# Patient Record
Sex: FEMALE | Race: WHITE | NOT HISPANIC OR LATINO | Employment: UNEMPLOYED | URBAN - METROPOLITAN AREA
[De-identification: names, ages, dates, MRNs, and addresses within clinical notes are randomized per-mention and may not be internally consistent; named-entity substitution may affect disease eponyms.]

---

## 2017-08-07 ENCOUNTER — OFFICE VISIT (OUTPATIENT)
Dept: RHEUMATOLOGY | Facility: CLINIC | Age: 42
End: 2017-08-07
Payer: COMMERCIAL

## 2017-08-07 VITALS
HEIGHT: 62 IN | DIASTOLIC BLOOD PRESSURE: 76 MMHG | SYSTOLIC BLOOD PRESSURE: 132 MMHG | BODY MASS INDEX: 36.62 KG/M2 | WEIGHT: 199 LBS

## 2017-08-07 DIAGNOSIS — M35.9 CONNECTIVE TISSUE DISEASE, UNDIFFERENTIATED: Primary | ICD-10-CM

## 2017-08-07 LAB
ALBUMIN SERPL-MCNC: 4.2 G/DL (ref 3.1–4.7)
ALP SERPL-CCNC: 81 IU/L (ref 40–104)
ALT (SGPT): 21 IU/L (ref 3–33)
AST SERPL-CCNC: 22 IU/L (ref 10–40)
BASOPHILS NFR BLD: 0 K/UL (ref 0–0.2)
BASOPHILS NFR BLD: 0.4 %
BILIRUB SERPL-MCNC: 0.5 MG/DL (ref 0.3–1)
BUN SERPL-MCNC: 15 MG/DL (ref 8–20)
CALCIUM SERPL-MCNC: 9.1 MG/DL (ref 7.7–10.4)
CHLORIDE: 103 MMOL/L (ref 98–110)
CO2 SERPL-SCNC: 26.6 MMOL/L (ref 22.8–31.6)
CREATININE: 0.52 MG/DL (ref 0.6–1.4)
EOSINOPHIL NFR BLD: 0.1 K/UL (ref 0–0.7)
EOSINOPHIL NFR BLD: 0.9 %
ERYTHROCYTE [DISTWIDTH] IN BLOOD BY AUTOMATED COUNT: 16.7 % (ref 11.7–14.9)
FOLATE SERPL-MCNC: 18 NG/ML (ref 2.2–11.2)
GLUCOSE: 109 MG/DL (ref 70–99)
GRAN #: 3.6 K/UL (ref 1.4–6.5)
GRAN%: 65 %
HCT VFR BLD AUTO: 39.7 % (ref 36–48)
HGB BLD-MCNC: 13 G/DL (ref 12–15)
IMMATURE GRANS (ABS): 0 K/UL (ref 0–1)
IMMATURE GRANULOCYTES: 0.4 %
LYMPH #: 1.5 K/UL (ref 1.2–3.4)
LYMPH%: 26.4 %
MCH RBC QN AUTO: 30 PG (ref 25–35)
MCHC RBC AUTO-ENTMCNC: 32.7 G/DL (ref 31–36)
MCV RBC AUTO: 91.7 FL (ref 79–98)
MONO #: 0.4 K/UL (ref 0.1–0.6)
MONO%: 6.9 %
NUCLEATED RBCS: 0 %
PLATELET # BLD AUTO: 195 K/UL (ref 140–440)
PMV BLD AUTO: 11.5 FL (ref 8.8–12.7)
POTASSIUM SERPL-SCNC: 4.4 MMOL/L (ref 3.5–5)
PROT SERPL-MCNC: 7.7 G/DL (ref 6–8.2)
RBC # BLD AUTO: 4.33 M/UL (ref 3.5–5.5)
SODIUM: 138 MMOL/L (ref 134–144)
URATE SERPL-MCNC: 2.7 MG/DL (ref 2.6–7.8)
VITAMIN B12: 341 PG/ML (ref 62–940)
WBC # BLD AUTO: 5.5 K/UL (ref 5–10)

## 2017-08-07 PROCEDURE — 3008F BODY MASS INDEX DOCD: CPT | Mod: ,,, | Performed by: INTERNAL MEDICINE

## 2017-08-07 PROCEDURE — 99204 OFFICE O/P NEW MOD 45 MIN: CPT | Mod: ,,, | Performed by: INTERNAL MEDICINE

## 2017-08-07 RX ORDER — TRIAMCINOLONE ACETONIDE 1 MG/G
CREAM TOPICAL
COMMUNITY
Start: 2017-07-01 | End: 2017-12-07 | Stop reason: SDUPTHER

## 2017-08-07 RX ORDER — DOXEPIN HYDROCHLORIDE 50 MG/1
CAPSULE ORAL
COMMUNITY
Start: 2017-07-01 | End: 2017-10-30 | Stop reason: SDUPTHER

## 2017-08-07 RX ORDER — PROMETHAZINE HYDROCHLORIDE 12.5 MG/1
12.5 TABLET ORAL
COMMUNITY
Start: 2017-05-31 | End: 2018-01-29 | Stop reason: SDUPTHER

## 2017-08-07 RX ORDER — GLY/DIMETH/PETROLAT,WHT/WATER
CREAM (GRAM) TOPICAL
COMMUNITY
Start: 2017-07-01 | End: 2019-04-29 | Stop reason: SDUPTHER

## 2017-08-07 RX ORDER — POLYETHYLENE GLYCOL 3350 17 G/17G
POWDER, FOR SOLUTION ORAL
COMMUNITY
Start: 2017-06-02 | End: 2017-10-30 | Stop reason: SDUPTHER

## 2017-08-07 RX ORDER — HYDROCORTISONE 1 %
CREAM (GRAM) TOPICAL
COMMUNITY
Start: 2017-07-01 | End: 2018-03-05

## 2017-08-07 RX ORDER — GABAPENTIN 400 MG/1
CAPSULE ORAL
COMMUNITY
Start: 2017-07-01 | End: 2017-10-30

## 2017-08-07 RX ORDER — KETOCONAZOLE 20 MG/G
CREAM TOPICAL
COMMUNITY
Start: 2017-07-01 | End: 2018-03-05

## 2017-08-07 RX ORDER — HYDROXYCHLOROQUINE SULFATE 200 MG/1
400 TABLET, FILM COATED ORAL DAILY
COMMUNITY
Start: 2017-07-01 | End: 2018-04-12 | Stop reason: SDUPTHER

## 2017-08-07 RX ORDER — ATORVASTATIN CALCIUM 40 MG/1
TABLET, FILM COATED ORAL
COMMUNITY
Start: 2017-07-01 | End: 2017-10-30 | Stop reason: SDUPTHER

## 2017-08-07 RX ORDER — MV-MN/IRON/FA/VIT K/K.GINSENG 18-400-25
1 TABLET ORAL DAILY
COMMUNITY
Start: 2017-06-05 | End: 2019-04-29

## 2017-08-07 RX ORDER — OMEPRAZOLE 40 MG/1
CAPSULE, DELAYED RELEASE ORAL
COMMUNITY
Start: 2017-06-22 | End: 2017-10-30 | Stop reason: SDUPTHER

## 2017-08-07 RX ORDER — CONJUGATED ESTROGENS 0.62 MG/G
CREAM VAGINAL
COMMUNITY
Start: 2017-07-01 | End: 2018-01-29

## 2017-08-07 RX ORDER — MUPIROCIN 20 MG/G
OINTMENT TOPICAL
COMMUNITY
Start: 2017-07-01 | End: 2018-03-05

## 2017-08-07 RX ORDER — DICLOFENAC SODIUM 75 MG/1
TABLET, DELAYED RELEASE ORAL
COMMUNITY
Start: 2017-07-01 | End: 2017-10-30 | Stop reason: SDUPTHER

## 2017-08-07 RX ORDER — GENTAMICIN SULFATE 1 MG/G
OINTMENT TOPICAL
COMMUNITY
Start: 2017-06-05 | End: 2017-12-01

## 2017-08-07 RX ORDER — ERGOCALCIFEROL 1.25 MG/1
CAPSULE ORAL
COMMUNITY
Start: 2017-05-26 | End: 2017-10-30 | Stop reason: SDUPTHER

## 2017-08-07 RX ORDER — DICYCLOMINE HYDROCHLORIDE 10 MG/1
10 CAPSULE ORAL 2 TIMES DAILY
COMMUNITY
Start: 2017-05-31 | End: 2018-04-12 | Stop reason: DRUGHIGH

## 2017-08-07 NOTE — PROGRESS NOTES
[facility] RHEUMATOLOGY        NEW PATIENT      Subjective:       Patient ID:   NAME: Promise Medrano : 1975     42 y.o. female    Referring Doc: No ref. provider found  Other Physicians:    Chief Complaint:  new patient (pt was diagnosed with Lupus by Dr. Chambers)      History of Present Illness:     New patient diagnosed by resident physician in [place] with Lupus.  Diagnosed based on positive PRASHANTH 1;320.Diagnosed based on arthralgias in PIPs,R 3 MCP and R trigger finger.  Fatigue for a few yrs.Interrupted sleep,loud snoring,tested yrs ago for sleep apnea but was neg.  No rashes,Photosensitive rash on chest and neck.  Dry mouth ,thinks from meds.No mucosal ulcerations.? Raynaud's        ROS:   GEN: no fevers,+ night sweats ,+ significant weight changes ( gained 20-30 pounds within last 6 months) +  fatigue  HEENT: no HA's, changes in vision , no mouth ulcers, no sicca symptoms, no scalp tenderness, jaw claudication  CV: no CP, SOB, PND, DE [place] ROSA or orthopnea,no palpitations,,Saw cardiolioist ,work up in progress for syncope  PULM:no SOB, cough, hemoptysis, sputum or pleuritic pain.Occ DE LA ROSA  GI: no abdominal pain, nausea, vomiting, constipation, diarrhea, melanotic stools, BRBPR, or hematemesis, no dysphagia,+Fatty liver  : no hematuria, dysuria,Nephrolithiasis.No hx renal dysfunction  NEURO: paresthesias in hands and feet on and off,No  headaches, visual disturbances, muscle weakness  SKIN:  no rashes , erythema, bruising, or swelling, +- Raynauds, +photosensitivity  MUSCULOSKELETAL:no joint swelling except as in HPI, + prolonged AM stiffness in hands lasting an hour no back pain   PSYCH:    Insomnia,  - depression, + anxiety  HEM; no hematologic disorders,thromboembolic episodes or miscarriages  Medications:    Current Outpatient Prescriptions:     atorvastatin (LIPITOR) 40 MG tablet, , Disp: , Rfl:     CENTRUM SPECIALIST ENERGY 18 mg iron-400 mcg-25 mcg-75mg Tab, , Disp: , Rfl:     CETAPHIL MOISTURIZING  "cream, , Disp: , Rfl:     COLLOIDAL OATMEAL (NATURAL OATMEAL BATH TREATMENT TOP), , Disp: , Rfl:     diclofenac (VOLTAREN) 75 MG EC tablet, , Disp: , Rfl:     dicyclomine (BENTYL) 10 MG capsule, , Disp: , Rfl:     doxepin (SINEQUAN) 50 MG capsule, , Disp: , Rfl:     ergocalciferol (ERGOCALCIFEROL) 50,000 unit Cap, , Disp: , Rfl:     gabapentin (NEURONTIN) 400 MG capsule, , Disp: , Rfl:     gentamicin (GARAMYCIN) 0.1 % ointment, , Disp: , Rfl:     hydrocortisone 1 % cream, , Disp: , Rfl:     hydroxychloroquine (PLAQUENIL) 200 mg tablet, 400 mg., Disp: , Rfl:     ibuprofen (ADVIL,MOTRIN) 600 MG tablet, Take 1 tablet (600 mg total) by mouth every 6 (six) hours as needed for Pain., Disp: 20 tablet, Rfl: 0    ketoconazole (NIZORAL) 2 % cream, , Disp: , Rfl:     mupirocin (BACTROBAN) 2 % ointment, , Disp: , Rfl:     omeprazole (PRILOSEC) 40 MG capsule, , Disp: , Rfl:     polyethylene glycol (GLYCOLAX) 17 gram/dose powder, , Disp: , Rfl:     PREMARIN vaginal cream, , Disp: , Rfl:     promethazine (PHENERGAN) 12.5 MG Tab, , Disp: , Rfl:     triamcinolone acetonide 0.1% (KENALOG) 0.1 % cream, , Disp: , Rfl: FAMILY HISTORY: negative for Connective Tissue Disease  FAMILY HX: sister with Lupus,mother with Raynauds    PAST MEDICAL HISTORY:Interstitial Lung Disease 7 yrs ago,lasted 1 1/2 yrs.Was treated with steroid  For one and a half year,DM age 13, low BP                                                Nephrolithiasis  PAST SURGICAL HISTORY:Hysterectomy,Tonsillectomy,C/S,,Breast Augmentation,reemoval fatty tumor in abdomen,    SOCIAL HISTORY:used to work as  for [email or web address] company.Not working outside home for 5 yrs.Lives with  and 2 kids,8,11    ALLERGIES:NKDA          Objective:     Vitals:  Blood pressure 132/76, height 5' 2" (1.575 m), weight 90.3 kg (199 lb).    Physical Examination:   GEN: no apparent distress, comfortable; AAOx3  SKIN: no rashes, no lesions, no sclerodactyly or " induration, no Raynaud's, no periungual erythema  HEAD: normal  EYES: no pallor, no icterus, PERRLA   ENT:  no thrush,no mucosal dryness or ulcerations  NECK: no masses, thyroid normal, trachea midline, no LAD/LN's, supple  CV:   S1 and S2 regular, no murmurs, gallop or rubs  CHEST: Normal respiratory effort;  normal breath sounds; no rubs, no wheezes, no crackles.   ABDOM: nontender and nondistended; soft; ; no rebound/guarding,no masses  MUSC/Skeletal: ROM normal; no crepitus; joints without synovitis, no deformities   EXTREM: no clubbing, cyanosis, edema, normal pulses.  NEURO: grossly intact; motorWNL; AAOx3; no tremors  PSYCH: normal mood, affect and behavior  LYMPH: normal cervical, supraclavicular            Labs:   @RESUFAST(WBC,HGB,HCT,MCV,PLT)  )@RESUFAST(NA,K,CL,CO2,GLU,BUN,Creatinine,Calcium,PROT,Albumin,Bilitot,Alkphos,AST,ALT,PRASHANTH,Sed Rate,CRP,RF,CCP)      Radiology/Diagnostic Studies:    I have reviewed all available labs and XRay reports    Assessment/Plan:   42 y.o. female with positive PRASHANTH,normal CBC,CMP,TSH from a few months ago  Reduce Plaquenil to 200 mg bid  PRASHANTH panel,antithyroid,antiphospholipids etc        PLAN:as above          Discussion:     I have explained all of the above in detail and the patient understands all of the current recommendation(s). I have answered all of their questions to the best of my ability and to their complete satisfaction.      I have reviewed the risks and benefits of the medication in detail with patient, who understands and wishes to proceed. Printed information regarding the disease and/or medication was also provided.        RTC 4 months      Electronically signed by Jonnathan James MD

## 2017-08-08 LAB
C3 SERPL-MCNC: 136 MG/DL (ref 82–167)
C4 NEF SERPL-MCNC: 17 MG/DL (ref 14–44)
HCV AB SERPL QL IA: <0.1 S/CO RATIO (ref 0–0.9)

## 2017-10-30 ENCOUNTER — OFFICE VISIT (OUTPATIENT)
Dept: FAMILY MEDICINE | Facility: CLINIC | Age: 42
End: 2017-10-30
Payer: MEDICAID

## 2017-10-30 VITALS
TEMPERATURE: 98 F | RESPIRATION RATE: 18 BRPM | OXYGEN SATURATION: 99 % | HEART RATE: 63 BPM | BODY MASS INDEX: 35.33 KG/M2 | WEIGHT: 192 LBS | DIASTOLIC BLOOD PRESSURE: 78 MMHG | HEIGHT: 62 IN | SYSTOLIC BLOOD PRESSURE: 125 MMHG

## 2017-10-30 DIAGNOSIS — K21.9 GERD WITHOUT ESOPHAGITIS: ICD-10-CM

## 2017-10-30 DIAGNOSIS — R10.11 RUQ PAIN: Primary | ICD-10-CM

## 2017-10-30 DIAGNOSIS — M32.8 OTHER FORMS OF SYSTEMIC LUPUS ERYTHEMATOSUS, UNSPECIFIED ORGAN INVOLVEMENT STATUS: ICD-10-CM

## 2017-10-30 DIAGNOSIS — E55.9 VITAMIN D DEFICIENCY: ICD-10-CM

## 2017-10-30 DIAGNOSIS — R55 VASOVAGAL SYNCOPE: ICD-10-CM

## 2017-10-30 DIAGNOSIS — Z98.84 HISTORY OF DIABETES MELLITUS RESOLVED FOLLOWING BARIATRIC SURGERY: ICD-10-CM

## 2017-10-30 DIAGNOSIS — E78.2 MIXED HYPERLIPIDEMIA: ICD-10-CM

## 2017-10-30 DIAGNOSIS — Z98.84 HISTORY OF BARIATRIC SURGERY: ICD-10-CM

## 2017-10-30 DIAGNOSIS — F51.04 CHRONIC INSOMNIA: ICD-10-CM

## 2017-10-30 DIAGNOSIS — Z86.39 HISTORY OF DIABETES MELLITUS RESOLVED FOLLOWING BARIATRIC SURGERY: ICD-10-CM

## 2017-10-30 DIAGNOSIS — Z72.0 TOBACCO ABUSE: ICD-10-CM

## 2017-10-30 DIAGNOSIS — K58.1 IRRITABLE BOWEL SYNDROME WITH CONSTIPATION: ICD-10-CM

## 2017-10-30 DIAGNOSIS — G62.9 PERIPHERAL POLYNEUROPATHY: ICD-10-CM

## 2017-10-30 PROCEDURE — 99204 OFFICE O/P NEW MOD 45 MIN: CPT | Mod: ,,, | Performed by: INTERNAL MEDICINE

## 2017-10-30 RX ORDER — VARENICLINE TARTRATE 1 MG/1
1 TABLET, FILM COATED ORAL 2 TIMES DAILY
Qty: 60 TABLET | Refills: 3 | Status: SHIPPED | OUTPATIENT
Start: 2017-10-30 | End: 2018-04-12

## 2017-10-30 RX ORDER — OMEPRAZOLE 40 MG/1
40 CAPSULE, DELAYED RELEASE ORAL EVERY MORNING
Qty: 30 CAPSULE | Refills: 5 | Status: SHIPPED | OUTPATIENT
Start: 2017-10-30 | End: 2019-03-21 | Stop reason: SDUPTHER

## 2017-10-30 RX ORDER — ERGOCALCIFEROL 1.25 MG/1
50000 CAPSULE ORAL
Qty: 4 CAPSULE | Refills: 5 | Status: SHIPPED | OUTPATIENT
Start: 2017-10-30 | End: 2018-06-20 | Stop reason: SDUPTHER

## 2017-10-30 RX ORDER — DULOXETIN HYDROCHLORIDE 30 MG/1
30 CAPSULE, DELAYED RELEASE ORAL NIGHTLY
Qty: 30 CAPSULE | Refills: 2 | Status: SHIPPED | OUTPATIENT
Start: 2017-10-30 | End: 2017-12-01 | Stop reason: SDUPTHER

## 2017-10-30 RX ORDER — POLYETHYLENE GLYCOL 3350 17 G/17G
POWDER, FOR SOLUTION ORAL
Qty: 510 G | Refills: 5 | Status: SHIPPED | OUTPATIENT
Start: 2017-10-30 | End: 2018-08-28

## 2017-10-30 RX ORDER — DICLOFENAC SODIUM 75 MG/1
75 TABLET, DELAYED RELEASE ORAL 2 TIMES DAILY PRN
Qty: 60 TABLET | Refills: 3 | Status: SHIPPED | OUTPATIENT
Start: 2017-10-30 | End: 2018-03-15 | Stop reason: SDUPTHER

## 2017-10-30 RX ORDER — DOXEPIN HYDROCHLORIDE 50 MG/1
50 CAPSULE ORAL NIGHTLY
Qty: 30 CAPSULE | Refills: 5 | Status: SHIPPED | OUTPATIENT
Start: 2017-10-30 | End: 2018-10-08 | Stop reason: SDUPTHER

## 2017-10-30 RX ORDER — VARENICLINE TARTRATE 0.5 (11)-1
KIT ORAL
COMMUNITY
Start: 2017-10-24 | End: 2017-10-30

## 2017-10-30 RX ORDER — ATORVASTATIN CALCIUM 40 MG/1
40 TABLET, FILM COATED ORAL DAILY
Qty: 30 TABLET | Refills: 5 | Status: SHIPPED | OUTPATIENT
Start: 2017-10-30 | End: 2018-04-12 | Stop reason: DRUGHIGH

## 2017-10-30 NOTE — ASSESSMENT & PLAN NOTE
Change from gabapentin to cymbalta. Will obtain routine gastric bypass labs, consider further w/u for neuropathy.

## 2017-10-30 NOTE — PROGRESS NOTES
"                                                NEW ADULT PATIENT NOTE    Subjective:     Chief Complaint:  Establish Care      History of Present Illness:   Promise Medrano is a 42 y.o. female here to establish care.   Lupus- Dx by resident at [place]. Now seeing Dr. James. Had labs done 8/2017.   RUQ pain- Pt c/o RUQ pain with bending over, squatting or pressing on belly x 3 years.  Not assoicated with eating.  Has been complaining of this for years, states she hwas told it was a hernia and had surgery but that didn't fix the pain. She was also told she has a fatty liver, not sure if that could be causing the pain.  +h/o IBS with constipation, doesn't take miralax regularly.   Neuropathic pain- Pt has tingling pain in B hands and feet, unclear if work up has been done to determine the etiology of this. She did have DM from age 13 until she had gastric bypass surgery.    Recurrent syncope- Pt recently saw Dr. Wilks and had + tilt table test in which her "heart stopped." Was told she has vasovagal syncope.      Past Medical History:   Diagnosis Date    Allergy     Arthritis     COPD (chronic obstructive pulmonary disease)     Diabetes mellitus     GERD (gastroesophageal reflux disease)     Neuromuscular disorder        Family History   Problem Relation Age of Onset    Early death Father     Heart disease Father     Stroke Father     Diabetes Paternal Grandmother        Social History     Social History    Marital status: Single     Spouse name: N/A    Number of children: N/A    Years of education: N/A     Occupational History    Not on file.     Social History Main Topics    Smoking status: Current Every Day Smoker     Packs/day: 1.00    Smokeless tobacco: Not on file    Alcohol use Yes    Drug use: No    Sexual activity: Yes     Partners: Male     Birth control/ protection: None     Other Topics Concern    Not on file     Social History Narrative    No narrative on file       ROS:  Review of " Systems   Constitutional: Negative for activity change, appetite change, fatigue and unexpected weight change.   HENT: Negative for congestion, ear pain, rhinorrhea, sinus pain, sinus pressure, sore throat and trouble swallowing.    Eyes: Negative for pain, redness and visual disturbance.   Respiratory: Negative for cough, chest tightness, shortness of breath and wheezing.    Cardiovascular: Negative for chest pain and palpitations.   Gastrointestinal: Positive for abdominal pain and constipation. Negative for diarrhea, nausea and vomiting.   Endocrine: Negative for cold intolerance, heat intolerance, polydipsia and polyuria.   Genitourinary: Negative for difficulty urinating, dysuria, flank pain, frequency, pelvic pain, vaginal bleeding and vaginal discharge.   Musculoskeletal: Positive for arthralgias and joint swelling.   Neurological: Positive for dizziness and syncope. Negative for seizures, weakness and headaches.   Hematological: Negative for adenopathy.   Psychiatric/Behavioral: Negative for confusion, dysphoric mood and suicidal ideas. The patient is not nervous/anxious.           Current Outpatient Prescriptions:     atorvastatin (LIPITOR) 40 MG tablet, Take 1 tablet (40 mg total) by mouth once daily., Disp: 30 tablet, Rfl: 5    CENTRUM SPECIALIST ENERGY 18 mg iron-400 mcg-25 mcg-75mg Tab, , Disp: , Rfl:     CETAPHIL MOISTURIZING cream, , Disp: , Rfl:     COLLOIDAL OATMEAL (NATURAL OATMEAL BATH TREATMENT TOP), , Disp: , Rfl:     diclofenac (VOLTAREN) 75 MG EC tablet, Take 1 tablet (75 mg total) by mouth 2 (two) times daily as needed (joint pain)., Disp: 60 tablet, Rfl: 3    dicyclomine (BENTYL) 10 MG capsule, , Disp: , Rfl:     doxepin (SINEQUAN) 50 MG capsule, Take 1 capsule (50 mg total) by mouth nightly., Disp: 30 capsule, Rfl: 5    DULoxetine (CYMBALTA) 30 MG capsule, Take 1 capsule (30 mg total) by mouth every evening., Disp: 30 capsule, Rfl: 2    ergocalciferol (ERGOCALCIFEROL) 50,000 unit  "Cap, Take 1 capsule (50,000 Units total) by mouth every 7 days., Disp: 4 capsule, Rfl: 5    hydrocortisone 1 % cream, , Disp: , Rfl:     hydroxychloroquine (PLAQUENIL) 200 mg tablet, 400 mg., Disp: , Rfl:     ketoconazole (NIZORAL) 2 % cream, , Disp: , Rfl:     mupirocin (BACTROBAN) 2 % ointment, , Disp: , Rfl:     omeprazole (PRILOSEC) 40 MG capsule, Take 1 capsule (40 mg total) by mouth every morning., Disp: 30 capsule, Rfl: 5    polyethylene glycol (GLYCOLAX) 17 gram/dose powder, Mix 17g (1 capful) with 6 oz liquid and take by mouth daily., Disp: 510 g, Rfl: 5    PREMARIN vaginal cream, , Disp: , Rfl:     promethazine (PHENERGAN) 12.5 MG Tab, , Disp: , Rfl:     triamcinolone acetonide 0.1% (KENALOG) 0.1 % cream, , Disp: , Rfl:     urea 40 % Lotn lotion, , Disp: , Rfl:     varenicline (CHANTIX) 1 mg Tab, Take 1 tablet (1 mg total) by mouth 2 (two) times daily., Disp: 60 tablet, Rfl: 3        Objective:       Physical Examination:     /78 (BP Location: Left arm, Patient Position: Sitting, BP Method: Large (Manual))   Pulse 63   Temp 97.8 °F (36.6 °C) (Oral)   Resp 18   Ht 5' 2" (1.575 m)   Wt 87.1 kg (192 lb)   SpO2 99%   BMI 35.12 kg/m²     Physical Exam   Constitutional: She is oriented to person, place, and time. She appears well-developed and well-nourished. No distress.   HENT:   Head: Normocephalic and atraumatic.   Nose: Nose normal.   Mouth/Throat: Oropharynx is clear and moist.   Eyes: Conjunctivae and EOM are normal. Pupils are equal, round, and reactive to light.   Neck: Normal range of motion. Neck supple. No thyromegaly present.   Cardiovascular: Normal rate, regular rhythm, normal heart sounds and intact distal pulses.    No murmur heard.  Pulmonary/Chest: Effort normal and breath sounds normal.   Abdominal: Soft. Bowel sounds are normal. She exhibits no distension and no mass. There is tenderness (diffuse). There is no guarding.   Musculoskeletal: She exhibits no edema. "   Lymphadenopathy:     She has no cervical adenopathy.   Neurological: She is alert and oriented to person, place, and time.   Skin: Skin is warm and dry.         Assessment/Plan:   Promise is a 42 y.o. female here to establish care.     Problem List Items Addressed This Visit        Neuro    Peripheral polyneuropathy    Current Assessment & Plan     Change from gabapentin to cymbalta. Will obtain routine gastric bypass labs, consider further w/u for neuropathy.          Relevant Medications    DULoxetine (CYMBALTA) 30 MG capsule       Cardiac/Vascular    Mixed hyperlipidemia    Current Assessment & Plan     Continue atorvastatin. Check lipids.          Relevant Medications    atorvastatin (LIPITOR) 40 MG tablet    Other Relevant Orders    Lipid panel (Completed)       Immunology/Multi System    Other forms of systemic lupus erythematosus    Current Assessment & Plan     F/u with Dr. James. ON plaquenil. Recent labs did not indicate active disease.          Relevant Medications    diclofenac (VOLTAREN) 75 MG EC tablet       Endocrine    Vitamin D deficiency    Current Assessment & Plan     Continue vitamin D supplementation. Recheck level.          Relevant Medications    ergocalciferol (ERGOCALCIFEROL) 50,000 unit Cap    Other Relevant Orders    Vitamin D (Completed)    History of bariatric surgery    Current Assessment & Plan     Check CBC, iron panel, zinc, copper, CMP, B12, folate.          Relevant Orders    Comprehensive metabolic panel (Completed)    CBC auto differential (Completed)    Copper, serum (Completed)    Folate (Completed)    Vitamin B12 (Completed)    Iron and TIBC (Completed)    Ferritin (Completed)    Zinc (Completed)    History of diabetes mellitus resolved following bariatric surgery    Current Assessment & Plan     Check HbA1c         Relevant Orders    Hemoglobin A1c (Completed)       GI    GERD without esophagitis    Current Assessment & Plan     Continue PPI.         RUQ pain - Primary     Current Assessment & Plan     Ddx includes fatty liver, constipation, IBS.  Recent colonoscopy WNL. Check RUQ u/s.          Relevant Orders    US Abdomen Limited (Completed)    Irritable bowel syndrome with constipation    Current Assessment & Plan     Advised daily miralax. May consider amitiza or linzess.          Relevant Medications    polyethylene glycol (GLYCOLAX) 17 gram/dose powder       Other    Chronic insomnia    Current Assessment & Plan     Continue doxepin.         Relevant Medications    doxepin (SINEQUAN) 50 MG capsule    Tobacco abuse    Current Assessment & Plan     Rx chantix.         Relevant Medications    varenicline (CHANTIX) 1 mg Tab    Vasovagal syncope    Current Assessment & Plan     Will obtain cardiology notes and testing done.                Health Maintenance   Topic Date Due    TETANUS VACCINE  07/12/1994    Pneumococcal PPSV23 (Medium Risk) (1) 07/12/1994    Mammogram  07/12/2016    Influenza Vaccine  Completed    Lipid Panel  Completed       Discussion:     Return in about 1 month (around 11/30/2017) for f/u lab results.    Goals     None          Electronically signed by Lexy Adamson

## 2017-11-20 LAB
% SATURATION: 10 %
ALBUMIN SERPL-MCNC: 4.1 G/DL (ref 3.1–4.7)
ALP SERPL-CCNC: 80 IU/L (ref 40–104)
ALT (SGPT): 13 IU/L (ref 3–33)
AST SERPL-CCNC: 17 IU/L (ref 10–40)
BASOPHILS NFR BLD: 0 K/UL (ref 0–0.2)
BASOPHILS NFR BLD: 0.7 %
BILIRUB SERPL-MCNC: 0.6 MG/DL (ref 0.3–1)
BUN SERPL-MCNC: 15 MG/DL (ref 8–20)
CALCIUM SERPL-MCNC: 8.9 MG/DL (ref 7.7–10.4)
CHLORIDE: 106 MMOL/L (ref 98–110)
CO2 SERPL-SCNC: 26.6 MMOL/L (ref 22.8–31.6)
CREATININE: 0.54 MG/DL (ref 0.6–1.4)
EOSINOPHIL NFR BLD: 0 K/UL (ref 0–0.7)
EOSINOPHIL NFR BLD: 0.5 %
ERYTHROCYTE [DISTWIDTH] IN BLOOD BY AUTOMATED COUNT: 15.8 % (ref 11.7–14.9)
FERRITIN SERPL-MCNC: 12 NG/ML (ref 24–162)
FOLATE SERPL-MCNC: 18.1 NG/ML (ref 2.2–11.2)
GLUCOSE: 87 MG/DL (ref 70–99)
GRAN #: 2.2 K/UL (ref 1.4–6.5)
GRAN%: 53.7 %
HBA1C MFR BLD: 6 % (ref 3.1–6.5)
HCT VFR BLD AUTO: 38.9 % (ref 36–48)
HGB BLD-MCNC: 12.8 G/DL (ref 12–15)
IMMATURE GRANS (ABS): 0 K/UL (ref 0–1)
IMMATURE GRANULOCYTES: 0.2 %
IRON: 44 MCG/DL (ref 24–162)
LYMPH #: 1.5 K/UL (ref 1.2–3.4)
LYMPH%: 37.5 %
MCH RBC QN AUTO: 29.8 PG (ref 25–35)
MCHC RBC AUTO-ENTMCNC: 32.9 G/DL (ref 31–36)
MCV RBC AUTO: 90.5 FL (ref 79–98)
MONO #: 0.3 K/UL (ref 0.1–0.6)
MONO%: 7.4 %
NUCLEATED RBCS: 0 %
PLATELET # BLD AUTO: 200 K/UL (ref 140–440)
PMV BLD AUTO: 11.2 FL (ref 8.8–12.7)
POTASSIUM SERPL-SCNC: 4 MMOL/L (ref 3.5–5)
PROT SERPL-MCNC: 7.5 G/DL (ref 6–8.2)
RBC # BLD AUTO: 4.3 M/UL (ref 3.5–5.5)
SODIUM: 140 MMOL/L (ref 134–144)
TOTAL IRON BINDING CAPACITY: 421 MCG/DL (ref 177–435)
VITAMIN B12: 230 PG/ML (ref 62–940)
VITAMIN D, 1,25 (OH)2: 67.5 NG/ML (ref 30–100)
WBC # BLD AUTO: 4 K/UL (ref 5–10)

## 2017-11-22 ENCOUNTER — PATIENT MESSAGE (OUTPATIENT)
Dept: FAMILY MEDICINE | Facility: CLINIC | Age: 42
End: 2017-11-22

## 2017-12-01 ENCOUNTER — OFFICE VISIT (OUTPATIENT)
Dept: FAMILY MEDICINE | Facility: CLINIC | Age: 42
End: 2017-12-01
Payer: MEDICAID

## 2017-12-01 VITALS
OXYGEN SATURATION: 98 % | WEIGHT: 194 LBS | SYSTOLIC BLOOD PRESSURE: 111 MMHG | DIASTOLIC BLOOD PRESSURE: 76 MMHG | BODY MASS INDEX: 35.48 KG/M2 | RESPIRATION RATE: 18 BRPM | HEART RATE: 72 BPM | TEMPERATURE: 98 F

## 2017-12-01 DIAGNOSIS — R09.82 POST-NASAL DRIP: ICD-10-CM

## 2017-12-01 DIAGNOSIS — M32.8 OTHER FORMS OF SYSTEMIC LUPUS ERYTHEMATOSUS, UNSPECIFIED ORGAN INVOLVEMENT STATUS: ICD-10-CM

## 2017-12-01 DIAGNOSIS — Z98.84 HISTORY OF BARIATRIC SURGERY: ICD-10-CM

## 2017-12-01 DIAGNOSIS — E61.1 IRON DEFICIENCY: ICD-10-CM

## 2017-12-01 DIAGNOSIS — G62.9 PERIPHERAL POLYNEUROPATHY: ICD-10-CM

## 2017-12-01 DIAGNOSIS — K58.1 IRRITABLE BOWEL SYNDROME WITH CONSTIPATION: ICD-10-CM

## 2017-12-01 DIAGNOSIS — E78.2 MIXED HYPERLIPIDEMIA: ICD-10-CM

## 2017-12-01 DIAGNOSIS — Z86.39 HISTORY OF DIABETES MELLITUS RESOLVED FOLLOWING BARIATRIC SURGERY: ICD-10-CM

## 2017-12-01 DIAGNOSIS — R10.11 RUQ PAIN: ICD-10-CM

## 2017-12-01 DIAGNOSIS — Z23 NEED FOR DIPHTHERIA-TETANUS-PERTUSSIS (TDAP) VACCINE: ICD-10-CM

## 2017-12-01 DIAGNOSIS — K64.9 HEMORRHOIDS, UNSPECIFIED HEMORRHOID TYPE: ICD-10-CM

## 2017-12-01 DIAGNOSIS — Z98.84 HISTORY OF DIABETES MELLITUS RESOLVED FOLLOWING BARIATRIC SURGERY: ICD-10-CM

## 2017-12-01 DIAGNOSIS — Z72.0 TOBACCO ABUSE: Primary | ICD-10-CM

## 2017-12-01 PROCEDURE — 90715 TDAP VACCINE 7 YRS/> IM: CPT | Mod: ,,, | Performed by: INTERNAL MEDICINE

## 2017-12-01 PROCEDURE — 99214 OFFICE O/P EST MOD 30 MIN: CPT | Mod: 25,,, | Performed by: INTERNAL MEDICINE

## 2017-12-01 PROCEDURE — 90471 IMMUNIZATION ADMIN: CPT | Mod: ,,, | Performed by: INTERNAL MEDICINE

## 2017-12-01 RX ORDER — UREA 40 G/100G
LOTION TOPICAL
COMMUNITY
Start: 2017-11-16 | End: 2021-01-06

## 2017-12-01 RX ORDER — FERROUS SULFATE 325(65) MG
325 TABLET ORAL DAILY
Qty: 30 TABLET | Refills: 5 | COMMUNITY
Start: 2017-12-01 | End: 2018-01-29 | Stop reason: SDUPTHER

## 2017-12-01 RX ORDER — FLUTICASONE PROPIONATE 50 MCG
1 SPRAY, SUSPENSION (ML) NASAL DAILY
Qty: 1 BOTTLE | Refills: 3 | Status: SHIPPED | OUTPATIENT
Start: 2017-12-01 | End: 2018-03-27 | Stop reason: SDUPTHER

## 2017-12-01 RX ORDER — HYDROCORTISONE ACETATE 25 MG/1
25 SUPPOSITORY RECTAL 2 TIMES DAILY
Qty: 20 SUPPOSITORY | Refills: 5 | Status: SHIPPED | OUTPATIENT
Start: 2017-12-01 | End: 2017-12-11

## 2017-12-01 RX ORDER — DULOXETIN HYDROCHLORIDE 60 MG/1
60 CAPSULE, DELAYED RELEASE ORAL NIGHTLY
Qty: 30 CAPSULE | Refills: 5 | Status: SHIPPED | OUTPATIENT
Start: 2017-12-01 | End: 2018-03-05

## 2017-12-01 NOTE — ASSESSMENT & PLAN NOTE
Increase cymbalta to 60mg. Check MMA since Vitamin B12 was borderline low.  Rest of labs were WNL.

## 2017-12-01 NOTE — ASSESSMENT & PLAN NOTE
F/u with Dr. James. Continue plaquenil. Recent labs did not indicate active disease.   Will d/w Dr. James starting pt on HRT in setting of SLE.

## 2017-12-01 NOTE — PROGRESS NOTES
ADULT FOLLOW-UP VISIT    Subjective:     Chief Complaint:  Follow-up      History of Present Illness:   Promise Medrano is a 42 y.o. female who presents for follow-up of medical issues.     Hemorrhoids- Pt c/o hemorrhoids recently since constipation worsened (though it has been better with daily miralax).  HRT- Pt reports that she had a full hysterectomy at age 33 and was on HRT with [facility] until last year when she was diagnosed with SLE and her PCP took her off.  She has been using premarin cream but would like to restart systemic therapy if possible.   Polyneuropathy- Labs showed mild iron deficiency and borderline low B12.  Pt reports that cymbalta is working for her neuropathy and she prefers it to the gabapentin.  She has been having some increased anxiety since stopping the gabapentin, though.  No side effects noted on cymbalta.      Past medical history, family history and social history are reviewed and there are no significant changes.     ROS:  Review of Systems   HENT: Positive for postnasal drip and rhinorrhea.    Eyes: Positive for visual disturbance (blurry vision off and on, pt has astigmatism).   Gastrointestinal: Positive for constipation and diarrhea. Negative for nausea and vomiting.   Neurological: Negative for tremors, syncope, light-headedness and headaches.   Psychiatric/Behavioral: Negative for confusion, dysphoric mood and sleep disturbance. The patient is nervous/anxious.           Current Outpatient Prescriptions:     atorvastatin (LIPITOR) 40 MG tablet, Take 1 tablet (40 mg total) by mouth once daily., Disp: 30 tablet, Rfl: 5    CENTRUM SPECIALIST ENERGY 18 mg iron-400 mcg-25 mcg-75mg Tab, , Disp: , Rfl:     CETAPHIL MOISTURIZING cream, , Disp: , Rfl:     COLLOIDAL OATMEAL (NATURAL OATMEAL BATH TREATMENT TOP), , Disp: , Rfl:     diclofenac (VOLTAREN) 75 MG EC tablet, Take 1 tablet (75 mg total) by mouth 2 (two) times daily as needed (joint pain).,  Disp: 60 tablet, Rfl: 3    dicyclomine (BENTYL) 10 MG capsule, , Disp: , Rfl:     doxepin (SINEQUAN) 50 MG capsule, Take 1 capsule (50 mg total) by mouth nightly., Disp: 30 capsule, Rfl: 5    DULoxetine (CYMBALTA) 60 MG capsule, Take 1 capsule (60 mg total) by mouth every evening., Disp: 30 capsule, Rfl: 5    ergocalciferol (ERGOCALCIFEROL) 50,000 unit Cap, Take 1 capsule (50,000 Units total) by mouth every 7 days., Disp: 4 capsule, Rfl: 5    hydrocortisone 1 % cream, , Disp: , Rfl:     hydroxychloroquine (PLAQUENIL) 200 mg tablet, 400 mg., Disp: , Rfl:     ketoconazole (NIZORAL) 2 % cream, , Disp: , Rfl:     mupirocin (BACTROBAN) 2 % ointment, , Disp: , Rfl:     omeprazole (PRILOSEC) 40 MG capsule, Take 1 capsule (40 mg total) by mouth every morning., Disp: 30 capsule, Rfl: 5    polyethylene glycol (GLYCOLAX) 17 gram/dose powder, Mix 17g (1 capful) with 6 oz liquid and take by mouth daily., Disp: 510 g, Rfl: 5    PREMARIN vaginal cream, , Disp: , Rfl:     promethazine (PHENERGAN) 12.5 MG Tab, , Disp: , Rfl:     triamcinolone acetonide 0.1% (KENALOG) 0.1 % cream, , Disp: , Rfl:     urea 40 % Lotn lotion, , Disp: , Rfl:     ferrous sulfate 325 mg (65 mg iron) Tab tablet, Take 1 tablet (325 mg total) by mouth once daily., Disp: 30 tablet, Rfl: 5    fluticasone (FLONASE) 50 mcg/actuation nasal spray, 1 spray by Each Nare route once daily., Disp: 1 Bottle, Rfl: 3    hydrocortisone (ANUSOL-HC) 25 mg suppository, Place 1 suppository (25 mg total) rectally 2 (two) times daily., Disp: 20 suppository, Rfl: 5    varenicline (CHANTIX) 1 mg Tab, Take 1 tablet (1 mg total) by mouth 2 (two) times daily., Disp: 60 tablet, Rfl: 3      Objective:     Physical Examination:     /76 (BP Location: Left arm, Patient Position: Sitting, BP Method: Large (Automatic))   Pulse 72   Temp 97.9 °F (36.6 °C) (Oral)   Resp 18   Wt 88 kg (194 lb)   SpO2 98%   BMI 35.48 kg/m²     Physical Exam   Constitutional: She  appears well-developed and well-nourished. No distress.   HENT:   Right Ear: Tympanic membrane and ear canal normal.   Left Ear: Tympanic membrane and ear canal normal.   Nose: Rhinorrhea present.   Mouth/Throat: Oropharynx is clear and moist. No oropharyngeal exudate or posterior oropharyngeal edema.   Eyes: Pupils are equal, round, and reactive to light.   Cardiovascular: Regular rhythm, normal heart sounds and intact distal pulses.    No murmur heard.  Pulmonary/Chest: Effort normal and breath sounds normal. She has no wheezes. She has no rales.   Musculoskeletal: She exhibits no edema.       Assessment/Plan:   Promise is a 42 y.o. female here for follow-up.    Problem List Items Addressed This Visit        Neuro    Peripheral polyneuropathy    Current Assessment & Plan     Increase cymbalta to 60mg. Check MMA since Vitamin B12 was borderline low.  Rest of labs were WNL.          Relevant Medications    DULoxetine (CYMBALTA) 60 MG capsule    Other Relevant Orders    Methylmalonic acid, serum       Cardiac/Vascular    Mixed hyperlipidemia    Current Assessment & Plan     Well controlled on atorvastatin. Lipids checked 10/2017.            Immunology/Multi System    Other forms of systemic lupus erythematosus    Current Assessment & Plan     F/u with Dr. James. Continue plaquenil. Recent labs did not indicate active disease.   Will d/w Dr. James starting pt on HRT in setting of SLE.             Endocrine    History of bariatric surgery    Current Assessment & Plan     Normal zinc, copper, CMP, folate. MMA ordered as above. Ferritin low, RDW elevated. Start daily Iron supplement.         History of diabetes mellitus resolved following bariatric surgery    Current Assessment & Plan     HbA1c 6.0% 10/2017.  Pt counseled to monitor sugar and carb intake. Check HbA1c Q6 months.            GI    RUQ pain    Current Assessment & Plan     Recent colonoscopy WNL, RUQ u/s WNL.  LFTs WNL.   Most likely related to  constipation/IBS.           Irritable bowel syndrome with constipation    Current Assessment & Plan     Continue daily miralax, if not helping can consider Amitiza or Linzess            Other    Tobacco abuse - Primary    Current Assessment & Plan     Pt has chantix but hasn't started it yet, quit date set for 1/1/18.               Other Visit Diagnoses     Iron deficiency        Relevant Medications    ferrous sulfate 325 mg (65 mg iron) Tab tablet    Hemorrhoids, unspecified hemorrhoid type        Relevant Medications    hydrocortisone (ANUSOL-HC) 25 mg suppository    Post-nasal drip        Relevant Medications    fluticasone (FLONASE) 50 mcg/actuation nasal spray    Need for diphtheria-tetanus-pertussis (Tdap) vaccine        Relevant Orders    Tdap Vaccine          Health Maintenance   Topic Date Due    TETANUS VACCINE  07/12/1994    Pneumococcal PPSV23 (Medium Risk) (1) 07/12/1994    Mammogram  07/12/2016    Influenza Vaccine  Completed    Lipid Panel  Completed     ?estrogen      Discussion:     Return in about 3 months (around 3/1/2018).    Goals     None          Electronically signed by Lexy Adamson

## 2017-12-01 NOTE — ASSESSMENT & PLAN NOTE
Normal zinc, copper, CMP, folate. MMA ordered as above. Ferritin low, RDW elevated. Start daily Iron supplement.

## 2017-12-07 ENCOUNTER — OFFICE VISIT (OUTPATIENT)
Dept: RHEUMATOLOGY | Facility: CLINIC | Age: 42
End: 2017-12-07
Payer: MEDICAID

## 2017-12-07 VITALS
WEIGHT: 197 LBS | DIASTOLIC BLOOD PRESSURE: 82 MMHG | BODY MASS INDEX: 36.25 KG/M2 | HEIGHT: 62 IN | SYSTOLIC BLOOD PRESSURE: 128 MMHG

## 2017-12-07 DIAGNOSIS — M15.9 OSTEOARTHRITIS, GENERALIZED: Primary | ICD-10-CM

## 2017-12-07 DIAGNOSIS — M35.9 CONNECTIVE TISSUE DISEASE, UNDIFFERENTIATED: ICD-10-CM

## 2017-12-07 PROCEDURE — 99213 OFFICE O/P EST LOW 20 MIN: CPT | Mod: ,,, | Performed by: INTERNAL MEDICINE

## 2017-12-07 NOTE — PROGRESS NOTES
[facility] RHEUMATOLOGY            PROGRESS NOTE      Subjective:       Patient ID:   NAME: Promise Medrano : 1975     42 y.o. female    Referring Doc: No ref. provider found  Other Physicians:    Chief Complaint:  No chief complaint on file.  Undifferentiated Connective Tissue Disease  History of Present Illness:     Patient returns today for a regularly scheduled follow-up visit for UCTD      The patient denies skin rashes,sicca sxs,mucosal ulcerations or Raynaud's  No cough or SOB,no CP  Continues with occ RUQ,had hall by Dr Adamson( apparently unrevealing)  No joint swelling.Occ arthralgias in finger joints.  Doing better overall on Cymbalta          ROS:   GEN:  No  fever, night sweats . weight is stable   + fatigue  SKIN: no rashes, no bruising, no ulcerations, no Raynaud's  HEENT: no HA's, No visual changes, no mucosal ulcers, no sicca symptoms,  CV:   no CP, SOB, PND, [place], no orthopnea, occ palpitations  PULM: normal with no SOB, cough, hemoptysis, sputum or pleuritic pain  GI:  no abdominal pain,+ occ  nausea,No vomiting,+ constipation(better ), diarrhea, melanotic stools, BRBPR, hematemesis, no dysphagia  :   no dysuria  NEURO: no paresthesias, headaches, visual disturbances, muscle weakness  MUSCULOSKELETAL:no joint swelling, prolonged AM stiffness, no back pain, +  muscle pain( neck pain)  Allergies:  Review of patient's allergies indicates:  No Known Allergies    Medications:    Current Outpatient Prescriptions:     atorvastatin (LIPITOR) 40 MG tablet, Take 1 tablet (40 mg total) by mouth once daily., Disp: 30 tablet, Rfl: 5    CENTRUM SPECIALIST ENERGY 18 mg iron-400 mcg-25 mcg-75mg Tab, , Disp: , Rfl:     CETAPHIL MOISTURIZING cream, , Disp: , Rfl:     diclofenac (VOLTAREN) 75 MG EC tablet, Take 1 tablet (75 mg total) by mouth 2 (two) times daily as needed (joint pain)., Disp: 60 tablet, Rfl: 3    dicyclomine (BENTYL) 10 MG capsule, , Disp: , Rfl:     doxepin (SINEQUAN) 50 MG capsule,  "Take 1 capsule (50 mg total) by mouth nightly., Disp: 30 capsule, Rfl: 5    DULoxetine (CYMBALTA) 60 MG capsule, Take 1 capsule (60 mg total) by mouth every evening., Disp: 30 capsule, Rfl: 5    ergocalciferol (ERGOCALCIFEROL) 50,000 unit Cap, Take 1 capsule (50,000 Units total) by mouth every 7 days., Disp: 4 capsule, Rfl: 5    ferrous sulfate 325 mg (65 mg iron) Tab tablet, Take 1 tablet (325 mg total) by mouth once daily., Disp: 30 tablet, Rfl: 5    fluticasone (FLONASE) 50 mcg/actuation nasal spray, 1 spray by Each Nare route once daily., Disp: 1 Bottle, Rfl: 3    hydrocortisone (ANUSOL-HC) 25 mg suppository, Place 1 suppository (25 mg total) rectally 2 (two) times daily., Disp: 20 suppository, Rfl: 5    hydrocortisone 1 % cream, , Disp: , Rfl:     hydroxychloroquine (PLAQUENIL) 200 mg tablet, 400 mg., Disp: , Rfl:     ketoconazole (NIZORAL) 2 % cream, , Disp: , Rfl:     mupirocin (BACTROBAN) 2 % ointment, , Disp: , Rfl:     omeprazole (PRILOSEC) 40 MG capsule, Take 1 capsule (40 mg total) by mouth every morning., Disp: 30 capsule, Rfl: 5    polyethylene glycol (GLYCOLAX) 17 gram/dose powder, Mix 17g (1 capful) with 6 oz liquid and take by mouth daily., Disp: 510 g, Rfl: 5    PREMARIN vaginal cream, , Disp: , Rfl:     promethazine (PHENERGAN) 12.5 MG Tab, , Disp: , Rfl:     urea 40 % Lotn lotion, , Disp: , Rfl:     varenicline (CHANTIX) 1 mg Tab, Take 1 tablet (1 mg total) by mouth 2 (two) times daily., Disp: 60 tablet, Rfl: 3    PMHx/PSHx Updates:        Last Eye Exam 3-4 months ago      Objective:     Vitals:  Blood pressure 128/82, height 5' 2" (1.575 m), weight 89.4 kg (197 lb).    Physical Examination:   GEN: no apparent distress, comfortable; AAOx3  SKIN: no rashes,no ulceration, no Raynaud's, no petechiae, no SQ nodules,  HEAD: normal  EYES: no pallor, no icterus, PERRLA  ENT:  ,no mucosal dryness or ulcerations  NECK: no masses, thyroid normal, trachea midline, no LAD/LN's, supple  CV: RRR " with no murmur; l S1 and S2 reg. ,no gallop no rubs,   CHEST: Normal respiratory effort; CTAB; normal breath sounds; no wheeze or crackles  ABDOM: nontender and nondistended; soft; no masses; no rebound/guarding  MUSC/Skeletal: ROM normal; no crepitus; joints without synovitis,  no deformities  No joint swelling or tenderness of PIP, MCP, wrist, elbow, shoulder, or knee joints  EXTREM: no clubbing, cyanosis, no edema,normal  pulses   NEURO: grossly intact; motor WNL; AAOx3;   PSYCH: normal mood, affect and behavior  LYMPH: normal cervical, supraclavicular          Labs:   Lab Results   Component Value Date    WBC 4.0 (L) 11/20/2017    HGB 12.8 11/20/2017    HCT 38.9 11/20/2017    MCV 90.5 11/20/2017     11/20/2017    CMP  @LASTLAB(NA,K,CL,CO2,GLU,BUN,Creatinine,Calcium,PROT,Albumin,Bilitot,Alkphos,AST,ALT,CRP,ESR,RF,CCP,PRASHANTH,SSA,CPK,uric acid) )@  I have reviewed all available lab results and radiology reports.    Radiology/Diagnostic Studies:        Assessment/Plan:   (1) 42 y.o. 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 I have answered all questions to the best of my ability and to their complete satisfaction.       The patient is to continue with the current management plan         RTC in   4 months      Electronically signed by Jonnathan James MD

## 2018-01-29 ENCOUNTER — OFFICE VISIT (OUTPATIENT)
Dept: FAMILY MEDICINE | Facility: CLINIC | Age: 43
End: 2018-01-29
Payer: MEDICAID

## 2018-01-29 VITALS
HEIGHT: 62 IN | WEIGHT: 200 LBS | BODY MASS INDEX: 36.8 KG/M2 | HEART RATE: 82 BPM | DIASTOLIC BLOOD PRESSURE: 72 MMHG | OXYGEN SATURATION: 98 % | SYSTOLIC BLOOD PRESSURE: 128 MMHG

## 2018-01-29 DIAGNOSIS — E61.1 IRON DEFICIENCY: ICD-10-CM

## 2018-01-29 DIAGNOSIS — H92.01 OTALGIA, RIGHT: Primary | ICD-10-CM

## 2018-01-29 DIAGNOSIS — H72.91 TYMPANIC MEMBRANE PERFORATION, RIGHT: ICD-10-CM

## 2018-01-29 DIAGNOSIS — H66.011 ACUTE SUPPURATIVE OTITIS MEDIA OF RIGHT EAR WITH SPONTANEOUS RUPTURE OF TYMPANIC MEMBRANE, RECURRENCE NOT SPECIFIED: ICD-10-CM

## 2018-01-29 DIAGNOSIS — H65.06 RECURRENT ACUTE SEROUS OTITIS MEDIA OF BOTH EARS: ICD-10-CM

## 2018-01-29 DIAGNOSIS — R11.0 NAUSEA: ICD-10-CM

## 2018-01-29 PROCEDURE — 99213 OFFICE O/P EST LOW 20 MIN: CPT | Mod: ,,, | Performed by: NURSE PRACTITIONER

## 2018-01-29 RX ORDER — AMOXICILLIN AND CLAVULANATE POTASSIUM 875; 125 MG/1; MG/1
1 TABLET, FILM COATED ORAL 2 TIMES DAILY
Qty: 20 TABLET | Refills: 0 | Status: SHIPPED | OUTPATIENT
Start: 2018-01-29 | End: 2018-03-05

## 2018-01-29 RX ORDER — OFLOXACIN 3 MG/ML
SOLUTION/ DROPS OPHTHALMIC
Qty: 10 ML | Refills: 1 | Status: SHIPPED | OUTPATIENT
Start: 2018-01-29 | End: 2018-03-05

## 2018-01-29 RX ORDER — FERROUS SULFATE 325(65) MG
325 TABLET ORAL DAILY
Qty: 30 TABLET | Refills: 5 | Status: SHIPPED | OUTPATIENT
Start: 2018-01-29 | End: 2019-01-23 | Stop reason: SDUPTHER

## 2018-01-29 RX ORDER — PROMETHAZINE HYDROCHLORIDE 25 MG/1
25 TABLET ORAL EVERY 4 HOURS PRN
Qty: 30 TABLET | Refills: 0 | Status: SHIPPED | OUTPATIENT
Start: 2018-01-29 | End: 2018-03-05

## 2018-01-29 RX ORDER — MIDODRINE HYDROCHLORIDE 2.5 MG/1
1 TABLET ORAL 2 TIMES DAILY
COMMUNITY
Start: 2018-01-23 | End: 2019-04-29 | Stop reason: SDUPTHER

## 2018-01-29 NOTE — PROGRESS NOTES
Subjective:       Patient ID: Promise Medrano is a 42 y.o. female.    Chief Complaint: Ear Injury (blew hole in right ear drum)    Otalgia    There is pain in the right ear. This is a new problem. The current episode started 1 to 4 weeks ago. The problem has been waxing and waning. There has been no fever. The pain is at a severity of 2/10. The pain is mild. Associated symptoms include headaches, rhinorrhea and vomiting. Pertinent negatives include no abdominal pain, coughing, diarrhea, ear discharge, hearing loss, neck pain, rash or sore throat. Treatments tried: peroxide in the ear. The treatment provided mild relief. There is no history of a chronic ear infection or a tympanostomy tube.     Review of Systems   Constitutional: Positive for fatigue. Negative for activity change, appetite change and fever.   HENT: Positive for ear pain, rhinorrhea, sinus pain and sinus pressure. Negative for congestion, ear discharge, facial swelling, hearing loss, sore throat, trouble swallowing and voice change.    Eyes: Negative for photophobia, pain, discharge, itching and visual disturbance.   Respiratory: Negative for cough, chest tightness and shortness of breath.    Cardiovascular: Negative for chest pain and palpitations.   Gastrointestinal: Positive for vomiting. Negative for abdominal pain, diarrhea and nausea.   Endocrine: Negative for cold intolerance and heat intolerance.   Genitourinary: Negative for difficulty urinating and dysuria.   Musculoskeletal: Negative for arthralgias, gait problem and neck pain.   Skin: Negative for rash.   Allergic/Immunologic: Negative for immunocompromised state.   Neurological: Positive for headaches. Negative for speech difficulty.   Psychiatric/Behavioral: Negative for confusion, self-injury and suicidal ideas.       Past Medical History:   Diagnosis Date    Allergy     Arthritis     COPD (chronic obstructive pulmonary disease)     Diabetes mellitus     GERD (gastroesophageal  reflux disease)     Neuromuscular disorder       Past Surgical History:   Procedure Laterality Date    breast augmentation      BREAST SURGERY       SECTION      COLON SURGERY      gastric sleeve      HYSTERECTOMY      TONSILLECTOMY         Family History   Problem Relation Age of Onset    Early death Father     Heart disease Father     Stroke Father     Diabetes Paternal Grandmother        Social History     Social History    Marital status: Single     Spouse name: N/A    Number of children: N/A    Years of education: N/A     Social History Main Topics    Smoking status: Current Every Day Smoker     Packs/day: 1.00    Smokeless tobacco: Never Used    Alcohol use Yes    Drug use: No    Sexual activity: Yes     Partners: Male     Birth control/ protection: None     Other Topics Concern    None     Social History Narrative    None       Current Outpatient Prescriptions   Medication Sig Dispense Refill    atorvastatin (LIPITOR) 40 MG tablet Take 1 tablet (40 mg total) by mouth once daily. 30 tablet 5    CENTRUM SPECIALIST ENERGY 18 mg iron-400 mcg-25 mcg-75mg Tab Take 1 tablet by mouth once daily.       CETAPHIL MOISTURIZING cream Apply topically as needed.       diclofenac (VOLTAREN) 75 MG EC tablet Take 1 tablet (75 mg total) by mouth 2 (two) times daily as needed (joint pain). 60 tablet 3    dicyclomine (BENTYL) 10 MG capsule Take 10 mg by mouth 2 (two) times daily.       doxepin (SINEQUAN) 50 MG capsule Take 1 capsule (50 mg total) by mouth nightly. 30 capsule 5    DULoxetine (CYMBALTA) 60 MG capsule Take 1 capsule (60 mg total) by mouth every evening. 30 capsule 5    ergocalciferol (ERGOCALCIFEROL) 50,000 unit Cap Take 1 capsule (50,000 Units total) by mouth every 7 days. 4 capsule 5    ferrous sulfate 325 mg (65 mg iron) Tab tablet Take 1 tablet (325 mg total) by mouth once daily. 30 tablet 5    fluticasone (FLONASE) 50 mcg/actuation nasal spray 1 spray by Each Nare route  "once daily. 1 Bottle 3    hydrocortisone 1 % cream Apply topically as needed.       hydroxychloroquine (PLAQUENIL) 200 mg tablet Take 400 mg by mouth once daily.       ketoconazole (NIZORAL) 2 % cream Apply topically as needed.       midodrine (PROAMATINE) 2.5 MG Tab Take 1 tablet by mouth 2 (two) times daily.      mupirocin (BACTROBAN) 2 % ointment Apply topically as needed.       omeprazole (PRILOSEC) 40 MG capsule Take 1 capsule (40 mg total) by mouth every morning. 30 capsule 5    polyethylene glycol (GLYCOLAX) 17 gram/dose powder Mix 17g (1 capful) with 6 oz liquid and take by mouth daily. 510 g 5    promethazine (PHENERGAN) 25 MG tablet Take 1 tablet (25 mg total) by mouth every 4 (four) hours as needed. 30 tablet 0    urea 40 % Lotn lotion Apply topically as needed.       amoxicillin-clavulanate 875-125mg (AUGMENTIN) 875-125 mg per tablet Take 1 tablet by mouth 2 (two) times daily. 20 tablet 0    ofloxacin (OCUFLOX) 0.3 % ophthalmic solution 10 drops in the right ear twice daily for 14 days 10 mL 1    varenicline (CHANTIX) 1 mg Tab Take 1 tablet (1 mg total) by mouth 2 (two) times daily. 60 tablet 3     No current facility-administered medications for this visit.        Review of patient's allergies indicates:  No Known Allergies  Objective:    HPI     Ear Injury    Additional comments: blew hole in right ear drum       Last edited by Alta Albert LPN on 1/29/2018 10:31 AM. (History)      Blood pressure 128/72, pulse 82, height 5' 2" (1.575 m), weight 90.7 kg (200 lb), SpO2 98 %. Body mass index is 36.58 kg/m².   Physical Exam   Constitutional: She is oriented to person, place, and time. She appears well-developed. She is cooperative. No distress.   HENT:   Head: Normocephalic and atraumatic.   Right Ear: No drainage. Tympanic membrane is injected and perforated (small perforation noted to right TM). A middle ear effusion is present.   Left Ear: No drainage. Tympanic membrane is bulging. A " middle ear effusion is present.   Ears:    Nose: Mucosal edema and rhinorrhea present. Right sinus exhibits maxillary sinus tenderness. Right sinus exhibits no frontal sinus tenderness. Left sinus exhibits maxillary sinus tenderness. Left sinus exhibits no frontal sinus tenderness.   Mouth/Throat: Uvula is midline and mucous membranes are normal. Oropharyngeal exudate (post nasal drainage noted) present. No posterior oropharyngeal erythema.   Eyes: Conjunctivae, EOM and lids are normal. Pupils are equal, round, and reactive to light. Lids are everted and swept, no foreign bodies found. Right pupil is round and reactive. Left pupil is round and reactive.   Neck: Trachea normal and normal range of motion. Neck supple.   Cardiovascular: Normal rate, regular rhythm, S1 normal, S2 normal, normal heart sounds and intact distal pulses.    No murmur heard.  Pulmonary/Chest: Effort normal and breath sounds normal. No respiratory distress. She has no wheezes.   Abdominal: Soft. There is no rigidity.   Musculoskeletal: Normal range of motion.   Lymphadenopathy:     She has no cervical adenopathy.     She has no axillary adenopathy.   Neurological: She is alert and oriented to person, place, and time.   Skin: Skin is warm and dry. Capillary refill takes less than 2 seconds.   Psychiatric: She has a normal mood and affect. Her behavior is normal. Judgment and thought content normal.   Nursing note and vitals reviewed.          Assessment:       1. Otalgia, right    2. Recurrent acute serous otitis media of both ears    3. Acute suppurative otitis media of right ear with spontaneous rupture of tympanic membrane, recurrence not specified    4. Tympanic membrane perforation, right    5. Iron deficiency    6. Nausea        Plan:       Promise was seen today for ear injury.    Diagnoses and all orders for this visit:    Otalgia, right    Recurrent acute serous otitis media of both ears    Acute suppurative otitis media of right ear  with spontaneous rupture of tympanic membrane, recurrence not specified  -     ofloxacin (OCUFLOX) 0.3 % ophthalmic solution; 10 drops in the right ear twice daily for 14 days  -     amoxicillin-clavulanate 875-125mg (AUGMENTIN) 875-125 mg per tablet; Take 1 tablet by mouth 2 (two) times daily.    Tympanic membrane perforation, right    Iron deficiency  -     ferrous sulfate 325 mg (65 mg iron) Tab tablet; Take 1 tablet (325 mg total) by mouth once daily.    Nausea  -     promethazine (PHENERGAN) 25 MG tablet; Take 1 tablet (25 mg total) by mouth every 4 (four) hours as needed.

## 2018-01-29 NOTE — PATIENT INSTRUCTIONS
When to Use Antibiotics   Antibiotics are medicines used to treat infections caused by bacteria. They dont work for illnesses caused by viruses or an allergic reaction. In fact, taking antibiotics for reasons other than a bacterial infection can cause problems. For example, you may have side effects from the medicine. And if you really need an antibiotic, it may not work well.                                                                                                                                              When antibiotics wont help  Your healthcare provider wont usually prescribe antibiotics for the following conditions. You can help by not asking for them if you have:   · A cold. This type of illness is caused by a virus. It can cause a runny nose, stuffed-up nose, sneezing, coughing, headache, mild body aches, and low fever. A cold gets better on its own in a few days to a week.  · The flu (influenza). This is a respiratory illness caused by a virus. The flu usually goes away on its own in a week or so. It can cause fever, body aches, sore throat, and fatigue.  · Bronchitis. This is an infection in the lungs most often caused by a virus. You may have coughing, phlegm, body aches, and a low fever. A common type of bronchitis is known as a chest cold (acute bronchitis). This often happens after you have a respiratory infection like a common cold. Bronchitis can take weeks to go away, but antibiotics usually dont help.  · Most sore throats. Sore throats are most often caused by viruses. Your throat may feel scratchy or achy, and it may hurt to swallow. You may also have a low fever and body aches. A sore throat usually gets better in a few days.  · Most ear infections. An ear infection may be caused by a virus or bacteria. It causes pain in the ear. Antibiotics usually dont help, and the infection goes away on its own.  · Most sinus infections (sinusitis). This kind of infection causes sinus pain and  swelling, and a runny nose. In most cases, sinusitis goes away on its own, and antibiotics dont make recovery quicker.  · Allergic rhinitis. This is a set of symptoms caused by an allergic reaction. You may have sneezing, a runny nose, itchy or watery eyes, or a sore throat. Allergies are not treated with antibiotics.  · Low fever. A mild fever thats less than 100.4°F (38°C) most likely doesnt need treatment with antibiotics.   When antibiotics can help   Antibiotics can be used to treat:                                                     · Strep throat. This is a throat infectioncaused by a certain type of bacteria. Symptoms of strep throat include a sore throat, white patches on the tonsils, red spots on the roof of the mouth, fever, body aches, and nausea and vomiting.  · Urinary tract infection (UTI). This is a bacterial infection of the bladder and the tube that takes urine out of the body. It can cause burning pain and urine thats cloudy or tinted with blood. UTIs are very common. Antibiotics usually help treat these infections.  · Some ear infections. In some cases, a healthcare provider may prescribe antibiotics for an ear infection. You may need a test to show whats causing the ear infection.  · Some sinus infections. In some cases, yourhealthcare provider may give you antibiotics. He or she may first need to make sure your symptoms arent caused by a virus, fungus, allergies, or air pollutants such as smoke.   Your doctor may also recommend antibiotics if you have a condition that can affect your immune system, such as diabetes or cancer.   Self-care at home   If your infection cant be treated with antibiotics, you can take other steps to feel better. Try the remedies below. In general:   · Rest and sleep as much as needed.  · Drink water and other clear fluids.  · Dont smoke, and avoid smoke from other people.  · Use over-the-counter medicine such as acetaminophen to ease pain or fever, as  directed by your healthcare provider.   To treat sinus pain or nasal congestion:   · Put a warm, moist washcloth on your face where you feel sinus pain or pressure.  · Use a nasal spray with medicine or saline, as directed by your healthcare provider.  · Breathe in steam from a hot shower.  · Use a humidifier or cool mist vaporizer.   To quiet a cough:   · Use a humidifier or cool mist vaporizer.  · Breathe in steam from a hot shower.  · Use cough lozenges.   To sooth a sore throat:   · Suck on ice chips, popsicles, or lozenges.  · Use a sore throat spray.  · Use a humidifier or cool mist vaporizer.  · Gargle with saltwater.  · Drink warm liquids.   To ease ear pain:   · Hold a warm, moist washcloth on the ear for 10 minutes at a time.  Date Last Reviewed: 9/1/2016  © 8557-1250 [employer]. [address] [place] [address]. All rights reserved. This information is not intended as a substitute for professional medical care. Always follow your healthcare professional's instructions.        Eardrum Rupture (Perforation)    Your eardrum is a thin membrane between your outer and middle ear. Sound waves entering your ear cause the membrane to vibrate. This helps you hear. An injury or infection can cause your eardrum to tear (rupture). This creates a hole (perforation) that may affect your hearing.  Causes of eardrum perforation  Causes of a ruptured eardrum include:  · Pressure from an ear infection  · Putting an object such as a cotton swab or pencil into the ear  · A very loud noise such as a gunshot close to the ear  · Rapid changes in air pressure. These can happen during scuba diving or traveling at high altitudes.  · A slap or blow to the ear  When to go to the emergency room (ER)  Seek medical care right away if you:  · Have severe pain, bleeding, or ringing in your ear.  · Lose your hearing suddenly.  · Become very dizzy for no reason.  · Have an object lodged in your ear.  A ruptured  eardrum from an ear infection usually isn't an emergency. In fact, the rupture often relieves pressure and pain. It usually heals within hours or days. But you should have the ear looked at by a healthcare provider within 24 hours.  What to expect in the ER  Your ear will be examined. Treatment will depend on how severe the damage is. Small holes often heal on their own. A small patch may be placed over a minor eardrum tear. Large tears may need to be repaired during an operation. If you are very dizzy or have severe hearing loss, you are likely to stay in the hospital for treatment for one or more days.  Don't clean inside the ear canal with cotton swabs or any other object.   Date Last Reviewed: 10/1/2016  © 5569-6009 [employer]. [address] [place] [address], [place] [address]. All rights reserved. This information is not intended as a substitute for professional medical care. Always follow your healthcare professional's instructions.

## 2018-03-05 ENCOUNTER — OFFICE VISIT (OUTPATIENT)
Dept: FAMILY MEDICINE | Facility: CLINIC | Age: 43
End: 2018-03-05
Payer: MEDICAID

## 2018-03-05 VITALS
BODY MASS INDEX: 36.28 KG/M2 | WEIGHT: 197.13 LBS | HEIGHT: 62 IN | OXYGEN SATURATION: 97 % | DIASTOLIC BLOOD PRESSURE: 82 MMHG | HEART RATE: 88 BPM | SYSTOLIC BLOOD PRESSURE: 122 MMHG | RESPIRATION RATE: 18 BRPM

## 2018-03-05 DIAGNOSIS — Z72.0 TOBACCO ABUSE: Primary | ICD-10-CM

## 2018-03-05 DIAGNOSIS — Z98.84 HISTORY OF DIABETES MELLITUS RESOLVED FOLLOWING BARIATRIC SURGERY: ICD-10-CM

## 2018-03-05 DIAGNOSIS — G62.9 PERIPHERAL POLYNEUROPATHY: ICD-10-CM

## 2018-03-05 DIAGNOSIS — Z86.39 HISTORY OF DIABETES MELLITUS RESOLVED FOLLOWING BARIATRIC SURGERY: ICD-10-CM

## 2018-03-05 DIAGNOSIS — J30.89 CHRONIC NONSEASONAL ALLERGIC RHINITIS DUE TO POLLEN: ICD-10-CM

## 2018-03-05 DIAGNOSIS — K58.1 IRRITABLE BOWEL SYNDROME WITH CONSTIPATION: ICD-10-CM

## 2018-03-05 DIAGNOSIS — Z23 NEED FOR PROPHYLACTIC VACCINATION AGAINST STREPTOCOCCUS PNEUMONIAE (PNEUMOCOCCUS): ICD-10-CM

## 2018-03-05 DIAGNOSIS — Z12.31 ENCOUNTER FOR SCREENING MAMMOGRAM FOR BREAST CANCER: ICD-10-CM

## 2018-03-05 DIAGNOSIS — M32.8 OTHER FORMS OF SYSTEMIC LUPUS ERYTHEMATOSUS, UNSPECIFIED ORGAN INVOLVEMENT STATUS: ICD-10-CM

## 2018-03-05 PROCEDURE — 90732 PPSV23 VACC 2 YRS+ SUBQ/IM: CPT | Mod: ,,, | Performed by: INTERNAL MEDICINE

## 2018-03-05 PROCEDURE — 90471 IMMUNIZATION ADMIN: CPT | Mod: ,,, | Performed by: INTERNAL MEDICINE

## 2018-03-05 PROCEDURE — 99213 OFFICE O/P EST LOW 20 MIN: CPT | Mod: 25,,, | Performed by: INTERNAL MEDICINE

## 2018-03-05 RX ORDER — CETIRIZINE HYDROCHLORIDE 10 MG/1
10 TABLET ORAL DAILY
Qty: 30 TABLET | Refills: 3 | Status: SHIPPED | OUTPATIENT
Start: 2018-03-05 | End: 2018-07-09 | Stop reason: SDUPTHER

## 2018-03-05 RX ORDER — GABAPENTIN 400 MG/1
400 CAPSULE ORAL 2 TIMES DAILY
Qty: 60 CAPSULE | Refills: 11 | Status: SHIPPED | OUTPATIENT
Start: 2018-03-05 | End: 2018-04-12 | Stop reason: SDUPTHER

## 2018-03-05 NOTE — ASSESSMENT & PLAN NOTE
F/u with Dr. James. Continue plaquenil. Recent labs did not indicate active disease.  Pt's new complaints today seem c/w autoimmune disease. She has upcoming appt with Dr. James and I advised her to discuss these symptoms at that time.

## 2018-03-05 NOTE — PROGRESS NOTES
"                                    ADULT FOLLOW-UP VISIT    Subjective:     Chief Complaint:  Follow-up; Medication Problem (does not like cymbalta); and Allergies      History of Present Illness:   Promise Medrano is a 42 y.o. female who presents for follow-up of medical issues.   At last visit she stated that cymbalta was working well for her neuropathic pain and we increased it to 60mg.  She says that since that time she has been having a  "flare" of her lupus sxs, complaining of widespread pain, joint swelling, color changes in her feet (?Raynaud's), and low grade fevers ( F). She started back on gabapentin which seems to help with her symptoms.  She did not f/u with me or Dr. James about these sxs.  She also did not start chantix or quit smoking because she has been suffering with the above sxs.       Past medical history, family history and social history are reviewed and there are no significant changes.     ROS:  Review of Systems   HENT: Positive for ear pain, postnasal drip, rhinorrhea and sneezing. Negative for ear discharge, sinus pain and sinus pressure.    Eyes: Positive for itching. Negative for discharge.   Respiratory: Positive for cough. Negative for shortness of breath and wheezing.    Cardiovascular: Negative for chest pain and palpitations.   Gastrointestinal: Positive for abdominal pain and constipation. Negative for diarrhea, nausea and vomiting.   Musculoskeletal: Positive for arthralgias, joint swelling, myalgias and neck pain.   Neurological: Negative for syncope, weakness, numbness and headaches.          Current Outpatient Prescriptions:     atorvastatin (LIPITOR) 40 MG tablet, Take 1 tablet (40 mg total) by mouth once daily., Disp: 30 tablet, Rfl: 5    CENTRUM SPECIALIST ENERGY 18 mg iron-400 mcg-25 mcg-75mg Tab, Take 1 tablet by mouth once daily. , Disp: , Rfl:     CETAPHIL MOISTURIZING cream, Apply topically as needed. , Disp: , Rfl:     diclofenac (VOLTAREN) 75 MG EC " "tablet, Take 1 tablet (75 mg total) by mouth 2 (two) times daily as needed (joint pain)., Disp: 60 tablet, Rfl: 3    dicyclomine (BENTYL) 10 MG capsule, Take 10 mg by mouth 2 (two) times daily. , Disp: , Rfl:     doxepin (SINEQUAN) 50 MG capsule, Take 1 capsule (50 mg total) by mouth nightly., Disp: 30 capsule, Rfl: 5    ergocalciferol (ERGOCALCIFEROL) 50,000 unit Cap, Take 1 capsule (50,000 Units total) by mouth every 7 days., Disp: 4 capsule, Rfl: 5    ferrous sulfate 325 mg (65 mg iron) Tab tablet, Take 1 tablet (325 mg total) by mouth once daily., Disp: 30 tablet, Rfl: 5    fluticasone (FLONASE) 50 mcg/actuation nasal spray, 1 spray by Each Nare route once daily., Disp: 1 Bottle, Rfl: 3    hydroxychloroquine (PLAQUENIL) 200 mg tablet, Take 400 mg by mouth once daily. , Disp: , Rfl:     midodrine (PROAMATINE) 2.5 MG Tab, Take 1 tablet by mouth 2 (two) times daily., Disp: , Rfl:     omeprazole (PRILOSEC) 40 MG capsule, Take 1 capsule (40 mg total) by mouth every morning., Disp: 30 capsule, Rfl: 5    polyethylene glycol (GLYCOLAX) 17 gram/dose powder, Mix 17g (1 capful) with 6 oz liquid and take by mouth daily., Disp: 510 g, Rfl: 5    urea 40 % Lotn lotion, Apply topically as needed. , Disp: , Rfl:     cetirizine (ZYRTEC) 10 MG tablet, Take 1 tablet (10 mg total) by mouth once daily., Disp: 30 tablet, Rfl: 3    gabapentin (NEURONTIN) 400 MG capsule, Take 1 capsule (400 mg total) by mouth 2 (two) times daily., Disp: 60 capsule, Rfl: 11    varenicline (CHANTIX) 1 mg Tab, Take 1 tablet (1 mg total) by mouth 2 (two) times daily., Disp: 60 tablet, Rfl: 3      Objective:     Physical Examination:     /82   Pulse 88   Resp 18   Ht 5' 2" (1.575 m)   Wt 89.4 kg (197 lb 1.6 oz)   SpO2 97%   BMI 36.05 kg/m²     Physical Exam   Constitutional: She appears well-developed and well-nourished. No distress.   HENT:   Right Ear: Tympanic membrane normal.   Left Ear: Tympanic membrane normal.   Nose: Mucosal " edema present.   Mouth/Throat: Oropharynx is clear and moist and mucous membranes are normal.   Eyes: Pupils are equal, round, and reactive to light.   Cardiovascular: Regular rhythm, normal heart sounds and intact distal pulses.    No murmur heard.  Pulmonary/Chest: Effort normal and breath sounds normal. She has no wheezes. She has no rales.   Abdominal: Soft. Bowel sounds are normal. She exhibits no distension. There is no tenderness.   Musculoskeletal: She exhibits no edema.       Assessment/Plan:   Promise is a 42 y.o. female here for follow-up.    Problem List Items Addressed This Visit        Neuro    Peripheral polyneuropathy    Current Assessment & Plan     Lab w/u was all WNL.  Unclear if pt's widespread aches and pains are all related to lupus/MCTD.  Gave pt instructions in switching back to gabapentin, weaning off cymbalta.          Relevant Medications    gabapentin (NEURONTIN) 400 MG capsule       Immunology/Multi System    Other forms of systemic lupus erythematosus    Current Assessment & Plan     F/u with Dr. James. Continue plaquenil. Recent labs did not indicate active disease.  Pt's new complaints today seem c/w autoimmune disease. She has upcoming appt with Dr. James and I advised her to discuss these symptoms at that time.             Endocrine    History of diabetes mellitus resolved following bariatric surgery    Current Assessment & Plan     HbA1c 6.0% 10/2017.  Pt counseled to monitor sugar and carb intake. Recheck HbA1c.         Relevant Orders    Hemoglobin A1c       GI    Irritable bowel syndrome with constipation    Current Assessment & Plan     Continue daily miralax.  Pt referred to GI for follow-up. Had last colonoscopy 1-2 years ago at [place].          Relevant Orders    Ambulatory referral to Gastroenterology       Other    Tobacco abuse - Primary    Current Assessment & Plan     Pt has chantix but hasn't started it yet.               Other Visit Diagnoses     Need for  prophylactic vaccination against Streptococcus pneumoniae (pneumococcus)        Relevant Orders    (In Office Administered) Pneumococcal Polysaccharide Vaccine (23 Valent) (SQ/IM) (Completed)    Encounter for screening mammogram for breast cancer        Relevant Orders    Mammo Digital Screening Bilat without CA    Chronic nonseasonal allergic rhinitis due to pollen        Relevant Medications    cetirizine (ZYRTEC) 10 MG tablet          Health Maintenance   Topic Date Due    Pneumococcal PPSV23 (Medium Risk) (1) 07/12/1994    Mammogram  03/28/2019    TETANUS VACCINE  12/01/2027    Influenza Vaccine  Completed    Lipid Panel  Completed           Discussion:     Follow-up in about 6 months (around 9/5/2018).    Goals     None          Electronically signed by Lexy Adamson

## 2018-03-05 NOTE — ASSESSMENT & PLAN NOTE
Lab w/u was all WNL.  Unclear if pt's widespread aches and pains are all related to lupus/MCTD.  Gave pt instructions in switching back to gabapentin, weaning off cymbalta.

## 2018-03-05 NOTE — ASSESSMENT & PLAN NOTE
Continue daily miralax.  Pt referred to GI for follow-up. Had last colonoscopy 1-2 years ago at [facility].

## 2018-03-15 DIAGNOSIS — M32.8 OTHER FORMS OF SYSTEMIC LUPUS ERYTHEMATOSUS, UNSPECIFIED ORGAN INVOLVEMENT STATUS: ICD-10-CM

## 2018-03-15 RX ORDER — DICLOFENAC SODIUM 75 MG/1
TABLET, DELAYED RELEASE ORAL
Qty: 60 TABLET | Refills: 3 | Status: SHIPPED | OUTPATIENT
Start: 2018-03-15 | End: 2018-08-19 | Stop reason: SDUPTHER

## 2018-03-27 DIAGNOSIS — G62.9 PERIPHERAL POLYNEUROPATHY: ICD-10-CM

## 2018-03-27 DIAGNOSIS — R09.82 POST-NASAL DRIP: ICD-10-CM

## 2018-03-27 RX ORDER — FLUTICASONE PROPIONATE 50 MCG
SPRAY, SUSPENSION (ML) NASAL
Qty: 9.9 G | Refills: 5 | Status: SHIPPED | OUTPATIENT
Start: 2018-03-27 | End: 2018-10-08 | Stop reason: SDUPTHER

## 2018-03-27 RX ORDER — DULOXETIN HYDROCHLORIDE 30 MG/1
CAPSULE, DELAYED RELEASE ORAL
Qty: 30 CAPSULE | Refills: 0 | OUTPATIENT
Start: 2018-03-27

## 2018-03-29 LAB — HBA1C MFR BLD: 5.9 % (ref 3.1–6.5)

## 2018-04-12 ENCOUNTER — OFFICE VISIT (OUTPATIENT)
Dept: RHEUMATOLOGY | Facility: CLINIC | Age: 43
End: 2018-04-12
Payer: MEDICAID

## 2018-04-12 VITALS — SYSTOLIC BLOOD PRESSURE: 148 MMHG | DIASTOLIC BLOOD PRESSURE: 92 MMHG | WEIGHT: 192.5 LBS | BODY MASS INDEX: 35.21 KG/M2

## 2018-04-12 DIAGNOSIS — G62.9 PERIPHERAL POLYNEUROPATHY: ICD-10-CM

## 2018-04-12 DIAGNOSIS — M15.9 OSTEOARTHRITIS, GENERALIZED: ICD-10-CM

## 2018-04-12 DIAGNOSIS — M79.7 FIBROMYALGIA: ICD-10-CM

## 2018-04-12 DIAGNOSIS — M35.9 CONNECTIVE TISSUE DISEASE, UNDIFFERENTIATED: Primary | ICD-10-CM

## 2018-04-12 LAB
ALBUMIN SERPL-MCNC: 4.3 G/DL (ref 3.1–4.7)
ALP SERPL-CCNC: 66 IU/L (ref 40–104)
ALT (SGPT): 18 IU/L (ref 3–33)
AST SERPL-CCNC: 22 IU/L (ref 10–40)
BASOPHILS NFR BLD: 0 K/UL (ref 0–0.2)
BASOPHILS NFR BLD: 0.3 %
BILIRUB SERPL-MCNC: 0.3 MG/DL (ref 0.3–1)
BUN SERPL-MCNC: 15 MG/DL (ref 8–20)
CALCIUM SERPL-MCNC: 9.5 MG/DL (ref 7.7–10.4)
CHLORIDE: 100 MMOL/L (ref 98–110)
CO2 SERPL-SCNC: 27.3 MMOL/L (ref 22.8–31.6)
CREATININE: 0.55 MG/DL (ref 0.6–1.4)
EOSINOPHIL NFR BLD: 0.1 K/UL (ref 0–0.7)
EOSINOPHIL NFR BLD: 1 %
ERYTHROCYTE [DISTWIDTH] IN BLOOD BY AUTOMATED COUNT: 15.9 % (ref 11.7–14.9)
GLUCOSE: 94 MG/DL (ref 70–99)
GRAN #: 3.6 K/UL (ref 1.4–6.5)
GRAN%: 61 %
HCT VFR BLD AUTO: 41.3 % (ref 36–48)
HGB BLD-MCNC: 13.7 G/DL (ref 12–15)
IMMATURE GRANS (ABS): 0 K/UL (ref 0–1)
IMMATURE GRANULOCYTES: 0.3 %
LYMPH #: 1.8 K/UL (ref 1.2–3.4)
LYMPH%: 30.2 %
MCH RBC QN AUTO: 30.6 PG (ref 25–35)
MCHC RBC AUTO-ENTMCNC: 33.2 G/DL (ref 31–36)
MCV RBC AUTO: 92.4 FL (ref 79–98)
MONO #: 0.4 K/UL (ref 0.1–0.6)
MONO%: 7.2 %
NUCLEATED RBCS: 0 %
PLATELET # BLD AUTO: 223 K/UL (ref 140–440)
PMV BLD AUTO: 10.6 FL (ref 8.8–12.7)
POTASSIUM SERPL-SCNC: 4.2 MMOL/L (ref 3.5–5)
PROT SERPL-MCNC: 7.7 G/DL (ref 6–8.2)
RBC # BLD AUTO: 4.47 M/UL (ref 3.5–5.5)
SODIUM: 139 MMOL/L (ref 134–144)
WBC # BLD AUTO: 6 K/UL (ref 5–10)

## 2018-04-12 PROCEDURE — 99213 OFFICE O/P EST LOW 20 MIN: CPT | Mod: ,,, | Performed by: INTERNAL MEDICINE

## 2018-04-12 RX ORDER — HYDROXYCHLOROQUINE SULFATE 200 MG/1
400 TABLET, FILM COATED ORAL DAILY
Qty: 60 TABLET | Refills: 3 | Status: SHIPPED | OUTPATIENT
Start: 2018-04-12 | End: 2018-08-28 | Stop reason: SDUPTHER

## 2018-04-12 RX ORDER — KETOCONAZOLE 20 MG/G
CREAM TOPICAL
COMMUNITY
Start: 2018-04-08 | End: 2021-01-06

## 2018-04-12 RX ORDER — IBUPROFEN 600 MG/1
600 TABLET ORAL 2 TIMES DAILY PRN
COMMUNITY
Start: 2018-03-27 | End: 2021-01-06 | Stop reason: SDUPTHER

## 2018-04-12 RX ORDER — GABAPENTIN 100 MG/1
CAPSULE ORAL
Qty: 300 CAPSULE | Refills: 3 | Status: SHIPPED | OUTPATIENT
Start: 2018-04-12 | End: 2019-01-16 | Stop reason: SDUPTHER

## 2018-04-12 RX ORDER — ATORVASTATIN CALCIUM 80 MG/1
TABLET, FILM COATED ORAL
COMMUNITY
Start: 2018-03-27 | End: 2019-04-29

## 2018-04-12 RX ORDER — HYDROCORTISONE 1 %
CREAM (GRAM) TOPICAL
COMMUNITY
Start: 2017-05-31 | End: 2021-01-06

## 2018-04-12 RX ORDER — DICYCLOMINE HYDROCHLORIDE 20 MG/1
TABLET ORAL
Refills: 2 | COMMUNITY
Start: 2018-02-27 | End: 2019-05-02 | Stop reason: SDUPTHER

## 2018-04-12 RX ORDER — MUPIROCIN 20 MG/G
OINTMENT TOPICAL
COMMUNITY
Start: 2018-04-08 | End: 2021-01-06

## 2018-04-24 RX ORDER — GABAPENTIN 400 MG/1
400 CAPSULE ORAL 3 TIMES DAILY
Qty: 90 CAPSULE | Refills: 11 | Status: SHIPPED | OUTPATIENT
Start: 2018-04-24 | End: 2018-06-01 | Stop reason: SDUPTHER

## 2018-05-31 ENCOUNTER — OFFICE VISIT (OUTPATIENT)
Dept: RHEUMATOLOGY | Facility: CLINIC | Age: 43
End: 2018-05-31
Payer: MEDICAID

## 2018-05-31 VITALS
WEIGHT: 193.38 LBS | BODY MASS INDEX: 35.37 KG/M2 | SYSTOLIC BLOOD PRESSURE: 126 MMHG | DIASTOLIC BLOOD PRESSURE: 72 MMHG

## 2018-05-31 DIAGNOSIS — M79.7 FIBROMYALGIA: ICD-10-CM

## 2018-05-31 DIAGNOSIS — M35.9 CONNECTIVE TISSUE DISEASE, UNDIFFERENTIATED: Primary | ICD-10-CM

## 2018-05-31 DIAGNOSIS — M15.9 OSTEOARTHRITIS, GENERALIZED: ICD-10-CM

## 2018-05-31 LAB — TSH SERPL DL<=0.005 MIU/L-ACNC: 1.75 ULU/ML (ref 0.3–5.6)

## 2018-05-31 PROCEDURE — 99213 OFFICE O/P EST LOW 20 MIN: CPT | Mod: ,,, | Performed by: INTERNAL MEDICINE

## 2018-05-31 RX ORDER — POLYETHYLENE GLYCOL 400 AND PROPYLENE GLYCOL 4; 3 MG/ML; MG/ML
SOLUTION/ DROPS OPHTHALMIC
COMMUNITY
Start: 2018-05-24 | End: 2018-08-28

## 2018-05-31 RX ORDER — TRIAMCINOLONE ACETONIDE 1 MG/G
CREAM TOPICAL
Refills: 3 | COMMUNITY
Start: 2018-05-20 | End: 2021-01-06

## 2018-05-31 NOTE — PROGRESS NOTES
[facility] RHEUMATOLOGY            PROGRESS NOTE      Subjective:       Patient ID:   NAME: Promise Medrano : 1975     42 y.o. female    Referring Doc: No ref. provider found  Other Physicians:    Chief Complaint:  Connective tissue disease, undifferentiated (Needs gabapentin three times a day sent in)      History of Present Illness:     Patient returns today for a regularly scheduled follow-up visit for  Undifferentiated connective tissue disease and fibromyalgia     The patient  Continues with a diffuse musculoskeletal pains and fatigue. She still smokes. No chest pains call or shortness of breath except when she has a bout of bronchitis. No mucosal ulcerations.          ROS:   GEN:  No  fever, night sweats . weight is stable   + fatigue  SKIN: no rashes, no bruising, no ulcerations, no Raynaud's  HEENT: no HA's, No visual changes, no mucosal ulcers, no sicca symptoms,  CV:   no CP, SOB, PND, DE LA ROSA, no orthopnea, no palpitations  PULM: normal with no SOB, cough, hemoptysis, sputum or pleuritic pain  GI:  no abdominal pain, nausea, vomiting, constipation, diarrhea, melanotic stools, BRBPR, hematemesis, no dysphagia  :   no dysuria  NEURO: no paresthesias, headaches, visual disturbances, muscle weakness  MUSCULOSKELETAL:no joint swelling, prolonged AM stiffness, no back pain, + muscle pain  Allergies:  Review of patient's allergies indicates:  No Known Allergies    Medications:    Current Outpatient Prescriptions:     atorvastatin (LIPITOR) 80 MG tablet, , Disp: , Rfl:     CENTRUM SPECIALIST ENERGY 18 mg iron-400 mcg-25 mcg-75mg Tab, Take 1 tablet by mouth once daily. , Disp: , Rfl:     CETAPHIL MOISTURIZING cream, Apply topically as needed. , Disp: , Rfl:     cetirizine (ZYRTEC) 10 MG tablet, Take 1 tablet (10 mg total) by mouth once daily., Disp: 30 tablet, Rfl: 3    diclofenac (VOLTAREN) 75 MG EC tablet, TAKE 1 TABLET(75 MG) BY MOUTH TWICE DAILY AS NEEDED FOR JOINT PAIN, Disp: 60 tablet, Rfl: 3     dicyclomine (BENTYL) 20 mg tablet, TK 1 T PO BID PRN. CONTINUE CURRENT DOSAGE, Disp: , Rfl: 2    doxepin (SINEQUAN) 50 MG capsule, Take 1 capsule (50 mg total) by mouth nightly., Disp: 30 capsule, Rfl: 5    ergocalciferol (ERGOCALCIFEROL) 50,000 unit Cap, Take 1 capsule (50,000 Units total) by mouth every 7 days., Disp: 4 capsule, Rfl: 5    ferrous sulfate 325 mg (65 mg iron) Tab tablet, Take 1 tablet (325 mg total) by mouth once daily., Disp: 30 tablet, Rfl: 5    fluticasone (FLONASE) 50 mcg/actuation nasal spray, SHAKE LIQUID AND USE 1 SPRAY IN EACH NOSTRIL EVERY DAY, Disp: 9.9 g, Rfl: 5    gabapentin (NEURONTIN) 100 MG capsule, 1-2 po tid( add to the 400 mg tablet), Disp: 300 capsule, Rfl: 3    gabapentin (NEURONTIN) 400 MG capsule, Take 1 capsule (400 mg total) by mouth 3 (three) times daily., Disp: 90 capsule, Rfl: 11    hydrocortisone 1 % cream, Apply to affected area 2 times daily., Disp: , Rfl:     hydroxychloroquine (PLAQUENIL) 200 mg tablet, Take 2 tablets (400 mg total) by mouth once daily., Disp: 60 tablet, Rfl: 3    ibuprofen (ADVIL,MOTRIN) 600 MG tablet, , Disp: , Rfl:     ketoconazole (NIZORAL) 2 % cream, , Disp: , Rfl:     midodrine (PROAMATINE) 2.5 MG Tab, Take 1 tablet by mouth 2 (two) times daily., Disp: , Rfl:     mupirocin (BACTROBAN) 2 % ointment, , Disp: , Rfl:     omeprazole (PRILOSEC) 40 MG capsule, Take 1 capsule (40 mg total) by mouth every morning., Disp: 30 capsule, Rfl: 5    polyethylene glycol (GLYCOLAX) 17 gram/dose powder, Mix 17g (1 capful) with 6 oz liquid and take by mouth daily., Disp: 510 g, Rfl: 5    SYSTANE ULTRA 0.4-0.3 % Drop, , Disp: , Rfl:     triamcinolone acetonide 0.1% (KENALOG) 0.1 % cream, MELY SPARINGLY TO ECZEMA BID, Disp: , Rfl: 3    urea 40 % Lotn lotion, Apply topically as needed. , Disp: , Rfl:     PMHx/PSHx Updates:        Last Eye Exam recently      Objective:     Vitals:  Blood pressure 126/72, weight 87.7 kg (193 lb 6.4 oz).    Physical  Examination:   GEN: no apparent distress, comfortable; AAOx3  SKIN: no rashes,no ulceration, no Raynaud's, no petechiae, no SQ nodules,  HEAD: normal  EYES: no pallor, no icterus, PERRLA  ENT:  ,+mucosal dryness No ulcerations  NECK: no masses, thyroid normal, trachea midline, no LAD/LN's, supple  CV: RRR with no murmur; l S1 and S2 reg. ,no gallop no rubs,   CHEST: Normal respiratory effort; CTAB; normal breath sounds; no wheeze or crackles  ABDOM: nontender and nondistended; soft; no masses; no rebound/guarding  MUSC/Skeletal: ROM normal; no crepitus; joints without synovitis,  no deformities  No joint swelling or tenderness of PIP, MCP, wrist, elbow, shoulder, or knee joints. Widespread trigger points  EXTREM: no clubbing, cyanosis, no edema,normal  pulses   NEURO: grossly intact; motor WNL; AAOx3; ,  PSYCH: normal mood, affect and behavior  LYMPH: normal cervical, supraclavicular          Labs:   Lab Results   Component Value Date    WBC 6.0 04/12/2018    HGB 13.7 04/12/2018    HCT 41.3 04/12/2018    MCV 92.4 04/12/2018     04/12/2018    CMP  @LASTLAB(NA,K,CL,CO2,GLU,BUN,Creatinine,Calcium,PROT,Albumin,Bilitot,Alkphos,AST,ALT,CRP,ESR,RF,CCP,PRASHANTH,SSA,CPK,uric acid) )@  I have reviewed all available lab results and radiology reports.    Radiology/Diagnostic Studies:        Assessment/Plan:   (1) 42 y.o. female with diagnosis of Undifferentiated connective tissue disease.( Positive PRASHANTH ,sicca symptoms and Raynaud's)  2) fibromyalgia. She can continue with gabapentin 3 times a day( she takes 400-600 mg 3 times a day)      TSH, thyroid antibodies  Antiphospholipids    Discussion:     I have explained all of the above in detail and the patient understands all of the current recommendation(s). I have answered all questions to the best of my ability and to their complete satisfaction.       The patient is to continue with the current management plan         RTC in   3-4 months      Electronically signed by  Jonnathan James MD

## 2018-06-01 RX ORDER — GABAPENTIN 400 MG/1
400 CAPSULE ORAL 3 TIMES DAILY
Qty: 90 CAPSULE | Refills: 11 | Status: SHIPPED | OUTPATIENT
Start: 2018-06-01 | End: 2019-07-19 | Stop reason: SDUPTHER

## 2018-06-04 DIAGNOSIS — M35.9 CONNECTIVE TISSUE DISEASE: ICD-10-CM

## 2018-06-04 DIAGNOSIS — M35.9 CONNECTIVE TISSUE DISEASE, UNDIFFERENTIATED: Primary | ICD-10-CM

## 2018-06-07 LAB
CARDIOLIPIN IGA SER IA-ACNC: <9 APL U/ML (ref 0–11)
CARDIOLIPIN IGG SER IA-ACNC: <9 GPL U/ML (ref 0–14)
CARDIOLIPIN IGM SER IA-ACNC: <9 MPL U/ML (ref 0–12)

## 2018-06-08 LAB
DRVVT CONFIRM: 34.9 SEC (ref 0–47)
LUPUS ANTICOAGULANT INTERPRETATION: NORMAL
PTT-LA MIX: 34 SEC (ref 0–51.9)

## 2018-06-09 LAB
B2 GLYCOPROTEIN I IGA: <9 GPI IGA UNITS (ref 0–25)
B2 GLYCOPROTEIN I IGG: <9 GPI IGG UNITS (ref 0–20)
B2 GLYCOPROTEIN I IGM: <9 GPI IGM UNITS (ref 0–32)

## 2018-06-20 DIAGNOSIS — E55.9 VITAMIN D DEFICIENCY: ICD-10-CM

## 2018-06-20 RX ORDER — ERGOCALCIFEROL 1.25 MG/1
CAPSULE ORAL
Qty: 4 CAPSULE | Refills: 3 | Status: SHIPPED | OUTPATIENT
Start: 2018-06-20 | End: 2018-10-17 | Stop reason: SDUPTHER

## 2018-07-09 DIAGNOSIS — J30.89 CHRONIC NONSEASONAL ALLERGIC RHINITIS DUE TO POLLEN: ICD-10-CM

## 2018-07-09 RX ORDER — CETIRIZINE HYDROCHLORIDE 10 MG/1
10 TABLET ORAL DAILY
Qty: 30 TABLET | Refills: 3 | Status: SHIPPED | OUTPATIENT
Start: 2018-07-09 | End: 2019-01-23

## 2018-08-19 DIAGNOSIS — M32.8 OTHER FORMS OF SYSTEMIC LUPUS ERYTHEMATOSUS, UNSPECIFIED ORGAN INVOLVEMENT STATUS: ICD-10-CM

## 2018-08-20 RX ORDER — DICLOFENAC SODIUM 75 MG/1
TABLET, DELAYED RELEASE ORAL
Qty: 60 TABLET | Refills: 0 | Status: SHIPPED | OUTPATIENT
Start: 2018-08-20 | End: 2018-08-28

## 2018-08-28 ENCOUNTER — OFFICE VISIT (OUTPATIENT)
Dept: RHEUMATOLOGY | Facility: CLINIC | Age: 43
End: 2018-08-28
Payer: MEDICAID

## 2018-08-28 VITALS
BODY MASS INDEX: 35.74 KG/M2 | DIASTOLIC BLOOD PRESSURE: 88 MMHG | SYSTOLIC BLOOD PRESSURE: 128 MMHG | WEIGHT: 195.38 LBS

## 2018-08-28 DIAGNOSIS — M35.9 CONNECTIVE TISSUE DISEASE, UNDIFFERENTIATED: Primary | ICD-10-CM

## 2018-08-28 DIAGNOSIS — R05.3 COUGH, PERSISTENT: ICD-10-CM

## 2018-08-28 LAB
ALBUMIN SERPL-MCNC: 4.6 G/DL (ref 3.1–4.7)
ALP SERPL-CCNC: 76 IU/L (ref 40–104)
ALT (SGPT): 17 IU/L (ref 3–33)
AST SERPL-CCNC: 21 IU/L (ref 10–40)
BASOPHILS NFR BLD: 0 K/UL (ref 0–0.2)
BASOPHILS NFR BLD: 0.3 %
BILIRUB SERPL-MCNC: 0.8 MG/DL (ref 0.3–1)
BUN SERPL-MCNC: 19 MG/DL (ref 8–20)
CALCIUM SERPL-MCNC: 9.2 MG/DL (ref 7.7–10.4)
CHLORIDE: 102 MMOL/L (ref 98–110)
CO2 SERPL-SCNC: 28.3 MMOL/L (ref 22.8–31.6)
CREATININE: 0.65 MG/DL (ref 0.6–1.4)
EOSINOPHIL NFR BLD: 0 K/UL (ref 0–0.7)
EOSINOPHIL NFR BLD: 0.4 %
ERYTHROCYTE [DISTWIDTH] IN BLOOD BY AUTOMATED COUNT: 14.7 % (ref 11.7–14.9)
GLUCOSE: 88 MG/DL (ref 70–99)
GRAN #: 4.6 K/UL (ref 1.4–6.5)
GRAN%: 66.4 %
HCT VFR BLD AUTO: 42.1 % (ref 36–48)
HGB BLD-MCNC: 14.4 G/DL (ref 12–15)
IMMATURE GRANS (ABS): 0 K/UL (ref 0–1)
IMMATURE GRANULOCYTES: 0.1 %
LYMPH #: 1.9 K/UL (ref 1.2–3.4)
LYMPH%: 27.4 %
MCH RBC QN AUTO: 32.2 PG (ref 25–35)
MCHC RBC AUTO-ENTMCNC: 34.2 G/DL (ref 31–36)
MCV RBC AUTO: 94.2 FL (ref 79–98)
MONO #: 0.4 K/UL (ref 0.1–0.6)
MONO%: 5.4 %
NUCLEATED RBCS: 0 %
PLATELET # BLD AUTO: 195 K/UL (ref 140–440)
PMV BLD AUTO: 11.2 FL (ref 8.8–12.7)
POTASSIUM SERPL-SCNC: 4.2 MMOL/L (ref 3.5–5)
PROT SERPL-MCNC: 8 G/DL (ref 6–8.2)
RBC # BLD AUTO: 4.47 M/UL (ref 3.5–5.5)
SODIUM: 139 MMOL/L (ref 134–144)
WBC # BLD AUTO: 7 K/UL (ref 5–10)

## 2018-08-28 PROCEDURE — 99213 OFFICE O/P EST LOW 20 MIN: CPT | Mod: ,,, | Performed by: INTERNAL MEDICINE

## 2018-08-28 RX ORDER — HYDROXYCHLOROQUINE SULFATE 200 MG/1
400 TABLET, FILM COATED ORAL DAILY
Qty: 60 TABLET | Refills: 3 | Status: SHIPPED | OUTPATIENT
Start: 2018-08-28 | End: 2019-01-23

## 2018-08-28 RX ORDER — LIFITEGRAST 50 MG/ML
SOLUTION/ DROPS OPHTHALMIC
COMMUNITY
Start: 2018-08-21 | End: 2021-01-06

## 2018-08-28 RX ORDER — DICLOFENAC SODIUM 10 MG/G
2 GEL TOPICAL 4 TIMES DAILY
Qty: 1 TUBE | Refills: 2 | Status: SHIPPED | OUTPATIENT
Start: 2018-08-28 | End: 2019-01-16 | Stop reason: SDUPTHER

## 2018-08-28 NOTE — PROGRESS NOTES
[facility] RHEUMATOLOGY            PROGRESS NOTE      Subjective:       Patient ID:   NAME: Promise Medrano : 1975     43 y.o. female    Referring Doc: No ref. provider found  Other Physicians:    Chief Complaint:  Connective tissue disease, undifferentiated      History of Present Illness:     Patient returns today for a regularly scheduled follow-up visit for  UCTD and Fibromyalgia.  Had head injury last wk,was seen in ER yesterday.CT Scan of head ok,as per pt.Will see neurologist,referred by ER     The patient still smokes 1 PPD,CNS with chronic cough and occasional shortness of breath. No chest pains. Continues with fatigue. No joint swelling. Diffuse musculoskeletal pain.            ROS:   GEN:  No  fever, night sweats . weight is stable   + fatigue  SKIN: no rashes, no bruising, no ulcerations, no Raynaud's  HEENT: no HA's, No visual changes, no mucosal ulcers, no sicca symptoms,  CV:   no CP, SOB, PND, DE LA ROSA, no orthopnea, no palpitations  PULM: normal with no SOB, + non prod cough,  No hemoptysis, sputum or pleuritic pain  GI:  no abdominal pain, nausea, vomiting, constipation, diarrhea, melanotic stools, BRBPR, hematemesis, no dysphagia  :   no dysuria  NEURO: no paresthesias, headaches, visual disturbances, muscle weakness  MUSCULOSKELETAL:no joint swelling, prolonged AM stiffness, no back pain, + muscle pain  Allergies:  Review of patient's allergies indicates:  No Known Allergies    Medications:    Current Outpatient Medications:     atorvastatin (LIPITOR) 80 MG tablet, , Disp: , Rfl:     CENTRUM SPECIALIST ENERGY 18 mg iron-400 mcg-25 mcg-75mg Tab, Take 1 tablet by mouth once daily. , Disp: , Rfl:     CETAPHIL MOISTURIZING cream, Apply topically as needed. , Disp: , Rfl:     cetirizine (ZYRTEC) 10 MG tablet, Take 1 tablet (10 mg total) by mouth once daily., Disp: 30 tablet, Rfl: 3    diclofenac (VOLTAREN) 75 MG EC tablet, TAKE 1 TABLET(75 MG) BY MOUTH TWICE DAILY AS NEEDED FOR JOINT PAIN,  Disp: 60 tablet, Rfl: 0    dicyclomine (BENTYL) 20 mg tablet, TK 1 T PO BID PRN. CONTINUE CURRENT DOSAGE, Disp: , Rfl: 2    doxepin (SINEQUAN) 50 MG capsule, Take 1 capsule (50 mg total) by mouth nightly., Disp: 30 capsule, Rfl: 5    ergocalciferol (ERGOCALCIFEROL) 50,000 unit Cap, TAKE 1 CAPSULE BY MOUTH EVERY 7 DAYS, Disp: 4 capsule, Rfl: 3    ferrous sulfate 325 mg (65 mg iron) Tab tablet, Take 1 tablet (325 mg total) by mouth once daily., Disp: 30 tablet, Rfl: 5    fluticasone (FLONASE) 50 mcg/actuation nasal spray, SHAKE LIQUID AND USE 1 SPRAY IN EACH NOSTRIL EVERY DAY, Disp: 9.9 g, Rfl: 5    gabapentin (NEURONTIN) 100 MG capsule, 1-2 po tid( add to the 400 mg tablet), Disp: 300 capsule, Rfl: 3    gabapentin (NEURONTIN) 400 MG capsule, Take 1 capsule (400 mg total) by mouth 3 (three) times daily., Disp: 90 capsule, Rfl: 11    hydrocortisone 1 % cream, Apply to affected area 2 times daily., Disp: , Rfl:     hydroxychloroquine (PLAQUENIL) 200 mg tablet, Take 2 tablets (400 mg total) by mouth once daily., Disp: 60 tablet, Rfl: 3    ibuprofen (ADVIL,MOTRIN) 600 MG tablet, , Disp: , Rfl:     ketoconazole (NIZORAL) 2 % cream, , Disp: , Rfl:     midodrine (PROAMATINE) 2.5 MG Tab, Take 1 tablet by mouth 2 (two) times daily., Disp: , Rfl:     mupirocin (BACTROBAN) 2 % ointment, , Disp: , Rfl:     omeprazole (PRILOSEC) 40 MG capsule, Take 1 capsule (40 mg total) by mouth every morning., Disp: 30 capsule, Rfl: 5    triamcinolone acetonide 0.1% (KENALOG) 0.1 % cream, MELY SPARINGLY TO ECZEMA BID, Disp: , Rfl: 3    urea 40 % Lotn lotion, Apply topically as needed. , Disp: , Rfl:     XIIDRA 5 % Dpet, , Disp: , Rfl:     PMHx/PSHx Updates:          Objective:     Vitals:  Blood pressure 128/88, weight 88.6 kg (195 lb 6.4 oz).    Physical Examination:   GEN: no apparent distress, comfortable; AAOx3  SKIN: no rashes,no ulceration, no Raynaud's, no petechiae, no SQ nodules,  HEAD: normal  EYES: no pallor, no  icterus, PERRLA  ENT:  ,no mucosal dryness or ulcerations  NECK: no masses, thyroid normal, trachea midline, no LAD/LN's, supple  CV: RRR with no murmur; l S1 and S2 reg. ,no gallop no rubs,   CHEST: Normal respiratory effort; CTAB; normal breath sounds; no wheeze or crackles  MUSC/Skeletal: ROM normal; no crepitus; joints without synovitis,  no deformities  No joint swelling or tenderness of PIP, MCP, wrist, elbow, shoulder, or knee jointsW.idespread trigger points  EXTREM: no clubbing, cyanosis, no edema,normal  pulses   NEURO: grossly intact; motor WNL; AAOx3; ,   PSYCH: normal mood, affect and behavior  LYMPH: normal cervical, supraclavicular          Labs:   Lab Results   Component Value Date    WBC 6.0 04/12/2018    HGB 13.7 04/12/2018    HCT 41.3 04/12/2018    MCV 92.4 04/12/2018     04/12/2018    CMP  @LASTLAB(NA,K,CL,CO2,GLU,BUN,Creatinine,Calcium,PROT,Albumin,Bilitot,Alkphos,AST,ALT,CRP,ESR,RF,CCP,PRASHANTH,SSA,CPK,uric acid) )@  I have reviewed all available lab results and radiology reports.    Radiology/Diagnostic Studies:    antiphospholipids were negative    Assessment/Plan:   (1) 43 y.o. female with diagnosis of fibromyalgia and undifferentiated connective tissue disease.  Both stable      CBC CMP urinalysis  Can use Voltaren gel as needed      Discussion:     I have explained all of the above in detail and the patient understands all of the current recommendation(s). I have answered all questions to the best of my ability and to their complete satisfaction.       The patient is to continue with the current management plan         RTC in 4 months or before if needed        Electronically signed by Jonnathan James MD

## 2018-08-31 DIAGNOSIS — R05.3 CHRONIC COUGH: Primary | ICD-10-CM

## 2018-09-04 ENCOUNTER — TELEPHONE (OUTPATIENT)
Dept: FAMILY MEDICINE | Facility: CLINIC | Age: 43
End: 2018-09-04

## 2018-09-05 ENCOUNTER — TELEPHONE (OUTPATIENT)
Dept: FAMILY MEDICINE | Facility: CLINIC | Age: 43
End: 2018-09-05

## 2018-09-05 ENCOUNTER — TELEPHONE (OUTPATIENT)
Dept: RHEUMATOLOGY | Facility: CLINIC | Age: 43
End: 2018-09-05

## 2018-09-05 NOTE — TELEPHONE ENCOUNTER
----- Message from Hien Lewis sent at 9/5/2018  5:30 PM CDT -----  Contact: PT Portal Request  Message from [facility], System Message sent at 9/5/2018  5:17 PM CDT -----    Appointment Request From: Promise Medrano    With Provider: Dr. Mine Palmer    Preferred Date Range: 9/6/2018 - 11/6/2018    Preferred Times: Any time    Reason for visit: Primary care establishment    Comments:  I would like to see Dr. Palmer. She was my Primary care previously. I believe she has a better understanding of my illnesses.

## 2018-09-05 NOTE — TELEPHONE ENCOUNTER
Patient notified of CT chest results. Instructed to see pulmonologist if still coughing.  Patient referred toDr. Garcia

## 2018-09-20 ENCOUNTER — OFFICE VISIT (OUTPATIENT)
Dept: FAMILY MEDICINE | Facility: CLINIC | Age: 43
End: 2018-09-20
Payer: MEDICAID

## 2018-09-20 ENCOUNTER — TELEPHONE (OUTPATIENT)
Dept: FAMILY MEDICINE | Facility: CLINIC | Age: 43
End: 2018-09-20

## 2018-09-20 VITALS
BODY MASS INDEX: 35.33 KG/M2 | DIASTOLIC BLOOD PRESSURE: 74 MMHG | WEIGHT: 192 LBS | OXYGEN SATURATION: 96 % | HEIGHT: 62 IN | HEART RATE: 78 BPM | TEMPERATURE: 99 F | RESPIRATION RATE: 14 BRPM | SYSTOLIC BLOOD PRESSURE: 112 MMHG

## 2018-09-20 DIAGNOSIS — R73.01 IFG (IMPAIRED FASTING GLUCOSE): ICD-10-CM

## 2018-09-20 DIAGNOSIS — Z12.39 BREAST CANCER SCREENING: ICD-10-CM

## 2018-09-20 DIAGNOSIS — Z72.0 TOBACCO ABUSE: ICD-10-CM

## 2018-09-20 DIAGNOSIS — R10.11 RUQ PAIN: ICD-10-CM

## 2018-09-20 DIAGNOSIS — Z23 FLU VACCINE NEED: ICD-10-CM

## 2018-09-20 DIAGNOSIS — F51.04 CHRONIC INSOMNIA: ICD-10-CM

## 2018-09-20 DIAGNOSIS — Z82.0 FAMILY HISTORY OF MIGRAINE HEADACHES IN MOTHER: ICD-10-CM

## 2018-09-20 DIAGNOSIS — J43.1 PANLOBULAR EMPHYSEMA: ICD-10-CM

## 2018-09-20 DIAGNOSIS — R55 VASOVAGAL SYNCOPE: ICD-10-CM

## 2018-09-20 DIAGNOSIS — E78.2 MIXED HYPERLIPIDEMIA: ICD-10-CM

## 2018-09-20 DIAGNOSIS — F41.9 CHRONIC ANXIETY: Primary | ICD-10-CM

## 2018-09-20 DIAGNOSIS — K21.9 GERD WITHOUT ESOPHAGITIS: ICD-10-CM

## 2018-09-20 PROCEDURE — 90471 IMMUNIZATION ADMIN: CPT | Mod: ,,, | Performed by: INTERNAL MEDICINE

## 2018-09-20 PROCEDURE — 90686 IIV4 VACC NO PRSV 0.5 ML IM: CPT | Mod: ,,, | Performed by: INTERNAL MEDICINE

## 2018-09-20 PROCEDURE — 99214 OFFICE O/P EST MOD 30 MIN: CPT | Mod: 25,,, | Performed by: INTERNAL MEDICINE

## 2018-09-20 RX ORDER — ESCITALOPRAM OXALATE 10 MG/1
10 TABLET ORAL DAILY
Qty: 30 TABLET | Refills: 5 | Status: SHIPPED | OUTPATIENT
Start: 2018-09-20 | End: 2018-10-18 | Stop reason: SDUPTHER

## 2018-09-20 RX ORDER — METFORMIN HYDROCHLORIDE 500 MG/1
500 TABLET ORAL 2 TIMES DAILY WITH MEALS
Qty: 60 TABLET | Refills: 5 | Status: SHIPPED | OUTPATIENT
Start: 2018-09-20 | End: 2019-03-20 | Stop reason: SDUPTHER

## 2018-09-20 RX ORDER — FLUTICASONE FUROATE AND VILANTEROL 100; 25 UG/1; UG/1
1 POWDER RESPIRATORY (INHALATION) DAILY
Qty: 1 EACH | Refills: 2 | Status: SHIPPED | OUTPATIENT
Start: 2018-09-20 | End: 2018-10-18

## 2018-09-20 NOTE — PATIENT INSTRUCTIONS
The patient is asked to make an attempt to improve diet and exercise patterns to aid in medical management of this problem.  Diet: Diabetes  Food is an important tool that you can use to control diabetes and stay healthy. Eating well-balanced meals in the correct amounts will help you control your blood glucose levels and prevent low blood sugar reactions. It will also help you reduce the health risks of diabetes. There is no one specific diet that is right for everyone with diabetes. But there are general guidelines to follow. A registered dietitian (URIEL) will create a tailored diet approach thats just right for you. He or she will also help you plan healthy meals and snacks. If you have any questions, call your dietitian for advice.     Guidelines for success  Talk with your healthcare provider before starting a diabetes diet or weight loss program. If you haven't talked with a dietitian yet, ask your provider for a referral. The following guidelines can help you succeed:  · Select foods from the 6 food groups below. Your dietitian will help you find food choices within each group. He or she will also show you serving sizes and how many servings you can have at each meal.  ¨ Grains, beans, and starchy vegetables  ¨ Vegetables  ¨ Fruit  ¨ Milk or yogurt  ¨ Meat, poultry, fish, or tofu  ¨ Healthy fats  · Check your blood sugar levels as directed by your provider. Take any medicine as prescribed by your provider.  · Learn to read food labels and pick the right portion sizes.  · Eat only the amount of food in your meal plan. Eat about the same amount of food at regular times each day. Dont skip meals. Eat meals 4 to 5 hours apart, with snacks in between.  · Limit alcohol. It raises blood sugar levels. Drink water or calorie-free diet drinks that use safe sweeteners.  · Eat less fat to help lower your risk of heart disease. Use nonfat or low-fat dairy products and lean meats. Avoid fried foods. Use cooking oils that  are unsaturated, such as olive, canola, or peanut oil.  · Talk with your dietitian about safe sugar substitutes.  · Avoid added salt. It can contribute to high blood pressure, which can cause heart disease. People with diabetes already have a risk of high blood pressure and heart disease.  · Stay at a healthy weight. If you need to lose weight, cut down on your portion sizes. But dont skip meals. Exercise is an important part of any weight management program. Talk with your provider about an exercise program thats right for you.  · For more information about the best diet plan for you, talk with a registered dietitian (RD). To find an RD in your area, contact:  ¨ Academy of Nutrition and Dietetics www.eatright.org  ¨ The American Diabetes Association 765-129-6648 www.diabetes.org  Date Last Reviewed: 8/1/2016  © 4099-3290 [employer]. [address] [place] [address], [place] [address]. All rights reserved. This information is not intended as a substitute for professional medical care. Always follow your healthcare professional's instructions.

## 2018-09-20 NOTE — TELEPHONE ENCOUNTER
The following medication needs a prior authorization:     Medication Name: BREO    Dosage: 100/25 MCG/DOSE    Frequency: Daily    Directions for use:  Take one puff inhalation daily    Diagnosis: j43.1    Is the request for a reauthorization? no    Is the patient currently stable on therapy? Yes samples    Please list all therapeutic alternatives previously used with start/end dates and outcome:    proventil failed  ADVAIR FAILED

## 2018-09-20 NOTE — PROGRESS NOTES
SUBJECTIVE:    Patient ID: Promise Medrano is a 43 y.o. female.    Chief Complaint: Establish Care and Lupus    HPI     Pt here to establish-Lupus diagnosed after Lucy-had two children after-Had interstitial lung disease 2009 (sx) never diagnosed but sx after hysterectomy-had gastric sleeve about 2010 and subsequently lost over 60 pounds and slowly gained it back-she had steroid induced Diabetes-since gastric sleeve she has had no problems with the lungs-she has been wheezing times two months and has been told she has allergies-is to see Pulmonary-saw cardiology who did hi own PFS and she was told she needed pulmonologist-he referred her to ENT for vertigo -she has seen cardiology for hyperlipidemia and has no heart disease of an ischemic nature she knows about-on tilt test when given beta blocker she fainted -is vasovagal she has been told is due to Lupus-she is on midodrine and bentyl for the vasovagal bradycardia-    History of type 2 diabetes but it was associated with PCO'S-responded to spironolactone and H blockers and chromium-has periods of low blood sugar AC 6.2    Needs to stop smoking but anxiety keeps her from doing so-has tried expo which had helped in the past    Tobacco Use/Cessation:  I assessed Promise Medrano and discussed smoking cessation with her for 3-10 minutes. She would like to stop and has chant ix      Social History     Tobacco Use   Smoking Status Current Every Day Smoker    Packs/day: 1.00    Types: Cigarettes   Smokeless Tobacco Never Used     Office Visit on 08/28/2018   Component Date Value Ref Range Status    Glucose 08/28/2018 88  70 - 99 mg/dL Final    BUN, Bld 08/28/2018 19  8 - 20 mg/dL Final    Creatinine 08/28/2018 0.65  0.60 - 1.40 mg/dL Final    Calcium 08/28/2018 9.2  7.7 - 10.4 mg/dL Final    Sodium 08/28/2018 139  134 - 144 mmol/L Final    Potassium 08/28/2018 4.2  3.5 - 5.0 mmol/L Final    Chloride 08/28/2018 102  98 - 110 mmol/L Final    CO2 08/28/2018  28.3  22.8 - 31.6 mmol/L Final    Albumin 08/28/2018 4.6  3.1 - 4.7 g/dL Final    Total Bilirubin 08/28/2018 0.8  0.3 - 1.0 mg/dL Final    Alkaline Phosphatase 08/28/2018 76  40 - 104 IU/L Final    Total Protein 08/28/2018 8.0  6.0 - 8.2 g/dL Final    ALT (SGPT) 08/28/2018 17  3 - 33 IU/L Final    AST 08/28/2018 21  10 - 40 IU/L Final    WBC 08/28/2018 7.0  5.0 - 10.0 K/uL Final    RBC 08/28/2018 4.47  3.50 - 5.50 M/uL Final    Hemoglobin 08/28/2018 14.4  12.0 - 15.0 g/dL Final    Hematocrit 08/28/2018 42.1  36.0 - 48.0 % Final    MCV 08/28/2018 94.2  79.0 - 98.0 fL Final    MCH 08/28/2018 32.2  25.0 - 35.0 pg Final    MCHC 08/28/2018 34.2  31.0 - 36.0 g/dL Final    RDW 08/28/2018 14.7  11.7 - 14.9 % Final    Platelets 08/28/2018 195  140 - 440 K/uL Final    MPV 08/28/2018 11.2  8.8 - 12.7 fL Final    Gran% 08/28/2018 66.4  % Final    Lymph% 08/28/2018 27.4  % Final    Mono% 08/28/2018 5.4  % Final    Eosinophil% 08/28/2018 0.4  % Final    Basophil% 08/28/2018 0.3  % Final    Gran # (ANC) 08/28/2018 4.6  1.4 - 6.5 K/uL Final    Lymph # 08/28/2018 1.9  1.2 - 3.4 K/uL Final    Mono # 08/28/2018 0.4  0.1 - 0.6 K/uL Final    Eos # 08/28/2018 0.0  0.0 - 0.7 K/uL Final    Baso # 08/28/2018 0.0  0.0 - 0.2 K/uL Final    Immature Grans (Abs) 08/28/2018 0.0  0.0 - 1.0 K/uL Final    Immature Granulocytes 08/28/2018 0.1  % Final    nRBC% 08/28/2018 0  % Final   Orders Only on 06/05/2018   Component Date Value Ref Range Status    Anticardiolipin IgG 06/05/2018 <9  0 - 14 GPL U/mL Final    Anticardiolipin IgM 06/05/2018 <9  0 - 12 MPL U/mL Final    Anticardiolipin IgA 06/05/2018 <9  0 - 11 APL U/mL Final    PTT-LA Mix 06/05/2018 34.0  0.0 - 51.9 sec Final    dRVVT Confirm 06/05/2018 34.9  0.0 - 47.0 sec Final    Lupus Anticoagulant Interpretation 06/05/2018 Comment:   Final   Orders Only on 06/04/2018   Component Date Value Ref Range Status    B2 Glycoprotein I IgG 06/05/2018 <9  0 - 20 GPI IgG  units Final    B2 Glycoprotein I IgM 06/05/2018 <9  0 - 32 GPI IgM units Final    B2 Glycoprotein I IgA 06/05/2018 <9  0 - 25 GPI IgA units Final   Office Visit on 05/31/2018   Component Date Value Ref Range Status    TSH 05/31/2018 1.75  0.30 - 5.60 ulU/ml Final   Office Visit on 04/12/2018   Component Date Value Ref Range Status    WBC 04/12/2018 6.0  5.0 - 10.0 K/uL Final    RBC 04/12/2018 4.47  3.50 - 5.50 M/uL Final    Hemoglobin 04/12/2018 13.7  12.0 - 15.0 g/dL Final    Hematocrit 04/12/2018 41.3  36.0 - 48.0 % Final    MCV 04/12/2018 92.4  79.0 - 98.0 fL Final    MCH 04/12/2018 30.6  25.0 - 35.0 pg Final    MCHC 04/12/2018 33.2  31.0 - 36.0 g/dL Final    RDW 04/12/2018 15.9* 11.7 - 14.9 % Final    Platelets 04/12/2018 223  140 - 440 K/uL Final    MPV 04/12/2018 10.6  8.8 - 12.7 fL Final    Gran% 04/12/2018 61.0  % Final    Lymph% 04/12/2018 30.2  % Final    Mono% 04/12/2018 7.2  % Final    Eosinophil% 04/12/2018 1.0  % Final    Basophil% 04/12/2018 0.3  % Final    Gran # (ANC) 04/12/2018 3.6  1.4 - 6.5 K/uL Final    Lymph # 04/12/2018 1.8  1.2 - 3.4 K/uL Final    Mono # 04/12/2018 0.4  0.1 - 0.6 K/uL Final    Eos # 04/12/2018 0.1  0.0 - 0.7 K/uL Final    Baso # 04/12/2018 0.0  0.0 - 0.2 K/uL Final    Immature Grans (Abs) 04/12/2018 0.0  0.0 - 1.0 K/uL Final    Immature Granulocytes 04/12/2018 0.3  % Final    nRBC% 04/12/2018 0  % Final    Glucose 04/12/2018 94  70 - 99 mg/dL Final    BUN, Bld 04/12/2018 15  8 - 20 mg/dL Final    Creatinine 04/12/2018 0.55* 0.60 - 1.40 mg/dL Final    Calcium 04/12/2018 9.5  7.7 - 10.4 mg/dL Final    Sodium 04/12/2018 139  134 - 144 mmol/L Final    Potassium 04/12/2018 4.2  3.5 - 5.0 mmol/L Final    Chloride 04/12/2018 100  98 - 110 mmol/L Final    CO2 04/12/2018 27.3  22.8 - 31.6 mmol/L Final    Albumin 04/12/2018 4.3  3.1 - 4.7 g/dL Final    Total Bilirubin 04/12/2018 0.3  0.3 - 1.0 mg/dL Final    Alkaline Phosphatase 04/12/2018 66  40 -  "104 IU/L Final    Total Protein 2018 7.7  6.0 - 8.2 g/dL Final    ALT (SGPT) 2018 18  3 - 33 IU/L Final    AST 2018 22  10 - 40 IU/L Final       Past Medical History:   Diagnosis Date    Allergy     Arthritis     COPD (chronic obstructive pulmonary disease)     Diabetes mellitus     GERD (gastroesophageal reflux disease)     Neuromuscular disorder      Social History     Socioeconomic History    Marital status: Single     Spouse name: Not on file    Number of children: Not on file    Years of education: Not on file    Highest education level: Not on file   Social Needs    Financial resource strain: Not on file    Food insecurity - worry: Not on file    Food insecurity - inability: Not on file    Transportation needs - medical: Not on file    Transportation needs - non-medical: Not on file   Occupational History    Not on file   Tobacco Use    Smoking status: Current Every Day Smoker     Packs/day: 1.00     Types: Cigarettes    Smokeless tobacco: Never Used   Substance and Sexual Activity    Alcohol use: Yes    Drug use: No    Sexual activity: Yes     Partners: Male     Birth control/protection: None   Other Topics Concern    Not on file   Social History Narrative    Not on file     Past Surgical History:   Procedure Laterality Date    breast augmentation      BREAST SURGERY       SECTION      COLON SURGERY      gastric sleeve      HYSTERECTOMY      TONSILLECTOMY       Family History   Problem Relation Age of Onset    Early death Father     Heart disease Father     Stroke Father     Diabetes Paternal Grandmother      Vitals:    18 1008   BP: 112/74   Pulse: 78   Resp: 14   Temp: 98.9 °F (37.2 °C)   SpO2: 96%   Weight: 87.1 kg (192 lb)   Height: 5' 2" (1.575 m)       Review of Systems   Constitutional: Positive for fever (in sun ( Lupus)).   HENT: Positive for ear pain (itch and crackle) and rhinorrhea (for now-usually nose is dry-singlulair did not " help-xyzal has helped). Negative for sore throat.         Dry ears-pruritic   Respiratory: Positive for shortness of breath and wheezing.    Cardiovascular: Positive for leg swelling. Negative for chest pain.   Gastrointestinal: Positive for abdominal pain (RUQ lateral pain-no def dx-she suspects GB-only US has been done). Negative for vomiting.        GERD without nexium   Musculoskeletal: Positive for neck pain.        Neck pain after injury in her garage for which she went to ER after ammo can fell on the head-mild concussion-headaches since-more when she moves around-burning in top of head-pain posterior head -using ibuprofen which helps   Skin: Negative for rash.        Reynauds   Neurological: Positive for tremors (with anxiety). Headaches: see MS.        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           Objective:      Physical Exam   Constitutional: She is oriented to person, place, and time. She appears well-developed and well-nourished. She is cooperative. No distress.   BMI   HENT:   Head: Normocephalic and atraumatic.   Right Ear: Tympanic membrane normal.   Left Ear: Tympanic membrane normal.   Mouth/Throat: Uvula is midline and mucous membranes are normal.   Eyes: Conjunctivae and EOM are normal. Right pupil is round and reactive. Left pupil is round and reactive.   Neck: Trachea normal and normal range of motion. Neck supple. No JVD present. No thyromegaly present.   Cardiovascular: Normal rate, regular rhythm, normal heart sounds and intact distal pulses.   Pulmonary/Chest: Effort normal. No tachypnea. No respiratory distress.   Inspiratory rhonchi and wheezes bilaterally ant anteroposteriorally   Abdominal: Soft. Bowel sounds are normal. There is no tenderness.   Musculoskeletal: Normal range of motion.   Lymphadenopathy:     She has no cervical adenopathy.   Neurological: She is alert and oriented to person, place, and  time. She has normal strength.   Skin: Skin is warm and dry. No rash noted.   Psychiatric: She has a normal mood and affect. Her speech is normal.   Nursing note and vitals reviewed.          Assessment:       1. Chronic anxiety    2. BMI 35.0-35.9,adult    3. Family history of migraine headaches in mother    4. Vasovagal syncope    5. Tobacco abuse    6. Chronic insomnia    7. GERD without esophagitis    8. Panlobular emphysema    9. Breast cancer screening    10. Mixed hyperlipidemia    11. Flu vaccine need    12. IGF (impaired fasting glucose)    13. RUQ pain         Plan:           Chronic anxiety        -     Start Expo 10 mg daily no 30 with 2 refills and ev al in one month for 20 mg dose     BMI 35.0-35.9,adult        -     Diet recks    Family history of migraine headaches in mother    Vasovagal syncope        -     Per cardiology    Tobacco abuse        -     Encourage her use of the chant ix she has    Chronic insomnia        -     Melatonin trial if sx continue after expo    GERD without esophagitis        -     Continue her usual routine for prn use (OTC)    Panlobular emphysema  -     es citalopram oxalate (LEXER) 10 MG tablet; Take 1 tablet (10 mg total) by mouth once daily.  Dispense: 30 tablet; Refill: 5    Breast cancer screening  -     Mammo Digital Screening Bi lat with To mos; Future; Expected date: 09/20/2018    Mixed hyperlipidemia  -     Lipid panel; Future; Expected date: 09/20/2018    Flu vaccine need  -     Influenza - Quadrivalent (3 years & older) (PF)    IGF (impaired fasting glucose)  -     Hemoglobin AC; Future; Expected date: 09/20/2018  -     metformin (GLUCOPHAGE) 500 MG tablet; Take 1 tablet (500 mg total) by mouth 2 (two) times daily with meals.  Dispense: 60 tablet; Refill: 5    RUQ pain  -     NM Hepatobiliary Imaging with GB (HI DA); Future; Expected date: 09/26/2018    Other orders  -     fluticasone-cilantro (BR EO PHOEBE PTA) 100-25 mcg/dose Diskus inhaler; Inhale 1 puff into  the lungs once daily. Controller  Dispense: 1 each; Refill: 2

## 2018-10-01 ENCOUNTER — TELEPHONE (OUTPATIENT)
Dept: FAMILY MEDICINE | Facility: CLINIC | Age: 43
End: 2018-10-01

## 2018-10-08 DIAGNOSIS — R09.82 POST-NASAL DRIP: ICD-10-CM

## 2018-10-08 DIAGNOSIS — F51.04 CHRONIC INSOMNIA: ICD-10-CM

## 2018-10-08 RX ORDER — FLUTICASONE PROPIONATE 50 MCG
SPRAY, SUSPENSION (ML) NASAL
Qty: 9.9 ML | Refills: 5 | Status: SHIPPED | OUTPATIENT
Start: 2018-10-08 | End: 2019-05-21 | Stop reason: SDUPTHER

## 2018-10-08 RX ORDER — DOXEPIN HYDROCHLORIDE 50 MG/1
CAPSULE ORAL
Qty: 30 CAPSULE | Refills: 5 | Status: SHIPPED | OUTPATIENT
Start: 2018-10-08 | End: 2019-05-22

## 2018-10-17 DIAGNOSIS — E55.9 VITAMIN D DEFICIENCY: ICD-10-CM

## 2018-10-17 RX ORDER — ERGOCALCIFEROL 1.25 MG/1
CAPSULE ORAL
Qty: 4 CAPSULE | Refills: 5 | Status: SHIPPED | OUTPATIENT
Start: 2018-10-17 | End: 2019-05-21 | Stop reason: SDUPTHER

## 2018-10-18 ENCOUNTER — OFFICE VISIT (OUTPATIENT)
Dept: FAMILY MEDICINE | Facility: CLINIC | Age: 43
End: 2018-10-18
Payer: MEDICAID

## 2018-10-18 VITALS
HEART RATE: 70 BPM | DIASTOLIC BLOOD PRESSURE: 72 MMHG | BODY MASS INDEX: 35.85 KG/M2 | TEMPERATURE: 99 F | HEIGHT: 62 IN | WEIGHT: 194.81 LBS | RESPIRATION RATE: 14 BRPM | OXYGEN SATURATION: 99 % | SYSTOLIC BLOOD PRESSURE: 128 MMHG

## 2018-10-18 DIAGNOSIS — E78.2 MIXED HYPERLIPIDEMIA: ICD-10-CM

## 2018-10-18 DIAGNOSIS — J43.1 PANLOBULAR EMPHYSEMA: ICD-10-CM

## 2018-10-18 DIAGNOSIS — F41.9 CHRONIC ANXIETY: Primary | ICD-10-CM

## 2018-10-18 DIAGNOSIS — R11.0 NAUSEA: ICD-10-CM

## 2018-10-18 PROBLEM — J44.9 COPD (CHRONIC OBSTRUCTIVE PULMONARY DISEASE): Status: ACTIVE | Noted: 2018-10-18

## 2018-10-18 PROCEDURE — 99214 OFFICE O/P EST MOD 30 MIN: CPT | Mod: ,,, | Performed by: NURSE PRACTITIONER

## 2018-10-18 RX ORDER — ONDANSETRON 4 MG/1
4 TABLET, ORALLY DISINTEGRATING ORAL EVERY 8 HOURS PRN
Qty: 30 TABLET | Refills: 1 | Status: SHIPPED | OUTPATIENT
Start: 2018-10-18 | End: 2019-01-23 | Stop reason: SDUPTHER

## 2018-10-18 RX ORDER — ONDANSETRON 4 MG/1
TABLET, ORALLY DISINTEGRATING ORAL
Refills: 0 | COMMUNITY
Start: 2018-08-28 | End: 2018-10-18 | Stop reason: SDUPTHER

## 2018-10-18 RX ORDER — ESCITALOPRAM OXALATE 20 MG/1
20 TABLET ORAL DAILY
Qty: 90 TABLET | Refills: 1 | Status: SHIPPED | OUTPATIENT
Start: 2018-10-18 | End: 2019-03-18 | Stop reason: SDUPTHER

## 2018-10-18 NOTE — PATIENT INSTRUCTIONS
Treatment for COPD    Your healthcare provider will prescribe the best treatments for your COPD.  Treatment  Recommendations include the following:  · Medicines. Some medicines help relieve symptoms when you have them. Others are taken daily to control inflammation in the lungs. Always take your medicines as prescribed. Learn the names of your medicines, as well as how and when to use them.  · Oxygen therapy. Oxygen may be prescribed if tests show that your blood contains too little oxygen.  · Smoking. If you smoke, quit. Smoking is the main cause of COPD. Quitting will help you be able to better manage your COPD. Ask your healthcare provider about ways to help you quit smoking.  · Avoiding infections. Infections, like a cold or the flu, can cause your symptoms to worsen. Try to stay away from people who are sick. Wash your hands often. And, ask your healthcare provider about vaccines for the flu and pneumonia.  Coping with shortness of breath  Coping tips include the following:  · Exercise. Try to be as active as possible. This will improve energy levels and strengthen your muscles, so you can do more.  · Breathing techniques. Ask your healthcare provider or nurse to show you how to do pursed-lip breathing.  · Balance rest and activity. Each day, try to balance rest periods with activity. For example, you might start the day with getting dressed and eating breakfast, then relax and read the paper. After that, take a brief walk. And then sit with your feet up for a while.  · Pulmonary rehabilitation. Ask your provider, or call your local hospital to find out about pulmonary rehab programs. The programs help with managing your disease, breathing techniques, exercise, support and counseling.  · Healthy eating. Eating a healthy, balanced diet and making an effort to maintain your ideal weight are important to staying as healthy as possible. Make sure you have a lot of fruit and vegetables every day, as well as  "balanced portions of whole grains, lean meats and fish, and low-fat dairy products.  Date Last Reviewed: 5/1/2016  © 9357-0130 [employer]. [address] [place] [address], [place] [address]. All rights reserved. This information is not intended as a substitute for professional medical care. Always follow your healthcare professional's instructions.        Facts About Dietary Fat     Olive oil is a good source of unsaturated fat.     Eating less saturated and trans fat is one of the best things you can do for your heart. Start by finding out which fats are better to use. Then always try to use as little "bad" fat as you can.  Why eat less fat?  · Cutting down on the fat you eat can lower your blood cholesterol levels. This may help prevent clogged arteries from buildup of plaque.  · A low-fat diet can help you lose excess weight. Doing so can lower your blood pressure and reduce your chances of getting diabetes.  · A low-fat diet reduces your risk for stroke and for some cancers.  Unsaturated fat is most healthy  · When you must add fat, use unsaturated fat.  · Unsaturated fats come from plants. They include olive, canola, peanut, corn, avocado, safflower, and sunflower oils.  · Liquid (squeezable) margarine is also mostly unsaturated fat.  · In moderate amounts, unsaturated fat can even be good for your heart.  Saturated fat is less healthy  · Avoid eating saturated fat because it raises your blood cholesterol levels.  · Most saturated fat comes from animals. Foods such as butter, lard, cheese, cream, whole milk, and fatty cuts of meat are high in saturated fat.  · Some oils, such as palm and coconut oils, are also saturated fats.  Trans fat is least healthy  · Also avoid trans fat whenever possible. Even if it's not listed on the food label, look for it in the ingredients in the form of hydrogenated or partially hydrogenated oils.  · This is found in snack foods, shortening, french fries, and stick " margarines.  Add flavor without fat  · Sprinkle herbs on fish, chicken, and meat, and in soups.  · Try herbs, lemon juice, or flavored vinegar on vegetables.  · Add chopped onions, garlic, and peppers to flavor beans and rice.   Date Last Reviewed: 5/11/2015  © 7460-2683 [employer]. [address] [place] [address], [place] [address]. All rights reserved. This information is not intended as a substitute for professional medical care. Always follow your healthcare professional's instructions.

## 2018-10-23 PROBLEM — F41.9 CHRONIC ANXIETY: Status: ACTIVE | Noted: 2018-10-23

## 2018-10-23 NOTE — PROGRESS NOTES
SUBJECTIVE:      Patient ID: Promise Medrano is a 43 y.o. female.    Chief Complaint: Follow-up (4 week f/u for lexapro)    FU for lexapro eval - states she's noticed some improvement in anxiety/mood, would like to increase dose    States insurance doesn't cover Breo, has to have tried & failed other options, brought formulary list today, open to trying Stiolto      Anxiety   Presents for follow-up visit. Symptoms include depressed mood, muscle tension, nervous/anxious behavior and shortness of breath. Patient reports no chest pain, compulsions, confusion, decreased concentration, dizziness, dry mouth, excessive worry, feeling of choking, hyperventilation, impotence, insomnia, irritability, malaise, nausea, obsessions, palpitations, panic, restlessness or suicidal ideas. Symptoms occur constantly. The severity of symptoms is mild. The quality of sleep is good.     Compliance with medications is %.   Breathing Problem   She complains of difficulty breathing, shortness of breath and wheezing. There is no chest tightness, cough, frequent throat clearing, hemoptysis, hoarse voice or sputum production. This is a chronic problem. The current episode started more than 1 year ago. The problem occurs daily. The problem has been unchanged. Associated symptoms include dyspnea on exertion and heartburn. Pertinent negatives include no appetite change, chest pain, ear congestion, ear pain, fever, headaches, malaise/fatigue, myalgias, nasal congestion, orthopnea, PND, postnasal drip, rhinorrhea, sneezing, sore throat, sweats, trouble swallowing or weight loss. Her symptoms are aggravated by strenuous activity and change in weather. Her symptoms are alleviated by steroid inhaler. She reports moderate improvement on treatment. Risk factors for lung disease include smoking/tobacco exposure. Her past medical history is significant for COPD and emphysema.       Past Surgical History:   Procedure Laterality Date    breast  augmentation  2004    BREAST SURGERY  2004     SECTION  ,    gastric sleeve  2010    HYSTERECTOMY  2010    TONSILLECTOMY  1996     Family History   Problem Relation Age of Onset    Early death Father     Heart disease Father     Stroke Father     Kidney disease Father     Diabetes Paternal Grandmother     Cancer Paternal Grandmother     Cancer Mother     Raynaud syndrome Mother     Raynaud syndrome Sister     Polycystic ovary syndrome Daughter     Diabetes Maternal Grandmother     Heart disease Maternal Grandmother     Kidney disease Maternal Grandmother     Heart disease Maternal Grandfather     Cancer Paternal Grandfather     No Known Problems Daughter       Social History     Socioeconomic History    Marital status: Single     Spouse name: None    Number of children: None    Years of education: None    Highest education level: None   Social Needs    Financial resource strain: None    Food insecurity - worry: None    Food insecurity - inability: None    Transportation needs - medical: None    Transportation needs - non-medical: None   Occupational History    None   Tobacco Use    Smoking status: Current Every Day Smoker     Packs/day: 1.00     Types: Cigarettes    Smokeless tobacco: Never Used   Substance and Sexual Activity    Alcohol use: Yes    Drug use: No    Sexual activity: Yes     Partners: Male     Birth control/protection: None   Other Topics Concern    None   Social History Narrative    None     Current Outpatient Medications   Medication Sig Dispense Refill    atorvastatin (LIPITOR) 80 MG tablet       [employer] 18 mg iron-400 mcg-25 mcg-75mg Tab Take 1 tablet by mouth once daily.       CETAPHIL MOISTURIZING cream Apply topically as needed.       cetirizine (ZYRTEC) 10 MG tablet Take 1 tablet (10 mg total) by mouth once daily. 30 tablet 3    diclofenac sodium (VOLTAREN) 1 % Gel Apply 2 g topically 4 (four) times daily. for 10 days  1 Tube 2    dicyclomine (BENTYL) 20 mg tablet TK 1 T PO BID PRN. CONTINUE CURRENT DOSAGE  2    doxepin (SINEQUAN) 50 MG capsule TAKE 1 CAPSULE(50 MG) BY MOUTH EVERY NIGHT 30 capsule 5    ergocalciferol (ERGOCALCIFEROL) 50,000 unit Cap TAKE 1 CAPSULE BY MOUTH EVERY 7 DAYS 4 capsule 5    escitalopram oxalate (LEXAPRO) 20 MG tablet Take 1 tablet (20 mg total) by mouth once daily. 90 tablet 1    ferrous sulfate 325 mg (65 mg iron) Tab tablet Take 1 tablet (325 mg total) by mouth once daily. 30 tablet 5    fluticasone (FLONASE) 50 mcg/actuation nasal spray SHAKE LIQUID AND USE 1 SPRAY IN EACH NOSTRIL EVERY DAY 9.9 mL 5    gabapentin (NEURONTIN) 100 MG capsule 1-2 po tid( add to the 400 mg tablet) 300 capsule 3    gabapentin (NEURONTIN) 400 MG capsule Take 1 capsule (400 mg total) by mouth 3 (three) times daily. 90 capsule 11    hydrocortisone 1 % cream Apply to affected area 2 times daily.      hydroxychloroquine (PLAQUENIL) 200 mg tablet Take 2 tablets (400 mg total) by mouth once daily. 60 tablet 3    ibuprofen (ADVIL,MOTRIN) 600 MG tablet       ketoconazole (NIZORAL) 2 % cream       metFORMIN (GLUCOPHAGE) 500 MG tablet Take 1 tablet (500 mg total) by mouth 2 (two) times daily with meals. 60 tablet 5    midodrine (PROAMATINE) 2.5 MG Tab Take 1 tablet by mouth 2 (two) times daily.      mupirocin (BACTROBAN) 2 % ointment       omeprazole (PRILOSEC) 40 MG capsule Take 1 capsule (40 mg total) by mouth every morning. 30 capsule 5    ondansetron (ZOFRAN-ODT) 4 MG TbDL Take 1 tablet (4 mg total) by mouth every 8 (eight) hours as needed. 30 tablet 1    triamcinolone acetonide 0.1% (KENALOG) 0.1 % cream MELY SPARINGLY TO ECZEMA BID  3    urea 40 % Lotn lotion Apply topically as needed.       XIIDRA 5 % Dpet       tiotropium-olodaterol (STIOLTO RESPIMAT) 2.5-2.5 mcg/actuation Mist Inhale 2 puffs into the lungs once daily. Controller 4 g 2    tiotropium-olodaterol (STIOLTO RESPIMAT) 2.5-2.5 mcg/actuation Mist  Inhale 2 puffs into the lungs once daily. Controller 8 g 0     No current facility-administered medications for this visit.      Review of patient's allergies indicates:  No Known Allergies   Past Medical History:   Diagnosis Date    Allergy     Arthritis     COPD (chronic obstructive pulmonary disease)     Diabetes mellitus     GERD (gastroesophageal reflux disease)     Neuromuscular disorder      Past Surgical History:   Procedure Laterality Date    breast augmentation  2004    BREAST SURGERY  2004     SECTION  ,2009    gastric sleeve  2010    HYSTERECTOMY      TONSILLECTOMY  1996       Review of Systems   Constitutional: Negative for activity change, appetite change, fatigue, fever, irritability, malaise/fatigue, unexpected weight change and weight loss.   HENT: Negative for congestion, ear pain, hearing loss, hoarse voice, postnasal drip, rhinorrhea, sinus pressure, sinus pain, sneezing, sore throat and trouble swallowing.    Eyes: Negative for photophobia and pain.   Respiratory: Positive for shortness of breath and wheezing. Negative for cough, hemoptysis, sputum production and chest tightness.    Cardiovascular: Positive for dyspnea on exertion. Negative for chest pain, palpitations, leg swelling and PND.   Gastrointestinal: Positive for heartburn. Negative for abdominal distention, abdominal pain, blood in stool, constipation, diarrhea, nausea and vomiting.   Endocrine: Negative for cold intolerance, heat intolerance, polydipsia and polyuria.   Genitourinary: Negative for difficulty urinating, dysuria, flank pain, frequency, hematuria, impotence, pelvic pain and urgency.   Musculoskeletal: Negative for arthralgias, back pain, joint swelling, myalgias and neck pain.   Skin: Negative for pallor.   Allergic/Immunologic: Negative for environmental allergies and food allergies.   Neurological: Negative for dizziness, weakness, light-headedness, numbness and headaches.   Hematological:  "Does not bruise/bleed easily.   Psychiatric/Behavioral: Negative for agitation, confusion, decreased concentration, sleep disturbance and suicidal ideas. The patient is nervous/anxious. The patient does not have insomnia.       OBJECTIVE:      Vitals:    10/18/18 0835   BP: 128/72   Pulse: 70   Resp: 14   Temp: 98.9 °F (37.2 °C)   TempSrc: Oral   SpO2: 99%   Weight: 88.4 kg (194 lb 12.8 oz)   Height: 5' 2" (1.575 m)     Physical Exam   Constitutional: She is oriented to person, place, and time. Vital signs are normal. She appears well-developed and well-nourished. No distress.   obese   HENT:   Head: Normocephalic and atraumatic.   Right Ear: Hearing normal.   Left Ear: Hearing normal.   Nose: Nose normal. No rhinorrhea.   Mouth/Throat: Mucous membranes are normal.   Eyes: Conjunctivae and lids are normal. Pupils are equal, round, and reactive to light. Right eye exhibits no discharge. Left eye exhibits no discharge. Right conjunctiva is not injected. Left conjunctiva is not injected. Right pupil is round and reactive. Left pupil is round and reactive. Pupils are equal.   Neck: Trachea normal and normal range of motion. Neck supple. No JVD present. No tracheal deviation present. No thyromegaly present.   Cardiovascular: Normal rate, regular rhythm, normal heart sounds and intact distal pulses. Exam reveals no gallop and no friction rub.   No murmur heard.  Pulses:       Radial pulses are 2+ on the right side, and 2+ on the left side.   Pulmonary/Chest: Effort normal. No stridor. No respiratory distress. She has decreased breath sounds in the right lower field and the left lower field. She has no wheezes. She has no rhonchi. She has no rales.   Abdominal: Soft. Bowel sounds are normal. She exhibits no distension. There is no tenderness. There is no rigidity and no guarding.   Musculoskeletal: Normal range of motion. She exhibits no edema.   Lymphadenopathy:     She has no cervical adenopathy.   Neurological: She is " alert and oriented to person, place, and time. She has normal strength. She displays no atrophy. She displays a negative Romberg sign. Coordination and gait normal.   Skin: Skin is warm and dry. Capillary refill takes less than 2 seconds. No lesion and no rash noted. No cyanosis. No pallor.   Psychiatric: She has a normal mood and affect. Her speech is normal and behavior is normal. Judgment and thought content normal. Cognition and memory are normal. She is attentive.   Nursing note and vitals reviewed.     Assessment:       1. Chronic anxiety    2. Panlobular emphysema    3. Mixed hyperlipidemia    4. Nausea        Plan:       Chronic anxiety  -     escitalopram oxalate (LEXAPRO) 20 MG tablet; Take 1 tablet (20 mg total) by mouth once daily.  Dispense: 90 tablet; Refill: 1    Panlobular emphysema  -     tiotropium-olodaterol (STIOLTO RESPIMAT) 2.5-2.5 mcg/actuation Mist; Inhale 2 puffs into the lungs once daily. Controller  Dispense: 4 g; Refill: 2  -     tiotropium-olodaterol (STIOLTO RESPIMAT) 2.5-2.5 mcg/actuation Mist; Inhale 2 puffs into the lungs once daily. Controller  Dispense: 8 g; Refill: 0    Mixed hyperlipidemia   -stable on med, reviewed 9/27 lab     Nausea  -     ondansetron (ZOFRAN-ODT) 4 MG TbDL; Take 1 tablet (4 mg total) by mouth every 8 (eight) hours as needed.  Dispense: 30 tablet; Refill: 1      Follow-up in about 3 months (around 1/18/2019) for breathing recheck.      10/23/2018 AURORA Owens, FNP-C

## 2018-10-26 DIAGNOSIS — R10.11 RUQ PAIN: Primary | ICD-10-CM

## 2019-01-16 DIAGNOSIS — R11.0 NAUSEA: ICD-10-CM

## 2019-01-16 DIAGNOSIS — G62.9 PERIPHERAL POLYNEUROPATHY: ICD-10-CM

## 2019-01-16 RX ORDER — DICLOFENAC SODIUM 10 MG/G
GEL TOPICAL
Qty: 100 G | Refills: 0 | Status: SHIPPED | OUTPATIENT
Start: 2019-01-16 | End: 2019-03-20 | Stop reason: SDUPTHER

## 2019-01-16 RX ORDER — GABAPENTIN 100 MG/1
CAPSULE ORAL
Qty: 300 CAPSULE | Refills: 0 | Status: SHIPPED | OUTPATIENT
Start: 2019-01-16 | End: 2019-03-20 | Stop reason: SDUPTHER

## 2019-01-17 RX ORDER — ONDANSETRON 4 MG/1
TABLET, ORALLY DISINTEGRATING ORAL
Qty: 30 TABLET | Refills: 0 | OUTPATIENT
Start: 2019-01-17

## 2019-01-23 ENCOUNTER — OFFICE VISIT (OUTPATIENT)
Dept: FAMILY MEDICINE | Facility: CLINIC | Age: 44
End: 2019-01-23
Payer: MEDICAID

## 2019-01-23 VITALS
SYSTOLIC BLOOD PRESSURE: 106 MMHG | TEMPERATURE: 99 F | OXYGEN SATURATION: 98 % | BODY MASS INDEX: 33.13 KG/M2 | HEIGHT: 62 IN | DIASTOLIC BLOOD PRESSURE: 74 MMHG | HEART RATE: 78 BPM | WEIGHT: 180 LBS | RESPIRATION RATE: 14 BRPM

## 2019-01-23 DIAGNOSIS — K64.1 GRADE II HEMORRHOIDS: ICD-10-CM

## 2019-01-23 DIAGNOSIS — J40 BRONCHITIS: ICD-10-CM

## 2019-01-23 DIAGNOSIS — E11.40 CONTROLLED TYPE 2 DIABETES WITH NEUROPATHY: Primary | ICD-10-CM

## 2019-01-23 DIAGNOSIS — E61.1 IRON DEFICIENCY: ICD-10-CM

## 2019-01-23 DIAGNOSIS — R11.0 NAUSEA: ICD-10-CM

## 2019-01-23 DIAGNOSIS — M32.13 OTHER SYSTEMIC LUPUS ERYTHEMATOSUS WITH LUNG INVOLVEMENT: ICD-10-CM

## 2019-01-23 DIAGNOSIS — L84 CALLUS OF FOOT: ICD-10-CM

## 2019-01-23 DIAGNOSIS — M35.00 SJOGREN'S SYNDROME WITHOUT EXTRAGLANDULAR INVOLVEMENT: ICD-10-CM

## 2019-01-23 LAB
CREATININE RANDOM URINE: 42 MG/DL
HBA1C MFR BLD: 5.4 % (ref 3.1–6.5)
MICROALBUM.,U,RANDOM: <2 MCG/ML (ref 0–19.9)
MICROALBUMIN/CREATININE RATIO: NORMAL (ref 0–30)

## 2019-01-23 PROCEDURE — 99214 PR OFFICE/OUTPT VISIT, EST, LEVL IV, 30-39 MIN: ICD-10-PCS | Mod: ,,, | Performed by: INTERNAL MEDICINE

## 2019-01-23 PROCEDURE — 99214 OFFICE O/P EST MOD 30 MIN: CPT | Mod: ,,, | Performed by: INTERNAL MEDICINE

## 2019-01-23 RX ORDER — ONDANSETRON 4 MG/1
4 TABLET, ORALLY DISINTEGRATING ORAL EVERY 8 HOURS PRN
Qty: 30 TABLET | Refills: 1 | Status: SHIPPED | OUTPATIENT
Start: 2019-01-23 | End: 2019-03-18 | Stop reason: SDUPTHER

## 2019-01-23 RX ORDER — LEVOCETIRIZINE DIHYDROCHLORIDE 5 MG/1
5 TABLET, FILM COATED ORAL NIGHTLY
Qty: 90 TABLET | Refills: 1 | Status: SHIPPED | OUTPATIENT
Start: 2019-01-23 | End: 2019-10-16 | Stop reason: SDUPTHER

## 2019-01-23 RX ORDER — LANCETS
1 EACH MISCELLANEOUS DAILY PRN
Qty: 100 EACH | Refills: 0 | Status: SHIPPED | OUTPATIENT
Start: 2019-01-23 | End: 2019-07-16 | Stop reason: SDUPTHER

## 2019-01-23 RX ORDER — LEVOCETIRIZINE DIHYDROCHLORIDE 5 MG/1
5 TABLET, FILM COATED ORAL NIGHTLY
COMMUNITY
End: 2019-01-23 | Stop reason: SDUPTHER

## 2019-01-23 RX ORDER — LEVOCETIRIZINE DIHYDROCHLORIDE 5 MG/1
5 TABLET, FILM COATED ORAL NIGHTLY
Qty: 90 TABLET | Refills: 1 | Status: SHIPPED | OUTPATIENT
Start: 2019-01-23 | End: 2019-02-26 | Stop reason: SDUPTHER

## 2019-01-23 RX ORDER — DOXYCYCLINE HYCLATE 100 MG
100 TABLET ORAL 2 TIMES DAILY
Qty: 20 TABLET | Refills: 0 | Status: SHIPPED | OUTPATIENT
Start: 2019-01-23 | End: 2019-02-26 | Stop reason: ALTCHOICE

## 2019-01-23 RX ORDER — FERROUS SULFATE 325(65) MG
325 TABLET ORAL DAILY
Qty: 90 TABLET | Refills: 1 | Status: SHIPPED | OUTPATIENT
Start: 2019-01-23 | End: 2019-08-18 | Stop reason: SDUPTHER

## 2019-01-23 NOTE — PROGRESS NOTES
SUBJECTIVE:    Patient ID: Promise Medrano is a 43 y.o. female.    Chief Complaint: Anxiety; Abdominal Pain; and Follow-up    HPI     Patient comes in for follow-up of below    HTN-after some trauma recently she went to ER and had HTN related to pain after fall .-BP has been good at home.    Chronic anxiety-been ok    Vasovagal syncope-no faints but low BP and low sugar at times and she treats herself with position change and something to eat    Diabetes-does not check-she does have neuropathy    Chronic insomnia-good    GERD without esophagitis-ok with rx    Panlobular emphysema--in August she had a CT of the chest because of ongoing cough and a mild was at attenuation pattern involving the lungs was seen with scattered thin-walled lucencies in both lungs predominantly in the upper lobes and in the subpleural distribution there were a few tiny centrilobular nodular opacities in both upper lobes with fluid airspace opacity or consolidation and no interlobular septal thickening. There were no pleural effusions. No bronchial wall thickening or bronchiectasis was seen. This was felt to possibly represent smoking-related interstitial lung changes with air trapping and small airways disease. She also was noted to have stable prominent mediastinal lymph nodes and prominent enlarged bilateral axillary lymph nodes    Right upper quadrant pain-we had attempted US but insurance did not approve-still hasn't had study-the pain got bad over the Holidays-she had some temporary left upper quadrant pain-Endoscopy done and showed only yeast     Laboratory studies included a glucose of 88 BUN of 19, creatinine of 0.65. Electrolytes were normal. Liver function studies were normal. CBC was normal with hemoglobin of 14.4 and hematocrit of 42.1. Platelets were normal. White blood cell count was normal.    Tobacco Use/Cessation:  I assessed Promise Medrano and discussed smoking cessation with her for 3-10 minutes. She is smoking less  -decreased to 1/2 PPD-offered smoking cessation therapy-she has chantix at home  Social History     Tobacco Use   Smoking Status Current Every Day Smoker    Packs/day: 1.00    Types: Cigarettes   Smokeless Tobacco Never Used       Orders Only on 01/23/2019   Component Date Value Ref Range Status    Hemoglobin A1C 01/23/2019 5.4  3.1 - 6.5 % Final   Office Visit on 01/23/2019   Component Date Value Ref Range Status    Creatinine, Random Ur 01/23/2019 42.00  mg/dl Preliminary   Office Visit on 08/28/2018   Component Date Value Ref Range Status    Glucose 08/28/2018 88  70 - 99 mg/dL Final    BUN, Bld 08/28/2018 19  8 - 20 mg/dL Final    Creatinine 08/28/2018 0.65  0.60 - 1.40 mg/dL Final    Calcium 08/28/2018 9.2  7.7 - 10.4 mg/dL Final    Sodium 08/28/2018 139  134 - 144 mmol/L Final    Potassium 08/28/2018 4.2  3.5 - 5.0 mmol/L Final    Chloride 08/28/2018 102  98 - 110 mmol/L Final    CO2 08/28/2018 28.3  22.8 - 31.6 mmol/L Final    Albumin 08/28/2018 4.6  3.1 - 4.7 g/dL Final    Total Bilirubin 08/28/2018 0.8  0.3 - 1.0 mg/dL Final    Alkaline Phosphatase 08/28/2018 76  40 - 104 IU/L Final    Total Protein 08/28/2018 8.0  6.0 - 8.2 g/dL Final    ALT (SGPT) 08/28/2018 17  3 - 33 IU/L Final    AST 08/28/2018 21  10 - 40 IU/L Final    WBC 08/28/2018 7.0  5.0 - 10.0 K/uL Final    RBC 08/28/2018 4.47  3.50 - 5.50 M/uL Final    Hemoglobin 08/28/2018 14.4  12.0 - 15.0 g/dL Final    Hematocrit 08/28/2018 42.1  36.0 - 48.0 % Final    MCV 08/28/2018 94.2  79.0 - 98.0 fL Final    MCH 08/28/2018 32.2  25.0 - 35.0 pg Final    MCHC 08/28/2018 34.2  31.0 - 36.0 g/dL Final    RDW 08/28/2018 14.7  11.7 - 14.9 % Final    Platelets 08/28/2018 195  140 - 440 K/uL Final    MPV 08/28/2018 11.2  8.8 - 12.7 fL Final    Gran% 08/28/2018 66.4  % Final    Lymph% 08/28/2018 27.4  % Final    Mono% 08/28/2018 5.4  % Final    Eosinophil% 08/28/2018 0.4  % Final    Basophil% 08/28/2018 0.3  % Final    Gran #  (ANC) 2018 4.6  1.4 - 6.5 K/uL Final    Lymph # 2018 1.9  1.2 - 3.4 K/uL Final    Mono # 2018 0.4  0.1 - 0.6 K/uL Final    Eos # 2018 0.0  0.0 - 0.7 K/uL Final    Baso # 2018 0.0  0.0 - 0.2 K/uL Final    Immature Grans (Abs) 2018 0.0  0.0 - 1.0 K/uL Final    Immature Granulocytes 2018 0.1  % Final    nRBC% 2018 0  % Final       Past Medical History:   Diagnosis Date    Allergy     Arthritis     COPD (chronic obstructive pulmonary disease)     Diabetes mellitus     GERD (gastroesophageal reflux disease)     Grade II hemorrhoids 2019    Neuromuscular disorder     Sjogren's syndrome 2019     Social History     Socioeconomic History    Marital status: Single     Spouse name: Not on file    Number of children: Not on file    Years of education: Not on file    Highest education level: Not on file   Social Needs    Financial resource strain: Not on file    Food insecurity - worry: Not on file    Food insecurity - inability: Not on file    Transportation needs - medical: Not on file    Transportation needs - non-medical: Not on file   Occupational History    Not on file   Tobacco Use    Smoking status: Current Every Day Smoker     Packs/day: 1.00     Types: Cigarettes    Smokeless tobacco: Never Used   Substance and Sexual Activity    Alcohol use: Yes    Drug use: No    Sexual activity: Yes     Partners: Male     Birth control/protection: None   Other Topics Concern    Not on file   Social History Narrative    Not on file     Past Surgical History:   Procedure Laterality Date    breast augmentation  2004    BREAST SURGERY  2004     SECTION  ,2009    gastric sleeve  2010    HYSTERECTOMY  2010    TONSILLECTOMY       Family History   Problem Relation Age of Onset    Early death Father     Heart disease Father     Stroke Father     Kidney disease Father     Diabetes Paternal Grandmother     Cancer Paternal  Grandmother     Cancer Mother     Raynaud syndrome Mother     Raynaud syndrome Sister     Polycystic ovary syndrome Daughter     Diabetes Maternal Grandmother     Heart disease Maternal Grandmother     Kidney disease Maternal Grandmother     Heart disease Maternal Grandfather     Cancer Paternal Grandfather     No Known Problems Daughter        Review of patient's allergies indicates:  No Known Allergies  Current Outpatient Medications   Medication Sig Dispense Refill    atorvastatin (LIPITOR) 80 MG tablet       [employer] 18 mg iron-400 mcg-25 mcg-75mg Tab Take 1 tablet by mouth once daily.       CETAPHIL MOISTURIZING cream Apply topically as needed.       diclofenac sodium (VOLTAREN) 1 % Gel APPLY 2 GRAMS EXTERNALLY TO THE AFFECTED AREA FOUR TIMES DAILY FOR 10 DAYS 100 g 0    dicyclomine (BENTYL) 20 mg tablet TK 1 T PO BID PRN. CONTINUE CURRENT DOSAGE  2    doxepin (SINEQUAN) 50 MG capsule TAKE 1 CAPSULE(50 MG) BY MOUTH EVERY NIGHT 30 capsule 5    ergocalciferol (ERGOCALCIFEROL) 50,000 unit Cap TAKE 1 CAPSULE BY MOUTH EVERY 7 DAYS 4 capsule 5    escitalopram oxalate (LEXAPRO) 20 MG tablet Take 1 tablet (20 mg total) by mouth once daily. 90 tablet 1    ferrous sulfate (FEOSOL) 325 mg (65 mg iron) Tab tablet Take 1 tablet (325 mg total) by mouth once daily. 90 tablet 1    fluticasone (FLONASE) 50 mcg/actuation nasal spray SHAKE LIQUID AND USE 1 SPRAY IN EACH NOSTRIL EVERY DAY 9.9 mL 5    gabapentin (NEURONTIN) 100 MG capsule TAKE 1 TO 2 CAPSULES BY MOUTH THREE TIMES DAILY(TAKE WITH 400 MG DOSE) 300 capsule 0    gabapentin (NEURONTIN) 400 MG capsule Take 1 capsule (400 mg total) by mouth 3 (three) times daily. 90 capsule 11    hydrocortisone 1 % cream Apply to affected area 2 times daily.      ibuprofen (ADVIL,MOTRIN) 600 MG tablet       ketoconazole (NIZORAL) 2 % cream       levocetirizine (XYZAL) 5 MG tablet Take 1 tablet (5 mg total) by mouth every evening. 90 tablet 1     metFORMIN (GLUCOPHAGE) 500 MG tablet Take 1 tablet (500 mg total) by mouth 2 (two) times daily with meals. 60 tablet 5    midodrine (PROAMATINE) 2.5 MG Tab Take 1 tablet by mouth 2 (two) times daily.      mupirocin (BACTROBAN) 2 % ointment       omeprazole (PRILOSEC) 40 MG capsule Take 1 capsule (40 mg total) by mouth every morning. 30 capsule 5    ondansetron (ZOFRAN-ODT) 4 MG TbDL Take 1 tablet (4 mg total) by mouth every 8 (eight) hours as needed. 30 tablet 1    tiotropium-olodaterol (STIOLTO RESPIMAT) 2.5-2.5 mcg/actuation Mist Inhale 2 puffs into the lungs once daily. Controller 4 g 2    triamcinolone acetonide 0.1% (KENALOG) 0.1 % cream MELY SPARINGLY TO ECZEMA BID  3    urea 40 % Lotn lotion Apply topically as needed.       XIIDRA 5 % Dpet       blood sugar diagnostic Strp 1 each by Misc.(Non-Drug; Combo Route) route daily as needed. 100 each 0    blood-glucose meter, drum-type (ACCU-CHEK COMPACT PLUS CARE) Kit 1 Device by Misc.(Non-Drug; Combo Route) route daily as needed. 1 kit 0    doxycycline (VIBRA-TABS) 100 MG tablet Take 1 tablet (100 mg total) by mouth 2 (two) times daily. 20 tablet 0    lancets (ACCU-CHEK SOFTCLIX LANCETS) Misc 1 Device by Misc.(Non-Drug; Combo Route) route daily as needed. 100 each 0    levocetirizine (XYZAL) 5 MG tablet Take 1 tablet (5 mg total) by mouth every evening. 90 tablet 1     No current facility-administered medications for this visit.        Review of Systems   Constitutional: Negative for appetite change, chills, diaphoresis, fatigue, fever and unexpected weight change.   HENT: Negative for congestion, ear pain, hearing loss, nosebleeds, postnasal drip, sinus pressure (sinus congestion), sinus pain, sneezing, sore throat, tinnitus, trouble swallowing and voice change.         Sjogrens   Eyes: Negative for photophobia, pain, itching and visual disturbance.   Respiratory: Negative for apnea, cough (recent), chest tightness, shortness of breath, wheezing and  "stridor.    Cardiovascular: Negative for chest pain, palpitations and leg swelling.   Gastrointestinal: Negative for abdominal distention, abdominal pain, blood in stool, constipation, diarrhea, nausea and vomiting.        Abd pain and swelling with plaquenil-also cause hemorroids   Endocrine: Negative for cold intolerance, heat intolerance, polydipsia and polyuria.   Genitourinary: Negative for difficulty urinating, dyspareunia, dysuria, flank pain, frequency, hematuria, menstrual problem, pelvic pain, urgency, vaginal discharge and vaginal pain.        Slight leakage-cant hold urine   Musculoskeletal: Negative for arthralgias, back pain, joint swelling, myalgias, neck pain and neck stiffness.        Right third finger gets Stuck down" and she has to open it up-no strength in hands-hx Lupus-she was on plaquenil which made her sick so she weaned herself off     Skin: Negative for pallor.        Calluses feet   Allergic/Immunologic: Negative for environmental allergies and food allergies.   Neurological: Negative for dizziness (at times with low BP), tremors, speech difficulty, weakness, light-headedness (with drops in BP) and numbness (hands and feet).   Hematological: Does not bruise/bleed easily.   Psychiatric/Behavioral: Negative for agitation, confusion, decreased concentration, sleep disturbance and suicidal ideas. The patient is not nervous/anxious.         HPI          Objective:      Vitals:    01/23/19 0857   BP: 106/74   Pulse: 78   Resp: 14   Temp: 99.1 °F (37.3 °C)   SpO2: 98%   Weight: 81.6 kg (180 lb)   Height: 5' 2" (1.575 m)       Physical Exam   Constitutional: She is oriented to person, place, and time. She appears well-developed and well-nourished. She is cooperative. No distress.   Increased BMI   HENT:   Head: Normocephalic and atraumatic.   Right Ear: Tympanic membrane normal.   Left Ear: Tympanic membrane normal.   Mouth/Throat: Uvula is midline, oropharynx is clear and moist and mucous " membranes are normal.   Mouth dry-brown coating tongue-pharynx clear   Eyes: Conjunctivae and EOM are normal. Right pupil is round and reactive. Left pupil is round and reactive.   Neck: Trachea normal and normal range of motion. Neck supple. No JVD present. No thyromegaly present.   Cardiovascular: Normal rate, regular rhythm, normal heart sounds and intact distal pulses.   Pulmonary/Chest: Effort normal. No tachypnea. No respiratory distress.   Bilateral rhonchi almost totally clear with cough   Abdominal: Soft. Bowel sounds are normal. There is no tenderness.   Today no tenderness in the RUQ   Musculoskeletal: Normal range of motion.   Trigger finger right third finger   Lymphadenopathy:     She has no cervical adenopathy.   Neurological: She is alert and oriented to person, place, and time. She has normal strength.   Skin: Skin is warm and dry. No rash noted.   Calluses left medial great toe and medial and lateral right foot-callus like formation metatarsal ridge plantar surface of left third metatarsal head   Psychiatric: She has a normal mood and affect. Her speech is normal.   Nursing note and vitals reviewed.      Protective Sensation (w/ 10 gram monofilament):  Right: Intact  Left: Intact    Visual Inspection:  Callus -  Bilateral    Pedal Pulses:   Right: Present  Left: Present    Posterior tibialis:   Right:Present  Left: Present      Assessment:       1. BMI 32.0-32.9,adult    2. Nausea    3. Iron deficiency    4. Controlled type 2 diabetes with neuropathy    5. Callus of foot    6. Bronchitis    7. Other systemic lupus erythematosus with lung involvement    8. Sjogren's syndrome without extraglandular involvement    9. Grade II hemorrhoids         Plan:       BMI 32.0-32.9,adult    Nausea  -     ondansetron (ZOFRAN-ODT) 4 MG TbDL; Take 1 tablet (4 mg total) by mouth every 8 (eight) hours as needed.  Dispense: 30 tablet; Refill: 1    Iron deficiency  -     ferrous sulfate (FEOSOL) 325 mg (65 mg iron) Tab  tablet; Take 1 tablet (325 mg total) by mouth once daily.  Dispense: 90 tablet; Refill: 1    Controlled type 2 diabetes with neuropathy  -     Hemoglobin A1C; Future; Expected date: 01/23/2019  -     Microalbumin/creatinine urine ratio  -     lancets (ACCU-CHEK SOFTCLIX LANCETS) Misc; 1 Device by Misc.(Non-Drug; Combo Route) route daily as needed.  Dispense: 100 each; Refill: 0  -     blood sugar diagnostic Strp; 1 each by Misc.(Non-Drug; Combo Route) route daily as needed.  Dispense: 100 each; Refill: 0  -     blood-glucose meter, drum-type (ACCU-CHEK COMPACT PLUS CARE) Kit; 1 Device by Misc.(Non-Drug; Combo Route) route daily as needed.  Dispense: 1 kit; Refill: 0    Callus of foot  -     Ambulatory referral to Podiatry    Bronchitis  -     doxycycline (VIBRA-TABS) 100 MG tablet; Take 1 tablet (100 mg total) by mouth 2 (two) times daily.  Dispense: 20 tablet; Refill: 0  -     levocetirizine (XYZAL) 5 MG tablet; Take 1 tablet (5 mg total) by mouth every evening.  Dispense: 90 tablet; Refill: 1    Other systemic lupus erythematosus with lung involvement        -     Per Rheumatology    Sjogren's syndrome without extraglandular involvement        -     Per Rheumatology-    Grade II hemorrhoids        --    OTC hydrocortisone supps    Other orders  -     levocetirizine (XYZAL) 5 MG tablet; Take 1 tablet (5 mg total) by mouth every evening.  Dispense: 90 tablet; Refill: 1      Follow-up in about 3 months (around 4/23/2019).        1/23/2019 Mine Palmer M.D.

## 2019-01-24 ENCOUNTER — TELEPHONE (OUTPATIENT)
Dept: FAMILY MEDICINE | Facility: CLINIC | Age: 44
End: 2019-01-24

## 2019-01-24 NOTE — TELEPHONE ENCOUNTER
----- Message from Mine Palmer MD sent at 1/23/2019 10:49 PM CST -----  Test results are normal-cont DM w no protein in rine

## 2019-01-28 ENCOUNTER — TELEPHONE (OUTPATIENT)
Dept: FAMILY MEDICINE | Facility: CLINIC | Age: 44
End: 2019-01-28

## 2019-01-29 ENCOUNTER — TELEPHONE (OUTPATIENT)
Dept: FAMILY MEDICINE | Facility: CLINIC | Age: 44
End: 2019-01-29

## 2019-01-31 ENCOUNTER — PATIENT MESSAGE (OUTPATIENT)
Dept: FAMILY MEDICINE | Facility: CLINIC | Age: 44
End: 2019-01-31

## 2019-01-31 DIAGNOSIS — Z86.39 HISTORY OF DIABETES MELLITUS RESOLVED FOLLOWING BARIATRIC SURGERY: Primary | ICD-10-CM

## 2019-01-31 DIAGNOSIS — Z98.84 HISTORY OF DIABETES MELLITUS RESOLVED FOLLOWING BARIATRIC SURGERY: Primary | ICD-10-CM

## 2019-01-31 RX ORDER — INSULIN PUMP SYRINGE, 3 ML
EACH MISCELLANEOUS
Qty: 1 EACH | Refills: 0 | Status: SHIPPED | OUTPATIENT
Start: 2019-01-31 | End: 2019-07-16 | Stop reason: SDUPTHER

## 2019-02-07 ENCOUNTER — OFFICE VISIT (OUTPATIENT)
Dept: PODIATRY | Facility: CLINIC | Age: 44
End: 2019-02-07
Payer: MEDICAID

## 2019-02-07 VITALS
SYSTOLIC BLOOD PRESSURE: 112 MMHG | HEART RATE: 71 BPM | DIASTOLIC BLOOD PRESSURE: 79 MMHG | BODY MASS INDEX: 32.74 KG/M2 | WEIGHT: 179 LBS

## 2019-02-07 DIAGNOSIS — L85.1 ACQUIRED KERATOSIS (KERATODERMA) PALMARIS ET PLANTARIS: ICD-10-CM

## 2019-02-07 DIAGNOSIS — S90.852A FOREIGN BODY IN LEFT FOOT, INITIAL ENCOUNTER: Primary | ICD-10-CM

## 2019-02-07 DIAGNOSIS — Q66.70 PES CAVUS: ICD-10-CM

## 2019-02-07 PROCEDURE — 99203 PR OFFICE/OUTPT VISIT, NEW, LEVL III, 30-44 MIN: ICD-10-PCS | Mod: 25,,, | Performed by: PODIATRIST

## 2019-02-07 PROCEDURE — 73630 X-RAY EXAM OF FOOT: CPT | Mod: RT,,, | Performed by: PODIATRIST

## 2019-02-07 PROCEDURE — 73630 PR  X-RAY FOOT 3+ VW: ICD-10-PCS | Mod: LT,,, | Performed by: PODIATRIST

## 2019-02-07 PROCEDURE — 99203 OFFICE O/P NEW LOW 30 MIN: CPT | Mod: 25,,, | Performed by: PODIATRIST

## 2019-02-07 RX ORDER — DOXYCYCLINE 100 MG/1
CAPSULE ORAL
Refills: 0 | COMMUNITY
Start: 2019-01-23 | End: 2019-02-26 | Stop reason: ALTCHOICE

## 2019-02-07 NOTE — PROGRESS NOTES
[address] [facility] [address]  [place] [address]  Phone: [phone]   [phone]    Patient's PCP:Mine Palmer MD  Referring Provider: No ref. provider found    Subjective:      Chief Complaint:: Callouses (bilateral) and Foot Pain    HPI  Promise Medrano is a 43 y.o. female who presents with a complaint of bilateral callouses lasting for 3 years.  Additional complaint of pt walking on the outside of her feet, pt not sure if has to do with the callouses. Onset of the symptoms was 3 years ago pt went to [place] and wore thin shoes and stepped on something so pt thinks stepped on a barnacle or something in the ocean, ever since stepped on a barnacle pt has been getting these callouses.  Pt was able to dig some of the barnacle out, but thinks something is still in foot.  Current symptoms include numbness, tingling.  Aggravating factors are prolonged walking, certain shoes. Symptoms have gotten worse. Treatment to date have included pumice stones, electric koffi/shaver, cuticle emerson, lotion.  Patients rates pain 7/10 on pain scale.    Systemic Doctor: Mine Palmer   Date Last Seen: 19  Blood Sugar: pt usually checks when feeling really bad which brings blood sugar really low, around 55  Hemoglobin A1c: 5.4    Vitals:    19 0954   BP: 112/79   Pulse: 71     Shoe Size: 7.5 / 8    Past Surgical History:   Procedure Laterality Date    breast augmentation  2004    BREAST SURGERY  2004     SECTION  ,    gastric sleeve  2010    HYSTERECTOMY      TONSILLECTOMY       Past Medical History:   Diagnosis Date    Allergy     Arthritis     COPD (chronic obstructive pulmonary disease)     Diabetes mellitus     GERD (gastroesophageal reflux disease)     Grade II hemorrhoids 2019    Lupus     Neuromuscular disorder     Sjogren's syndrome 2019     Family History   Problem Relation Age of Onset    Early death Father     Heart disease Father     Stroke Father      Kidney disease Father     Diabetes Paternal Grandmother     Cancer Paternal Grandmother     Cancer Mother     Raynaud syndrome Mother     Raynaud syndrome Sister     Polycystic ovary syndrome Daughter     Diabetes Maternal Grandmother     Heart disease Maternal Grandmother     Kidney disease Maternal Grandmother     Heart disease Maternal Grandfather     Cancer Paternal Grandfather     No Known Problems Daughter         Social History:   Marital Status: Single  Alcohol History:  reports that she drinks alcohol.  Tobacco History:  reports that she has been smoking cigarettes.  She has been smoking about 1.00 pack per day. she has never used smokeless tobacco.  Drug History:  reports that she does not use drugs.    Review of patient's allergies indicates:  No Known Allergies    Current Outpatient Medications   Medication Sig Dispense Refill    atorvastatin (LIPITOR) 80 MG tablet       blood sugar diagnostic Strp 1 each by Misc.(Non-Drug; Combo Route) route daily as needed. 100 each 0    blood-glucose meter kit Use as instructed 1 each 0    blood-glucose meter, drum-type (ACCU-CHEK COMPACT PLUS CARE) Kit 1 Device by Misc.(Non-Drug; Combo Route) route daily as needed. 1 kit 0    CENTRUM SPECIALIST ENERGY 18 mg iron-400 mcg-25 mcg-75mg Tab Take 1 tablet by mouth once daily.       CETAPHIL MOISTURIZING cream Apply topically as needed.       diclofenac sodium (VOLTAREN) 1 % Gel APPLY 2 GRAMS EXTERNALLY TO THE AFFECTED AREA FOUR TIMES DAILY FOR 10 DAYS 100 g 0    dicyclomine (BENTYL) 20 mg tablet TK 1 T PO BID PRN. CONTINUE CURRENT DOSAGE  2    doxepin (SINEQUAN) 50 MG capsule TAKE 1 CAPSULE(50 MG) BY MOUTH EVERY NIGHT 30 capsule 5    doxycycline (MONODOX) 100 MG capsule   0    doxycycline (VIBRA-TABS) 100 MG tablet Take 1 tablet (100 mg total) by mouth 2 (two) times daily. 20 tablet 0    ergocalciferol (ERGOCALCIFEROL) 50,000 unit Cap TAKE 1 CAPSULE BY MOUTH EVERY 7 DAYS 4 capsule 5     escitalopram oxalate (LEXAPRO) 20 MG tablet Take 1 tablet (20 mg total) by mouth once daily. 90 tablet 1    ferrous sulfate (FEOSOL) 325 mg (65 mg iron) Tab tablet Take 1 tablet (325 mg total) by mouth once daily. 90 tablet 1    fluticasone (FLONASE) 50 mcg/actuation nasal spray SHAKE LIQUID AND USE 1 SPRAY IN EACH NOSTRIL EVERY DAY 9.9 mL 5    gabapentin (NEURONTIN) 100 MG capsule TAKE 1 TO 2 CAPSULES BY MOUTH THREE TIMES DAILY(TAKE WITH 400 MG DOSE) 300 capsule 0    gabapentin (NEURONTIN) 400 MG capsule Take 1 capsule (400 mg total) by mouth 3 (three) times daily. 90 capsule 11    hydrocortisone 1 % cream Apply to affected area 2 times daily.      ibuprofen (ADVIL,MOTRIN) 600 MG tablet       ketoconazole (NIZORAL) 2 % cream       lancets (ACCU-CHEK SOFTCLIX LANCETS) Misc 1 Device by Misc.(Non-Drug; Combo Route) route daily as needed. 100 each 0    levocetirizine (XYZAL) 5 MG tablet Take 1 tablet (5 mg total) by mouth every evening. 90 tablet 1    levocetirizine (XYZAL) 5 MG tablet Take 1 tablet (5 mg total) by mouth every evening. 90 tablet 1    metFORMIN (GLUCOPHAGE) 500 MG tablet Take 1 tablet (500 mg total) by mouth 2 (two) times daily with meals. 60 tablet 5    midodrine (PROAMATINE) 2.5 MG Tab Take 1 tablet by mouth 2 (two) times daily.      mupirocin (BACTROBAN) 2 % ointment       omeprazole (PRILOSEC) 40 MG capsule Take 1 capsule (40 mg total) by mouth every morning. 30 capsule 5    ondansetron (ZOFRAN-ODT) 4 MG TbDL Take 1 tablet (4 mg total) by mouth every 8 (eight) hours as needed. 30 tablet 1    tiotropium-olodaterol (STIOLTO RESPIMAT) 2.5-2.5 mcg/actuation Mist Inhale 2 puffs into the lungs once daily. Controller 4 g 2    triamcinolone acetonide 0.1% (KENALOG) 0.1 % cream MELY SPARINGLY TO ECZEMA BID  3    urea 40 % Lotn lotion Apply topically as needed.       XIIDRA 5 % Dpet        No current facility-administered medications for this visit.        Review of Systems   Constitutional:  Positive for chills, fatigue and fever. Negative for unexpected weight change.   HENT: Negative for hearing loss and trouble swallowing.    Eyes: Negative for photophobia and visual disturbance.   Respiratory: Negative for cough, shortness of breath and wheezing.    Cardiovascular: Negative for chest pain, palpitations and leg swelling.   Gastrointestinal: Negative for abdominal pain and nausea.   Genitourinary: Negative for dysuria and frequency.   Musculoskeletal: Positive for arthralgias and joint swelling. Negative for back pain.   Skin: Negative for rash.   Neurological: Negative for tremors, seizures, weakness, numbness and headaches.   Hematological: Does not bruise/bleed easily.         Objective:        Physical Exam:   Foot Exam    General  General Appearance: appears stated age and healthy   Orientation: alert and oriented to person, place, and time   Affect: appropriate   Gait: antalgic       Right Foot/Ankle     Inspection and Palpation  Ecchymosis: none  Tenderness: great toe metatarsophalangeal joint and lesser metatarsophalangeal joints (Plantar surface no ulcerations or signs of infection plantar fifth MPJ no ulcers or signs of infection)  Swelling: none   Arch: pes cavus (Plantarflexed first metatarsal limits semiflat flexible)  Hammertoes: absent  Claw Toes: absent  Hallux valgus: no  Hallux limitus: no  Skin Exam: skin intact;     Neurovascular  Dorsalis pedis: 2+  Posterior tibial: 2+  Saphenous nerve sensation: normal  Tibial nerve sensation: normal  Superficial peroneal nerve sensation: normal  Deep peroneal nerve sensation: normal  Sural nerve sensation: normal    Muscle Strength  Ankle dorsiflexion: 5  Ankle plantar flexion: 5  Ankle inversion: 5  Ankle eversion: 5  Great toe extension: 5  Great toe flexion: 5    Range of Motion    Normal right ankle ROM    Comments  Keratosis plantar first MPJ right foot fifth MPJ right foot medial first toe right foot no bleeding no signs of infection no  ulcerations. Pes cavus foot was plantarflexed first metatarsal.    Left Foot/Ankle      Inspection and Palpation  Ecchymosis: none  Tenderness: lesser metatarsophalangeal joints (Plantar second MPJ no ulcerations as infection)  Swelling: none   Arch: pes cavus (Flexible plantarflexed first metatarsal)  Hammertoes: absent  Claw toes: absent  Hallux valgus: no  Hallux limitus: no  Skin Exam: skin intact;     Neurovascular  Dorsalis pedis: 2+  Posterior tibial: 2+  Saphenous nerve sensation: normal  Tibial nerve sensation: normal  Superficial peroneal nerve sensation: normal  Deep peroneal nerve sensation: normal  Sural nerve sensation: normal    Muscle Strength  Ankle dorsiflexion: 5  Ankle plantar flexion: 5  Ankle inversion: 5  Ankle eversion: 5  Great toe extension: 5  Great toe flexion: 5    Range of Motion    Normal left ankle ROM    Comments  Nucleated keratotic lesion plantar second MPJ with severe pain to touch and compression. No ulceration no bleeding. Keratosis Lanter fifth MPJ. Pes cavus foot structure.    Physical Exam   Cardiovascular:   Pulses:       Dorsalis pedis pulses are 2+ on the right side, and 2+ on the left side.        Posterior tibial pulses are 2+ on the right side, and 2+ on the left side.       Imaging:   X-Ray Foot Complete Bilateral  AP, lateral, lateral oblique weightbearing bilateral feet:  Pes cavus foot structure plantarflexed first metatarsal right foot. No fractures, no bone tumors, no soft tissue masses.    Left foot no fractures, no bone tumors, no soft tissue masses. No foreign body seen. Pes cavus foot structure.           Assessment:       1. Foreign body in left foot, initial encounter    2. Acquired keratosis (keratoderma) palmaris et plantaris    3. Pes cavus      Plan:   Foreign body in left foot, initial encounter  -     X-Ray Foot Complete Bilateral    Acquired keratosis (keratoderma) palmaris et plantaris    Pes cavus  -     X-Ray Foot Complete Bilateral    Instructions  After Cantharone Acid Treatment    1. Keep dry for 3 days  2. If a blister forms, pop it  3. After 3rd day, begin wart stick twice daily for two week  4. Follow-up appointment 2 weeks      Wart Treatment: Twice Daily    1. Bathe area at night  2. File with pumice stone or nail file  3. Apply wart stick to affected area  4. Cover with a bad aid    Follow-up in about 2 weeks (around 2/21/2019).    Procedures - None    Counseling:     I provided patient education verbally regarding:   Patient diagnosis, treatment options, as well as alternatives, risks, and benefits.     This note was created using Dragon voice recognition software that occasionally misinterpreted phrases or words.             [unfilled]

## 2019-02-26 ENCOUNTER — OFFICE VISIT (OUTPATIENT)
Dept: FAMILY MEDICINE | Facility: CLINIC | Age: 44
End: 2019-02-26
Payer: MEDICAID

## 2019-02-26 ENCOUNTER — PATIENT MESSAGE (OUTPATIENT)
Dept: FAMILY MEDICINE | Facility: CLINIC | Age: 44
End: 2019-02-26

## 2019-02-26 VITALS
TEMPERATURE: 100 F | DIASTOLIC BLOOD PRESSURE: 88 MMHG | HEIGHT: 62 IN | OXYGEN SATURATION: 95 % | HEART RATE: 84 BPM | BODY MASS INDEX: 32.97 KG/M2 | SYSTOLIC BLOOD PRESSURE: 124 MMHG | RESPIRATION RATE: 14 BRPM | WEIGHT: 179.19 LBS

## 2019-02-26 DIAGNOSIS — J40 BRONCHITIS: ICD-10-CM

## 2019-02-26 DIAGNOSIS — J06.9 URI WITH COUGH AND CONGESTION: Primary | ICD-10-CM

## 2019-02-26 DIAGNOSIS — J01.40 ACUTE NON-RECURRENT PANSINUSITIS: ICD-10-CM

## 2019-02-26 DIAGNOSIS — J06.9 URI WITH COUGH AND CONGESTION: ICD-10-CM

## 2019-02-26 PROCEDURE — 99213 OFFICE O/P EST LOW 20 MIN: CPT | Mod: 25,,, | Performed by: NURSE PRACTITIONER

## 2019-02-26 PROCEDURE — 99213 PR OFFICE/OUTPT VISIT, EST, LEVL III, 20-29 MIN: ICD-10-PCS | Mod: 25,,, | Performed by: NURSE PRACTITIONER

## 2019-02-26 PROCEDURE — 94640 PR INHAL RX, AIRWAY OBST/DX SPUTUM INDUCT: ICD-10-PCS | Mod: ,,, | Performed by: NURSE PRACTITIONER

## 2019-02-26 PROCEDURE — 94640 AIRWAY INHALATION TREATMENT: CPT | Mod: ,,, | Performed by: NURSE PRACTITIONER

## 2019-02-26 RX ORDER — PROMETHAZINE HYDROCHLORIDE AND DEXTROMETHORPHAN HYDROBROMIDE 6.25; 15 MG/5ML; MG/5ML
5 SYRUP ORAL EVERY 6 HOURS PRN
Qty: 118 ML | Refills: 0 | Status: SHIPPED | OUTPATIENT
Start: 2019-02-26 | End: 2019-02-26 | Stop reason: ALTCHOICE

## 2019-02-26 RX ORDER — DOXYCYCLINE HYCLATE 100 MG
100 TABLET ORAL 2 TIMES DAILY
Qty: 20 TABLET | Refills: 0 | Status: SHIPPED | OUTPATIENT
Start: 2019-02-26 | End: 2019-02-27

## 2019-02-26 RX ORDER — PROMETHAZINE HYDROCHLORIDE AND CODEINE PHOSPHATE 6.25; 1 MG/5ML; MG/5ML
5 SOLUTION ORAL EVERY 4 HOURS PRN
Qty: 118 ML | Refills: 0 | Status: SHIPPED | OUTPATIENT
Start: 2019-02-26 | End: 2019-02-27 | Stop reason: SDUPTHER

## 2019-02-26 RX ORDER — ALBUTEROL SULFATE 90 UG/1
2 AEROSOL, METERED RESPIRATORY (INHALATION) EVERY 6 HOURS PRN
Qty: 18 G | Refills: 0 | Status: SHIPPED | OUTPATIENT
Start: 2019-02-26 | End: 2019-04-08 | Stop reason: SDUPTHER

## 2019-02-26 RX ORDER — IPRATROPIUM BROMIDE AND ALBUTEROL SULFATE 2.5; .5 MG/3ML; MG/3ML
3 SOLUTION RESPIRATORY (INHALATION) EVERY 6 HOURS PRN
Qty: 1 BOX | Refills: 2 | Status: CANCELLED | OUTPATIENT
Start: 2019-02-26

## 2019-02-26 RX ORDER — IPRATROPIUM BROMIDE AND ALBUTEROL SULFATE 2.5; .5 MG/3ML; MG/3ML
3 SOLUTION RESPIRATORY (INHALATION)
Status: COMPLETED | OUTPATIENT
Start: 2019-02-26 | End: 2019-02-26

## 2019-02-26 RX ORDER — IPRATROPIUM BROMIDE AND ALBUTEROL SULFATE 2.5; .5 MG/3ML; MG/3ML
3 SOLUTION RESPIRATORY (INHALATION) EVERY 6 HOURS PRN
Qty: 1 BOX | Refills: 2 | Status: SHIPPED | OUTPATIENT
Start: 2019-02-26 | End: 2019-02-27 | Stop reason: SDUPTHER

## 2019-02-26 RX ORDER — PROMETHAZINE HYDROCHLORIDE AND CODEINE PHOSPHATE 6.25; 1 MG/5ML; MG/5ML
5 SOLUTION ORAL EVERY 4 HOURS PRN
Qty: 118 ML | Refills: 0 | Status: CANCELLED | OUTPATIENT
Start: 2019-02-26 | End: 2019-03-08

## 2019-02-26 RX ADMIN — IPRATROPIUM BROMIDE AND ALBUTEROL SULFATE 3 ML: 2.5; .5 SOLUTION RESPIRATORY (INHALATION) at 09:02

## 2019-02-26 NOTE — PROGRESS NOTES
SUBJECTIVE:      Patient ID: Promise Medrano is a 43 y.o. female.    Chief Complaint: No chief complaint on file.    URI    This is a new problem. The current episode started in the past 7 days. The problem has been gradually improving. The maximum temperature recorded prior to her arrival was 102 - 102.9 F. The fever has been present for 1 to 2 days. Associated symptoms include congestion, coughing, ear pain, headaches, nausea, rhinorrhea, a sore throat, swollen glands, vomiting and wheezing. Pertinent negatives include no abdominal pain, chest pain, diarrhea, dysuria, neck pain, sinus pain or sneezing. She has tried NSAIDs for the symptoms. The treatment provided mild relief.       Past Surgical History:   Procedure Laterality Date    breast augmentation      BREAST SURGERY       SECTION  ,2009    gastric sleeve  2010    HYSTERECTOMY  2010    TONSILLECTOMY       Family History   Problem Relation Age of Onset    Early death Father     Heart disease Father     Stroke Father     Kidney disease Father     Diabetes Paternal Grandmother     Cancer Paternal Grandmother     Cancer Mother     Raynaud syndrome Mother     Raynaud syndrome Sister     Polycystic ovary syndrome Daughter     Diabetes Maternal Grandmother     Heart disease Maternal Grandmother     Kidney disease Maternal Grandmother     Heart disease Maternal Grandfather     Cancer Paternal Grandfather     No Known Problems Daughter       Social History     Socioeconomic History    Marital status: Single     Spouse name: None    Number of children: None    Years of education: None    Highest education level: None   Social Needs    Financial resource strain: None    Food insecurity - worry: None    Food insecurity - inability: None    Transportation needs - medical: None    Transportation needs - non-medical: None   Occupational History    None   Tobacco Use    Smoking status: Current Every Day Smoker      Packs/day: 1.00     Types: Cigarettes    Smokeless tobacco: Never Used   Substance and Sexual Activity    Alcohol use: Yes    Drug use: No    Sexual activity: Yes     Partners: Male     Birth control/protection: None   Other Topics Concern    None   Social History Narrative    None     Current Outpatient Medications   Medication Sig Dispense Refill    atorvastatin (LIPITOR) 80 MG tablet       blood sugar diagnostic Strp 1 each by Misc.(Non-Drug; Combo Route) route daily as needed. 100 each 0    blood-glucose meter kit Use as instructed 1 each 0    blood-glucose meter, drum-type (ACCU-CHEK COMPACT PLUS CARE) Kit 1 Device by Misc.(Non-Drug; Combo Route) route daily as needed. 1 kit 0    CENTRUM SPECIALIST ENERGY 18 mg iron-400 mcg-25 mcg-75mg Tab Take 1 tablet by mouth once daily.       CETAPHIL MOISTURIZING cream Apply topically as needed.       diclofenac sodium (VOLTAREN) 1 % Gel APPLY 2 GRAMS EXTERNALLY TO THE AFFECTED AREA FOUR TIMES DAILY FOR 10 DAYS 100 g 0    dicyclomine (BENTYL) 20 mg tablet TK 1 T PO BID PRN. CONTINUE CURRENT DOSAGE  2    doxepin (SINEQUAN) 50 MG capsule TAKE 1 CAPSULE(50 MG) BY MOUTH EVERY NIGHT 30 capsule 5    ergocalciferol (ERGOCALCIFEROL) 50,000 unit Cap TAKE 1 CAPSULE BY MOUTH EVERY 7 DAYS 4 capsule 5    escitalopram oxalate (LEXAPRO) 20 MG tablet Take 1 tablet (20 mg total) by mouth once daily. 90 tablet 1    ferrous sulfate (FEOSOL) 325 mg (65 mg iron) Tab tablet Take 1 tablet (325 mg total) by mouth once daily. 90 tablet 1    fluticasone (FLONASE) 50 mcg/actuation nasal spray SHAKE LIQUID AND USE 1 SPRAY IN EACH NOSTRIL EVERY DAY 9.9 mL 5    gabapentin (NEURONTIN) 100 MG capsule TAKE 1 TO 2 CAPSULES BY MOUTH THREE TIMES DAILY(TAKE WITH 400 MG DOSE) 300 capsule 0    gabapentin (NEURONTIN) 400 MG capsule Take 1 capsule (400 mg total) by mouth 3 (three) times daily. 90 capsule 11    hydrocortisone 1 % cream Apply to affected area 2 times daily.      ibuprofen  (ADVIL,MOTRIN) 600 MG tablet       ketoconazole (NIZORAL) 2 % cream       lancets (ACCU-CHEK SOFTCLIX LANCETS) Misc 1 Device by Misc.(Non-Drug; Combo Route) route daily as needed. 100 each 0    levocetirizine (XYZAL) 5 MG tablet Take 1 tablet (5 mg total) by mouth every evening. 90 tablet 1    metFORMIN (GLUCOPHAGE) 500 MG tablet Take 1 tablet (500 mg total) by mouth 2 (two) times daily with meals. 60 tablet 5    midodrine (PROAMATINE) 2.5 MG Tab Take 1 tablet by mouth 2 (two) times daily.      mupirocin (BACTROBAN) 2 % ointment       omeprazole (PRILOSEC) 40 MG capsule Take 1 capsule (40 mg total) by mouth every morning. 30 capsule 5    ondansetron (ZOFRAN-ODT) 4 MG TbDL Take 1 tablet (4 mg total) by mouth every 8 (eight) hours as needed. 30 tablet 1    tiotropium-olodaterol (STIOLTO RESPIMAT) 2.5-2.5 mcg/actuation Mist Inhale 2 puffs into the lungs once daily. Controller 4 g 2    triamcinolone acetonide 0.1% (KENALOG) 0.1 % cream MELY SPARINGLY TO ECZEMA BID  3    urea 40 % Lotn lotion Apply topically as needed.       XIIDRA 5 % Dpet       albuterol (VENTOLIN HFA) 90 mcg/actuation inhaler Inhale 2 puffs into the lungs every 6 (six) hours as needed for Wheezing. Rescue 18 g 0    albuterol-ipratropium (DUO-NEB) 2.5 mg-0.5 mg/3 mL nebulizer solution Take 3 mLs by nebulization every 6 (six) hours as needed for Wheezing or Shortness of Breath. Rescue - not to be used with inhaler 1 Box 2    doxycycline (MONODOX) 100 MG capsule Take 1 capsule (100 mg total) by mouth 2 (two) times daily. for 20 doses 20 capsule 0    promethazine-codeine 6.25-10 mg/5 ml (PHENERGAN WITH CODEINE) 6.25-10 mg/5 mL syrup Take 5 mLs by mouth every 4 (four) hours as needed for Cough. 118 mL 0     No current facility-administered medications for this visit.      Review of patient's allergies indicates:  No Known Allergies   Past Medical History:   Diagnosis Date    Allergy     Arthritis     COPD (chronic obstructive pulmonary  disease)     Diabetes mellitus     GERD (gastroesophageal reflux disease)     Grade II hemorrhoids 2019    Lupus     Neuromuscular disorder     Sjogren's syndrome 2019     Past Surgical History:   Procedure Laterality Date    breast augmentation  2004    BREAST SURGERY  2004     SECTION  2006,2009    gastric sleeve  2010    HYSTERECTOMY      TONSILLECTOMY         Review of Systems   Constitutional: Negative for activity change, appetite change, fatigue and unexpected weight change.   HENT: Positive for congestion, ear pain, postnasal drip, rhinorrhea and sore throat. Negative for hearing loss, sinus pressure, sinus pain and sneezing.    Eyes: Negative for photophobia and pain.   Respiratory: Positive for cough, shortness of breath and wheezing. Negative for chest tightness.    Cardiovascular: Negative for chest pain, palpitations and leg swelling.   Gastrointestinal: Positive for nausea and vomiting. Negative for abdominal distention, abdominal pain, blood in stool, constipation and diarrhea.   Endocrine: Negative for cold intolerance, heat intolerance, polydipsia and polyuria.   Genitourinary: Negative for difficulty urinating, dysuria, flank pain, frequency, hematuria, pelvic pain and urgency.   Musculoskeletal: Negative for arthralgias, back pain, joint swelling, myalgias and neck pain.   Skin: Negative for pallor.   Allergic/Immunologic: Negative for environmental allergies and food allergies.   Neurological: Positive for headaches. Negative for dizziness, weakness, light-headedness and numbness.   Hematological: Does not bruise/bleed easily.   Psychiatric/Behavioral: Negative for agitation, confusion, decreased concentration and sleep disturbance. The patient is nervous/anxious.       OBJECTIVE:      Vitals:    19 0848 19 0901   BP: 124/88    Pulse: 85 84   Resp: 14    Temp: 99.7 °F (37.6 °C)    SpO2: (!) 91% 95%   Weight: 81.3 kg (179 lb 3.2 oz)    Height: 5'  "2" (1.575 m)      Physical Exam   Constitutional: She is oriented to person, place, and time. She appears well-developed and well-nourished. She has a sickly appearance. No distress.   Obese, low grade temp   HENT:   Head: Normocephalic and atraumatic.   Right Ear: Hearing normal.   Left Ear: Hearing normal.   Nose: No rhinorrhea. Right sinus exhibits maxillary sinus tenderness and frontal sinus tenderness. Left sinus exhibits maxillary sinus tenderness and frontal sinus tenderness.   Mouth/Throat: Uvula is midline and mucous membranes are normal. Posterior oropharyngeal erythema present.   Eyes: Conjunctivae and lids are normal. Pupils are equal, round, and reactive to light. Right eye exhibits no discharge. Left eye exhibits no discharge. Right conjunctiva is not injected. Left conjunctiva is not injected. Right pupil is round and reactive. Left pupil is round and reactive. Pupils are equal.   Neck: Trachea normal and normal range of motion. Neck supple. No JVD present. No tracheal deviation present. No thyromegaly present.   Cardiovascular: Normal rate, regular rhythm, normal heart sounds and intact distal pulses. Exam reveals no gallop and no friction rub.   No murmur heard.  Pulses:       Radial pulses are 2+ on the right side, and 2+ on the left side.   Pulmonary/Chest: Effort normal. No stridor. No respiratory distress. She has no decreased breath sounds. She has wheezes in the right lower field and the left lower field. She has rhonchi in the right upper field, the right lower field, the left upper field and the left lower field. She has no rales.   S/p duo-neb treatment, rhonchi & wheezing significantly improved   Abdominal: Soft. Bowel sounds are normal. She exhibits no distension. There is no tenderness. There is no rigidity and no guarding.   Musculoskeletal: Normal range of motion. She exhibits no edema.   Lymphadenopathy:        Head (right side): Submandibular adenopathy present.        Head (left " side): Submandibular adenopathy present.     She has no cervical adenopathy.   Neurological: She is alert and oriented to person, place, and time. She has normal strength. She displays no atrophy. She displays a negative Romberg sign. Coordination and gait normal.   Skin: Skin is warm and dry. Capillary refill takes less than 2 seconds. No lesion and no rash noted. No cyanosis. No pallor.   Psychiatric: She has a normal mood and affect. Her speech is normal and behavior is normal. Judgment and thought content normal. Cognition and memory are normal. She is attentive.   Nursing note and vitals reviewed.     Assessment:       1. URI with cough and congestion    2. Acute non-recurrent pansinusitis    3. Bronchitis        Plan:       URI with cough and congestion  -     albuterol-ipratropium 2.5 mg-0.5 mg/3 mL nebulizer solution 3 mL  -     albuterol (VENTOLIN HFA) 90 mcg/actuation inhaler; Inhale 2 puffs into the lungs every 6 (six) hours as needed for Wheezing. Rescue  Dispense: 18 g; Refill: 0  -     promethazine-codeine 6.25-10 mg/5 ml (PHENERGAN WITH CODEINE) 6.25-10 mg/5 mL syrup; Take 5 mLs by mouth every 4 (four) hours as needed for Cough.  Dispense: 118 mL; Refill: 0    Acute non-recurrent pansinusitis  -     doxycycline (Monodox) 100 MG tablet; Take 1 tablet (100 mg total) by mouth 2 (two) times daily.  Dispense: 20 tablet; Refill: 0    Bronchitis  -     NEBULIZER KIT (SUPPLIES) FOR HOME USE  -     NEBULIZER FOR HOME USE  -     albuterol-ipratropium (DUO-NEB) 2.5 mg-0.5 mg/3 mL nebulizer solution; Take 3 mLs by nebulization every 6 (six) hours as needed for Wheezing or Shortness of Breath. Rescue - not to be used with inhaler  Dispense: 1 Box; Refill: 2        Follow-up if symptoms worsen or fail to improve.      3/2/2019 AURORA Owens, FNP-C

## 2019-02-26 NOTE — PATIENT INSTRUCTIONS

## 2019-02-27 ENCOUNTER — PATIENT MESSAGE (OUTPATIENT)
Dept: FAMILY MEDICINE | Facility: CLINIC | Age: 44
End: 2019-02-27

## 2019-02-27 DIAGNOSIS — J01.40 ACUTE NON-RECURRENT PANSINUSITIS: Primary | ICD-10-CM

## 2019-02-27 RX ORDER — PROMETHAZINE HYDROCHLORIDE AND CODEINE PHOSPHATE 6.25; 1 MG/5ML; MG/5ML
5 SOLUTION ORAL EVERY 4 HOURS PRN
Qty: 118 ML | Refills: 0 | Status: SHIPPED | OUTPATIENT
Start: 2019-02-27 | End: 2019-03-09

## 2019-02-27 RX ORDER — IPRATROPIUM BROMIDE AND ALBUTEROL SULFATE 2.5; .5 MG/3ML; MG/3ML
3 SOLUTION RESPIRATORY (INHALATION) EVERY 6 HOURS PRN
Qty: 1 BOX | Refills: 2 | Status: SHIPPED | OUTPATIENT
Start: 2019-02-27 | End: 2019-07-19 | Stop reason: SDUPTHER

## 2019-02-27 RX ORDER — DOXYCYCLINE 100 MG/1
100 CAPSULE ORAL 2 TIMES DAILY
Qty: 20 CAPSULE | Refills: 0 | Status: SHIPPED | OUTPATIENT
Start: 2019-02-27 | End: 2019-03-09

## 2019-03-08 ENCOUNTER — TELEPHONE (OUTPATIENT)
Dept: FAMILY MEDICINE | Facility: CLINIC | Age: 44
End: 2019-03-08

## 2019-03-08 NOTE — TELEPHONE ENCOUNTER
Pt pharmacy called to clarify Doxy Prior Auth Request. Was left on hold for 15 min twice and hung up on when the first call went through.

## 2019-03-14 ENCOUNTER — OFFICE VISIT (OUTPATIENT)
Dept: FAMILY MEDICINE | Facility: CLINIC | Age: 44
End: 2019-03-14
Payer: MEDICAID

## 2019-03-14 VITALS
BODY MASS INDEX: 31.93 KG/M2 | OXYGEN SATURATION: 95 % | WEIGHT: 173.5 LBS | TEMPERATURE: 100 F | HEIGHT: 62 IN | DIASTOLIC BLOOD PRESSURE: 76 MMHG | SYSTOLIC BLOOD PRESSURE: 134 MMHG | HEART RATE: 81 BPM | RESPIRATION RATE: 14 BRPM

## 2019-03-14 DIAGNOSIS — J06.9 URI WITH COUGH AND CONGESTION: ICD-10-CM

## 2019-03-14 DIAGNOSIS — M79.605 LEFT LEG PAIN: Primary | ICD-10-CM

## 2019-03-14 PROCEDURE — 99214 PR OFFICE/OUTPT VISIT, EST, LEVL IV, 30-39 MIN: ICD-10-PCS | Mod: ,,, | Performed by: NURSE PRACTITIONER

## 2019-03-14 PROCEDURE — 99214 OFFICE O/P EST MOD 30 MIN: CPT | Mod: ,,, | Performed by: NURSE PRACTITIONER

## 2019-03-14 NOTE — PROGRESS NOTES
SUBJECTIVE:      Patient ID: Promise Medrano is a 43 y.o. female.    Chief Complaint: No chief complaint on file.    Leg Pain    The incident occurred more than 1 week ago. There was no injury mechanism. Pain location: L calf. The quality of the pain is described as aching and cramping. The pain is at a severity of 7/10. The pain has been constant since onset. Associated symptoms include numbness and tingling. She reports no foreign bodies present. The symptoms are aggravated by movement and weight bearing. She has tried rest for the symptoms. The treatment provided no relief.   URI    This is a new problem. The current episode started 1 to 4 weeks ago. The problem has been gradually improving. Maximum temperature: low grade. The fever has been present for 3 to 4 days. Associated symptoms include congestion, coughing, headaches, nausea, a plugged ear sensation, rhinorrhea and a sore throat. Pertinent negatives include no abdominal pain, chest pain, diarrhea, dysuria, ear pain, neck pain, sinus pain, sneezing, vomiting or wheezing. She has tried inhaler use, sleep and decongestant (nebs, promethazine-codeine) for the symptoms. The treatment provided moderate relief.       Past Surgical History:   Procedure Laterality Date    breast augmentation  2004    BREAST SURGERY  2004     SECTION  ,2009    gastric sleeve  2010    HYSTERECTOMY      TONSILLECTOMY       Family History   Problem Relation Age of Onset    Early death Father     Heart disease Father     Stroke Father     Kidney disease Father     Diabetes Paternal Grandmother     Cancer Paternal Grandmother     Cancer Mother     Raynaud syndrome Mother     Raynaud syndrome Sister     Polycystic ovary syndrome Daughter     Diabetes Maternal Grandmother     Heart disease Maternal Grandmother     Kidney disease Maternal Grandmother     Heart disease Maternal Grandfather     Cancer Paternal Grandfather     No Known Problems  Daughter       Social History     Socioeconomic History    Marital status: Single     Spouse name: None    Number of children: None    Years of education: None    Highest education level: None   Social Needs    Financial resource strain: None    Food insecurity - worry: None    Food insecurity - inability: None    Transportation needs - medical: None    Transportation needs - non-medical: None   Occupational History    None   Tobacco Use    Smoking status: Current Every Day Smoker     Packs/day: 1.00     Types: Cigarettes    Smokeless tobacco: Never Used   Substance and Sexual Activity    Alcohol use: Yes    Drug use: No    Sexual activity: Yes     Partners: Male     Birth control/protection: None   Other Topics Concern    None   Social History Narrative    None     Current Outpatient Medications   Medication Sig Dispense Refill    albuterol (VENTOLIN HFA) 90 mcg/actuation inhaler Inhale 2 puffs into the lungs every 6 (six) hours as needed for Wheezing. Rescue 18 g 0    albuterol-ipratropium (DUO-NEB) 2.5 mg-0.5 mg/3 mL nebulizer solution Take 3 mLs by nebulization every 6 (six) hours as needed for Wheezing or Shortness of Breath. Rescue - not to be used with inhaler 1 Box 2    atorvastatin (LIPITOR) 80 MG tablet       blood sugar diagnostic Strp 1 each by Misc.(Non-Drug; Combo Route) route daily as needed. 100 each 0    blood-glucose meter kit Use as instructed 1 each 0    blood-glucose meter, drum-type (ACCU-CHEK COMPACT PLUS CARE) Kit 1 Device by Misc.(Non-Drug; Combo Route) route daily as needed. 1 kit 0    CENTRUM SPECIALIST ENERGY 18 mg iron-400 mcg-25 mcg-75mg Tab Take 1 tablet by mouth once daily.       CETAPHIL MOISTURIZING cream Apply topically as needed.       diclofenac sodium (VOLTAREN) 1 % Gel APPLY 2 GRAMS EXTERNALLY TO THE AFFECTED AREA FOUR TIMES DAILY FOR 10 DAYS 100 g 0    dicyclomine (BENTYL) 20 mg tablet TK 1 T PO BID PRN. CONTINUE CURRENT DOSAGE  2    doxepin  (SINEQUAN) 50 MG capsule TAKE 1 CAPSULE(50 MG) BY MOUTH EVERY NIGHT 30 capsule 5    ergocalciferol (ERGOCALCIFEROL) 50,000 unit Cap TAKE 1 CAPSULE BY MOUTH EVERY 7 DAYS 4 capsule 5    escitalopram oxalate (LEXAPRO) 20 MG tablet Take 1 tablet (20 mg total) by mouth once daily. 90 tablet 1    ferrous sulfate (FEOSOL) 325 mg (65 mg iron) Tab tablet Take 1 tablet (325 mg total) by mouth once daily. 90 tablet 1    fluticasone (FLONASE) 50 mcg/actuation nasal spray SHAKE LIQUID AND USE 1 SPRAY IN EACH NOSTRIL EVERY DAY 9.9 mL 5    gabapentin (NEURONTIN) 100 MG capsule TAKE 1 TO 2 CAPSULES BY MOUTH THREE TIMES DAILY(TAKE WITH 400 MG DOSE) 300 capsule 0    gabapentin (NEURONTIN) 400 MG capsule Take 1 capsule (400 mg total) by mouth 3 (three) times daily. 90 capsule 11    hydrocortisone 1 % cream Apply to affected area 2 times daily.      ibuprofen (ADVIL,MOTRIN) 600 MG tablet       ketoconazole (NIZORAL) 2 % cream       lancets (ACCU-CHEK SOFTCLIX LANCETS) Misc 1 Device by Misc.(Non-Drug; Combo Route) route daily as needed. 100 each 0    levocetirizine (XYZAL) 5 MG tablet Take 1 tablet (5 mg total) by mouth every evening. 90 tablet 1    metFORMIN (GLUCOPHAGE) 500 MG tablet Take 1 tablet (500 mg total) by mouth 2 (two) times daily with meals. 60 tablet 5    midodrine (PROAMATINE) 2.5 MG Tab Take 1 tablet by mouth 2 (two) times daily.      mupirocin (BACTROBAN) 2 % ointment       omeprazole (PRILOSEC) 40 MG capsule Take 1 capsule (40 mg total) by mouth every morning. 30 capsule 5    ondansetron (ZOFRAN-ODT) 4 MG TbDL Take 1 tablet (4 mg total) by mouth every 8 (eight) hours as needed. 30 tablet 1    tiotropium-olodaterol (STIOLTO RESPIMAT) 2.5-2.5 mcg/actuation Mist Inhale 2 puffs into the lungs once daily. Controller 4 g 2    triamcinolone acetonide 0.1% (KENALOG) 0.1 % cream MELY SPARINGLY TO ECZEMA BID  3    urea 40 % Lotn lotion Apply topically as needed.       XIIDRA 5 % Dpet        No current  facility-administered medications for this visit.      Review of patient's allergies indicates:  No Known Allergies   Past Medical History:   Diagnosis Date    Allergy     Arthritis     COPD (chronic obstructive pulmonary disease)     Diabetes mellitus     GERD (gastroesophageal reflux disease)     Grade II hemorrhoids 2019    Lupus     Neuromuscular disorder     Sjogren's syndrome 2019     Past Surgical History:   Procedure Laterality Date    breast augmentation  2004    BREAST SURGERY  2004     SECTION  2006,2009    gastric sleeve  2010    HYSTERECTOMY  2010    TONSILLECTOMY         Review of Systems   Constitutional: Negative for activity change, appetite change, fatigue and unexpected weight change.   HENT: Positive for congestion, rhinorrhea and sore throat. Negative for ear pain, hearing loss, postnasal drip, sinus pressure, sinus pain and sneezing.    Eyes: Negative for photophobia and pain.   Respiratory: Positive for cough. Negative for chest tightness, shortness of breath and wheezing.    Cardiovascular: Negative for chest pain, palpitations and leg swelling.   Gastrointestinal: Positive for nausea. Negative for abdominal distention, abdominal pain, blood in stool, constipation, diarrhea and vomiting.   Endocrine: Negative for cold intolerance, heat intolerance, polydipsia and polyuria.   Genitourinary: Negative for difficulty urinating, dysuria, flank pain, frequency, hematuria, pelvic pain and urgency.   Musculoskeletal: Positive for myalgias (L calf up to glute). Negative for arthralgias, back pain, joint swelling and neck pain.   Skin: Negative for pallor.   Allergic/Immunologic: Negative for environmental allergies and food allergies.   Neurological: Positive for tingling, numbness and headaches. Negative for dizziness, weakness and light-headedness.   Hematological: Does not bruise/bleed easily.   Psychiatric/Behavioral: Negative for agitation, confusion,  "decreased concentration and sleep disturbance. The patient is nervous/anxious.       OBJECTIVE:      Vitals:    03/14/19 0810   BP: 134/76   Pulse: 81   Resp: 14   Temp: 99.6 °F (37.6 °C)   SpO2: 95%   Weight: 78.7 kg (173 lb 8 oz)   Height: 5' 2" (1.575 m)     Physical Exam   Constitutional: She is oriented to person, place, and time. Vital signs are normal. She appears well-developed and well-nourished. No distress.   obese   HENT:   Head: Normocephalic and atraumatic.   Right Ear: Hearing normal. A middle ear effusion (serous) is present.   Left Ear: Hearing normal. A middle ear effusion (serous) is present.   Nose: Mucosal edema present. No rhinorrhea.   Mouth/Throat: Uvula is midline and mucous membranes are normal. Posterior oropharyngeal erythema present.   Eyes: Conjunctivae and lids are normal. Pupils are equal, round, and reactive to light. Right eye exhibits no discharge. Left eye exhibits no discharge. Right conjunctiva is not injected. Left conjunctiva is not injected. Right pupil is round and reactive. Left pupil is round and reactive. Pupils are equal.   Neck: Trachea normal and normal range of motion. Neck supple. No JVD present. No tracheal deviation present. No thyromegaly present.   Cardiovascular: Normal rate, regular rhythm, normal heart sounds and intact distal pulses. Exam reveals no gallop and no friction rub.   No murmur heard.  Pulses:       Radial pulses are 2+ on the right side, and 2+ on the left side.   Pulmonary/Chest: Effort normal and breath sounds normal. No stridor. No respiratory distress. She has no decreased breath sounds. She has no wheezes. She has no rhonchi. She has no rales.   Abdominal: Soft. Bowel sounds are normal. She exhibits no distension. There is no tenderness. There is no rigidity and no guarding.   Musculoskeletal: Normal range of motion. She exhibits no edema.        Left lower leg: She exhibits tenderness.   Lymphadenopathy:     She has no cervical adenopathy. "   Neurological: She is alert and oriented to person, place, and time. She has normal strength. She displays no atrophy. She displays a negative Romberg sign. Coordination and gait normal.   Skin: Skin is warm and dry. Capillary refill takes less than 2 seconds. No lesion and no rash noted. No cyanosis. No pallor.   Psychiatric: She has a normal mood and affect. Her speech is normal and behavior is normal. Judgment and thought content normal. Cognition and memory are normal. She is attentive.   Nursing note and vitals reviewed.     Assessment:       1. Left leg pain    2. URI with cough and congestion        Plan:       Left leg pain  -     US Lower Extremity Veins Left; Future; Expected date: 03/14/2019    URI with cough and congestion   - improving with alternating neb treatments & inhaler   - has had difficulty getting ABX approved through insurance, so she has just been using prescription cough medicine with OTC mucinex   - continue to watch caffeine intake, rest, adequate hydration, salt water gargles for throat discomfort        Follow-up if symptoms worsen or fail to improve.      3/14/2019 AURORA Owens, FNP-C

## 2019-03-14 NOTE — PATIENT INSTRUCTIONS
Leg Cramps  A muscle cramp or spasm is a strong contraction of the muscle fibers. It is also called a charley horse. This may occur in the foot, calf, or thigh at night when the legs are elevated. If the spasm is prolonged, it can become very painful.  This may be caused by sleeping in an uncomfortable position, muscle fatigue, poor muscle tone from lack of exercise and stretching, dehydration, electrolyte imbalance, diabetes, alcohol use, and certain medicine.  Home care  · Drink plenty of fluids during the day to prevent dehydration.  · Stretch your legs before bedtime.  · Eat a diet high in potassium. These foods include fresh fruit, such as bananas, oranges, cantaloupe, and honeydew melon. It also includes apple, prune, orange, grape and pineapple juices. Other foods high in potassium are white, red, and balderas beans, baked potatoes, raw spinach, cod, flounder, halibut, salmon, and scallops.  · Talk with your healthcare provider about taking mineral and vitamin supplements that contain magnesium and vitamin B-12 if you are not already taking these. Other prescription medicines may also be used.  · Avoid stimulants such as caffeine, nicotine, decongestants.  How to relieve an acute leg cramp  · For mild pain, getting out of the bed and walking may help. Some people find relief with heat and massage. You can apply heat with a warm shower, bath, or compress. Some people feel better with a cold packs. You can make an ice pack by filling a plastic bag that seals at the top with ice cubes and then wrapping it with a thin towel. Try both and use the method that feels best for 15 to 20 minutes at a time.  · For severe pain, stretching the muscle that is in spasm may quickly relieve the pain.  · When the spasm is in your foot, your toes may curl up or down. To stretch the muscle in spasm, bend your toes in the opposite direction. If the spasm pulls your toes up, bend them down. If the spasm pulls them down, bend them  up.  · When the spasm is in your calf, bend the ankle so the foot points upward toward your knee.  · When the spasm is in your thigh, bend or straighten the knee and hip until you feel relief.  Follow-up care  Follow up with your healthcare provider, or as advised.  When to seek medical advice  Call your healthcare provider right away if any of these occur:  · Walking makes your pain worse and rest makes it better  · You develop weakness in the affected leg  · Pain or frequency of spasms increases and is not controlled by the above measures  Date Last Reviewed: 11/23/2015  © 7619-1875 [employer]. [address] [place] [address], [place] [address]. All rights reserved. This information is not intended as a substitute for professional medical care. Always follow your healthcare professional's instructions.

## 2019-03-18 DIAGNOSIS — E78.2 MIXED HYPERLIPIDEMIA: ICD-10-CM

## 2019-03-18 DIAGNOSIS — R11.0 NAUSEA: ICD-10-CM

## 2019-03-18 DIAGNOSIS — G62.9 PERIPHERAL POLYNEUROPATHY: ICD-10-CM

## 2019-03-18 DIAGNOSIS — F41.9 CHRONIC ANXIETY: ICD-10-CM

## 2019-03-18 RX ORDER — DICLOFENAC SODIUM 10 MG/G
GEL TOPICAL
Qty: 100 G | Refills: 0 | OUTPATIENT
Start: 2019-03-18

## 2019-03-18 RX ORDER — ONDANSETRON 4 MG/1
TABLET, ORALLY DISINTEGRATING ORAL
Qty: 30 TABLET | Refills: 0 | Status: SHIPPED | OUTPATIENT
Start: 2019-03-18 | End: 2019-03-20 | Stop reason: SDUPTHER

## 2019-03-18 RX ORDER — ATORVASTATIN CALCIUM 40 MG/1
TABLET, FILM COATED ORAL
Qty: 30 TABLET | Refills: 5 | Status: SHIPPED | OUTPATIENT
Start: 2019-03-18 | End: 2019-07-16

## 2019-03-18 RX ORDER — GABAPENTIN 100 MG/1
CAPSULE ORAL
Qty: 300 CAPSULE | Refills: 0 | OUTPATIENT
Start: 2019-03-18

## 2019-03-19 RX ORDER — ESCITALOPRAM OXALATE 20 MG/1
TABLET ORAL
Qty: 90 TABLET | Refills: 0 | Status: SHIPPED | OUTPATIENT
Start: 2019-03-19 | End: 2019-03-20 | Stop reason: SDUPTHER

## 2019-03-20 DIAGNOSIS — G62.9 PERIPHERAL POLYNEUROPATHY: ICD-10-CM

## 2019-03-20 DIAGNOSIS — R73.01 IFG (IMPAIRED FASTING GLUCOSE): ICD-10-CM

## 2019-03-20 DIAGNOSIS — F41.9 CHRONIC ANXIETY: ICD-10-CM

## 2019-03-20 DIAGNOSIS — R11.0 NAUSEA: ICD-10-CM

## 2019-03-20 RX ORDER — ONDANSETRON 4 MG/1
8 TABLET, ORALLY DISINTEGRATING ORAL EVERY 8 HOURS PRN
Qty: 30 TABLET | Refills: 0 | Status: SHIPPED | OUTPATIENT
Start: 2019-03-20 | End: 2019-03-21 | Stop reason: SDUPTHER

## 2019-03-20 RX ORDER — METFORMIN HYDROCHLORIDE 500 MG/1
500 TABLET ORAL 2 TIMES DAILY WITH MEALS
Qty: 60 TABLET | Refills: 5 | Status: SHIPPED | OUTPATIENT
Start: 2019-03-20 | End: 2019-07-16

## 2019-03-20 RX ORDER — DICLOFENAC SODIUM 10 MG/G
GEL TOPICAL
Qty: 100 G | Refills: 0 | Status: SHIPPED | OUTPATIENT
Start: 2019-03-20 | End: 2021-03-16

## 2019-03-20 RX ORDER — ESCITALOPRAM OXALATE 20 MG/1
20 TABLET ORAL DAILY
Qty: 90 TABLET | Refills: 0 | Status: SHIPPED | OUTPATIENT
Start: 2019-03-20 | End: 2019-03-21 | Stop reason: SDUPTHER

## 2019-03-20 RX ORDER — GABAPENTIN 100 MG/1
CAPSULE ORAL
Qty: 180 CAPSULE | Refills: 0 | Status: SHIPPED | OUTPATIENT
Start: 2019-03-20 | End: 2019-06-06 | Stop reason: SDUPTHER

## 2019-03-21 DIAGNOSIS — R11.0 NAUSEA: ICD-10-CM

## 2019-03-21 DIAGNOSIS — F41.9 CHRONIC ANXIETY: ICD-10-CM

## 2019-03-21 RX ORDER — OMEPRAZOLE 40 MG/1
40 CAPSULE, DELAYED RELEASE ORAL EVERY MORNING
Qty: 30 CAPSULE | Refills: 5 | Status: SHIPPED | OUTPATIENT
Start: 2019-03-21 | End: 2019-09-19 | Stop reason: SDUPTHER

## 2019-03-21 RX ORDER — ESCITALOPRAM OXALATE 20 MG/1
20 TABLET ORAL DAILY
Qty: 90 TABLET | Refills: 0 | Status: SHIPPED | OUTPATIENT
Start: 2019-03-21 | End: 2019-04-21 | Stop reason: SDUPTHER

## 2019-03-21 RX ORDER — ONDANSETRON 4 MG/1
8 TABLET, ORALLY DISINTEGRATING ORAL EVERY 8 HOURS PRN
Qty: 30 TABLET | Refills: 0 | Status: SHIPPED | OUTPATIENT
Start: 2019-03-21 | End: 2019-04-29

## 2019-03-25 ENCOUNTER — TELEPHONE (OUTPATIENT)
Dept: FAMILY MEDICINE | Facility: CLINIC | Age: 44
End: 2019-03-25

## 2019-03-27 ENCOUNTER — PATIENT MESSAGE (OUTPATIENT)
Dept: FAMILY MEDICINE | Facility: CLINIC | Age: 44
End: 2019-03-27

## 2019-04-08 DIAGNOSIS — J06.9 URI WITH COUGH AND CONGESTION: ICD-10-CM

## 2019-04-08 RX ORDER — ALBUTEROL SULFATE 90 UG/1
AEROSOL, METERED RESPIRATORY (INHALATION)
Qty: 18 G | Refills: 0 | Status: SHIPPED | OUTPATIENT
Start: 2019-04-08 | End: 2019-04-29

## 2019-04-21 DIAGNOSIS — R11.0 NAUSEA: ICD-10-CM

## 2019-04-21 DIAGNOSIS — J43.1 PANLOBULAR EMPHYSEMA: ICD-10-CM

## 2019-04-21 DIAGNOSIS — F41.9 CHRONIC ANXIETY: ICD-10-CM

## 2019-04-22 ENCOUNTER — OFFICE VISIT (OUTPATIENT)
Dept: RHEUMATOLOGY | Facility: CLINIC | Age: 44
End: 2019-04-22
Payer: MEDICAID

## 2019-04-22 VITALS
SYSTOLIC BLOOD PRESSURE: 106 MMHG | DIASTOLIC BLOOD PRESSURE: 67 MMHG | WEIGHT: 177.31 LBS | BODY MASS INDEX: 32.43 KG/M2

## 2019-04-22 DIAGNOSIS — M35.9 CONNECTIVE TISSUE DISEASE: Primary | ICD-10-CM

## 2019-04-22 PROCEDURE — 99213 PR OFFICE/OUTPT VISIT, EST, LEVL III, 20-29 MIN: ICD-10-PCS | Mod: ,,, | Performed by: INTERNAL MEDICINE

## 2019-04-22 PROCEDURE — 99213 OFFICE O/P EST LOW 20 MIN: CPT | Mod: ,,, | Performed by: INTERNAL MEDICINE

## 2019-04-22 RX ORDER — ESCITALOPRAM OXALATE 20 MG/1
TABLET ORAL
Qty: 90 TABLET | Refills: 0 | Status: SHIPPED | OUTPATIENT
Start: 2019-04-22 | End: 2019-04-29

## 2019-04-22 RX ORDER — TIOTROPIUM BROMIDE AND OLODATEROL 3.124; 2.736 UG/1; UG/1
SPRAY, METERED RESPIRATORY (INHALATION)
Qty: 4 G | Refills: 5 | Status: SHIPPED | OUTPATIENT
Start: 2019-04-22 | End: 2021-04-14

## 2019-04-22 RX ORDER — ONDANSETRON 4 MG/1
TABLET, ORALLY DISINTEGRATING ORAL
Qty: 30 TABLET | Refills: 0 | Status: SHIPPED | OUTPATIENT
Start: 2019-04-22 | End: 2019-10-16 | Stop reason: SDUPTHER

## 2019-04-22 NOTE — PROGRESS NOTES
[facility] RHEUMATOLOGY            PROGRESS NOTE      Subjective:       Patient ID:   NAME: Promise Medrano : 1975     43 y.o. female    Referring Doc: No ref. provider found  Other Physicians:    Chief Complaint:  connective tissue disease,undifferentiated (fighting pneuonia off/on since December)      History of Present Illness:     Patient returns today for a regularly scheduled follow-up visit for    Undifferentiated connective tissue disease   The patient was recently hospitalized for one day for exacerbation of COPD. Patient is a smoker.No chest pains no rashes no mucosal ulcerations. No sicca symptoms  Pt  stopped Plaquenil  due to GI discomfort.no Raynaud's          ROS:   GEN:  No  fever, night sweats . weight is stable   + fatigue  SKIN: no rashes, no bruising, no ulcerations, no Raynaud's  HEENT: no HA's, No visual changes, no mucosal ulcers, no sicca symptoms,  CV:   no CP, SOB, PND, [place], no orthopnea, no palpitations  PULM: normal with no SOB, NP cough,No hemoptysis, sputum or pleuritic pain  GI:  no abdominal pain, nausea, vomiting, constipation, diarrhea, melanotic stools, BRBPR, hematemesis, no dysphagia  :   no dysuria  NEURO: no paresthesias, headaches, visual disturbances, muscle weakness  MUSCULOSKELETAL:no joint swelling, prolonged AM stiffness, no back pain, + muscle pain  Allergies:  Review of patient's allergies indicates:   Allergen Reactions    Plaquenil [hydroxychloroquine]      GI issue. Could not tolerate       Medications:    Current Outpatient Medications:     albuterol (PROVENTIL/VENTOLIN HFA) 90 mcg/actuation inhaler, INHALE 2 PUFFS INTO THE LUNGS EVERY 6 HOURS AS NEEDED FOR WHEEZING, Disp: 18 g, Rfl: 0    albuterol-ipratropium (DUO-NEB) 2.5 mg-0.5 mg/3 mL nebulizer solution, Take 3 mLs by nebulization every 6 (six) hours as needed for Wheezing or Shortness of Breath. Rescue - not to be used with inhaler, Disp: 1 Box, Rfl: 2    atorvastatin (LIPITOR) 40 MG tablet, TAKE  1 TABLET(40 MG) BY MOUTH EVERY DAY, Disp: 30 tablet, Rfl: 5    atorvastatin (LIPITOR) 80 MG tablet, , Disp: , Rfl:     blood sugar diagnostic Strp, 1 each by Misc.(Non-Drug; Combo Route) route daily as needed., Disp: 100 each, Rfl: 0    blood-glucose meter kit, Use as instructed, Disp: 1 each, Rfl: 0    blood-glucose meter, drum-type (ACCU-CHEK COMPACT PLUS CARE) Kit, 1 Device by Misc.(Non-Drug; Combo Route) route daily as needed., Disp: 1 kit, Rfl: 0    CENTRUM SPECIALIST ENERGY 18 mg iron-400 mcg-25 mcg-75mg Tab, Take 1 tablet by mouth once daily. , Disp: , Rfl:     CETAPHIL MOISTURIZING cream, Apply topically as needed. , Disp: , Rfl:     diclofenac sodium (VOLTAREN) 1 % Gel, 2-4 gm bid-tid prn, Disp: 100 g, Rfl: 0    dicyclomine (BENTYL) 20 mg tablet, TK 1 T PO BID PRN. CONTINUE CURRENT DOSAGE, Disp: , Rfl: 2    doxepin (SINEQUAN) 50 MG capsule, TAKE 1 CAPSULE(50 MG) BY MOUTH EVERY NIGHT, Disp: 30 capsule, Rfl: 5    ergocalciferol (ERGOCALCIFEROL) 50,000 unit Cap, TAKE 1 CAPSULE BY MOUTH EVERY 7 DAYS, Disp: 4 capsule, Rfl: 5    escitalopram oxalate (LEXAPRO) 20 MG tablet, Take 1 tablet (20 mg total) by mouth once daily., Disp: 90 tablet, Rfl: 0    ferrous sulfate (FEOSOL) 325 mg (65 mg iron) Tab tablet, Take 1 tablet (325 mg total) by mouth once daily., Disp: 90 tablet, Rfl: 1    fluticasone (FLONASE) 50 mcg/actuation nasal spray, SHAKE LIQUID AND USE 1 SPRAY IN EACH NOSTRIL EVERY DAY, Disp: 9.9 mL, Rfl: 5    gabapentin (NEURONTIN) 100 MG capsule, TAKE 1 TO 2 CAPSULES BY MOUTH THREE TIMES DAILY(TAKE WITH 400 MG DOSE), Disp: 180 capsule, Rfl: 0    gabapentin (NEURONTIN) 400 MG capsule, Take 1 capsule (400 mg total) by mouth 3 (three) times daily., Disp: 90 capsule, Rfl: 11    hydrocortisone 1 % cream, Apply to affected area 2 times daily., Disp: , Rfl:     ibuprofen (ADVIL,MOTRIN) 600 MG tablet, , Disp: , Rfl:     ketoconazole (NIZORAL) 2 % cream, , Disp: , Rfl:     lancets (ACCU-CHEK SOFTCLIX  LANCETS) Misc, 1 Device by Misc.(Non-Drug; Combo Route) route daily as needed., Disp: 100 each, Rfl: 0    levocetirizine (XYZAL) 5 MG tablet, Take 1 tablet (5 mg total) by mouth every evening., Disp: 90 tablet, Rfl: 1    metFORMIN (GLUCOPHAGE) 500 MG tablet, Take 1 tablet (500 mg total) by mouth 2 (two) times daily with meals., Disp: 60 tablet, Rfl: 5    midodrine (PROAMATINE) 2.5 MG Tab, Take 1 tablet by mouth 2 (two) times daily., Disp: , Rfl:     mupirocin (BACTROBAN) 2 % ointment, , Disp: , Rfl:     omeprazole (PRILOSEC) 40 MG capsule, Take 1 capsule (40 mg total) by mouth every morning., Disp: 30 capsule, Rfl: 5    ondansetron (ZOFRAN-ODT) 4 MG TbDL, Take 2 tablets (8 mg total) by mouth every 8 (eight) hours as needed., Disp: 30 tablet, Rfl: 0    STIOLTO RESPIMAT 2.5-2.5 mcg/actuation Mist, INHALE 1 PUFF INTO THE LUNGS ONCE DAILY, Disp: 4 g, Rfl: 5    triamcinolone acetonide 0.1% (KENALOG) 0.1 % cream, MELY SPARINGLY TO ECZEMA BID, Disp: , Rfl: 3    urea 40 % Lotn lotion, Apply topically as needed. , Disp: , Rfl:     XIIDRA 5 % Dpet, , Disp: , Rfl:     PMHx/PSHx Updates:      Objective:     Vitals:  Blood pressure 106/67, weight 80.4 kg (177 lb 4.8 oz).    Physical Examination:   GEN: no apparent distress, comfortable; AAOx3  SKIN: no rashes,no ulceration, no Raynaud's, no petechiae, no SQ nodules,  HEAD: normal  EYES: no pallor, no icterus,  NECK: no masses, thyroid normal, trachea midline, no LAD/LN's, supple  CV: RRR with no murmur; l S1 and S2 reg. ,no gallop no rubs,   CHEST: Normal respiratory effort; CTAB; normal breath sounds; no wheeze or crackles  MUSC/Skeletal: ROM normal; no crepitus; joints without synovitis,  no deformities  No joint swelling or tenderness of PIP, MCP, wrist, elbow, shoulder, or knee joints  EXTREM: no clubbing, cyanosis, no edema,normal  pulses   NEURO: grossly intact; motor WNL; AAOx3; ,  PSYCH: normal mood, affect and behavior  LYMPH: normal cervical,  supraclavicular          Labs:   Lab Results   Component Value Date    WBC 7.0 08/28/2018    HGB 14.4 08/28/2018    HCT 42.1 08/28/2018    MCV 94.2 08/28/2018     08/28/2018    CMP  @[facility](NA,K,CL,CO2,GLU,BUN,Creatinine,Calcium,PROT,Albumin,Bilitot,Alkphos,AST,ALT,CRP,ESR,RF,CCP,PRASHANTH,SSA,CPK,uric acid) )@  I have reviewed all available lab results and radiology reports.    Radiology/Diagnostic Studies:        Assessment/Plan:   (1) 43 y.o. female with diagnosis of UCTD and Fibromyalgia..stable.reviewed recent CBC CMP and urinalysis all okay. She can try Plaquenil one  in the evening ;stop if any side effects  2) COPD..will see Dr Lamar ( pul ,has seen him in the past)  3)Smoker  Referred to [facility] smoking cessation clinics            Discussion:     I have explained all of the above in detail and the patient understands all of the current recommendation(s). I have answered all questions to the best of my ability and to their complete satisfaction.       The patient is to continue with the current management plan         RTC in   3 months      Electronically signed by Jonnathan James MD

## 2019-04-29 ENCOUNTER — OFFICE VISIT (OUTPATIENT)
Dept: FAMILY MEDICINE | Facility: CLINIC | Age: 44
End: 2019-04-29
Payer: MEDICAID

## 2019-04-29 VITALS
SYSTOLIC BLOOD PRESSURE: 110 MMHG | DIASTOLIC BLOOD PRESSURE: 72 MMHG | HEART RATE: 74 BPM | BODY MASS INDEX: 32.2 KG/M2 | OXYGEN SATURATION: 96 % | WEIGHT: 175 LBS | HEIGHT: 62 IN | RESPIRATION RATE: 14 BRPM | TEMPERATURE: 99 F

## 2019-04-29 DIAGNOSIS — F51.04 CHRONIC INSOMNIA: ICD-10-CM

## 2019-04-29 DIAGNOSIS — J43.1 PANLOBULAR EMPHYSEMA: Primary | ICD-10-CM

## 2019-04-29 DIAGNOSIS — E78.2 MIXED HYPERLIPIDEMIA: ICD-10-CM

## 2019-04-29 DIAGNOSIS — Z12.39 SCREENING BREAST EXAMINATION: ICD-10-CM

## 2019-04-29 DIAGNOSIS — Z98.84 HISTORY OF DIABETES MELLITUS RESOLVED FOLLOWING BARIATRIC SURGERY: ICD-10-CM

## 2019-04-29 DIAGNOSIS — M54.2 NECK PAIN, CHRONIC: ICD-10-CM

## 2019-04-29 DIAGNOSIS — Z91.09 ENVIRONMENTAL ALLERGIES: ICD-10-CM

## 2019-04-29 DIAGNOSIS — F41.9 CHRONIC ANXIETY: ICD-10-CM

## 2019-04-29 DIAGNOSIS — R79.0 LOW MAGNESIUM LEVEL: ICD-10-CM

## 2019-04-29 DIAGNOSIS — R20.0 NUMBNESS IN BOTH HANDS: ICD-10-CM

## 2019-04-29 DIAGNOSIS — G47.30 SLEEP APNEA, UNSPECIFIED TYPE: ICD-10-CM

## 2019-04-29 DIAGNOSIS — Z86.39 HISTORY OF DIABETES MELLITUS RESOLVED FOLLOWING BARIATRIC SURGERY: ICD-10-CM

## 2019-04-29 DIAGNOSIS — G89.29 NECK PAIN, CHRONIC: ICD-10-CM

## 2019-04-29 DIAGNOSIS — M32.19 OTHER SYSTEMIC LUPUS ERYTHEMATOSUS WITH OTHER ORGAN INVOLVEMENT: ICD-10-CM

## 2019-04-29 DIAGNOSIS — J32.4 CHRONIC PANSINUSITIS: ICD-10-CM

## 2019-04-29 DIAGNOSIS — R55 VASOVAGAL SYNCOPE: ICD-10-CM

## 2019-04-29 PROCEDURE — 99406 PR TOBACCO USE CESSATION INTERMEDIATE 3-10 MINUTES: ICD-10-PCS | Mod: ,,, | Performed by: INTERNAL MEDICINE

## 2019-04-29 PROCEDURE — 99214 PR OFFICE/OUTPT VISIT, EST, LEVL IV, 30-39 MIN: ICD-10-PCS | Mod: ,,, | Performed by: INTERNAL MEDICINE

## 2019-04-29 PROCEDURE — 99214 OFFICE O/P EST MOD 30 MIN: CPT | Mod: ,,, | Performed by: INTERNAL MEDICINE

## 2019-04-29 PROCEDURE — 99406 BEHAV CHNG SMOKING 3-10 MIN: CPT | Mod: ,,, | Performed by: INTERNAL MEDICINE

## 2019-04-29 RX ORDER — MIDODRINE HYDROCHLORIDE 2.5 MG/1
2.5 TABLET ORAL 2 TIMES DAILY
Qty: 180 TABLET | Refills: 1 | Status: SHIPPED | OUTPATIENT
Start: 2019-04-29 | End: 2019-07-16 | Stop reason: SDUPTHER

## 2019-04-29 RX ORDER — DIAZEPAM 5 MG/1
TABLET ORAL
Qty: 60 TABLET | Refills: 0 | Status: SHIPPED | OUTPATIENT
Start: 2019-04-29 | End: 2019-06-06 | Stop reason: SDUPTHER

## 2019-04-29 RX ORDER — GLY/DIMETH/PETROLAT,WHT/WATER
1 CREAM (GRAM) TOPICAL
Qty: 90 G | Refills: 1 | COMMUNITY
Start: 2019-04-29 | End: 2020-04-02 | Stop reason: SDUPTHER

## 2019-04-29 NOTE — PROGRESS NOTES
"  SUBJECTIVE:    Patient ID: Promise Medrano is a 43 y.o. female.    Chief Complaint: Anxiety; Hyperlipidemia; and Follow-up    HPI     Patient comes in for recheck with underlying history of    Chronic anxiety"through the roof" after lung flare-ups -always happens post pulmonary flare-ups-needs something-took an old xanax 1mg which was 8 years old, to sleep-but it makes her sad  Vasovagal syncope-from lupus-on midodrine  Diabetes-not following  Chronic insomnia-see above  GERD-ok with rx  Panlobular emphysema-see below  RUQ pain--still has same-hx colitis-going to GI for evaluation    On 4/14 she was admitted to the hospital with acute hypoxemic respiratory failure requiring supplemental oxygen, severe recurrent bronchitis, hypomagnesemia, acute sinusitis, tachycardia, history of lupus, depression, obesity, and tobacco dependence. She was discharged with oral steroids and short course of antibiotics at that time her hemoglobin was 12.7 with hematocrit of 38.3 glucose was 261 potassium was 4.3 glucose came down to 103 magnesium was 1.4 on admission.Pt is now able to breathe but is smoking again-she was told she did not have a COPD flare-she remained overnite in the ER    New issue- neck hurting ? From coughing-neck pain hurts all the time-has hand numbness very often-    Tobacco Use/Cessation:  I assessed Promise Medrano and discussed smoking cessation with her for 3-10 minutes. She is still smoking a PPD and knows she needs to stop-I offered smoke cessation classes-she already has chantix-lexapro did not work.      Social History     Tobacco Use   Smoking Status Current Every Day Smoker    Packs/day: 1.00    Types: Cigarettes   Smokeless Tobacco Never Used       Orders Only on 01/23/2019   Component Date Value Ref Range Status    Hemoglobin A1C 01/23/2019 5.4  3.1 - 6.5 % Final   Office Visit on 01/23/2019   Component Date Value Ref Range Status    Microalbum.,U,Random 01/23/2019 <2.0  0.0 - 19.9 mcg/ml Final "    Creatinine, Random Ur 2019 42.00  mg/dl Final    Microalb Creat Ratio 2019 see below  0 - 30 Final       Past Medical History:   Diagnosis Date    Allergy     Arthritis     COPD (chronic obstructive pulmonary disease)     Diabetes mellitus     GERD (gastroesophageal reflux disease)     Grade II hemorrhoids 2019    Lupus     Neuromuscular disorder     Sjogren's syndrome 2019     Past Surgical History:   Procedure Laterality Date    breast augmentation      BREAST SURGERY       SECTION  ,    gastric sleeve  2010    HYSTERECTOMY      TONSILLECTOMY       Family History   Problem Relation Age of Onset    Early death Father     Heart disease Father     Stroke Father     Kidney disease Father     Diabetes Paternal Grandmother     Cancer Paternal Grandmother     Cancer Mother     Raynaud syndrome Mother     Raynaud syndrome Sister     Polycystic ovary syndrome Daughter     Diabetes Maternal Grandmother     Heart disease Maternal Grandmother     Kidney disease Maternal Grandmother     Heart disease Maternal Grandfather     Cancer Paternal Grandfather     No Known Problems Daughter        Marital Status: Single  Alcohol History:  reports that she drinks alcohol.  Tobacco History:  reports that she has been smoking cigarettes.  She has been smoking about 1.00 pack per day. She has never used smokeless tobacco.  Drug History:  reports that she does not use drugs.    Review of patient's allergies indicates:   Allergen Reactions    Plaquenil [hydroxychloroquine]      GI issue. Could not tolerate       Current Outpatient Medications:     albuterol-ipratropium (DUO-NEB) 2.5 mg-0.5 mg/3 mL nebulizer solution, Take 3 mLs by nebulization every 6 (six) hours as needed for Wheezing or Shortness of Breath. Rescue - not to be used with inhaler, Disp: 1 Box, Rfl: 2    atorvastatin (LIPITOR) 40 MG tablet, TAKE 1 TABLET(40 MG) BY MOUTH EVERY DAY,  Disp: 30 tablet, Rfl: 5    blood sugar diagnostic Strp, 1 each by Misc.(Non-Drug; Combo Route) route daily as needed., Disp: 100 each, Rfl: 0    blood-glucose meter kit, Use as instructed, Disp: 1 each, Rfl: 0    blood-glucose meter, drum-type (ACCU-CHEK COMPACT PLUS CARE) Kit, 1 Device by Misc.(Non-Drug; Combo Route) route daily as needed., Disp: 1 kit, Rfl: 0    CETAPHIL MOISTURIZING cream, Apply 1 application topically as needed., Disp: 90 g, Rfl: 1    diclofenac sodium (VOLTAREN) 1 % Gel, 2-4 gm bid-tid prn, Disp: 100 g, Rfl: 0    dicyclomine (BENTYL) 20 mg tablet, TK 1 T PO BID PRN. CONTINUE CURRENT DOSAGE, Disp: , Rfl: 2    doxepin (SINEQUAN) 50 MG capsule, TAKE 1 CAPSULE(50 MG) BY MOUTH EVERY NIGHT, Disp: 30 capsule, Rfl: 5    ergocalciferol (ERGOCALCIFEROL) 50,000 unit Cap, TAKE 1 CAPSULE BY MOUTH EVERY 7 DAYS, Disp: 4 capsule, Rfl: 5    ferrous sulfate (FEOSOL) 325 mg (65 mg iron) Tab tablet, Take 1 tablet (325 mg total) by mouth once daily., Disp: 90 tablet, Rfl: 1    fluticasone (FLONASE) 50 mcg/actuation nasal spray, SHAKE LIQUID AND USE 1 SPRAY IN EACH NOSTRIL EVERY DAY, Disp: 9.9 mL, Rfl: 5    gabapentin (NEURONTIN) 100 MG capsule, TAKE 1 TO 2 CAPSULES BY MOUTH THREE TIMES DAILY(TAKE WITH 400 MG DOSE), Disp: 180 capsule, Rfl: 0    gabapentin (NEURONTIN) 400 MG capsule, Take 1 capsule (400 mg total) by mouth 3 (three) times daily., Disp: 90 capsule, Rfl: 11    hydrocortisone 1 % cream, Apply to affected area 2 times daily., Disp: , Rfl:     ibuprofen (ADVIL,MOTRIN) 600 MG tablet, , Disp: , Rfl:     ketoconazole (NIZORAL) 2 % cream, , Disp: , Rfl:     lancets (ACCU-CHEK SOFTCLIX LANCETS) Misc, 1 Device by Misc.(Non-Drug; Combo Route) route daily as needed., Disp: 100 each, Rfl: 0    levocetirizine (XYZAL) 5 MG tablet, Take 1 tablet (5 mg total) by mouth every evening., Disp: 90 tablet, Rfl: 1    metFORMIN (GLUCOPHAGE) 500 MG tablet, Take 1 tablet (500 mg total) by mouth 2 (two) times  daily with meals., Disp: 60 tablet, Rfl: 5    midodrine (PROAMATINE) 2.5 MG Tab, Take 1 tablet (2.5 mg total) by mouth 2 (two) times daily., Disp: 180 tablet, Rfl: 1    mupirocin (BACTROBAN) 2 % ointment, , Disp: , Rfl:     omeprazole (PRILOSEC) 40 MG capsule, Take 1 capsule (40 mg total) by mouth every morning., Disp: 30 capsule, Rfl: 5    ondansetron (ZOFRAN-ODT) 4 MG TbDL, DISSOLVE 2 TABLETS(8 MG) ON THE TONGUE EVERY 8 HOURS AS NEEDED, Disp: 30 tablet, Rfl: 0    STIOLTO RESPIMAT 2.5-2.5 mcg/actuation Mist, INHALE 1 PUFF INTO THE LUNGS ONCE DAILY, Disp: 4 g, Rfl: 5    triamcinolone acetonide 0.1% (KENALOG) 0.1 % cream, MELY SPARINGLY TO ECZEMA BID, Disp: , Rfl: 3    urea 40 % Lotn lotion, Apply topically as needed. , Disp: , Rfl:     XIIDRA 5 % Dpet, , Disp: , Rfl:     diazePAM (VALIUM) 5 MG tablet, 1-2 hs as needed for sleeplessness/anxiety, Disp: 60 tablet, Rfl: 0    Review of Systems   Constitutional: Positive for fever (constant-due to lupu99...). Negative for appetite change, chills, diaphoresis, fatigue and unexpected weight change.   HENT: Positive for ear pain and rhinorrhea. Negative for congestion (chronic nasal and sinus mucous-green mucous), hearing loss, nosebleeds, postnasal drip, sinus pressure, sinus pain, sneezing, sore throat, tinnitus, trouble swallowing and voice change.    Eyes: Negative for photophobia, pain, itching and visual disturbance.   Respiratory: Positive for cough (chronic cough) and shortness of breath (walking across parking lot this am). Negative for apnea, chest tightness, wheezing and stridor.    Cardiovascular: Positive for chest pain (with shortness of breath and heart racing-sees cardiology) and leg swelling (minimal). Negative for palpitations.   Gastrointestinal: Positive for abdominal pain (HPI) and vomiting. Negative for abdominal distention, blood in stool, constipation, diarrhea and nausea.   Endocrine: Negative for cold intolerance, heat intolerance,  "polydipsia and polyuria.   Genitourinary: Negative for difficulty urinating, dyspareunia, dysuria, flank pain, frequency, hematuria, menstrual problem, pelvic pain, urgency, vaginal discharge and vaginal pain.        Stress bladder incontinence   Musculoskeletal: Positive for neck pain. Negative for arthralgias, back pain, joint swelling, myalgias and neck stiffness.   Skin: Negative for pallor and rash.   Allergic/Immunologic: Negative for environmental allergies (many-hx of allergy shots as child) and food allergies.   Neurological: Positive for headaches (sinus headaches). Negative for dizziness, tremors, speech difficulty, weakness, light-headedness and numbness.   Hematological: Does not bruise/bleed easily.   Psychiatric/Behavioral: Negative for agitation, confusion, decreased concentration, sleep disturbance and suicidal ideas. The patient is not nervous/anxious.           Objective:      Vitals:    04/29/19 1029   BP: 110/72   Pulse: 74   Resp: 14   Temp: 99 °F (37.2 °C)   SpO2: 96%   Weight: 79.4 kg (175 lb)   Height: 5' 2" (1.575 m)     Physical Exam   Constitutional: She is oriented to person, place, and time. She appears well-developed and well-nourished. She is cooperative. No distress.   Increased BMI   HENT:   Head: Normocephalic and atraumatic.   Right Ear: Tympanic membrane normal.   Left Ear: Tympanic membrane normal.   Mouth/Throat: Uvula is midline and mucous membranes are normal.   Eyes: Pupils are equal, round, and reactive to light. Conjunctivae and EOM are normal. Right eye exhibits no discharge. Left eye exhibits no discharge. No scleral icterus. Right pupil is round and reactive. Left pupil is round and reactive.   Neck: Trachea normal and normal range of motion. Neck supple. No JVD present. Carotid bruit is not present. No thyromegaly present.   Cardiovascular: Normal rate, regular rhythm and intact distal pulses. Exam reveals no gallop and no friction rub.   No murmur heard.  Pulses:      "  Dorsalis pedis pulses are 3+ on the right side, and 1+ on the left side.        Posterior tibial pulses are 3+ on the right side, and 1+ on the left side.   Pulmonary/Chest: Effort normal. No respiratory distress. She has no wheezes. She has no rales.   Rhonchi bilaterally and some distant inspiratory wheezes   Abdominal: Soft. Bowel sounds are normal. She exhibits no distension and no mass. There is no tenderness. There is no guarding. No hernia.   Musculoskeletal: Normal range of motion. She exhibits no edema.   Neurological: She is alert and oriented to person, place, and time. She has normal strength.   Skin: Skin is warm and dry. Capillary refill takes less than 2 seconds. No lesion and no rash noted. No cyanosis. Nails show no clubbing.   Psychiatric: She has a normal mood and affect. Her speech is normal and behavior is normal. Judgment and thought content normal.   Nursing note and vitals reviewed.        Assessment:       1. Panlobular emphysema    2. Environmental allergies    3. Chronic pansinusitis    4. BMI 32.0-32.9,adult    5. Chronic anxiety    6. Vasovagal syncope    7. Mixed hyperlipidemia    8. Other systemic lupus erythematosus with other organ involvement    9. History of diabetes mellitus resolved following bariatric surgery    10. Neck pain, chronic    11. Numbness in both hands    12. Chronic insomnia    13. Sleep apnea, unspecified type    14. Low magnesium level    15. Screening breast examination         Plan:       Panlobular emphysema  -     CBC auto differential; Future; Expected date: 09/29/2019  -     Comprehensive metabolic panel; Future; Expected date: 09/29/2019    Environmental allergies  -     Ambulatory referral to Immunology    Chronic pansinusitis  -     CBC auto differential; Future; Expected date: 09/29/2019  -     Comprehensive metabolic panel; Future; Expected date: 09/29/2019  -     Ambulatory referral to Immunology    BMI 32.0-32.9,adult    Chronic anxiety  -      Comprehensive metabolic panel; Future; Expected date: 09/29/2019    Vasovagal syncope  -     CBC auto differential; Future; Expected date: 09/29/2019  -     midodrine (PROAMATINE) 2.5 MG Tab; Take 1 tablet (2.5 mg total) by mouth 2 (two) times daily.  Dispense: 180 tablet; Refill: 1    Mixed hyperlipidemia  -     Lipid panel; Future; Expected date: 09/29/2019  -     Comprehensive metabolic panel; Future; Expected date: 09/29/2019    Other systemic lupus erythematosus with other organ involvement  -     Comprehensive metabolic panel; Future; Expected date: 09/29/2019    History of diabetes mellitus resolved following bariatric surgery  -     Hemoglobin A1c; Future; Expected date: 07/29/2019  -     Microalbumin/creatinine urine ratio; Future; Expected date: 07/29/2019  -     Lipid panel; Future; Expected date: 09/29/2019  -     Comprehensive metabolic panel; Future; Expected date: 09/29/2019  -     Hemoglobin A1C; Future; Expected date: 04/29/2019  -     Cancel: Microalbumin/creatinine urine ratio    Neck pain, chronic  -     X-Ray Cervical Spine AP Lat with Flex Ex; Future; Expected date: 04/29/2019    Numbness in both hands  -     Comprehensive metabolic panel; Future; Expected date: 09/29/2019  -     X-Ray Cervical Spine AP Lat with Flex Ex; Future; Expected date: 04/29/2019    Chronic insomnia  -     diazePAM (VALIUM) 5 MG tablet; 1-2 hs as needed for sleeplessness/anxiety  Dispense: 60 tablet; Refill: 0    Sleep apnea, unspecified type  -     Polysomnogram (CPAP will be added if patient meets diagnostic criteria.); Future    Low magnesium level  -     Magnesium; Future; Expected date: 04/29/2019    Screening breast examination  -     Mammo Digital Screening Bilat with Rico; Future; Expected date: 04/29/2019    Other orders  -     CETAPHIL MOISTURIZING cream; Apply 1 application topically as needed.  Dispense: 90 g; Refill: 1      Follow up in about 3 months (around 7/29/2019) for FOLLOW UP LABS-COPD.         4/29/2019 Mine Palmer M.D.

## 2019-05-02 DIAGNOSIS — K58.1 IRRITABLE BOWEL SYNDROME WITH CONSTIPATION: Primary | ICD-10-CM

## 2019-05-02 LAB — HBA1C MFR BLD: 5.5 % (ref 3.1–6.5)

## 2019-05-02 NOTE — TELEPHONE ENCOUNTER
Patient states that she is waiting on a refill  for Bentyl 20 mg, BID.    Pt notified and verbalized understanding.    ----- Message from Mine Palmer MD sent at 5/2/2019  3:53 PM CDT -----  Test results are normal

## 2019-05-06 RX ORDER — DICYCLOMINE HYDROCHLORIDE 20 MG/1
TABLET ORAL
Qty: 60 TABLET | Refills: 2 | Status: SHIPPED | OUTPATIENT
Start: 2019-05-06 | End: 2019-08-18 | Stop reason: SDUPTHER

## 2019-05-08 ENCOUNTER — TELEPHONE (OUTPATIENT)
Dept: FAMILY MEDICINE | Facility: CLINIC | Age: 44
End: 2019-05-08

## 2019-05-08 NOTE — TELEPHONE ENCOUNTER
----- Message from Mine Palmer MD sent at 5/2/2019  3:55 PM CDT -----  Please call the patient regarding her abnormal result.mild arthritic change lower C spine but screening xrays dont tell much but its where we have to start when we evaluate any neck sx

## 2019-05-21 DIAGNOSIS — R09.82 POST-NASAL DRIP: ICD-10-CM

## 2019-05-21 DIAGNOSIS — E55.9 VITAMIN D DEFICIENCY: ICD-10-CM

## 2019-05-21 RX ORDER — FLUTICASONE PROPIONATE 50 MCG
SPRAY, SUSPENSION (ML) NASAL
Qty: 9.9 ML | Refills: 0 | Status: SHIPPED | OUTPATIENT
Start: 2019-05-21 | End: 2019-06-19 | Stop reason: SDUPTHER

## 2019-05-21 RX ORDER — ERGOCALCIFEROL 1.25 MG/1
CAPSULE ORAL
Qty: 4 CAPSULE | Refills: 0 | Status: SHIPPED | OUTPATIENT
Start: 2019-05-21 | End: 2019-06-19 | Stop reason: SDUPTHER

## 2019-05-22 ENCOUNTER — PATIENT MESSAGE (OUTPATIENT)
Dept: FAMILY MEDICINE | Facility: CLINIC | Age: 44
End: 2019-05-22

## 2019-05-22 ENCOUNTER — OFFICE VISIT (OUTPATIENT)
Dept: FAMILY MEDICINE | Facility: CLINIC | Age: 44
End: 2019-05-22
Payer: MEDICAID

## 2019-05-22 VITALS
DIASTOLIC BLOOD PRESSURE: 78 MMHG | OXYGEN SATURATION: 96 % | SYSTOLIC BLOOD PRESSURE: 114 MMHG | HEIGHT: 62 IN | WEIGHT: 167 LBS | HEART RATE: 82 BPM | BODY MASS INDEX: 30.73 KG/M2 | RESPIRATION RATE: 12 BRPM | TEMPERATURE: 99 F

## 2019-05-22 DIAGNOSIS — E78.2 MIXED HYPERLIPIDEMIA: ICD-10-CM

## 2019-05-22 DIAGNOSIS — M94.0 ACUTE COSTOCHONDRITIS: Primary | ICD-10-CM

## 2019-05-22 DIAGNOSIS — Z86.39 HISTORY OF TYPE 2 DIABETES MELLITUS: ICD-10-CM

## 2019-05-22 PROBLEM — Z98.84 HISTORY OF DIABETES MELLITUS RESOLVED FOLLOWING BARIATRIC SURGERY: Status: RESOLVED | Noted: 2017-10-30 | Resolved: 2019-05-22

## 2019-05-22 PROCEDURE — 99213 OFFICE O/P EST LOW 20 MIN: CPT | Mod: ,,, | Performed by: INTERNAL MEDICINE

## 2019-05-22 PROCEDURE — 99213 PR OFFICE/OUTPT VISIT, EST, LEVL III, 20-29 MIN: ICD-10-PCS | Mod: ,,, | Performed by: INTERNAL MEDICINE

## 2019-05-22 RX ORDER — NABUMETONE 750 MG/1
750 TABLET, FILM COATED ORAL 2 TIMES DAILY
Qty: 20 TABLET | Refills: 0 | Status: SHIPPED | OUTPATIENT
Start: 2019-05-22 | End: 2019-06-17

## 2019-05-22 NOTE — PROGRESS NOTES
SUBJECTIVE:    Patient ID: Promise Medrano is a 43 y.o. female.    Chief Complaint: Pain (pt was skating and ran into friend on left side. )    HPI     Patient comes in with chief complaint of possible rib fracture. She was at the skating rink and decided to skate-she tripped over the carpet and her ribs hit a man-her chest hurts and she hurts under the ribs posteriorally and hurts with coughing-has underlying COPD-says they both heard a number of pops -says her right side hurt less than it has in a number of years.( also neck)    Patient is was a diabetic before gastric bypass surgery, who is no longer following her Accu-Cheks regularly. Her A1C, however,was 5.5, and her urine was negative for protein.    Office Visit on 2019   Component Date Value Ref Range Status    Hemoglobin A1C 2019 5.5  3.1 - 6.5 % Final   Orders Only on 2019   Component Date Value Ref Range Status    Hemoglobin A1C 2019 5.4  3.1 - 6.5 % Final   Office Visit on 2019   Component Date Value Ref Range Status    Microalbum.,U,Random 2019 <2.0  0.0 - 19.9 mcg/ml Final    Creatinine, Random Ur 2019 42.00  mg/dl Final    Microalb Creat Ratio 2019 see below  0 - 30 Final       Past Medical History:   Diagnosis Date    Allergy     Arthritis     COPD (chronic obstructive pulmonary disease)     Diabetes mellitus     GERD (gastroesophageal reflux disease)     Grade II hemorrhoids 2019    History of diabetes mellitus resolved following bariatric surgery 10/30/2017    Lupus     Neuromuscular disorder     Sjogren's syndrome 2019     Past Surgical History:   Procedure Laterality Date    breast augmentation  2004    BREAST SURGERY  2004     SECTION  2006,2009    gastric sleeve  2010    HYSTERECTOMY  2010    TONSILLECTOMY       Family History   Problem Relation Age of Onset    Early death Father     Heart disease Father     Stroke Father     Kidney disease Father      Diabetes Paternal Grandmother     Cancer Paternal Grandmother     Cancer Mother     Raynaud syndrome Mother     Raynaud syndrome Sister     Polycystic ovary syndrome Daughter     Diabetes Maternal Grandmother     Heart disease Maternal Grandmother     Kidney disease Maternal Grandmother     Heart disease Maternal Grandfather     Cancer Paternal Grandfather     No Known Problems Daughter        Marital Status: Single  Alcohol History:  reports that she drinks alcohol.  Tobacco History:  reports that she has been smoking cigarettes.  She has been smoking about 1.00 pack per day. She has never used smokeless tobacco.  Drug History:  reports that she does not use drugs.    Review of patient's allergies indicates:   Allergen Reactions    Plaquenil [hydroxychloroquine]      GI issue. Could not tolerate       Current Outpatient Medications:     albuterol-ipratropium (DUO-NEB) 2.5 mg-0.5 mg/3 mL nebulizer solution, Take 3 mLs by nebulization every 6 (six) hours as needed for Wheezing or Shortness of Breath. Rescue - not to be used with inhaler, Disp: 1 Box, Rfl: 2    atorvastatin (LIPITOR) 40 MG tablet, TAKE 1 TABLET(40 MG) BY MOUTH EVERY DAY, Disp: 30 tablet, Rfl: 5    blood sugar diagnostic Strp, 1 each by Misc.(Non-Drug; Combo Route) route daily as needed., Disp: 100 each, Rfl: 0    blood-glucose meter kit, Use as instructed, Disp: 1 each, Rfl: 0    blood-glucose meter, drum-type (ACCU-CHEK COMPACT PLUS CARE) Kit, 1 Device by Misc.(Non-Drug; Combo Route) route daily as needed., Disp: 1 kit, Rfl: 0    CETAPHIL MOISTURIZING cream, Apply 1 application topically as needed., Disp: 90 g, Rfl: 1    diazePAM (VALIUM) 5 MG tablet, 1-2 hs as needed for sleeplessness/anxiety, Disp: 60 tablet, Rfl: 0    diclofenac sodium (VOLTAREN) 1 % Gel, 2-4 gm bid-tid prn, Disp: 100 g, Rfl: 0    dicyclomine (BENTYL) 20 mg tablet, TK 1 T PO BID PRN. CONTINUE CURRENT DOSAGE, Disp: 60 tablet, Rfl: 2    ergocalciferol  (ERGOCALCIFEROL) 50,000 unit Cap, TAKE 1 CAPSULE BY MOUTH EVERY 7 DAYS, Disp: 4 capsule, Rfl: 0    ferrous sulfate (FEOSOL) 325 mg (65 mg iron) Tab tablet, Take 1 tablet (325 mg total) by mouth once daily., Disp: 90 tablet, Rfl: 1    fluticasone propionate (FLONASE) 50 mcg/actuation nasal spray, SHAKE LIQUID AND USE 1 SPRAY IN EACH NOSTRIL EVERY DAY, Disp: 9.9 mL, Rfl: 0    gabapentin (NEURONTIN) 100 MG capsule, TAKE 1 TO 2 CAPSULES BY MOUTH THREE TIMES DAILY(TAKE WITH 400 MG DOSE), Disp: 180 capsule, Rfl: 0    gabapentin (NEURONTIN) 400 MG capsule, Take 1 capsule (400 mg total) by mouth 3 (three) times daily., Disp: 90 capsule, Rfl: 11    hydrocortisone 1 % cream, Apply to affected area 2 times daily., Disp: , Rfl:     ibuprofen (ADVIL,MOTRIN) 600 MG tablet, , Disp: , Rfl:     ketoconazole (NIZORAL) 2 % cream, , Disp: , Rfl:     lancets (ACCU-CHEK SOFTCLIX LANCETS) Misc, 1 Device by Misc.(Non-Drug; Combo Route) route daily as needed., Disp: 100 each, Rfl: 0    levocetirizine (XYZAL) 5 MG tablet, Take 1 tablet (5 mg total) by mouth every evening., Disp: 90 tablet, Rfl: 1    metFORMIN (GLUCOPHAGE) 500 MG tablet, Take 1 tablet (500 mg total) by mouth 2 (two) times daily with meals., Disp: 60 tablet, Rfl: 5    midodrine (PROAMATINE) 2.5 MG Tab, Take 1 tablet (2.5 mg total) by mouth 2 (two) times daily., Disp: 180 tablet, Rfl: 1    mupirocin (BACTROBAN) 2 % ointment, , Disp: , Rfl:     omeprazole (PRILOSEC) 40 MG capsule, Take 1 capsule (40 mg total) by mouth every morning., Disp: 30 capsule, Rfl: 5    ondansetron (ZOFRAN-ODT) 4 MG TbDL, DISSOLVE 2 TABLETS(8 MG) ON THE TONGUE EVERY 8 HOURS AS NEEDED, Disp: 30 tablet, Rfl: 0    STIOLTO RESPIMAT 2.5-2.5 mcg/actuation Mist, INHALE 1 PUFF INTO THE LUNGS ONCE DAILY, Disp: 4 g, Rfl: 5    triamcinolone acetonide 0.1% (KENALOG) 0.1 % cream, MELY SPARINGLY TO ECZEMA BID, Disp: , Rfl: 3    urea 40 % Lotn lotion, Apply topically as needed. , Disp: , Rfl:      "XIIDRA 5 % Dpet, , Disp: , Rfl:     nabumetone (RELAFEN) 750 MG tablet, Take 1 tablet (750 mg total) by mouth 2 (two) times daily., Disp: 20 tablet, Rfl: 0    Review of Systems   All other systems reviewed and are negative.         Objective:      Vitals:    05/22/19 0920   BP: 114/78   Pulse: 82   Resp: 12   Temp: 99 °F (37.2 °C)   SpO2: 96%   Weight: 75.8 kg (167 lb)   Height: 5' 2" (1.575 m)     Physical Exam   Constitutional: She is oriented to person, place, and time. She appears well-developed and well-nourished. She is cooperative.   HENT:   Right Ear: Tympanic membrane normal.   Left Ear: Tympanic membrane normal.   Eyes: Conjunctivae, EOM and lids are normal. Right pupil is round and reactive. Left pupil is round and reactive.   Neck: Trachea normal and normal range of motion. Neck supple. No JVD present. Carotid bruit is not present. No thyromegaly present.   Cardiovascular: Normal rate, regular rhythm, S1 normal, S2 normal, normal heart sounds and intact distal pulses. Exam reveals no gallop and no friction rub.   No murmur heard.  Pulmonary/Chest: Breath sounds normal. No respiratory distress. She has no wheezes. She has no rales. She exhibits tenderness (some tenderness under the left scapula).   Abdominal: Soft. Bowel sounds are normal. She exhibits no mass. There is no tenderness. There is no rigidity and no guarding.   Musculoskeletal: She exhibits no edema.   Neurological: She is alert and oriented to person, place, and time.   Skin: Skin is warm and dry. Capillary refill takes less than 2 seconds. No lesion and no rash noted. Nails show no clubbing.   Psychiatric: She has a normal mood and affect. Her behavior is normal. Judgment and thought content normal.   Nursing note and vitals reviewed.      Protective Sensation (w/ 10 gram monofilament):  Right: Intact  Left: Intact    Visual Inspection:  Normal -  Bilateral    Pedal Pulses:   Right: Present  Left: Present    Posterior tibialis: "   Right:Present  Left: Present    Assessment:       1. Acute costochondritis    2. Mixed hyperlipidemia    3. History of type 2 diabetes mellitus         Plan:       Acute costochondritis  -     X-Ray Ribs 2 View Left; Future; Expected date: 05/22/2019  -     nabumetone (RELAFEN) 750 MG tablet; Take 1 tablet (750 mg total) by mouth 2 (two) times daily.  Dispense: 20 tablet; Refill: 0    Mixed hyperlipidemia  -     Comprehensive metabolic panel; Future; Expected date: 08/22/2019  -     Lipid panel; Future; Expected date: 08/22/2019    History of type 2 diabetes mellitus  -     Hemoglobin A1C; Future; Expected date: 05/22/2019      No follow-ups on file.        5/22/2019 Mine Palmer M.D.

## 2019-06-06 DIAGNOSIS — R11.0 NAUSEA: ICD-10-CM

## 2019-06-06 DIAGNOSIS — G62.9 PERIPHERAL POLYNEUROPATHY: ICD-10-CM

## 2019-06-06 DIAGNOSIS — F51.04 CHRONIC INSOMNIA: ICD-10-CM

## 2019-06-06 RX ORDER — DIAZEPAM 5 MG/1
TABLET ORAL
Qty: 60 TABLET | Refills: 0 | Status: SHIPPED | OUTPATIENT
Start: 2019-06-06 | End: 2019-07-16 | Stop reason: SDUPTHER

## 2019-06-06 RX ORDER — GABAPENTIN 100 MG/1
CAPSULE ORAL
Qty: 180 CAPSULE | Refills: 0 | Status: SHIPPED | OUTPATIENT
Start: 2019-06-06 | End: 2019-08-19 | Stop reason: SDUPTHER

## 2019-06-06 RX ORDER — ONDANSETRON 4 MG/1
TABLET, ORALLY DISINTEGRATING ORAL
Qty: 30 TABLET | Refills: 0 | Status: SHIPPED | OUTPATIENT
Start: 2019-06-06 | End: 2019-07-16 | Stop reason: SDUPTHER

## 2019-06-10 DIAGNOSIS — F51.04 CHRONIC INSOMNIA: ICD-10-CM

## 2019-06-10 RX ORDER — DIAZEPAM 5 MG/1
TABLET ORAL
Qty: 60 TABLET | Refills: 0 | OUTPATIENT
Start: 2019-06-10

## 2019-06-10 NOTE — TELEPHONE ENCOUNTER
----- Message from Alta Izquierdo LPN sent at 6/10/2019  9:48 AM CDT -----  DIAZEPAM NO LONGER ON PREFERRED MEDIATION LIST FOR [facility]. PT WILL NEED ALTERNATE OR CAN PAY OUT - OF - POCKET

## 2019-06-10 NOTE — TELEPHONE ENCOUNTER
Any issues like this calls for the patient to give us a list of the agents the insurance company allows in order that a delay in recommendations be prevented

## 2019-06-13 ENCOUNTER — TELEPHONE (OUTPATIENT)
Dept: FAMILY MEDICINE | Facility: CLINIC | Age: 44
End: 2019-06-13

## 2019-06-13 DIAGNOSIS — F51.04 CHRONIC INSOMNIA: ICD-10-CM

## 2019-06-13 RX ORDER — DIAZEPAM 5 MG/1
TABLET ORAL
Qty: 60 TABLET | Refills: 0 | Status: CANCELLED | OUTPATIENT
Start: 2019-06-13

## 2019-06-13 NOTE — TELEPHONE ENCOUNTER
----- Message from Zenobia Cuellar sent at 6/11/2019  9:32 AM CDT -----  PRIOR AUTHORIZATION, 6/10/19

## 2019-06-17 ENCOUNTER — OFFICE VISIT (OUTPATIENT)
Dept: ALLERGY | Facility: CLINIC | Age: 44
End: 2019-06-17
Payer: MEDICAID

## 2019-06-17 VITALS
WEIGHT: 162 LBS | HEIGHT: 62 IN | BODY MASS INDEX: 29.81 KG/M2 | SYSTOLIC BLOOD PRESSURE: 106 MMHG | DIASTOLIC BLOOD PRESSURE: 60 MMHG

## 2019-06-17 DIAGNOSIS — J40 CHRONIC SINUSITIS WITH RECURRENT BRONCHITIS: Primary | ICD-10-CM

## 2019-06-17 DIAGNOSIS — Z98.84 HISTORY OF BARIATRIC SURGERY: ICD-10-CM

## 2019-06-17 DIAGNOSIS — K21.9 LARYNGOPHARYNGEAL REFLUX (LPR): ICD-10-CM

## 2019-06-17 DIAGNOSIS — Z72.0 TOBACCO ABUSE: ICD-10-CM

## 2019-06-17 DIAGNOSIS — A49.9 RECURRENT BACTERIAL INFECTION: ICD-10-CM

## 2019-06-17 DIAGNOSIS — J32.9 CHRONIC SINUSITIS WITH RECURRENT BRONCHITIS: Primary | ICD-10-CM

## 2019-06-17 DIAGNOSIS — M35.00 SJOGREN'S SYNDROME WITHOUT EXTRAGLANDULAR INVOLVEMENT: ICD-10-CM

## 2019-06-17 PROCEDURE — 99205 PR OFFICE/OUTPT VISIT, NEW, LEVL V, 60-74 MIN: ICD-10-PCS | Mod: ,,, | Performed by: ALLERGY & IMMUNOLOGY

## 2019-06-17 PROCEDURE — 99205 OFFICE O/P NEW HI 60 MIN: CPT | Mod: ,,, | Performed by: ALLERGY & IMMUNOLOGY

## 2019-06-17 RX ORDER — AZELASTINE 1 MG/ML
1 SPRAY, METERED NASAL 2 TIMES DAILY
Qty: 30 ML | Refills: 3 | Status: SHIPPED | OUTPATIENT
Start: 2019-06-17 | End: 2019-10-24 | Stop reason: SDUPTHER

## 2019-06-17 RX ORDER — MONTELUKAST SODIUM 10 MG/1
10 TABLET ORAL DAILY
Qty: 90 TABLET | Refills: 0 | Status: SHIPPED | OUTPATIENT
Start: 2019-06-17 | End: 2019-07-18 | Stop reason: SDUPTHER

## 2019-06-17 NOTE — LETTER
June 17, 2019      Mine Palmer MD  [address]  [place] [address]           [facility] - Allergy  [address]  [address]  [place] [address]  Phone: [phone]  Fax: [phone]          Patient: Promise Medrano   MR Number: [id number]   YOB: 1975   Date of Visit: 6/17/2019       Dear Dr. Mine Palmer:    Thank you for referring Promise Medrano to me for evaluation. Attached you will find relevant portions of my assessment and plan of care.    If you have questions, please do not hesitate to call me. I look forward to following Promise Medrano along with you.    Sincerely,    Katiana Cline MD    Enclosure  CC:  No Recipients    If you would like to receive this communication electronically, please contact [email or web address] or [phone] to request more information on [facility] Link access.    For providers and/or their staff who would like to refer a patient to [facility], please contact us through our one-stop-shop provider referral line, [facility] , at [phone].    If you feel you have received this communication in error or would no longer like to receive these types of communications, please e-mail [email or web address]          psoriatic plaques (baseline)

## 2019-06-17 NOTE — PATIENT INSTRUCTIONS
Nose:  Saline first 1-2 times per day- arm and hammer simply saline mist is the easiest.   Next azelastine 1 spray per nare twice per day - if symptoms worsen, may use up to 4 times per day   Then fluticasone 1 spray per nare twice per day, increased symptoms, 2 sprays per nare twice per day.     Technique:  Head down  Aim up and out   Spray don't sniff    Start montelukast 1 pill once per day     Sinus ct to look at the inside of your sinuses     Lung:  Referred to Dr. Garcia for pulmonary care   Spirometry test     Immune: blood work at [facility] imaging, don't need to fast, you can eat with labs.     F/u 6 weeks sooner if needed.

## 2019-06-17 NOTE — PROGRESS NOTES
"Subjective:       Patient ID: Promise Medrano is a 43 y.o. female.    Chief Complaint: Allergies (Referral from Dr. Mine Palmer - "eval of allergies".  Was allergy tested as a child and had significant allergies to grass, dust, animals, etc.  Did get allergy shots weekly as a child.  reports "I almost drown in my own mucous" - has lupus) and Recurrent Pneumonia (reports having 3-4 months of bronichitis this past winter. )    HPI     Pt presents as a consult from Dr. Mine Palmer for an allergy/immunology eval    Rhinitis  Onset: childhood  Sx: pnd, rhinorrhea, congestion, ears crackle.   Season: perennial   Trigger: uncertain   Tx: flonase- off past few days  xyzal, prior montelukast-    ait in the past: yes  Recent testing:no   No ct sinus.     Recurrent bronchitis  Onset: 2018  3-4 episodes last winter  Has a pmh of lupus autoimmunity  April 2019 was dc from [facility] for pna and or bronchitis  She has a history of current smoker   Has nebulizer for albuterol   Chest ct 8/2018 shows bronchioloitis and mediastinal lymph nodes and axillary lymph nodes presumed reactive.     Lpr:   "horrible acid reflux"  "gut issues" not diagnosed.   "why I got off plaquenil" "tore up my gut."     Atopic Hx    Rhinitis see above   Oral allergy: denies  Food allergy: none    Asthma see above for bronchitis   Latex tolerates   Eczema: denies, but may have a psoriasis.    Urticaria denies chronic, has doxepin qhs     Infection History    Pneumonia # in the past 12 months: bronchitis as above   Sinus infection # in the past 12 months: reports feels like sinus infections.   Otitis infection # in the past 12 months:  Denies chronic infection, but notes chronic fluid in the ears.       Review of Systems      General: neg unexpected weight changes, fevers, chills, night sweats, malaise  HEENT: see hpi, Neg eye pain, vision changes, ear drainage, nose bleeds, throat tightness, sores in the mouth  CV: Neg chest pain, palpitations, " "swelling  Resp: see hpi, neg shortness of breath, hemoptysis  GI: see hpi, neg dysphagia, night abdominal pain, reflux, chronic diarrhea, chronic constipation  Derm: See Hpi, neg new rash, neg flushing  Mu/sk: Neg joint pain, joint swelling   Psych: Neg anxiety  neuro: neg chronic headaches, muscle weakness  Endo: neg heat/cold intolerance, chronic fatigue    Objective:     Vitals:    06/17/19 1042   BP: 106/60   Weight: 73.5 kg (162 lb)   Height: 5' 2" (1.575 m)   PF: 230 L/min        Physical Exam      General: no acute distress, well developed well nourished   HEENT:   Head:normocephalic atraumatic  Eyes: BENITEZ, EOMI, Neg injection, scleral icterus, or conjunctival papillary hypertrophy.  Ears: tm clear bilaterally, normal canal  Nose:2-3+ inferior turbinates pink, neg nasal polyps            Mucosa: mild dryness             Septal irritation: none   OP: mucus membranes moist, - cobblestoning, - PND, neg erythema or lesions, + tongue discoloration likely from tobacco, rust color/yellow.   Neck: supple, Full range of motion, neg lymphadenopathy  Chest: full respiratory excursion no abnormal chest abnormality  Resp: clear to ascultation bilaterally  CV: RRR, neg MRG, brisk capillary refill  Abdomen: BS+, non tender, non distended.   Ext:  Neg clubbing, cyanosis, pitting edema  Skin: Neg rashes or lesions  Lymph: neg supraclavicular, axillary     Assessment:       1. Chronic sinusitis with recurrent bronchitis    2. Recurrent bacterial infection    3. Laryngopharyngeal reflux (LPR)    4. Tobacco abuse    5. History of bariatric surgery    6. Sjogren's syndrome without extraglandular involvement    7. History of lupus        Plan:       Chronic sinusitis with recurrent bronchitis  -     Complete PFT with bronchodilator; Future  -     Ambulatory referral to Pulmonology  -     azelastine (ASTELIN) 137 mcg (0.1 %) nasal spray; 1 spray (137 mcg total) by Nasal route 2 (two) times daily.  Dispense: 30 mL; Refill: 3  -     " montelukast (SINGULAIR) 10 mg tablet; Take 1 tablet (10 mg total) by mouth once daily.  Dispense: 90 tablet; Refill: 0  -     CT Sinuses without Contrast  -     IgG 1, 2, 3, and 4; Future; Expected date: 06/17/2019  -     IgG; Future; Expected date: 06/17/2019  -     T- and B-Lymphocyte and Natural Killer Cell Profile; Future; Expected date: 06/17/2019  -     IgM; Future; Expected date: 06/17/2019  -     IgA; Future; Expected date: 06/17/2019  -     Pneumococcal Im (14 Serotypes); Future; Expected date: 06/17/2019  -     Inhalant Panel; Future; Expected date: 06/17/2019  -     C4+C2; Future; Expected date: 06/17/2019  -     Complement, total; Future; Expected date: 06/17/2019    Recurrent bacterial infection  -     IgG 1, 2, 3, and 4; Future; Expected date: 06/17/2019  -     IgG; Future; Expected date: 06/17/2019  -     T- and B-Lymphocyte and Natural Killer Cell Profile; Future; Expected date: 06/17/2019  -     IgM; Future; Expected date: 06/17/2019  -     IgA; Future; Expected date: 06/17/2019  -     Pneumococcal Im (14 Serotypes); Future; Expected date: 06/17/2019  -     C4+C2; Future; Expected date: 06/17/2019  -     Complement, total; Future; Expected date: 06/17/2019    Laryngopharyngeal reflux (LPR)  -     ranitidine (ZANTAC) 150 MG tablet; Take 2 tablets (300 mg total) by mouth nightly.  Dispense: 60 tablet; Refill: 1    Tobacco abuse  -     Ambulatory referral to Pulmonology    History of bariatric surgery    Sjogren's syndrome without extraglandular involvement    History of lupus  -     C4+C2; Future; Expected date: 06/17/2019  -     Complement, total; Future; Expected date: 06/17/2019      Chronic rhinitis:  Serum IgE- not stable enough for skin prick testing   Start saline and azelastine   Continue fluticasone   Start montelukast 1 pill po qday     Recurrent bronchitis and sinusitis  Start immune eval.   [facility] labs - imaging center   Spirometry - complete PFT  Referral to pulm for smoker with recurrent  bronchitis.   Doesn't feel she needs rescue inhaler but has sx > 2 days per week.    LPR:  Start zantac 300 mg qhs.     F/u 6 weeks, sooner if needed.            Katiana Cline M.D.  Allergy/Immunology  [facility]   [phone] phone  [phone] fax

## 2019-06-18 ENCOUNTER — TELEPHONE (OUTPATIENT)
Dept: FAMILY MEDICINE | Facility: CLINIC | Age: 44
End: 2019-06-18

## 2019-06-19 DIAGNOSIS — E55.9 VITAMIN D DEFICIENCY: ICD-10-CM

## 2019-06-19 DIAGNOSIS — R09.82 POST-NASAL DRIP: ICD-10-CM

## 2019-06-20 RX ORDER — FLUTICASONE PROPIONATE 50 MCG
SPRAY, SUSPENSION (ML) NASAL
Qty: 9.9 ML | Refills: 0 | Status: SHIPPED | OUTPATIENT
Start: 2019-06-20 | End: 2019-07-18 | Stop reason: SDUPTHER

## 2019-06-20 RX ORDER — ERGOCALCIFEROL 1.25 MG/1
CAPSULE ORAL
Qty: 4 CAPSULE | Refills: 0 | Status: SHIPPED | OUTPATIENT
Start: 2019-06-20 | End: 2019-07-18 | Stop reason: SDUPTHER

## 2019-06-21 LAB
C4 NEF SERPL-MCNC: 16 MG/DL (ref 14–44)
IGM: 52 MG/DL (ref 26–217)

## 2019-06-27 ENCOUNTER — PATIENT MESSAGE (OUTPATIENT)
Dept: ALLERGY | Facility: CLINIC | Age: 44
End: 2019-06-27

## 2019-07-16 ENCOUNTER — OFFICE VISIT (OUTPATIENT)
Dept: FAMILY MEDICINE | Facility: CLINIC | Age: 44
End: 2019-07-16
Payer: MEDICAID

## 2019-07-16 VITALS
BODY MASS INDEX: 28.52 KG/M2 | DIASTOLIC BLOOD PRESSURE: 70 MMHG | SYSTOLIC BLOOD PRESSURE: 122 MMHG | OXYGEN SATURATION: 96 % | TEMPERATURE: 99 F | HEIGHT: 62 IN | WEIGHT: 155 LBS | HEART RATE: 82 BPM | RESPIRATION RATE: 12 BRPM

## 2019-07-16 DIAGNOSIS — F51.04 CHRONIC INSOMNIA: ICD-10-CM

## 2019-07-16 DIAGNOSIS — R55 VASOVAGAL SYNCOPE: ICD-10-CM

## 2019-07-16 DIAGNOSIS — J43.1 PANLOBULAR EMPHYSEMA: ICD-10-CM

## 2019-07-16 DIAGNOSIS — F41.9 CHRONIC ANXIETY: Primary | ICD-10-CM

## 2019-07-16 DIAGNOSIS — E78.2 MIXED HYPERLIPIDEMIA: ICD-10-CM

## 2019-07-16 DIAGNOSIS — E11.40 CONTROLLED TYPE 2 DIABETES WITH NEUROPATHY: ICD-10-CM

## 2019-07-16 DIAGNOSIS — E78.00 PURE HYPERCHOLESTEROLEMIA: ICD-10-CM

## 2019-07-16 DIAGNOSIS — K21.9 GERD WITHOUT ESOPHAGITIS: ICD-10-CM

## 2019-07-16 PROCEDURE — 99214 OFFICE O/P EST MOD 30 MIN: CPT | Mod: ,,, | Performed by: INTERNAL MEDICINE

## 2019-07-16 PROCEDURE — 99214 PR OFFICE/OUTPT VISIT, EST, LEVL IV, 30-39 MIN: ICD-10-PCS | Mod: ,,, | Performed by: INTERNAL MEDICINE

## 2019-07-16 RX ORDER — DIAZEPAM 5 MG/1
TABLET ORAL
Qty: 60 TABLET | Refills: 0 | Status: SHIPPED | OUTPATIENT
Start: 2019-07-16 | End: 2019-09-24 | Stop reason: SDUPTHER

## 2019-07-16 RX ORDER — EZETIMIBE 10 MG/1
10 TABLET ORAL DAILY
COMMUNITY
Start: 2019-07-15 | End: 2021-04-14 | Stop reason: SDUPTHER

## 2019-07-16 RX ORDER — MIDODRINE HYDROCHLORIDE 5 MG/1
5 TABLET ORAL 2 TIMES DAILY
Qty: 180 TABLET | Refills: 0 | Status: SHIPPED | OUTPATIENT
Start: 2019-07-16 | End: 2019-10-16 | Stop reason: SDUPTHER

## 2019-07-16 RX ORDER — ATORVASTATIN CALCIUM 40 MG/1
80 TABLET, FILM COATED ORAL DAILY
Qty: 30 TABLET | Refills: 5 | Status: SHIPPED | OUTPATIENT
Start: 2019-07-16 | End: 2019-07-29

## 2019-07-16 RX ORDER — GABAPENTIN 400 MG/1
400 CAPSULE ORAL 2 TIMES DAILY
Refills: 11 | COMMUNITY
Start: 2019-05-21 | End: 2019-10-16 | Stop reason: SDUPTHER

## 2019-07-16 NOTE — PROGRESS NOTES
SUBJECTIVE:    Patient ID: Promise Medrano is a 44 y.o. female.    Chief Complaint: COPD; Allergies; and Follow-up    HPI     Patient comes in for follow-up of anxiety. Additional history includes    Mixed hyperlipidemia-ACTUALLY TRIGS DOWN NOW    History of type 2 diabetes mellitus (from age 13) before her gastric bypass. Her last A1C was 5.5-She has not been taking the metformin.    She has lupus-had neuropathy first ascribed to Diabetes-she also had significant arthritis sx-she actually told the MD to check him for lupus-she does not get butterfly rash but puffiness-    Total  Chol 191  HDL 47  TRIG 81-H/H 13.5/41.9  GLUCOSE 84  BUN15 CR .44 GFR 125CC--TSH 1.5-SHE IS ATORVASTATIN 80 MG     COPD-PFS THIS YEAR-SHE HAS BEEN RECOMMENDED TO SEE THE PULMONOLOGIST    SMOKING A PPD STILL    Orders Only on 06/19/2019   Component Date Value Ref Range Status    C4 Complement 06/19/2019 16  14 - 44 mg/dL Final   Office Visit on 06/17/2019   Component Date Value Ref Range Status    IgM 06/19/2019 52  26 - 217 mg/dL Final   Office Visit on 04/29/2019   Component Date Value Ref Range Status    Hemoglobin A1C 05/02/2019 5.5  3.1 - 6.5 % Final   Orders Only on 01/23/2019   Component Date Value Ref Range Status    Hemoglobin A1C 01/23/2019 5.4  3.1 - 6.5 % Final   Office Visit on 01/23/2019   Component Date Value Ref Range Status    Microalbum.,U,Random 01/23/2019 <2.0  0.0 - 19.9 mcg/ml Final    Creatinine, Random Ur 01/23/2019 42.00  mg/dl Final    Microalb Creat Ratio 01/23/2019 see below  0 - 30 Final       Past Medical History:   Diagnosis Date    Allergy     Arthritis     COPD (chronic obstructive pulmonary disease)     Diabetes mellitus     GERD (gastroesophageal reflux disease)     Grade II hemorrhoids 1/23/2019    History of diabetes mellitus resolved following bariatric surgery 10/30/2017    Lupus     Neuromuscular disorder     Sjogren's syndrome 1/23/2019     Past Surgical History:   Procedure  Laterality Date    breast augmentation      BREAST SURGERY       SECTION  ,    gastric sleeve  2010    HYSTERECTOMY      TONSILLECTOMY       Family History   Problem Relation Age of Onset    Early death Father     Heart disease Father     Stroke Father     Kidney disease Father     Diabetes Paternal Grandmother     Cancer Paternal Grandmother     Cancer Mother     Raynaud syndrome Mother     Raynaud syndrome Sister     Polycystic ovary syndrome Daughter     Diabetes Maternal Grandmother     Heart disease Maternal Grandmother     Kidney disease Maternal Grandmother     Heart disease Maternal Grandfather     Cancer Paternal Grandfather     No Known Problems Daughter        Marital Status: Single  Alcohol History:  reports that she drinks alcohol.  Tobacco History:  reports that she has been smoking cigarettes.  She has been smoking about 1.00 pack per day. She has never used smokeless tobacco.  Drug History:  reports that she does not use drugs.    Review of patient's allergies indicates:  No Known Allergies    Current Outpatient Medications:     albuterol-ipratropium (DUO-NEB) 2.5 mg-0.5 mg/3 mL nebulizer solution, Take 3 mLs by nebulization every 6 (six) hours as needed for Wheezing or Shortness of Breath. Rescue - not to be used with inhaler, Disp: 1 Box, Rfl: 2    atorvastatin (LIPITOR) 40 MG tablet, TAKE 1 TABLET(40 MG) BY MOUTH EVERY DAY, Disp: 30 tablet, Rfl: 5    azelastine (ASTELIN) 137 mcg (0.1 %) nasal spray, 1 spray (137 mcg total) by Nasal route 2 (two) times daily., Disp: 30 mL, Rfl: 3    blood sugar diagnostic Strp, 1 each by Misc.(Non-Drug; Combo Route) route daily as needed., Disp: 100 each, Rfl: 0    blood-glucose meter, drum-type (ACCU-CHEK COMPACT PLUS CARE) Kit, 1 Device by Misc.(Non-Drug; Combo Route) route daily as needed., Disp: 1 kit, Rfl: 0    CETAPHIL MOISTURIZING cream, Apply 1 application topically as needed., Disp: 90 g, Rfl:  1    diazePAM (VALIUM) 5 MG tablet, TAKE 1 TO 2 TABLETS BY MOUTH EVERY NIGHT AT BEDTIME AS NEEDED FOR ANXIETY OR SLEEPLESSNESS, Disp: 60 tablet, Rfl: 0    diclofenac sodium (VOLTAREN) 1 % Gel, 2-4 gm bid-tid prn, Disp: 100 g, Rfl: 0    dicyclomine (BENTYL) 20 mg tablet, TK 1 T PO BID PRN. CONTINUE CURRENT DOSAGE, Disp: 60 tablet, Rfl: 2    ergocalciferol (ERGOCALCIFEROL) 50,000 unit Cap, TAKE 1 CAPSULE BY MOUTH EVERY 7 DAYS, Disp: 4 capsule, Rfl: 0    ezetimibe (ZETIA) 10 mg tablet, Take 10 mg by mouth once daily. , Disp: , Rfl:     ferrous sulfate (FEOSOL) 325 mg (65 mg iron) Tab tablet, Take 1 tablet (325 mg total) by mouth once daily., Disp: 90 tablet, Rfl: 1    fluticasone propionate (FLONASE) 50 mcg/actuation nasal spray, SHAKE LIQUID AND USE 1 SPRAY IN EACH NOSTRIL EVERY DAY, Disp: 9.9 mL, Rfl: 0    gabapentin (NEURONTIN) 100 MG capsule, TAKE 1 TO 2 CAPSULES BY MOUTH THREE TIMES DAILY. TAKE WITH 400MG DOSE, Disp: 180 capsule, Rfl: 0    gabapentin (NEURONTIN) 400 MG capsule, , Disp: , Rfl: 11    hydrocortisone 1 % cream, Apply to affected area 2 times daily., Disp: , Rfl:     ibuprofen (ADVIL,MOTRIN) 600 MG tablet, , Disp: , Rfl:     ketoconazole (NIZORAL) 2 % cream, , Disp: , Rfl:     lancets (ACCU-CHEK SOFTCLIX LANCETS) Misc, 1 Device by Misc.(Non-Drug; Combo Route) route daily as needed., Disp: 100 each, Rfl: 0    levocetirizine (XYZAL) 5 MG tablet, Take 1 tablet (5 mg total) by mouth every evening., Disp: 90 tablet, Rfl: 1    midodrine (PROAMATINE) 2.5 MG Tab, Take 1 tablet (2.5 mg total) by mouth 2 (two) times daily., Disp: 180 tablet, Rfl: 1    montelukast (SINGULAIR) 10 mg tablet, Take 1 tablet (10 mg total) by mouth once daily., Disp: 90 tablet, Rfl: 0    mupirocin (BACTROBAN) 2 % ointment, , Disp: , Rfl:     omeprazole (PRILOSEC) 40 MG capsule, Take 1 capsule (40 mg total) by mouth every morning., Disp: 30 capsule, Rfl: 5    ondansetron (ZOFRAN-ODT) 4 MG TbDL, DISSOLVE 2 TABLETS(8  "MG) ON THE TONGUE EVERY 8 HOURS AS NEEDED, Disp: 30 tablet, Rfl: 0    ranitidine (ZANTAC) 150 MG tablet, Take 2 tablets (300 mg total) by mouth nightly., Disp: 60 tablet, Rfl: 1    STIOLTO RESPIMAT 2.5-2.5 mcg/actuation Mist, INHALE 1 PUFF INTO THE LUNGS ONCE DAILY, Disp: 4 g, Rfl: 5    triamcinolone acetonide 0.1% (KENALOG) 0.1 % cream, MELY SPARINGLY TO ECZEMA BID, Disp: , Rfl: 3    urea 40 % Lotn lotion, Apply topically as needed. , Disp: , Rfl:     XIIDRA 5 % Dpet, , Disp: , Rfl:     Review of Systems   Constitutional: Negative for activity change and unexpected weight change.   HENT: Negative for hearing loss, rhinorrhea and trouble swallowing.    Eyes: Positive for visual disturbance. Negative for discharge.   Respiratory: Positive for shortness of breath (intermittent) and wheezing. Negative for chest tightness.    Cardiovascular: Positive for chest pain and palpitations (intermittent, associated with dizziness).   Gastrointestinal: Positive for constipation. Negative for blood in stool, diarrhea and vomiting.   Endocrine: Negative for polydipsia and polyuria.   Genitourinary: Positive for difficulty urinating (cant hold urine). Negative for dysuria, hematuria and menstrual problem.   Musculoskeletal: Positive for arthralgias (generalized), joint swelling (fingers-trigger finger right third ) and neck pain.   Neurological: Positive for tremors (awakens with tremors at times) and headaches. Negative for weakness.   Psychiatric/Behavioral: Positive for sleep disturbance (sleep paralysis). Negative for confusion and dysphoric mood.          Objective:      Vitals:    07/16/19 1645 07/16/19 1649   BP:  122/70   Pulse:  82   Resp: 12 12   Temp:  98.9 °F (37.2 °C)   SpO2:  96%   Weight: 70.3 kg (155 lb) 70.3 kg (155 lb)   Height: 5' 2" (1.575 m) 5' 2" (1.575 m)     Physical Exam   Constitutional: She is oriented to person, place, and time. She appears well-developed and well-nourished. She is cooperative. No " distress.   HENT:   Head: Normocephalic and atraumatic.   Right Ear: Tympanic membrane normal.   Left Ear: Tympanic membrane normal.   Nose: Nose normal.   Mouth/Throat: Uvula is midline and mucous membranes are normal.   Eyes: Pupils are equal, round, and reactive to light. Conjunctivae and EOM are normal. Right eye exhibits no discharge. Left eye exhibits no discharge. No scleral icterus. Right pupil is round and reactive. Left pupil is round and reactive.   Neck: Trachea normal and normal range of motion. Neck supple. No JVD present. Carotid bruit is not present. No thyromegaly present.   Cardiovascular: Normal rate, regular rhythm and intact distal pulses. Exam reveals no gallop and no friction rub.   No murmur heard.  Pulmonary/Chest: Effort normal. No respiratory distress. She has no wheezes. She has no rales.   Rhonchi bilaterally anterior and posterior   Abdominal: Soft. Bowel sounds are normal. She exhibits no distension and no mass. There is no tenderness. There is no guarding. No hernia.   Musculoskeletal: Normal range of motion. She exhibits no edema.   Neurological: She is alert and oriented to person, place, and time. She has normal strength.   Skin: Skin is warm and dry. No lesion and no rash noted. No cyanosis. Nails show no clubbing.   Psychiatric: She has a normal mood and affect. Her speech is normal and behavior is normal. Judgment and thought content normal.   Nursing note and vitals reviewed.        Assessment:       1. Pure hypercholesterolemia    2. Panlobular emphysema    3. GERD without esophagitis    4. Chronic insomnia    5. Chronic anxiety         Plan:       Pure hypercholesterolemia  -     Lipid panel; Future; Expected date: 01/16/2020  -     Hepatic function panel; Future; Expected date: 01/16/2020    Panlobular emphysema    GERD without esophagitis    Chronic insomnia    Chronic anxiety      No follow-ups on file.        7/16/2019 Mine Palmer M.D.

## 2019-07-18 DIAGNOSIS — R09.82 POST-NASAL DRIP: ICD-10-CM

## 2019-07-18 DIAGNOSIS — J40 CHRONIC SINUSITIS WITH RECURRENT BRONCHITIS: ICD-10-CM

## 2019-07-18 DIAGNOSIS — E55.9 VITAMIN D DEFICIENCY: ICD-10-CM

## 2019-07-18 DIAGNOSIS — J32.9 CHRONIC SINUSITIS WITH RECURRENT BRONCHITIS: ICD-10-CM

## 2019-07-18 DIAGNOSIS — R11.0 NAUSEA: ICD-10-CM

## 2019-07-18 RX ORDER — ONDANSETRON 4 MG/1
TABLET, ORALLY DISINTEGRATING ORAL
Qty: 30 TABLET | Refills: 0 | Status: SHIPPED | OUTPATIENT
Start: 2019-07-18 | End: 2019-08-18 | Stop reason: SDUPTHER

## 2019-07-18 RX ORDER — LANCETS
1 EACH MISCELLANEOUS DAILY PRN
Qty: 100 EACH | Refills: 0 | Status: SHIPPED | OUTPATIENT
Start: 2019-07-18 | End: 2020-04-20

## 2019-07-18 RX ORDER — MONTELUKAST SODIUM 10 MG/1
TABLET ORAL
Qty: 90 TABLET | Refills: 0 | Status: SHIPPED | OUTPATIENT
Start: 2019-07-18 | End: 2019-11-24 | Stop reason: SDUPTHER

## 2019-07-18 RX ORDER — FLUTICASONE PROPIONATE 50 MCG
SPRAY, SUSPENSION (ML) NASAL
Qty: 16 ML | Refills: 0 | Status: SHIPPED | OUTPATIENT
Start: 2019-07-18 | End: 2019-08-18 | Stop reason: SDUPTHER

## 2019-07-18 RX ORDER — ERGOCALCIFEROL 1.25 MG/1
CAPSULE ORAL
Qty: 4 CAPSULE | Refills: 0 | Status: SHIPPED | OUTPATIENT
Start: 2019-07-18 | End: 2019-08-18 | Stop reason: SDUPTHER

## 2019-07-19 ENCOUNTER — PATIENT MESSAGE (OUTPATIENT)
Dept: FAMILY MEDICINE | Facility: CLINIC | Age: 44
End: 2019-07-19

## 2019-07-19 DIAGNOSIS — J40 BRONCHITIS: ICD-10-CM

## 2019-07-19 RX ORDER — GABAPENTIN 400 MG/1
CAPSULE ORAL
Qty: 90 CAPSULE | Refills: 0 | Status: SHIPPED | OUTPATIENT
Start: 2019-07-19 | End: 2019-08-19 | Stop reason: SDUPTHER

## 2019-07-22 DIAGNOSIS — E78.00 PURE HYPERCHOLESTEROLEMIA: Primary | ICD-10-CM

## 2019-07-22 RX ORDER — ATORVASTATIN CALCIUM 80 MG/1
80 TABLET, FILM COATED ORAL DAILY
Qty: 90 TABLET | Refills: 3 | Status: SHIPPED | OUTPATIENT
Start: 2019-07-22 | End: 2020-08-12

## 2019-07-22 RX ORDER — IPRATROPIUM BROMIDE AND ALBUTEROL SULFATE 2.5; .5 MG/3ML; MG/3ML
SOLUTION RESPIRATORY (INHALATION)
Qty: 180 ML | Refills: 0 | Status: SHIPPED | OUTPATIENT
Start: 2019-07-22 | End: 2019-10-16 | Stop reason: SDUPTHER

## 2019-07-22 NOTE — TELEPHONE ENCOUNTER
INS WILL NOT COVER ATORVASTATIN 40 x 2 TABLES DAILY, ONLY COVERS 1 TAB DAILY. RE-PENDED 80MG BELOW, PLEASE SIGN & RESUBMIT, THX

## 2019-07-29 ENCOUNTER — OFFICE VISIT (OUTPATIENT)
Dept: ALLERGY | Facility: CLINIC | Age: 44
End: 2019-07-29
Payer: MEDICAID

## 2019-07-29 VITALS
WEIGHT: 155 LBS | SYSTOLIC BLOOD PRESSURE: 138 MMHG | DIASTOLIC BLOOD PRESSURE: 80 MMHG | HEIGHT: 62 IN | BODY MASS INDEX: 28.52 KG/M2

## 2019-07-29 DIAGNOSIS — Z72.0 TOBACCO ABUSE: ICD-10-CM

## 2019-07-29 DIAGNOSIS — J98.4 SMALL AIRWAYS DISEASE: Primary | ICD-10-CM

## 2019-07-29 DIAGNOSIS — K21.9 LARYNGOPHARYNGEAL REFLUX (LPR): ICD-10-CM

## 2019-07-29 DIAGNOSIS — J32.9 RECURRENT SINUSITIS: ICD-10-CM

## 2019-07-29 PROCEDURE — 99214 PR OFFICE/OUTPT VISIT, EST, LEVL IV, 30-39 MIN: ICD-10-PCS | Mod: S$GLB,,, | Performed by: ALLERGY & IMMUNOLOGY

## 2019-07-29 PROCEDURE — 3023F SPIROM DOC REV: CPT | Mod: S$GLB,,, | Performed by: ALLERGY & IMMUNOLOGY

## 2019-07-29 PROCEDURE — 99214 OFFICE O/P EST MOD 30 MIN: CPT | Mod: S$GLB,,, | Performed by: ALLERGY & IMMUNOLOGY

## 2019-07-29 PROCEDURE — 3023F PR SPIROMETRY RESULTS DOCUMENTED AND REVIEWED: ICD-10-PCS | Mod: S$GLB,,, | Performed by: ALLERGY & IMMUNOLOGY

## 2019-07-29 RX ORDER — BUDESONIDE AND FORMOTEROL FUMARATE DIHYDRATE 160; 4.5 UG/1; UG/1
2 AEROSOL RESPIRATORY (INHALATION) EVERY 12 HOURS
Qty: 1 INHALER | Refills: 3 | Status: SHIPPED | OUTPATIENT
Start: 2019-07-29 | End: 2020-07-28

## 2019-07-29 NOTE — PROGRESS NOTES
Subjective:       Patient ID: Promise Medrano is a 44 y.o. female.    Chief Complaint: Immune Eval (Review labs, doing better, but still has a lot of sinus drainage)    HPI     Pt presents allergy/immunology eval    Allergic Rhinitis- dust mite only   Condition: improved   Onset: childhood  Sx: pnd, rhinorrhea, congestion, ears crackle.   Season: perennial   Trigger: uncertain   Tx: saline, azelastine- 1 sen qday or more prn, fluticasone, montelukast    ait in the past: yes  Recent testing: serum IgE dust mite only.   ct sinus: 6/2019  Essentially clear paranasal sinuses without significant mucoperiosteal sinus  disease.    Recurrent bronchitis  Condition: improved   Onset: 2018  3-4 episodes last winter  Has a pmh of lupus autoimmunity, complement levels normal.   April 2019 was dc from [facility] for pna and or bronchitis  She has a history of current smoker   Has nebulizer for albuterol  Not required nebulizer.   Sx: cough   Sx frequ: > 2 days per week    Chest ct 8/2018 shows bronchioloitis and mediastinal lymph nodes and axillary lymph nodes presumed reactive.   Pft: large airway no obstruction and partial response to bronchodilator, small airway reversed by 31%.     Immune evaluation:   Complement, Total (CH50) 56  >41 U/mL  Complement C2                 3.3  C4 Complement 14 - 44 mg/dL 16      IgA, Serum                    281                         IgM 26 - 217 mg/dL 52      IgG, Total Serum             1259                     700-1600  mg/dL       IgG Subclass 1                710                      248-810  mg/dL       IgG Subclass 2                189                      130-555  mg/dL       IgG Subclass 3                 70                         mg/dL       IgG Subclass 4                 61                         2-96  mg/dL                                           Strep Pneumoniae Type 1  0.5 L >1.3 ug/mL  Strep Pneumoniae Type 3 0.6 L >1.3 ug/mL  Strep Pneumoniae Type 4 0.1 L >1.3 ug/mL  Strep  Pneumoniae Type 8 1.8  >1.3 ug/mL  Strep Pneumoniae Type 9 0.5 L >1.3 ug/mL  Strep Pneumoniae Type 12 <0.1 L >1.3 ug/mL  Strep Pneumoniae Type 14 1.2 L >1.3 ug/mL  Strep Pneumoniae Type 19 0.7 L >1.3 ug/mL  Strep Pneumoniae Type 23 0.3 L >1.3 ug/mL  Strep Pneumoniae Type 26 4.3  >1.3 ug/mL  Strep Pneumoniae Type 51 0.3 L >1.3 ug/mL  Strep Pneumoniae Type 56 0.5 L >1.3 ug/mL  Strep Pneumoniae Type 57 1.5  >1.3 ug/mL  Strep Pneumoniae Type 68 <0.1 L >1.3 ug/mL    3/14 = 21%    Hematocrit                   46.0                    34.0-46.6  %           WBC                           5.7                     3.4-10.8  x10E3/uL    RBC                          4.83                    3.77-5.28  x10E6/uL    Hemoglobin                   14.7                    11.1-15.9  g/dL        Basos                           0                   Not Estab.  %           Neurtrophils                   60                   Not Estab.  %           Neurtrophils (Absolute) 3.4  1.4-7.0 x10E3/uL  Lymphs (Absolute)             1.9                      0.7-3.1  x10E3/uL    MCV                            95                        79-97  fL          MCHC                         32.0                    31.5-35.7  g/dL        MCH                          30.4                    26.6-33.0  pg          Lymphs                         34                   Not Estab.  %           Immature Granulocytes            0                   Not Estab.  %           Eos                             0                   Not Estab.  %           Monocytes                       6                   Not Estab.  %           Platelets                     185                      150-450  x10E3/uL    Eos (Absolute)                0.0                      0.0-0.4  x10E3/uL    BASO (Absolute)               0.0                      0.0-0.2  x10E3/uL    Monocytes (Absolute)          0.4                      0.1-0.9  x10E3/uL    % CD19+ Lymphs                9.3                      "3.3-25.4  %           Abs. CD19+ Lymphs             177                         /uL         Absolute CD4 Wilbur           716                     359-1519  /uL         Absolute CD3                 1634                     622-2402  /uL         % CD 4 Pos. Lymph            37.7                    30.8-58.5  %           CD4/CD8 Ratio                0.78 L                  0.92-3.72              % CD3 Pos. Lymph             86.0                    57.5-86.2  %           Abs. CD8 Suppressor           923 H                    109-897  /uL         % CD8 Pos. Lymph             48.6 H                  12.0-35.5  %           RDW                          14.5                    12.3-15.4  %           % NK (CD56/16)                4.1                     1.4-19.4  %           Ab NK (CD56/16)                78       Lpr:   Ranitidine rx at last visit.   Condition: stable , not better.   She didn't start it.   "horrible acid reflux"  "gut issues" not diagnosed.   "why I got off plaquenil" "tore up my gut."   On prilosec daily.     Atopic Hx    Rhinitis see above   Oral allergy: denies  Food allergy: none    Asthma see above for bronchitis   Latex tolerates   Eczema: denies, but may have a psoriasis.    Urticaria denies chronic, has doxepin qhs     Infection History    Pneumonia # in the past 12 months: bronchitis as above   Sinus infection # in the past 12 months: reports feels like sinus infections.   Otitis infection # in the past 12 months:  Denies chronic infection, but notes chronic fluid in the ears.     Dust mite only    IgE Cladosporium herbarum <0.10  Class 0 kU/L  IgE Dog Dander              <0.10                      Class 0  kU/L        IgE Cat Epithelium          <0.10                      Class 0  kU/L        IgE Ragweed, Short          <0.10                      Class 0  kU/L        IgE D. pteronyssinus         0.13 AB                 Class 0/I  kU/L        IgE A. fumigatus            <0.10                   "    Class 0  kU/L        IgE Alteraria alternata <0.10  Class 0 kU/L  IgE Elm, American           <0.10                      Class 0  kU/L        IgE Bermuda Grass           <0.10                      Class 0  kU/L        IgE Trenton, White              <0.10                      Class 0  kU/L        IgE Pigweed, Common         <0.10                      Class 0  kU/L        IgE Cockroach, English        <0.10                      Class 0  kU/L        IgE Thistle Russian         <0.10                      Class 0  kU/L        IgE D. farinae              <0.10                      Class 0  kU/L        IgE Cockroach American <0.10  Class 0 kU/L   This test was developed and its performance      characteristics determined by [facility].  It      has not been cleared or approved by the U.S.      Food and Drug Administration.      The FDA has determined that such clearance      or approval is not necessary. This test is      used for clinical purposes.  It should not      be regarded as investigational or for                             research.                                           IgE Maple/Box Elder         <0.10                      Class 0  kU/L        IgE Penicillium chrysogen <0.10  Class 0 kU/L  IgE Englewood              <0.10                      Class 0  kU/L        IgE Gabe Grass           <0.10                      Class 0  kU/L        IgE Arik Grass           <0.10                      Class 0  kU/L        IgE Bahia Grass             <0.10                      Class 0  kU/L        IgE Sheep Estelle           <0.10                      Class 0  kU/L        IgE Plantain, English        <0.10                      Class 0  kU/L        IgE Mugwort                 <0.10                      Class 0  kU/L        IgE Pecan Cannon           <0.10                      Class 0  kU/L        IgE Candida albicans        <0.10                      Class 0  kU/L        IgE Setomelanomma rostrat <0.10  Class 0 kU/L  IgE  White Myrtlewood          <0.10                      Class 0  kU/L        IgE Epicoccum purpur        <0.10                      Class 0  kU/L        IgE Fusarium proliferatum <0.10  Class 0 kU/L  IgE Trichophyton rubrum <0.10  Class 0 kU/L  IgE Rough Marshelder        <0.10                      Class 0  kU/L        IgE Mouse Urine             <0.10                      Class 0  kU/L        IgE Silver Birch            <0.10                      Class 0  kU/L        IgE Maple Whiting/[place] <0.10  Class 0 kU/L  IgE Bipolaris               <0.10                      Class 0  kU/L         This test was developed and its performance      characteristics determined by [facility].  It      has not been cleared or approved by the U.[place].      Food and Drug Administration.      The FDA has determined that such clearance      or approval is not necessary. This test is      used for clinical purposes.  It should not      be regarded as investigational or for                             research.                                           IgE Nishant, White              <0.10                      Class 0  kU/L        IgE Privet, Common          <0.10                      Class 0  kU/L        IgE Ragweed, Giant          <0.10                      Class 0  kU/L        IgE Nettle                  <0.10                      Class 0  kU/L        IgE Cocklebur               <0.10                      Class 0  kU/L        IgE Dockweed, Yellow        <0.10                      Class 0  kU/L         This test was developed and its performance      characteristics determined by [facility].  It      has not been cleared or approved by the U.S.      Food and Drug Administration.      The FDA has determined that such clearance      or approval is not necessary. This test is      used for clinical purposes.  It should not      be regarded as investigational or for                             research.                                           IgE Hackberry                <0.10                      Class 0  kU/L         This test was developed and its performance      characteristics determined by [facility].  It      has not been cleared or approved by the U.S.      Food and Drug Administration.      The FDA has determined that such clearance      or approval is not necessary. This test is      used for clinical purposes.  It should not      be regarded as investigational or for                             research.                                           IgE Sweet Gum               <0.10                      Class 0  kU/L         This test was developed and its performance      characteristics determined by [facility].  It      has not been cleared or approved by the U.S.      Food and Drug Administration.      The FDA has determined that such clearance      or approval is not necessary. This test is      used for clinical purposes.  It should not      be regarded as investigational or for                             research.                                           IgE Wormwood                <0.10                      Class 0  kU/L        IgE Fennel, Dog             <0.10                      Class 0  kU/L         This test was developed and its performance      characteristics determined by [facility].  It      has not been cleared or approved by the U.[place].      Food and Drug Administration.      The FDA has determined that such clearance      or approval is not necessary. This test is      used for clinical purposes.  It should not      be regarded as investigational or for                             research.                                                                                                                        Performed at: , [facility] [address], [id number]      Jc Mckeon MD, Phone:  [phone]     IgE Mouse Epithelium        <0.10                      Class 0  kU/L        IgE Ingleside Bald            <0.10      "      Review of Systems      General: neg unexpected weight changes, fevers, chills, night sweats, malaise  HEENT: see hpi, Neg eye pain, vision changes, ear drainage, nose bleeds, throat tightness, sores in the mouth  CV: Neg chest pain, palpitations, swelling  Resp: see hpi, neg shortness of breath, hemoptysis  GI: see hpi, neg dysphagia, night abdominal pain, reflux, chronic diarrhea, chronic constipation  Derm: See Hpi, neg new rash, neg flushing  Mu/sk: Neg joint pain, joint swelling   Psych: Neg anxiety  neuro: neg chronic headaches, muscle weakness  Endo: neg heat/cold intolerance, chronic fatigue    Objective:     Vitals:    07/29/19 1306   BP: 138/80   Weight: 70.3 kg (155 lb)   Height: 5' 2" (1.575 m)   PF: 320 L/min        Physical Exam      General: no acute distress, well developed well nourished   HEENT:   Head:normocephalic atraumatic  Eyes: BENITEZ, EOMI, Neg injection, scleral icterus, or conjunctival papillary hypertrophy.  Ears: tm clear bilaterally, normal canal  Nose:2-3+ inferior turbinates pink, neg nasal polyps            Mucosa: mild dryness             Septal irritation: none   OP: mucus membranes moist, - cobblestoning, - PND, neg erythema or lesions, + tongue discoloration likely from tobacco, rust color/yellow.   Neck: supple, Full range of motion, neg lymphadenopathy  Chest: full respiratory excursion no abnormal chest abnormality  Resp: clear to ascultation bilaterally  CV: RRR, neg MRG, brisk capillary refill  Abdomen: BS+, non tender, non distended.   Ext:  Neg clubbing, cyanosis, pitting edema  Skin: Neg rashes or lesions  Lymph: neg supraclavicular, axillary     Assessment:       1. Small airways disease    2. Recurrent sinusitis    3. Tobacco abuse    4. Laryngopharyngeal reflux (LPR)        Plan:       Small airways disease  -     budesonide-formoterol 160-4.5 mcg (SYMBICORT) 160-4.5 mcg/actuation HFAA; Inhale 2 puffs into the lungs every 12 (twelve) hours. Controller  Dispense: 1 " Inhaler; Refill: 3  -     inhalat.spacing dev,med. mask (AEROCHAMBER PLUS Z STAT MD LOPEZ) Spcr; 1 Device by Misc.(Non-Drug; Combo Route) route 2 (two) times daily.  Dispense: 1 each; Refill: 3    Recurrent sinusitis    Tobacco abuse    Laryngopharyngeal reflux (LPR)      Chronic rhinitis: dust mite only   Serum IgE- not stable enough for skin prick testing. Dust mite only.   saline and azelastine    Continue fluticasone   montelukast 1 pill po qday     Recurrent bronchitis and sinusitis  Immune eval: personally reviewed   Low strep pneumo titers  Recommend ppv 23 then repeat titers in 4 weeks.   [facility] labs - imaging center   Spirometry - complete PFT  -personally reviewed   Small airway reversibility by 31%   Referral to pulm for smoker with recurrent bronchitis sent.   Doesn't feel she needs rescue inhaler but has sx > 2 days per week.  Start controller inhaler. symbicort 160 2 puffs bid.   Continue stialto.     LPR:  Restart zantac 300 mg qhs, prn.      F/u 8 weeks, sooner if needed.            Katiana Cline M.D.  Allergy/Immunology  [facility]   [phone] phone  [phone] fax

## 2019-07-29 NOTE — PATIENT INSTRUCTIONS
Immune: pneumovax -23 and then repeat titers 4 weeks later.     Nose:  Continue saline and two nasal sprays     Continue montelukast 10 mg 1 pill once per day     Lung:  symbicort 160 2 puffs twice per day     Use ventolin 2-4 puffs as needed for cough, wheeze, shortness of breath.     Make appt with pulmonology.     Stomach:  Ranitidine 300 mg (2 pills) at night if needed.

## 2019-07-30 ENCOUNTER — OFFICE VISIT (OUTPATIENT)
Dept: RHEUMATOLOGY | Facility: CLINIC | Age: 44
End: 2019-07-30
Payer: MEDICAID

## 2019-07-30 VITALS — SYSTOLIC BLOOD PRESSURE: 113 MMHG | BODY MASS INDEX: 28.5 KG/M2 | DIASTOLIC BLOOD PRESSURE: 71 MMHG | WEIGHT: 155.81 LBS

## 2019-07-30 DIAGNOSIS — M35.9 CONNECTIVE TISSUE DISEASE, UNDIFFERENTIATED: Primary | ICD-10-CM

## 2019-07-30 PROCEDURE — 99213 OFFICE O/P EST LOW 20 MIN: CPT | Mod: S$GLB,,, | Performed by: INTERNAL MEDICINE

## 2019-07-30 PROCEDURE — 99213 PR OFFICE/OUTPT VISIT, EST, LEVL III, 20-29 MIN: ICD-10-PCS | Mod: S$GLB,,, | Performed by: INTERNAL MEDICINE

## 2019-07-30 NOTE — PROGRESS NOTES
[facility] RHEUMATOLOGY            PROGRESS NOTE      Subjective:       Patient ID:   NAME: Promise Medrano : 1975     44 y.o. female    Referring Doc: No ref. provider found  Other Physicians:    Chief Complaint:  Connective tissue disease, undifferentiated      History of Present Illness:     Patient returns today for a regularly scheduled follow-up visit for UCTD       The patient still smokes.  Has lost #40 since Ivana,smaller portions.  No rashes.  Occ pruritus.No sicca.No mucosal ulcerations.  Will see pulmonologist        ROS:   GEN:  No  fever, night sweats . weight is stable   + fatigue  SKIN: no rashes, no bruising, no ulcerations, no Raynaud's  HEENT: no HA's, No visual changes, no mucosal ulcers, no sicca symptoms,  CV:   no CP, SOB, PND, [place], no orthopnea, no palpitations  PULM: normal with + occ SOB,+ cough,No  hemoptysis, sputum or pleuritic pain  GI:  no abdominal pain, nausea, vomiting, constipation, diarrhea, melanotic stools, BRBPR, hematemesis, no dysphagia  :   no dysuria  NEURO: no paresthesias, headaches, visual disturbances, muscle weakness  MUSCULOSKELETAL:no joint swelling, prolonged AM stiffness, no back pain, no muscle pain  Allergies:  Review of patient's allergies indicates:  No Known Allergies    Medications:    Current Outpatient Medications:     albuterol-ipratropium (DUO-NEB) 2.5 mg-0.5 mg/3 mL nebulizer solution, USE 1 VIAL IN NEBULIZER EVERY 6 HOURS AS NEEDED FOR WHEEZING OR SHORTNESS OF BREATH, RESUCE, DO NOT USE WITH INHALER, Disp: 180 mL, Rfl: 0    atorvastatin (LIPITOR) 80 MG tablet, Take 1 tablet (80 mg total) by mouth once daily., Disp: 90 tablet, Rfl: 3    azelastine (ASTELIN) 137 mcg (0.1 %) nasal spray, 1 spray (137 mcg total) by Nasal route 2 (two) times daily., Disp: 30 mL, Rfl: 3    blood sugar diagnostic Strp, 1 each by Misc.(Non-Drug; Combo Route) route daily as needed. TRUE METRIX BS TEST STRIPS.Z86.39, Disp: 100 each, Rfl: 3    blood-glucose  meter, drum-type (ACCU-CHEK COMPACT PLUS CARE) Kit, 1 Device by Misc.(Non-Drug; Combo Route) route daily as needed., Disp: 1 kit, Rfl: 0    budesonide-formoterol 160-4.5 mcg (SYMBICORT) 160-4.5 mcg/actuation HFAA, Inhale 2 puffs into the lungs every 12 (twelve) hours. Controller, Disp: 1 Inhaler, Rfl: 3    CETAPHIL MOISTURIZING cream, Apply 1 application topically as needed., Disp: 90 g, Rfl: 1    diazePAM (VALIUM) 5 MG tablet, TAKE 1 TO 2 TABLETS BY MOUTH EVERY NIGHT AT BEDTIME AS NEEDED FOR ANXIETY OR SLEEPLESSNESS, Disp: 60 tablet, Rfl: 0    diclofenac sodium (VOLTAREN) 1 % Gel, 2-4 gm bid-tid prn, Disp: 100 g, Rfl: 0    dicyclomine (BENTYL) 20 mg tablet, TK 1 T PO BID PRN. CONTINUE CURRENT DOSAGE, Disp: 60 tablet, Rfl: 2    ergocalciferol (ERGOCALCIFEROL) 50,000 unit Cap, TAKE 1 CAPSULE BY MOUTH EVERY 7 DAYS, Disp: 4 capsule, Rfl: 0    ezetimibe (ZETIA) 10 mg tablet, Take 10 mg by mouth once daily. , Disp: , Rfl:     ferrous sulfate (FEOSOL) 325 mg (65 mg iron) Tab tablet, Take 1 tablet (325 mg total) by mouth once daily., Disp: 90 tablet, Rfl: 1    fluticasone propionate (FLONASE) 50 mcg/actuation nasal spray, SHAKE LIQUID AND USE 1 SPRAY IN EACH NOSTRIL EVERY DAY, Disp: 16 mL, Rfl: 0    gabapentin (NEURONTIN) 100 MG capsule, TAKE 1 TO 2 CAPSULES BY MOUTH THREE TIMES DAILY. TAKE WITH 400MG DOSE, Disp: 180 capsule, Rfl: 0    gabapentin (NEURONTIN) 400 MG capsule, , Disp: , Rfl: 11    gabapentin (NEURONTIN) 400 MG capsule, TAKE 1 CAPSULE(400 MG) BY MOUTH THREE TIMES DAILY, Disp: 90 capsule, Rfl: 0    hydrocortisone 1 % cream, Apply to affected area 2 times daily., Disp: , Rfl:     ibuprofen (ADVIL,MOTRIN) 600 MG tablet, , Disp: , Rfl:     inhalat.spacing dev,med. mask (AEROCHAMBER PLUS Z STAT MD MSK) Spcr, 1 Device by Misc.(Non-Drug; Combo Route) route 2 (two) times daily., Disp: 1 each, Rfl: 3    ketoconazole (NIZORAL) 2 % cream, , Disp: , Rfl:     lancets (ACCU-CHEK SOFTCLIX LANCETS) Misc, 1  Device by Misc.(Non-Drug; Combo Route) route daily as needed. TRUE METRIX OR GENERIC COVERED BY INS.Z86.39, Disp: 100 each, Rfl: 0    levocetirizine (XYZAL) 5 MG tablet, Take 1 tablet (5 mg total) by mouth every evening., Disp: 90 tablet, Rfl: 1    midodrine (PROAMATINE) 5 MG Tab, Take 1 tablet (5 mg total) by mouth 2 (two) times daily., Disp: 180 tablet, Rfl: 0    montelukast (SINGULAIR) 10 mg tablet, TAKE 1 TABLET(10 MG) BY MOUTH EVERY DAY, Disp: 90 tablet, Rfl: 0    mupirocin (BACTROBAN) 2 % ointment, , Disp: , Rfl:     omeprazole (PRILOSEC) 40 MG capsule, Take 1 capsule (40 mg total) by mouth every morning., Disp: 30 capsule, Rfl: 5    ondansetron (ZOFRAN-ODT) 4 MG TbDL, DISSOLVE 2 TABLETS(8 MG) ON THE TONGUE EVERY 8 HOURS AS NEEDED, Disp: 30 tablet, Rfl: 0    ondansetron (ZOFRAN-ODT) 4 MG TbDL, DISSOLVE 2 TABLETS(8 MG) ON THE TONGUE EVERY 8 HOURS AS NEEDED, Disp: 30 tablet, Rfl: 0    ranitidine (ZANTAC) 150 MG tablet, Take 2 tablets (300 mg total) by mouth nightly., Disp: 60 tablet, Rfl: 1    STIOLTO RESPIMAT 2.5-2.5 mcg/actuation Mist, INHALE 1 PUFF INTO THE LUNGS ONCE DAILY, Disp: 4 g, Rfl: 5    triamcinolone acetonide 0.1% (KENALOG) 0.1 % cream, MELY SPARINGLY TO ECZEMA BID, Disp: , Rfl: 3    urea 40 % Lotn lotion, Apply topically as needed. , Disp: , Rfl:     XIIDRA 5 % Dpet, , Disp: , Rfl:     PMHx/PSHx Updates:        Objective:     Vitals:  Blood pressure 113/71, weight 70.7 kg (155 lb 12.8 oz).    Physical Examination:   GEN: no apparent distress, comfortable; AAOx3  SKIN: no rashes,no ulceration, no Raynaud's, no petechiae, no SQ nodules,  HEAD: normal  EYES: no pallor, no icterus,  NECK: no masses, thyroid normal, trachea midline, no LAD/LN's, supple  CV: RRR with no murmur; l S1 and S2 reg. ,no gallop no rubs,   CHEST: Normal respiratory effort; CTAB; normal breath sounds; no wheeze or crackles  MUSC/Skeletal: ROM normal; no crepitus; joints without synovitis,  no deformities  No joint  swelling or tenderness of PIP, MCP, wrist, elbow, shoulder, or knee joints.Widespread trigger points  EXTREM: no clubbing, cyanosis, no edema,normal  pulses   NEURO: grossly intact; motor WNL; AAOx3;  PSYCH: normal mood, affect and behavior  LYMPH: normal cervical, supraclavicular          Labs:   Lab Results   Component Value Date    WBC 7.0 08/28/2018    HGB 14.4 08/28/2018    HCT 42.1 08/28/2018    MCV 94.2 08/28/2018     08/28/2018    CMP  @[facility](NA,K,CL,CO2,GLU,BUN,Creatinine,Calcium,PROT,Albumin,Bilitot,Alkphos,AST,ALT,CRP,ESR,RF,CCP,PRASHANTH,SSA,CPK,uric acid) )@  I have reviewed all available lab results and radiology reports.    Radiology/Diagnostic Studies:    CBC,CMP from this month normal    Assessment/Plan:   (1) 44 y.o. female with diagnosis of UCTD.( low titer PRASHANTH but + anti Corbin and + RNP)Did not restart Plaquenil.Stable  PLAN: UA  2)Smoker.Did not take Chantix  3) Fibromyalgia  Will try to quit soon              Discussion:     I have explained all of the above in detail and the patient understands all of the current recommendation(s). I have answered all questions to the best of my ability and to their complete satisfaction.       The patient is to continue with the current management plan         RTC in   4 months      Electronically signed by Jonnathan James MD

## 2019-08-18 DIAGNOSIS — E61.1 IRON DEFICIENCY: ICD-10-CM

## 2019-08-18 DIAGNOSIS — K21.9 LARYNGOPHARYNGEAL REFLUX (LPR): ICD-10-CM

## 2019-08-18 DIAGNOSIS — E55.9 VITAMIN D DEFICIENCY: ICD-10-CM

## 2019-08-18 DIAGNOSIS — G62.9 PERIPHERAL POLYNEUROPATHY: ICD-10-CM

## 2019-08-18 DIAGNOSIS — K58.1 IRRITABLE BOWEL SYNDROME WITH CONSTIPATION: ICD-10-CM

## 2019-08-18 DIAGNOSIS — R11.0 NAUSEA: ICD-10-CM

## 2019-08-18 DIAGNOSIS — R09.82 POST-NASAL DRIP: ICD-10-CM

## 2019-08-18 RX ORDER — ERGOCALCIFEROL 1.25 MG/1
CAPSULE ORAL
Qty: 4 CAPSULE | Refills: 0 | Status: SHIPPED | OUTPATIENT
Start: 2019-08-18 | End: 2019-10-16 | Stop reason: SDUPTHER

## 2019-08-18 RX ORDER — ONDANSETRON 4 MG/1
TABLET, ORALLY DISINTEGRATING ORAL
Qty: 30 TABLET | Refills: 0 | Status: SHIPPED | OUTPATIENT
Start: 2019-08-18 | End: 2019-09-19 | Stop reason: SDUPTHER

## 2019-08-18 RX ORDER — FLUTICASONE PROPIONATE 50 MCG
SPRAY, SUSPENSION (ML) NASAL
Qty: 16 ML | Refills: 0 | Status: SHIPPED | OUTPATIENT
Start: 2019-08-18 | End: 2019-09-19 | Stop reason: SDUPTHER

## 2019-08-18 RX ORDER — DICYCLOMINE HYDROCHLORIDE 20 MG/1
TABLET ORAL
Qty: 60 TABLET | Refills: 0 | Status: SHIPPED | OUTPATIENT
Start: 2019-08-18 | End: 2019-09-19 | Stop reason: SDUPTHER

## 2019-08-18 RX ORDER — FERROUS SULFATE 325(65) MG
TABLET ORAL
Qty: 90 TABLET | Refills: 0 | Status: SHIPPED | OUTPATIENT
Start: 2019-08-18 | End: 2019-11-24 | Stop reason: SDUPTHER

## 2019-08-19 RX ORDER — GABAPENTIN 100 MG/1
CAPSULE ORAL
Qty: 180 CAPSULE | Refills: 0 | Status: SHIPPED | OUTPATIENT
Start: 2019-08-19 | End: 2019-09-19 | Stop reason: SDUPTHER

## 2019-08-19 RX ORDER — GABAPENTIN 400 MG/1
CAPSULE ORAL
Qty: 90 CAPSULE | Refills: 0 | Status: SHIPPED | OUTPATIENT
Start: 2019-08-19 | End: 2019-09-19 | Stop reason: SDUPTHER

## 2019-09-19 DIAGNOSIS — R09.82 POST-NASAL DRIP: ICD-10-CM

## 2019-09-19 DIAGNOSIS — K21.9 LARYNGOPHARYNGEAL REFLUX (LPR): ICD-10-CM

## 2019-09-19 DIAGNOSIS — G62.9 PERIPHERAL POLYNEUROPATHY: ICD-10-CM

## 2019-09-19 DIAGNOSIS — R11.0 NAUSEA: ICD-10-CM

## 2019-09-19 DIAGNOSIS — E55.9 VITAMIN D DEFICIENCY: ICD-10-CM

## 2019-09-19 DIAGNOSIS — K58.1 IRRITABLE BOWEL SYNDROME WITH CONSTIPATION: ICD-10-CM

## 2019-09-19 RX ORDER — GABAPENTIN 100 MG/1
CAPSULE ORAL
Qty: 180 CAPSULE | Refills: 0 | Status: SHIPPED | OUTPATIENT
Start: 2019-09-19 | End: 2019-10-24 | Stop reason: SDUPTHER

## 2019-09-19 RX ORDER — GABAPENTIN 400 MG/1
CAPSULE ORAL
Qty: 90 CAPSULE | Refills: 0 | Status: SHIPPED | OUTPATIENT
Start: 2019-09-19 | End: 2019-10-24 | Stop reason: SDUPTHER

## 2019-09-21 RX ORDER — ONDANSETRON 4 MG/1
TABLET, ORALLY DISINTEGRATING ORAL
Qty: 30 TABLET | Refills: 0 | Status: SHIPPED | OUTPATIENT
Start: 2019-09-21 | End: 2019-10-24 | Stop reason: SDUPTHER

## 2019-09-21 RX ORDER — DICYCLOMINE HYDROCHLORIDE 20 MG/1
TABLET ORAL
Qty: 60 TABLET | Refills: 0 | Status: SHIPPED | OUTPATIENT
Start: 2019-09-21 | End: 2019-10-24 | Stop reason: SDUPTHER

## 2019-09-21 RX ORDER — FLUTICASONE PROPIONATE 50 MCG
SPRAY, SUSPENSION (ML) NASAL
Qty: 16 ML | Refills: 0 | Status: SHIPPED | OUTPATIENT
Start: 2019-09-21 | End: 2019-10-24 | Stop reason: SDUPTHER

## 2019-09-21 RX ORDER — OMEPRAZOLE 40 MG/1
CAPSULE, DELAYED RELEASE ORAL
Qty: 30 CAPSULE | Refills: 0 | Status: SHIPPED | OUTPATIENT
Start: 2019-09-21 | End: 2019-10-16 | Stop reason: SDUPTHER

## 2019-09-21 RX ORDER — ERGOCALCIFEROL 1.25 MG/1
CAPSULE ORAL
Qty: 4 CAPSULE | Refills: 0 | OUTPATIENT
Start: 2019-09-21

## 2019-09-23 ENCOUNTER — APPOINTMENT (OUTPATIENT)
Dept: LAB | Facility: HOSPITAL | Age: 44
End: 2019-09-23
Attending: INTERNAL MEDICINE
Payer: MEDICAID

## 2019-09-23 ENCOUNTER — OFFICE VISIT (OUTPATIENT)
Dept: ALLERGY | Facility: CLINIC | Age: 44
End: 2019-09-23
Payer: MEDICAID

## 2019-09-23 VITALS
SYSTOLIC BLOOD PRESSURE: 122 MMHG | HEIGHT: 62 IN | WEIGHT: 155 LBS | DIASTOLIC BLOOD PRESSURE: 68 MMHG | BODY MASS INDEX: 28.52 KG/M2

## 2019-09-23 DIAGNOSIS — J98.4 SMALL AIRWAYS DISEASE: ICD-10-CM

## 2019-09-23 DIAGNOSIS — A49.9 RECURRENT BACTERIAL INFECTION: Primary | ICD-10-CM

## 2019-09-23 DIAGNOSIS — M35.9 DIFFUSE DISEASE OF CONNECTIVE TISSUE: Primary | ICD-10-CM

## 2019-09-23 DIAGNOSIS — J30.89 ALLERGIC RHINITIS DUE TO DUST: ICD-10-CM

## 2019-09-23 DIAGNOSIS — Z72.0 TOBACCO ABUSE: ICD-10-CM

## 2019-09-23 PROCEDURE — 99214 PR OFFICE/OUTPT VISIT, EST, LEVL IV, 30-39 MIN: ICD-10-PCS | Mod: S$GLB,,, | Performed by: ALLERGY & IMMUNOLOGY

## 2019-09-23 PROCEDURE — 99214 OFFICE O/P EST MOD 30 MIN: CPT | Mod: S$GLB,,, | Performed by: ALLERGY & IMMUNOLOGY

## 2019-09-23 RX ORDER — PREDNISONE 20 MG/1
20 TABLET ORAL DAILY
Qty: 4 TABLET | Refills: 0 | Status: SHIPPED | OUTPATIENT
Start: 2019-09-23 | End: 2019-09-27

## 2019-09-23 NOTE — PATIENT INSTRUCTIONS
Nose:  Continue saline twice per day   Continue rhinocort 1 spray per nare or fluticasone 1 spray per nare twice per day, with increased symptoms, may double dose for 2 weeks.     Lung:  Continue symbicort 160 2 puffs twice per day continue stialto 1 puff daily .  Continue montelukast 1 pill daily     Continue rescue  Medication as needed for cough wheeze shortness of breath   For cough right now, may schedule every 6 hours for the next day to help with cough and shortness of breath     Prednisone x 4 days 1 pill once per day take with food in the morning.       Get flu vaccine and pneumovax-23 vaccine and then 4-6 weeks later need repeat blood work

## 2019-09-23 NOTE — PROGRESS NOTES
"Subjective:       Patient ID: Promise Medrano is a 44 y.o. female.    Chief Complaint: Immune Eval (has PPV-23 in March 2018 - recommended she have this over the summer, did not repeat it.  Still feels like she is getting a sinus infection frequently.  )    HPI     Pt presents for allergic rhinitis and recurrent bronchitis.     Allergic Rhinitis- dust mite only   Condition: exacerbated    Onset: childhood  Sx: pnd, rhinorrhea, congestion, ears crackle, frontal sinus pain and pressure.   Season: perennial   Trigger: uncertain   Tx: saline, azelastine- 1 sen qday or more prn, fluticasone, montelukast    ait in the past: yes  Recent testing: serum IgE dust mite only.   ct sinus: 6/2019  Essentially clear paranasal sinuses without significant mucoperiosteal sinus  disease.    Recurrent bronchitis  Condition: exacerbated   Onset: 2018  3-4 episodes last winter  Has a pmh of lupus autoimmunity, complement levels normal.   April 2019 was dc from [facility] for pna and or bronchitis  She has a history of current smoker   Has nebulizer for albuterol  Not required nebulizer.   Sx: cough   Does have associated tobacco abuse as well.   Sx frequ: > 2 days per week    Chest ct 8/2018 shows bronchioloitis and mediastinal lymph nodes and axillary lymph nodes presumed reactive.   Pft: large airway no obstruction and partial response to bronchodilator, small airway reversed by 31%.   Tx: currently on stialto , "red inhaler" symbicort 160, "gray inhaler" ventolin.     Immune evaluation: 6/20/2019    Complement, Total (CH50) 56 ( >41 U/mL)  Complement C2                 3.3  C4 Complement 14 - 44 mg/dL 16      IgA, Serum                    281                         IgM 26 - 217 mg/dL 52      IgG, Total Serum             1259                     700-1600  mg/dL       IgG Subclass 1                710                      248-810  mg/dL       IgG Subclass 2                189                      130-555  mg/dL       IgG Subclass 3           "       70                         mg/dL       IgG Subclass 4                 61                         2-96  mg/dL                                           Strep Pneumoniae Type 1  0.5 L >1.3 ug/mL  Strep Pneumoniae Type 3 0.6 L >1.3 ug/mL  Strep Pneumoniae Type 4 0.1 L >1.3 ug/mL  Strep Pneumoniae Type 8 1.8  >1.3 ug/mL  Strep Pneumoniae Type 9 0.5 L >1.3 ug/mL  Strep Pneumoniae Type 12 <0.1 L >1.3 ug/mL  Strep Pneumoniae Type 14 1.2 L >1.3 ug/mL  Strep Pneumoniae Type 19 0.7 L >1.3 ug/mL  Strep Pneumoniae Type 23 0.3 L >1.3 ug/mL  Strep Pneumoniae Type 26 4.3  >1.3 ug/mL  Strep Pneumoniae Type 51 0.3 L >1.3 ug/mL  Strep Pneumoniae Type 56 0.5 L >1.3 ug/mL  Strep Pneumoniae Type 57 1.5  >1.3 ug/mL  Strep Pneumoniae Type 68 <0.1 L >1.3 ug/mL    3/14 = 21%    Hematocrit                   46.0                    34.0-46.6  %           WBC                           5.7                     3.4-10.8  x10E3/uL    RBC                          4.83                    3.77-5.28  x10E6/uL    Hemoglobin                   14.7                    11.1-15.9  g/dL        Basos                           0                   Not Estab.  %           Neurtrophils                   60                   Not Estab.  %           Neurtrophils (Absolute) 3.4  1.4-7.0 x10E3/uL  Lymphs (Absolute)             1.9                      0.7-3.1  x10E3/uL    MCV                            95                        79-97  fL          MCHC                         32.0                    31.5-35.7  g/dL        MCH                          30.4                    26.6-33.0  pg          Lymphs                         34                   Not Estab.  %           Immature Granulocytes            0                   Not Estab.  %           Eos                             0                   Not Estab.  %           Monocytes                       6                   Not Estab.  %           Platelets                     185                      " 150-450  x10E3/uL    Eos (Absolute)                0.0                      0.0-0.4  x10E3/uL    BASO (Absolute)               0.0                      0.0-0.2  x10E3/uL    Monocytes (Absolute)          0.4                      0.1-0.9  x10E3/uL    % CD19+ Lymphs                9.3                     3.3-25.4  %           Abs. CD19+ Lymphs             177                         /uL         Absolute CD4 [place]           716                     359-1519  /uL         Absolute CD3                 1634                     622-2402  /uL         % CD 4 Pos. Lymph            37.7                    30.8-58.5  %           CD4/CD8 Ratio                0.78 L                  0.92-3.72              % CD3 Pos. Lymph             86.0                    57.5-86.2  %           Abs. CD8 Suppressor           923 H                    109-897  /uL         % CD8 Pos. Lymph             48.6 H                  12.0-35.5  %           RDW                          14.5                    12.3-15.4  %           % NK (CD56/16)                4.1                     1.4-19.4  %           Ab NK (CD56/16)                78       Lpr:   Ranitidine rx at last visit.   Condition: stable , not better.   She didn't start it.   "horrible acid reflux"  "gut issues" not diagnosed.   "why I got off plaquenil" "tore up my gut."       Atopic Hx    Rhinitis see above   Oral allergy: denies  Food allergy: none    Asthma see above for bronchitis   Latex tolerates   Eczema: denies, but may have a psoriasis.    Urticaria denies chronic, has doxepin qhs     Infection History    Pneumonia # in the past 12 months: bronchitis as above   Sinus infection # in the past 12 months: reports feels like sinus infections.   Otitis infection # in the past 12 months:  Denies chronic infection, but notes chronic fluid in the ears.     Dust mite only    IgE Cladosporium herbarum <0.10  Class 0 kU/L  IgE Dog Dander              <0.10                      Class 0  kU/L   "      IgE Cat Epithelium          <0.10                      Class 0  kU/L        IgE Ragweed, Short          <0.10                      Class 0  kU/L        IgE D. pteronyssinus         0.13 AB                 Class 0/I  kU/L        IgE A. fumigatus            <0.10                      Class 0  kU/L        IgE Alteraria alternata <0.10  Class 0 kU/L  IgE Elm, American           <0.10                      Class 0  kU/L        IgE Bermuda Grass           <0.10                      Class 0  kU/L        IgE Mer[place], White              <0.10                      Class 0  kU/L        IgE Pigweed, Common         <0.10                      Class 0  kU/L        IgE Cockroach, Icelandic        <0.10                      Class 0  kU/L        IgE Thistle Russian         <0.10                      Class 0  kU/L        IgE D. farinae              <0.10                      Class 0  kU/L        IgE Cockroach American <0.10  Class 0 kU/L   This test was developed and its performance      characteristics determined by [facility].  It      has not been cleared or approved by the U.S.      Food and Drug Administration.      The FDA has determined that such clearance      or approval is not necessary. This test is      used for clinical purposes.  It should not      be regarded as investigational or for                             research.                                           IgE Maple/[place] Elder         <0.10                      Class 0  kU/L        IgE Penicillium chrysogen <0.10  Class 0 kU/L  IgE White Oak              <0.10                      Class 0  kU/L        IgE Gabe Grass           <0.10                      Class 0  kU/L        IgE Arik Grass           <0.10                      Class 0  kU/L        IgE Bahia Grass             <0.10                      Class 0  kU/L        IgE Sheep Estelle           <0.10                      Class 0  kU/L        IgE Plantain, English        <0.10                      Class 0  kU/L         IgE Mugwort                 <0.10                      Class 0  kU/L        IgE Pecan [place]           <0.10                      Class 0  kU/L        IgE Candida albicans        <0.10                      Class 0  kU/L        IgE Setomelanomma rostrat <0.10  Class 0 kU/L  IgE White [place]          <0.10                      Class 0  kU/L        IgE Epicoccum purpur        <0.10                      Class 0  kU/L        IgE Fusarium proliferatum <0.10  Class 0 kU/L  IgE Trichophyton rubrum <0.10  Class 0 kU/L  IgE Rough Marshelder        <0.10                      Class 0  kU/L        IgE Mouse Urine             <0.10                      Class 0  kU/L        IgE Silver Birch            <0.10                      Class 0  kU/L        IgE Maple [place] <0.10  Class 0 kU/L  IgE Bipolaris               <0.10                      Class 0  kU/L         This test was developed and its performance      characteristics determined by [facility].  It      has not been cleared or approved by the U.S.      Food and Drug Administration.      The FDA has determined that such clearance      or approval is not necessary. This test is      used for clinical purposes.  It should not      be regarded as investigational or for                             research.                                           IgE Nishant, White              <0.10                      Class 0  kU/L        IgE Privet, Common          <0.10                      Class 0  kU/L        IgE Ragweed, Giant          <0.10                      Class 0  kU/L        IgE Nettle                  <0.10                      Class 0  kU/L        IgE Cocklebur               <0.10                      Class 0  kU/L        IgE Dockweed, Yellow        <0.10                      Class 0  kU/L         This test was developed and its performance      characteristics determined by [facility].  It      has not been cleared or approved by the U.S.      Food and Drug  Administration.      The FDA has determined that such clearance      or approval is not necessary. This test is      used for clinical purposes.  It should not      be regarded as investigational or for                             research.                                           IgE Hackberry               <0.10                      Class 0  kU/L         This test was developed and its performance      characteristics determined by [facility].  It      has not been cleared or approved by the U.S.      Food and Drug Administration.      The FDA has determined that such clearance      or approval is not necessary. This test is      used for clinical purposes.  It should not      be regarded as investigational or for                             research.                                           IgE Sweet Gum               <0.10                      Class 0  kU/L         This test was developed and its performance      characteristics determined by [facility].  It      has not been cleared or approved by the U.S.      Food and Drug Administration.      The FDA has determined that such clearance      or approval is not necessary. This test is      used for clinical purposes.  It should not      be regarded as investigational or for                             research.                                           IgE W[place]wood                <0.10                      Class 0  kU/L        IgE Fennel, Dog             <0.10                      Class 0  kU/L         This test was developed and its performance      characteristics determined by [facility].  It      has not been cleared or approved by the U.S.      Food and Drug Administration.      The FDA has determined that such clearance      or approval is not necessary. This test is      used for clinical purposes.  It should not      be regarded as investigational or for                             research.                                                                       "                                                  Performed at: , [facility], [place], [place], [id number]      Jc Mckeon MD, Phone:  [phone]     IgE Mouse Epithelium        <0.10                      Class 0  kU/L        IgE Marquand Bald            <0.10           Review of Systems      General: neg unexpected weight changes, fevers, chills, night sweats, malaise  HEENT: see hpi, Neg eye pain, vision changes, ear drainage, nose bleeds, throat tightness, sores in the mouth  CV: Neg chest pain, palpitations, swelling  Resp: see hpi, neg shortness of breath, hemoptysis  GI: see hpi, neg dysphagia, night abdominal pain, reflux, chronic diarrhea, chronic constipation  Derm: See Hpi, neg new rash, neg flushing  Mu/sk: Neg joint pain, joint swelling   Psych: Neg anxiety  neuro: neg chronic headaches, muscle weakness  Endo: neg heat/cold intolerance, chronic fatigue    Objective:     Vitals:    09/23/19 1034   BP: 122/68   Weight: 70.3 kg (155 lb)   Height: 5' 2" (1.575 m)   PF: 320 L/min        Physical Exam      General: no acute distress, well developed well nourished   HEENT:   Head:normocephalic atraumatic  Eyes: BENITEZ, EOMI, Neg injection, scleral icterus, or conjunctival papillary hypertrophy.  Ears: tm clear bilaterally, normal canal  Nose: 3+ inferior turbinates pink, neg nasal polyps            Mucosa: mild dryness with spider webbing.             Septal irritation: none   OP: mucus membranes moist, - cobblestoning, - PND, neg erythema or lesions, + tongue discoloration likely from tobacco, rust color/yellow.   Neck: supple, Full range of motion, neg lymphadenopathy  Chest: full respiratory excursion no abnormal chest abnormality  Resp: clear to ascultation bilaterally  CV: RRR, neg MRG, brisk capillary refill  Abdomen: BS+, non tender, non distended.   Ext:  Neg clubbing, cyanosis, pitting edema  Skin: Neg rashes or lesions  Lymph: neg supraclavicular, axillary "     Assessment:       1. Recurrent bacterial infection    2. Small airways disease    3. Tobacco abuse    4. Allergic rhinitis due to dust        Plan:       Recurrent bacterial infection    Small airways disease  -     Ambulatory referral to Pulmonology  -     predniSONE (DELTASONE) 20 MG tablet; Take 1 tablet (20 mg total) by mouth once daily. for 4 days  Dispense: 4 tablet; Refill: 0    Tobacco abuse  -     Ambulatory referral to Pulmonology    Allergic rhinitis due to dust  -     predniSONE (DELTASONE) 20 MG tablet; Take 1 tablet (20 mg total) by mouth once daily. for 4 days  Dispense: 4 tablet; Refill: 0      Chronic rhinitis: dust mite only   Serum IgE- not stable enough for skin prick testing. Dust mite only.   saline and azelastine    Continue fluticasone   montelukast 1 pill po qday   levocetirizine prn     Recurrent bronchitis and sinusitis  Immune eval: personally reviewed   Low strep pneumo titers    ecommend ppv 23 then repeat titers in 4 weeks. Per report march 2018 injection but I need pre post titers for 2019 for SAD eval.     [facility] - imaging center   Spirometry - complete PFT  -personally reviewed   Small airway reversibility by 31%   Referral to pulm for smoker with recurrent bronchitis sent.   Doesn't feel she needs rescue inhaler but has sx > 2 days per week.  symbicort 160 2 puffs bid.   Continue stialto.   Not seen pulmonary yet but I don't see an appt on the schedule.     Prednisone x 4 days to help with sinus and lung symptoms- 9/2019    Smoking cessation.     Flu vaccine yearly recommended.     LPR:  Restart zantac 300 mg qhs, prn.      F/u 3 months, sooner if needed.            Katiana Cline M.D.  Allergy/Immunology  [facility]   [phone] phone  [phone] fax

## 2019-09-24 DIAGNOSIS — F51.04 CHRONIC INSOMNIA: ICD-10-CM

## 2019-09-24 RX ORDER — DIAZEPAM 5 MG/1
TABLET ORAL
Qty: 60 TABLET | Refills: 0 | Status: SHIPPED | OUTPATIENT
Start: 2019-09-24 | End: 2019-10-29 | Stop reason: SDUPTHER

## 2019-09-30 DIAGNOSIS — R07.89 OTHER CHEST PAIN: ICD-10-CM

## 2019-09-30 DIAGNOSIS — G47.30 SLEEP APNEA, UNSPECIFIED TYPE: Primary | ICD-10-CM

## 2019-09-30 DIAGNOSIS — J84.89 OTHER SPECIFIED INTERSTITIAL PULMONARY DISEASES: ICD-10-CM

## 2019-09-30 DIAGNOSIS — M32.10 SYSTEMIC LUPUS ERYTHEMATOSUS WITH ORGAN SYSTEM INVOLVEMENT: ICD-10-CM

## 2019-09-30 DIAGNOSIS — E78.2 MIXED HYPERLIPIDEMIA: ICD-10-CM

## 2019-09-30 DIAGNOSIS — R42 DIZZINESS AND GIDDINESS: Primary | ICD-10-CM

## 2019-09-30 DIAGNOSIS — E66.3 SEVERELY OVERWEIGHT: ICD-10-CM

## 2019-09-30 DIAGNOSIS — R06.89 HYPOVENTILATION, IDIOPATHIC: ICD-10-CM

## 2019-10-03 ENCOUNTER — HOSPITAL ENCOUNTER (OUTPATIENT)
Dept: RADIOLOGY | Facility: HOSPITAL | Age: 44
Discharge: HOME | End: 2019-10-03
Attending: SPECIALIST
Payer: MEDICAID

## 2019-10-03 DIAGNOSIS — J84.89 OTHER SPECIFIED INTERSTITIAL PULMONARY DISEASES: ICD-10-CM

## 2019-10-03 DIAGNOSIS — R06.89 HYPOVENTILATION, IDIOPATHIC: ICD-10-CM

## 2019-10-03 DIAGNOSIS — R42 DIZZINESS AND GIDDINESS: ICD-10-CM

## 2019-10-03 DIAGNOSIS — E66.3 SEVERELY OVERWEIGHT: ICD-10-CM

## 2019-10-03 DIAGNOSIS — E78.2 MIXED HYPERLIPIDEMIA: ICD-10-CM

## 2019-10-03 DIAGNOSIS — R07.89 OTHER CHEST PAIN: ICD-10-CM

## 2019-10-03 DIAGNOSIS — M32.10 SYSTEMIC LUPUS ERYTHEMATOSUS WITH ORGAN SYSTEM INVOLVEMENT: ICD-10-CM

## 2019-10-03 PROCEDURE — 71275 CT ANGIOGRAPHY CHEST: CPT | Mod: TC

## 2019-10-03 PROCEDURE — 25500020 PHARM REV CODE 255: Performed by: SPECIALIST

## 2019-10-03 RX ADMIN — IOHEXOL 100 ML: 350 INJECTION, SOLUTION INTRAVENOUS at 08:10

## 2019-10-09 ENCOUNTER — INITIAL CONSULT (OUTPATIENT)
Dept: PULMONOLOGY | Facility: CLINIC | Age: 44
End: 2019-10-09
Payer: MEDICAID

## 2019-10-09 VITALS
OXYGEN SATURATION: 96 % | HEART RATE: 93 BPM | HEIGHT: 62 IN | WEIGHT: 157 LBS | SYSTOLIC BLOOD PRESSURE: 120 MMHG | DIASTOLIC BLOOD PRESSURE: 75 MMHG | BODY MASS INDEX: 28.89 KG/M2

## 2019-10-09 DIAGNOSIS — F17.219 CIGARETTE NICOTINE DEPENDENCE WITH NICOTINE-INDUCED DISORDER: ICD-10-CM

## 2019-10-09 DIAGNOSIS — F17.200 CURRENT EVERY DAY SMOKER: ICD-10-CM

## 2019-10-09 DIAGNOSIS — M35.00 SJOGREN'S SYNDROME WITHOUT EXTRAGLANDULAR INVOLVEMENT: ICD-10-CM

## 2019-10-09 DIAGNOSIS — J98.4 CYSTIC-BULLOUS DISEASE OF LUNG: ICD-10-CM

## 2019-10-09 DIAGNOSIS — J40 CHRONIC SINUSITIS WITH RECURRENT BRONCHITIS: ICD-10-CM

## 2019-10-09 DIAGNOSIS — J32.9 CHRONIC SINUSITIS WITH RECURRENT BRONCHITIS: ICD-10-CM

## 2019-10-09 DIAGNOSIS — J84.9 INTERSTITIAL LUNG DISEASE: Primary | ICD-10-CM

## 2019-10-09 PROCEDURE — 99205 OFFICE O/P NEW HI 60 MIN: CPT | Mod: S$GLB,,, | Performed by: INTERNAL MEDICINE

## 2019-10-09 PROCEDURE — 99205 PR OFFICE/OUTPT VISIT, NEW, LEVL V, 60-74 MIN: ICD-10-PCS | Mod: S$GLB,,, | Performed by: INTERNAL MEDICINE

## 2019-10-09 NOTE — LETTER
October 9, 2019      Katiana Cline MD  [address]  [address]  [place] [address]           [facility] - Pulmonology  [address]  [place] [address]  Phone: [phone]  Fax: [phone]          Patient: Promise Medrano   MR Number: [id number]   YOB: 1975   Date of Visit: 10/9/2019       Dear Dr. Katiana Cline:    Thank you for referring Promise Medrano to me for evaluation. Attached you will find relevant portions of my assessment and plan of care.    If you have questions, please do not hesitate to call me. I look forward to following Promise Medrano along with you.    Sincerely,    Nate Tarango MD    Enclosure  CC:  No Recipients    If you would like to receive this communication electronically, please contact [email or web address] or [phone] to request more information on [employer] Link access.    For providers and/or their staff who would like to refer a patient to [facility], please contact us through our one-stop-shop provider referral line, [facility] [place], at [phone].    If you feel you have received this communication in error or would no longer like to receive these types of communications, please e-mail [email or web address]

## 2019-10-09 NOTE — PATIENT INSTRUCTIONS
Staying Smoke-Free     Deep breathing can help ease the urge to smoke.     Quitting smoking is a big change. People will congratulate you. You have the right to be proud. But later at times you may miss smoking. Plan ahead to resist temptation.  Prepare to be tempted  · If you feel the urge to smoke, distract yourself for about 5 minutes. Drink water. Call a friend, walk around the room, or try deep breathing. Usually, the urge to smoke will pass.  · Dont trust yourself to have just one cigarette. Many ex-smokers get hooked again that way.  · Remind yourself why you quit. Tell yourself you can stay quit.  · Avoid people or places that can trigger you to smoke. Ask others not to smoke in your home or car.  · Spend time in places where you cant smoke-- a museum, a library, a store, or a gym.  · Take your nonsmoking life one day at a time. Willie each day on your calendar.  · HALT your desire. Keep yourself from feeling too Hungry, Angry, Lonely, or Tired. Deal with your real needs. Eat, talk, or sleep.  · Put aside cigarette money and reward yourself.  If you slip  You may slip and smoke again. Many ex-smokers slip on the way to success. If you do, its not the end of your quit process. Think about what triggered you to smoke. Then think of ways to prevent future slips. Ask yourself what you can learn from the slip. Decide how you will handle this trigger better in the future. Then get back on track--right away!  Dont give up  Keep telling yourself youre no longer a smoker. Dont lose hope. Most people have tried to quit several times before being successful. Try to stay focused on your plan to be smoke-free. Keep in mind all the benefits of staying quit. Millions of people have given up smoking. You can too.        For more information  · https://smokefree.gov/nekq-gk-lm-expert  · National [employer] [place] Smoking Quitline: 877-44U-QUIT (334-053-5899)      Date Last Reviewed: 2/1/2017  © 1008-2898 [employer]. [address] [place] [address], [place] [address]. All rights reserved. This information is not intended as a substitute for professional medical care. Always follow your healthcare professional's instructions.

## 2019-10-09 NOTE — PROGRESS NOTES
[facility]  Pulmonology  Initial Clinic Visit    SUBJECTIVE:   Reason for Referral:    Promise Medrano is a 44 y.o. female referred by her allergist for evaluation of recurrent bronchitis.    Chief Complaint:   Shortness of breath      History of Present Illness:  Ms. Medrano is tangential in characterizing her symptomatology, and obtaining a sequential and focused description of her respiratory symptoms took nearly an hour and required almost constant redirection.  From what I am able to gather, she has suffered from episodic flares of acute dyspnea, nonproductive cough, and occasional wheezing throughout most of her adult life.  These episodes initially only occurred once every several years, were brief, and self-limited.  However, in 2007 she had a particularly severe and persistent flare after giving birth to her 1st child.  She left the hospital on home oxygen and was referred to a pulmonologist at discharge. He reportedly diagnosed her with an undifferentiated interstitial lung disease, but no clear etiology was ever identified as far she is able to recall.  The possibility of an open lung biopsy was discussed, but the patient decided to forego definitive tissue diagnosis.  She tells me that she was empirically started on prednisone, and that her symptoms slowly improved over several months.  She remained on prednisone for approximately a year, and after couple of unsuccessful attempts early on she was finally weaned off of them completely.  Afterwards she reports being relatively symptom free, and is unable to recall experiencing any significant respiratory symptoms for approximately 5 years.  Five years ago she became pregnant with her 2nd child and as her pregnancy progressed her respiratory symptoms gradually returned.  However, since then has been unable to reestablish care with a pulmonologist, and has instead been intermittently treated by multiple physicians for a variety of suspected  respiratory illnesses.  Occasionally she has experienced mild ft fleeting symptom relief, but overall her shortness of breath has progressively become more constant and severe.  She was reportedly diagnosed with some sort of autoimmune condition 2 or 3 years ago, and has been followed by Rheumatology since.  As best I am able to tell the working diagnosis is an undifferentiated connective tissue disorder, and a brief trial of Plaquenil earlier this year reportedly provided no significant symptom relief.  She has been hospitalized on a couple of occasions and empirically treated for a variety of pulmonary conditions, but not been given a formal diagnosis up to this point.  At some point she was labile with COPD and has been on various inhalers without much symptom relief, but PFTs obtained earlier this year are not consistent with obstructive lung disease.  She has also been evaluated by Allergy and immunology who was evaluated her for immunologic deficiencies, but as far is unable to tell, have been and able to reach a diagnosis.  She was referred to me from her allergist, which brings us to the present.    Current leave she is having symptoms of non-productive cough, exercise intolerance (difficulty walking 1 blocks on flat ground) and shortness of breath which have been present for 10 years and are gradually worsening. Symptoms are exacerbated by climbing stairs, exercise, sexual intercourse, showering and walking and relieved by rest. Other symptoms include dyspnea on exertion and non productive cough. She has tried inhaled steriods and Systemic corticosteroids, which provides moderate.     Occupational history.  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  She reports being exposed to various chemicals but is unaware of any specific exposures or any coworkers with similar symptoms.    She is not on home oxygen.  She is a  current smoker    Past Medical History:   Diagnosis Date    Allergic rhinitis     Arthritis     GERD (gastroesophageal reflux disease)     Grade II hemorrhoids 2019    History of diabetes mellitus resolved following bariatric surgery 10/30/2017    Interstitial lung disease 2019    Lupus     Sjogren's syndrome 2019     Past Surgical History:   Procedure Laterality Date    BREAST SURGERY  2004     SECTION  ,2009    gastric sleeve  2010    HYSTERECTOMY      TONSILLECTOMY       Family History   Problem Relation Age of Onset    Early death Father     Heart disease Father     Stroke Father     Kidney disease Father     Diabetes Paternal Grandmother     Cancer Paternal Grandmother     Raynaud syndrome Mother     Uterine cancer Mother     Raynaud syndrome Sister     Polycystic ovary syndrome Daughter     Diabetes Maternal Grandmother     Heart disease Maternal Grandmother     Kidney disease Maternal Grandmother     Heart disease Maternal Grandfather     Cancer Paternal Grandfather     No Known Problems Daughter       Social History:  She is a current 1.5 pack per day smoker and has smoked for approximately 35 pack years.  She only rarely consumes alcohol and always moderation.  No prior history of heavy alcohol use.  She occasionally smokes marijuana.  She tried inhalational cocaine once a teenager but never since.  Otherwise she denies any additional illicit drug use.  She lives at home with her 2 children.      Allergies:  Patient has no known allergies.     Outpatient Medications as of 10/9/2019   Medication    albuterol-ipratropium (DUO-NEB) 2.5 mg-0.5 mg/3 mL nebulizer solution    atorvastatin (LIPITOR) 80 MG tablet    blood sugar diagnostic Strp    blood-glucose meter, drum-type (ACCU-CHEK COMPACT PLUS CARE) Kit    budesonide-formoterol 160-4.5 mcg (SYMBICORT) 160-4.5 mcg/actuation HFAA    CETAPHIL MOISTURIZING cream    diazePAM (VALIUM) 5 MG tablet     diclofenac sodium (VOLTAREN) 1 % Gel    dicyclomine (BENTYL) 20 mg tablet    ergocalciferol (ERGOCALCIFEROL) 50,000 unit Cap    ezetimibe (ZETIA) 10 mg tablet    ferrous sulfate (FEOSOL) 325 mg (65 mg iron) Tab tablet    fluticasone propionate (FLONASE) 50 mcg/actuation nasal spray    gabapentin (NEURONTIN) 100 MG capsule    gabapentin (NEURONTIN) 400 MG capsule    gabapentin (NEURONTIN) 400 MG capsule    hydrocortisone 1 % cream    ibuprofen (ADVIL,MOTRIN) 600 MG tablet    inhalat.spacing dev,med. mask (AEROCHAMBER PLUS Z STAT MD LOPEZ) Spcr    ketoconazole (NIZORAL) 2 % cream    lancets (ACCU-CHEK SOFTCLIX LANCETS) Misc    levocetirizine (XYZAL) 5 MG tablet    midodrine (PROAMATINE) 5 MG Tab    montelukast (SINGULAIR) 10 mg tablet    mupirocin (BACTROBAN) 2 % ointment    omeprazole (PRILOSEC) 40 MG capsule    ondansetron (ZOFRAN-ODT) 4 MG TbDL    ondansetron (ZOFRAN-ODT) 4 MG TbDL    ranitidine (ZANTAC) 150 MG tablet    STIOLTO RESPIMAT 2.5-2.5 mcg/actuation Mist    triamcinolone acetonide 0.1% (KENALOG) 0.1 % cream    urea 40 % Lotn lotion    XIIDRA 5 % Dpet    azelastine (ASTELIN) 137 mcg (0.1 %) nasal spray    mv-mn-iron-FA-vit K-ginseng (CENTRUM SPECIALIST ENERGY) 18 mg iron-400 mcg-25 mcg-75mg Tab     No current facility-administered medications on file as of 10/9/2019.       Review of Systems   Constitutional: Positive for activity change, fatigue and weakness. Negative for fever, chills, weight loss, weight gain, appetite change and night sweats.   HENT: Negative for nosebleeds, postnasal drip, rhinorrhea, sinus pressure, sore throat, trouble swallowing, voice change and congestion.    Eyes: Negative for redness and itching.   Respiratory: Positive for cough, chest tightness, shortness of breath, previous hospitalization due to pulmonary problems and use of rescue inhaler. Negative for apnea, snoring, hemoptysis, sputum production, choking, wheezing, orthopnea, asthma nighttime  "symptoms, pleurisy, dyspnea on extertion, somnolence and Paroxysmal Nocturnal Dyspnea.    Cardiovascular: Negative for chest pain, palpitations and leg swelling.   Genitourinary: Negative for difficulty urinating and hematuria.   Endocrine: Negative for polydipsia, polyphagia, cold intolerance, heat intolerance and polyuria.    Musculoskeletal: Positive for arthralgias and myalgias. Negative for joint swelling.   Skin: Negative for rash.   Gastrointestinal: Negative for nausea, vomiting, abdominal pain and acid reflux.   Neurological: Positive for dizziness, weakness and light-headedness. Negative for syncope.   Hematological: Negative for adenopathy. Does not bruise/bleed easily and no excessive bruising.   Psychiatric/Behavioral: Negative for confusion and sleep disturbance. The patient is not nervous/anxious.    All other systems reviewed and are negative.     I have personally reviewed the following during today's encounter:  past medical history, ROS, family history, social history, surgical history, current medications, drug allergies, today's vital signs, prior diagnostic studies and prior nursing/provider documentation.    OBJECTIVE:      /75 (BP Location: Left arm, Patient Position: Sitting, BP Method: Medium (Manual))   Pulse 93   Ht 5' 2" (1.575 m)   Wt 71.2 kg (157 lb)   SpO2 96%   BMI 28.72 kg/m²   Body mass index is 28.72 kg/m².     Physical Exam   Constitutional: She is oriented to person, place, and time. Vital signs are normal. She appears well-developed and well-nourished. She is cooperative.  Non-toxic appearance. No distress.   HENT:   Head: Normocephalic and atraumatic.   Right Ear: Hearing and external ear normal.   Left Ear: Hearing and external ear normal.   Nose: Nose normal. No mucosal edema, rhinorrhea, sinus tenderness, nasal deformity, septal deviation or nasal septal hematoma. Right sinus exhibits no maxillary sinus tenderness and no frontal sinus tenderness. Left sinus " exhibits no maxillary sinus tenderness and no frontal sinus tenderness.   Mouth/Throat: Uvula is midline, oropharynx is clear and moist and mucous membranes are normal. Normal dentition. No dental abscesses or dental caries. No oropharyngeal exudate or posterior oropharyngeal edema. Mallampati Score: I.   Neck: Normal range of motion. Neck supple. No JVD present. No tracheal deviation present. No thyromegaly present.   Cardiovascular: Normal rate, regular rhythm, normal heart sounds and intact distal pulses. Exam reveals no gallop and no friction rub.   No murmur heard.  Pulmonary/Chest: Normal expansion, symmetric chest wall expansion and effort normal. No stridor. No tachypnea. No respiratory distress. She has no decreased breath sounds. She has no wheezes. She has no rhonchi. She has rales (faint bibasilar rales). Chest wall is not dull to percussion. She exhibits no tenderness. Negative for egophony. Negative for tactile fremitus.   Abdominal: Soft. Bowel sounds are normal. She exhibits no distension and no mass. There is no hepatosplenomegaly. There is no tenderness. There is no rebound and no guarding. No hernia.   Musculoskeletal: Normal range of motion. She exhibits no edema, tenderness or deformity.   Lymphadenopathy: No supraclavicular adenopathy is present.     She has no cervical adenopathy.     She has no axillary adenopathy.   Neurological: She is alert and oriented to person, place, and time. She has normal reflexes. No cranial nerve deficit. Gait normal.   Skin: Skin is warm and intact. No lesion, no petechiae and no rash noted. No cyanosis or erythema. No pallor. Nails show clubbing.   Psychiatric: Her behavior is normal. Judgment and thought content normal. Her mood appears anxious. Her speech is rapid and/or pressured and tangential. Cognition and memory are normal.   Nursing note and vitals reviewed.     Diagnostic Studies:  I have personally reviewed the following labs/studies/images.    Chest  x-ray:   Date:  June 25, 2012  Radiology Report::  No acute cardiopulmonary abnormality.  My impression:  Unable to fully evaluate the bases due to breast implants.  Otherwise no gross abnormalities.    Date:  April 14, 2019  Radiology report:  None available  My impression:  Interval increase and basilar predominant bilateral hazy airspace opacities with a decrease and overall lung volume compared to prior chest radiograph.    I independently viewed the above imaging/lab studies in addition to reviewing the report     CT Chest:  Date:  August 31, 2018  Radiology report:    1. Mild mosaic attenuation pattern in the lungs, which was present on theprevious exam of 07/14/2010, with scattered minimal bronchiolitis in both upper lobes and a few thin-walled parenchymal cysts. Smoking-related interstitial lung diseases could have this appearance, with air-trapping and small airways inflammation, or inhalational lung disease of various potential etiologies, constituting additional diagnostic considerations.  2. Stable prominent mediastinal lymph nodes, and prominent to borderline enlarged bilateral axillary lymph nodes, nonspecific but presumably reactive given stability.  My impression:    Diffuse interlobular septal thickening with some areas having a nodular appearance as well.  Diffuse ground-glass opacities with some mosaicism at both bases.  Scattered thin walled cysts of varying sizes.  Mild paraseptal emphysema.  Overall, this could be consistent with some very early interstitial lung disease among other etiologies, but appearance is not classic for any specific I LD.      Date:  September 30, 2019  Radiology report:  1. Negative for pulmonary embolus.  2. New diffuse bilateral pulmonary ground-glass opacities.  Potential etiologies include infectious or inflammatory alveolitis/pneumonitis, alveolar edema, or alveolar hemorrhage.  Clinical and laboratory correlation is needed.  My impression:  Interval increase in  the size and number of 10 walled cysts.  Persistent interlobular septal thickening, but is difficult to clearly appreciated due to presence of IV contrast.  Diffuse ground-glass opacities also difficult to compare to prior CT due to the presence of IV contrast.    Date:  2012  Radiology report:  None available  My impression:  Available imaging of the lung bases from CT abdomen are unremarkable.  I independently viewed the above imaging/lab studies in addition to reviewing the report     PFTs:  Date:  2019  FEV1:  2.49  (91 % predicted), FVC:  3.25 (96 % predicted), FEV1/FVC:  77, T.71 (102 % predicted), RV/TLVC:  33 (97 % predicted), DLCO:  14.95 (74 % predicted)  Computer interpretation:  Spirometry is within normal limits.  There was a significant response to inhaled bronchodilator in small airways  Lung volumes are normal.  Diffusion is normal.     My impression:   No evidence of obstructive lung disease, or reactive airways disease.   The clinical utility of measuring the FEF 25-75 is debatable, and it is response to bronchodilators even more so.  The statement significant response to inhaled bronchodilator and small airways is an inaccurate description of the overall impression from her PFTs.  Significantly elevated FRC and ERV are unusual but suggest some intrinsic pulmonary dysfunction.     Methacholine Challenge:  None on file.     6MWT:  Not on file.  SpO2 was 91% after a brisk walk around medical office building during today's visit..     PSG:  None on file     ECG:  None available to review.     Echocardiogram:   Date:  2018  Estimated left ventricular ejection fraction is 60-65%  Normal left atrial pressure diastolic function.  There is mild mitral regurgitation.  Estimated RVSP is 28 mmHg.  No mention of pulmonary artery pressure     BNP  None on file..      PRASHANTH :   Date:  2017   Titer:  1:  320 (elevated)   Pattern:  Speckled   Rheumatoid factor:  <15  "   Anti CCP:<15.6    Assessment:       1. Concern for Undifferentiated Interstitial lung disease    2. Cystic-bullous disease of lung    3. Current every day smoker    4. Cigarette nicotine dependence with nicotine-induced disorder    5. Suspected MCTD.   6. Chronic sinusitis with recurrent bronchitis        Impression:  Symptomatology, clinical tempo, radiographic abnormalities and pulmonary function tests all concerning for interstitial lung disease. However, the current available data is not suggestive of any specific etiology, and the differential diagnosis is extensive.    She clearly does not have obstructive pulmonary disease and I question the accuracy of previous "pneumonia diagnoses.    Plan:   · Repeat a thyroid serologic work up.  · Needs a formal 6 MWT.  · Obtain Pulmonary function tests, 6 minute walk test, Echocardiogram, Obtain serology to evaluate for autoimmune disease, BNP, CBC, Chemistry and ABG prior to next visit.  · Continue ICS/LABA/LAMA + prn SOPHIE.  · No new medications just yet.  Will need to gather additional data first.  · Discontinue cigarettes.  · Risk associated with cigarette smoking and benefits of cessation were discussed with patient in detail for 10 minutes, and cessation encouraged.  Pharmacologic and non-pharmacologic therapeutic options were discussed.  · I informed the patient of my working diagnosis, it's etiology, risk factors, expected symptoms, diagnostic work up, treatment options and prognosis., I personally reviewed the results of relevant imaging/labs/studies with the patient, and discussed their clinical significance., I spent >10 minutes counseling the patient about their condition., Plan discussed with the patient, who is in agreement., Opportunity provided for the the patient to voice any additional questions or concerns., All questions were answered to the patient's satisfaction., Educational material provided and Patient given date for next visit.    Follow up " in about 2 weeks (around 10/23/2019).    Nate Tarango MD  Pulmonary / Critical Care Medicine  [facility]  10/09/2019

## 2019-10-14 ENCOUNTER — TELEPHONE (OUTPATIENT)
Dept: CARDIOLOGY | Facility: HOSPITAL | Age: 44
End: 2019-10-14

## 2019-10-14 ENCOUNTER — TELEPHONE (OUTPATIENT)
Dept: PULMONOLOGY | Facility: CLINIC | Age: 44
End: 2019-10-14

## 2019-10-14 NOTE — TELEPHONE ENCOUNTER
----- Message from Modesto Palmer MA sent at 10/14/2019 10:30 AM CDT -----  Can you call this patient and reschedule her on the 29th bc she is scheduled to have her PFTs and walk Dr Tarango wants her to have before her apt. And they are scheduled on the 28th

## 2019-10-14 NOTE — TELEPHONE ENCOUNTER
Rescheduled appt due to PFT/6 minuted walk was scheduled after return appointment date first given.

## 2019-10-15 ENCOUNTER — CLINICAL SUPPORT (OUTPATIENT)
Dept: CARDIOLOGY | Facility: HOSPITAL | Age: 44
End: 2019-10-15
Attending: INTERNAL MEDICINE
Payer: MEDICAID

## 2019-10-15 DIAGNOSIS — F17.200 CURRENT EVERY DAY SMOKER: ICD-10-CM

## 2019-10-15 DIAGNOSIS — F17.219 CIGARETTE NICOTINE DEPENDENCE WITH NICOTINE-INDUCED DISORDER: ICD-10-CM

## 2019-10-15 DIAGNOSIS — M35.00 SJOGREN'S SYNDROME WITHOUT EXTRAGLANDULAR INVOLVEMENT: ICD-10-CM

## 2019-10-15 DIAGNOSIS — J84.9 INTERSTITIAL LUNG DISEASE: ICD-10-CM

## 2019-10-15 DIAGNOSIS — J32.9 CHRONIC SINUSITIS WITH RECURRENT BRONCHITIS: ICD-10-CM

## 2019-10-15 DIAGNOSIS — J98.4 CYSTIC-BULLOUS DISEASE OF LUNG: ICD-10-CM

## 2019-10-15 DIAGNOSIS — J40 CHRONIC SINUSITIS WITH RECURRENT BRONCHITIS: ICD-10-CM

## 2019-10-15 LAB
AORTIC ROOT ANNULUS: 2.95 CM
AORTIC VALVE CUSP SEPERATION: 1.78 CM
AV INDEX (PROSTH): 0.93
AV MEAN GRADIENT: 5 MMHG
AV PEAK GRADIENT: 10 MMHG
AV VALVE AREA: 3 CM2
AV VELOCITY RATIO: 81.09
CV ECHO LV RWT: 0.33 CM
DOP CALC AO PEAK VEL: 1.55 M/S
DOP CALC AO VTI: 27.08 CM
DOP CALC LVOT AREA: 3.2 CM2
DOP CALC LVOT DIAMETER: 2.03 CM
DOP CALC LVOT PEAK VEL: 125.69 M/S
DOP CALC LVOT STROKE VOLUME: 81.26 CM3
DOP CALCLVOT PEAK VEL VTI: 25.12 CM
E WAVE DECELERATION TIME: 194.76 MSEC
E/A RATIO: 1.31
E/E' RATIO: 11.63 M/S
ECHO LV POSTERIOR WALL: 0.73 CM (ref 0.6–1.1)
FRACTIONAL SHORTENING: 41 % (ref 28–44)
INTERVENTRICULAR SEPTUM: 0.72 CM (ref 0.6–1.1)
LEFT ATRIUM SIZE: 3.39 CM
LEFT INTERNAL DIMENSION IN SYSTOLE: 2.63 CM (ref 2.1–4)
LEFT VENTRICULAR INTERNAL DIMENSION IN DIASTOLE: 4.47 CM (ref 3.5–6)
LEFT VENTRICULAR MASS: 98.91 G
LV LATERAL E/E' RATIO: 10.33 M/S
LV SEPTAL E/E' RATIO: 13.29 M/S
MV PEAK A VEL: 0.71 M/S
MV PEAK E VEL: 0.93 M/S
PISA TR MAX VEL: 2.52 M/S
PV PEAK VELOCITY: 105.11 CM/S
RA PRESSURE: 3 MMHG
RIGHT VENTRICULAR END-DIASTOLIC DIMENSION: 310 CM
TDI LATERAL: 0.09 M/S
TDI SEPTAL: 0.07 M/S
TDI: 0.08 M/S
TR MAX PG: 25 MMHG
TV REST PULMONARY ARTERY PRESSURE: 28 MMHG

## 2019-10-15 PROCEDURE — 93306 TTE W/DOPPLER COMPLETE: CPT

## 2019-10-16 ENCOUNTER — LAB VISIT (OUTPATIENT)
Dept: LAB | Facility: HOSPITAL | Age: 44
End: 2019-10-16
Attending: INTERNAL MEDICINE
Payer: MEDICAID

## 2019-10-16 ENCOUNTER — OFFICE VISIT (OUTPATIENT)
Dept: FAMILY MEDICINE | Facility: CLINIC | Age: 44
End: 2019-10-16
Payer: MEDICAID

## 2019-10-16 ENCOUNTER — PROCEDURE VISIT (OUTPATIENT)
Dept: SLEEP MEDICINE | Facility: HOSPITAL | Age: 44
End: 2019-10-16
Attending: INTERNAL MEDICINE
Payer: MEDICAID

## 2019-10-16 VITALS
OXYGEN SATURATION: 97 % | WEIGHT: 152 LBS | HEART RATE: 82 BPM | HEIGHT: 62 IN | SYSTOLIC BLOOD PRESSURE: 138 MMHG | RESPIRATION RATE: 18 BRPM | TEMPERATURE: 99 F | BODY MASS INDEX: 27.97 KG/M2 | DIASTOLIC BLOOD PRESSURE: 86 MMHG

## 2019-10-16 DIAGNOSIS — F17.219 CIGARETTE NICOTINE DEPENDENCE WITH NICOTINE-INDUCED DISORDER: ICD-10-CM

## 2019-10-16 DIAGNOSIS — Z86.39 PERSONAL HISTORY OF NUTRITIONAL DEFICIENCY: ICD-10-CM

## 2019-10-16 DIAGNOSIS — R51.9 SEVERE FRONTAL HEADACHES: Primary | ICD-10-CM

## 2019-10-16 DIAGNOSIS — Z23 NEED FOR PROPHYLACTIC VACCINATION AND INOCULATION AGAINST INFLUENZA: ICD-10-CM

## 2019-10-16 DIAGNOSIS — F17.200 CURRENT EVERY DAY SMOKER: ICD-10-CM

## 2019-10-16 DIAGNOSIS — E55.9 VITAMIN D DEFICIENCY: ICD-10-CM

## 2019-10-16 DIAGNOSIS — Z86.39 HISTORY OF DIABETES MELLITUS: ICD-10-CM

## 2019-10-16 DIAGNOSIS — J98.4 CYSTIC-BULLOUS DISEASE OF LUNG: ICD-10-CM

## 2019-10-16 DIAGNOSIS — J40 CHRONIC SINUSITIS WITH RECURRENT BRONCHITIS: ICD-10-CM

## 2019-10-16 DIAGNOSIS — K21.9 GASTROESOPHAGEAL REFLUX DISEASE WITHOUT ESOPHAGITIS: ICD-10-CM

## 2019-10-16 DIAGNOSIS — R55 VASOVAGAL SYNCOPE: ICD-10-CM

## 2019-10-16 DIAGNOSIS — J32.9 CHRONIC SINUSITIS WITH RECURRENT BRONCHITIS: ICD-10-CM

## 2019-10-16 DIAGNOSIS — J84.9 INTERSTITIAL LUNG DISEASE: ICD-10-CM

## 2019-10-16 DIAGNOSIS — F41.9 CHRONIC ANXIETY: ICD-10-CM

## 2019-10-16 DIAGNOSIS — E11.40 CONTROLLED TYPE 2 DIABETES WITH NEUROPATHY: ICD-10-CM

## 2019-10-16 DIAGNOSIS — J84.10 PULMONARY FIBROSIS: ICD-10-CM

## 2019-10-16 DIAGNOSIS — G47.30 SLEEP APNEA, UNSPECIFIED TYPE: ICD-10-CM

## 2019-10-16 DIAGNOSIS — M35.00 SJOGREN'S SYNDROME WITHOUT EXTRAGLANDULAR INVOLVEMENT: ICD-10-CM

## 2019-10-16 DIAGNOSIS — Z12.11 SPECIAL SCREENING FOR MALIGNANT NEOPLASMS, COLON: ICD-10-CM

## 2019-10-16 LAB
ALBUMIN SERPL BCP-MCNC: 4.5 G/DL (ref 3.5–5.2)
ALP SERPL-CCNC: 74 U/L (ref 55–135)
ALT SERPL W/O P-5'-P-CCNC: 13 U/L (ref 10–44)
ANION GAP SERPL CALC-SCNC: 10 MMOL/L (ref 8–16)
AST SERPL-CCNC: 17 U/L (ref 10–40)
BASOPHILS # BLD AUTO: 0.03 K/UL (ref 0–0.2)
BASOPHILS NFR BLD: 0.5 % (ref 0–1.9)
BILIRUB SERPL-MCNC: 0.4 MG/DL (ref 0.1–1)
BNP SERPL-MCNC: 27 PG/ML (ref 0–99)
BUN SERPL-MCNC: 15 MG/DL (ref 6–20)
CALCIUM SERPL-MCNC: 9.1 MG/DL (ref 8.7–10.5)
CHLORIDE SERPL-SCNC: 100 MMOL/L (ref 95–110)
CK SERPL-CCNC: 49 U/L (ref 20–180)
CK SERPL-CCNC: 49 U/L (ref 20–180)
CO2 SERPL-SCNC: 27 MMOL/L (ref 23–29)
CREAT SERPL-MCNC: 0.6 MG/DL (ref 0.5–1.4)
CRP SERPL-MCNC: 0.46 MG/DL (ref 0–0.75)
DIFFERENTIAL METHOD: ABNORMAL
EOSINOPHIL # BLD AUTO: 0 K/UL (ref 0–0.5)
EOSINOPHIL NFR BLD: 0.2 % (ref 0–8)
ERYTHROCYTE [DISTWIDTH] IN BLOOD BY AUTOMATED COUNT: 14.6 % (ref 11.5–14.5)
ERYTHROCYTE [SEDIMENTATION RATE] IN BLOOD BY WESTERGREN METHOD: 10 MM/HR (ref 0–20)
EST. GFR  (AFRICAN AMERICAN): >60 ML/MIN/1.73 M^2
EST. GFR  (NON AFRICAN AMERICAN): >60 ML/MIN/1.73 M^2
GLUCOSE SERPL-MCNC: 100 MG/DL (ref 70–110)
HCT VFR BLD AUTO: 42.4 % (ref 37–48.5)
HGB BLD-MCNC: 14.1 G/DL (ref 12–16)
IMM GRANULOCYTES # BLD AUTO: 0.02 K/UL (ref 0–0.04)
IMM GRANULOCYTES NFR BLD AUTO: 0.3 % (ref 0–0.5)
LDH SERPL L TO P-CCNC: 112 U/L (ref 110–260)
LDH SERPL L TO P-CCNC: 112 U/L (ref 110–260)
LYMPHOCYTES # BLD AUTO: 1.6 K/UL (ref 1–4.8)
LYMPHOCYTES NFR BLD: 27.3 % (ref 18–48)
MCH RBC QN AUTO: 32.1 PG (ref 27–31)
MCHC RBC AUTO-ENTMCNC: 33.3 G/DL (ref 32–36)
MCV RBC AUTO: 97 FL (ref 82–98)
MONOCYTES # BLD AUTO: 0.4 K/UL (ref 0.3–1)
MONOCYTES NFR BLD: 6.2 % (ref 4–15)
NEUTROPHILS # BLD AUTO: 3.8 K/UL (ref 1.8–7.7)
NEUTROPHILS NFR BLD: 65.5 % (ref 38–73)
NRBC BLD-RTO: 0 /100 WBC
PLATELET # BLD AUTO: 214 K/UL (ref 150–350)
PMV BLD AUTO: 10.4 FL (ref 9.2–12.9)
POTASSIUM SERPL-SCNC: 3.4 MMOL/L (ref 3.5–5.1)
PROT SERPL-MCNC: 8.1 G/DL (ref 6–8.4)
RBC # BLD AUTO: 4.39 M/UL (ref 4–5.4)
SODIUM SERPL-SCNC: 137 MMOL/L (ref 136–145)
WBC # BLD AUTO: 5.79 K/UL (ref 3.9–12.7)

## 2019-10-16 PROCEDURE — 86431 RHEUMATOID FACTOR QUANT: CPT

## 2019-10-16 PROCEDURE — 86160 COMPLEMENT ANTIGEN: CPT | Mod: 59

## 2019-10-16 PROCEDURE — 36415 COLL VENOUS BLD VENIPUNCTURE: CPT

## 2019-10-16 PROCEDURE — 87389 HIV-1 AG W/HIV-1&-2 AB AG IA: CPT

## 2019-10-16 PROCEDURE — 86160 COMPLEMENT ANTIGEN: CPT

## 2019-10-16 PROCEDURE — 86235 NUCLEAR ANTIGEN ANTIBODY: CPT | Mod: 59

## 2019-10-16 PROCEDURE — 99214 OFFICE O/P EST MOD 30 MIN: CPT | Mod: S$PBB,,, | Performed by: INTERNAL MEDICINE

## 2019-10-16 PROCEDURE — 82085 ASSAY OF ALDOLASE: CPT

## 2019-10-16 PROCEDURE — 86235 NUCLEAR ANTIGEN ANTIBODY: CPT

## 2019-10-16 PROCEDURE — 95810 POLYSOM 6/> YRS 4/> PARAM: CPT

## 2019-10-16 PROCEDURE — 99214 PR OFFICE/OUTPT VISIT, EST, LEVL IV, 30-39 MIN: ICD-10-PCS | Mod: S$PBB,,, | Performed by: INTERNAL MEDICINE

## 2019-10-16 PROCEDURE — 83615 LACTATE (LD) (LDH) ENZYME: CPT

## 2019-10-16 PROCEDURE — 84155 ASSAY OF PROTEIN SERUM: CPT

## 2019-10-16 PROCEDURE — 85025 COMPLETE CBC W/AUTO DIFF WBC: CPT

## 2019-10-16 PROCEDURE — 85651 RBC SED RATE NONAUTOMATED: CPT

## 2019-10-16 PROCEDURE — 99215 OFFICE O/P EST HI 40 MIN: CPT | Mod: 25 | Performed by: INTERNAL MEDICINE

## 2019-10-16 PROCEDURE — 90662 IIV NO PRSV INCREASED AG IM: CPT | Mod: PBBFAC | Performed by: INTERNAL MEDICINE

## 2019-10-16 PROCEDURE — 84165 PROTEIN E-PHORESIS SERUM: CPT

## 2019-10-16 PROCEDURE — 86256 FLUORESCENT ANTIBODY TITER: CPT | Mod: 59

## 2019-10-16 PROCEDURE — 83880 ASSAY OF NATRIURETIC PEPTIDE: CPT

## 2019-10-16 PROCEDURE — 86140 C-REACTIVE PROTEIN: CPT

## 2019-10-16 PROCEDURE — 80053 COMPREHEN METABOLIC PANEL: CPT

## 2019-10-16 PROCEDURE — 82103 ALPHA-1-ANTITRYPSIN TOTAL: CPT

## 2019-10-16 PROCEDURE — 86038 ANTINUCLEAR ANTIBODIES: CPT

## 2019-10-16 PROCEDURE — 82595 ASSAY OF CRYOGLOBULIN: CPT

## 2019-10-16 PROCEDURE — 82550 ASSAY OF CK (CPK): CPT

## 2019-10-16 PROCEDURE — 82164 ANGIOTENSIN I ENZYME TEST: CPT

## 2019-10-16 RX ORDER — MIDODRINE HYDROCHLORIDE 5 MG/1
5 TABLET ORAL 2 TIMES DAILY
Qty: 180 TABLET | Refills: 0 | Status: SHIPPED | OUTPATIENT
Start: 2019-10-16 | End: 2020-01-20

## 2019-10-16 RX ORDER — INSULIN PUMP SYRINGE, 3 ML
EACH MISCELLANEOUS
Qty: 1 EACH | Refills: 0 | Status: SHIPPED | OUTPATIENT
Start: 2019-10-16 | End: 2020-09-09

## 2019-10-16 RX ORDER — ERGOCALCIFEROL 1.25 MG/1
50000 CAPSULE ORAL
Qty: 4 CAPSULE | Refills: 0 | Status: SHIPPED | OUTPATIENT
Start: 2019-10-16 | End: 2019-12-13 | Stop reason: SDUPTHER

## 2019-10-16 RX ORDER — OMEPRAZOLE 40 MG/1
CAPSULE, DELAYED RELEASE ORAL
Qty: 30 CAPSULE | Refills: 5 | Status: SHIPPED | OUTPATIENT
Start: 2019-10-16 | End: 2020-03-18

## 2019-10-16 RX ORDER — LEVOCETIRIZINE DIHYDROCHLORIDE 5 MG/1
5 TABLET, FILM COATED ORAL NIGHTLY
Qty: 90 TABLET | Refills: 1 | Status: SHIPPED | OUTPATIENT
Start: 2019-10-16 | End: 2020-03-21

## 2019-10-16 RX ORDER — IPRATROPIUM BROMIDE AND ALBUTEROL SULFATE 2.5; .5 MG/3ML; MG/3ML
SOLUTION RESPIRATORY (INHALATION)
Qty: 180 ML | Refills: 0 | Status: SHIPPED | OUTPATIENT
Start: 2019-10-16 | End: 2020-03-19
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
## 2019-10-17 ENCOUNTER — TELEPHONE (OUTPATIENT)
Dept: FAMILY MEDICINE | Facility: CLINIC | Age: 44
End: 2019-10-17

## 2019-10-17 ENCOUNTER — PATIENT MESSAGE (OUTPATIENT)
Dept: PULMONOLOGY | Facility: CLINIC | Age: 44
End: 2019-10-17

## 2019-10-17 LAB
ANA HOMOGEN TITR SER: NORMAL {TITER}
ANA INTERCELL BRIDGE TITR SER IF: NORMAL {TITER}
ANA NOTE: NORMAL
ANA NUCLEAR DOTS TITR SER IF: NORMAL {TITER}
ANA NUCLEAR MEMBRANE PORES TITR SER IF: NORMAL {TITER}
ANA NUCLEOLAR TITR SER: NORMAL {TITER}
ANA SPECKLED TITR SER: NORMAL {TITER}
ANA TITR SER IF: POSITIVE {TITER}
ANA TITR SER IF: POSITIVE {TITER}
C3 SERPL-MCNC: 122 MG/DL (ref 82–167)
C3 SERPL-MCNC: 122 MG/DL (ref 82–167)
C4 SERPL-MCNC: 18 MG/DL (ref 14–44)
C4 SERPL-MCNC: 18 MG/DL (ref 14–44)
CENTROMERE AB TITR SER IF: NORMAL {TITER}
DEPRECATED CENTRIOLE AB TITR SER IF: NORMAL {TITER}
ENA JO1 AB SER-ACNC: <0.2 AI (ref 0–0.9)
ENA JO1 AB SER-ACNC: <0.2 AI (ref 0–0.9)
ENA PCNA AB TITR SER IF: NORMAL {TITER}
ENA SCL70 AB SER-ACNC: <0.2 AI (ref 0–0.9)
ENA SS-A AB SER-ACNC: <0.2 AI (ref 0–0.9)
ENA SS-A AB SER-ACNC: <0.2 AI (ref 0–0.9)
ENA SS-B AB SER-ACNC: <0.2 AI (ref 0–0.9)
ENA SS-B AB SER-ACNC: <0.2 AI (ref 0–0.9)
HIV 1+2 AB+HIV1 P24 AG SERPL QL IA: NON REACTIVE
MITOTIC SPINDLE APP AB TITR SERPL IF: NORMAL {TITER}
RHEUMATOID FACT SERPL-ACNC: <10 IU/ML (ref 0–13.9)

## 2019-10-17 NOTE — TELEPHONE ENCOUNTER
Spoke with pt. Insurance not accepted by Dr Quinteros (neurology). Pt to call back of insurance card for Neurologist that does take her insurance and she is to call us back.

## 2019-10-18 LAB
ACE SERPL-CCNC: 44 U/L (ref 14–82)
ACE SERPL-CCNC: 44 U/L (ref 14–82)
ALBUMIN SERPL ELPH-MCNC: 4.1 G/DL (ref 2.9–4.4)
ALBUMIN SERPL ELPH-MCNC: 4.1 G/DL (ref 2.9–4.4)
ALBUMIN/GLOB SERPL: 1.2 {RATIO} (ref 0.7–1.7)
ALBUMIN/GLOB SERPL: 1.2 {RATIO} (ref 0.7–1.7)
ALDOLASE SERPL-CCNC: 3.4 U/L (ref 3.3–10.3)
ALDOLASE SERPL-CCNC: 3.4 U/L (ref 3.3–10.3)
ALPHA1 GLOB SERPL ELPH-MCNC: 0.3 G/DL (ref 0–0.4)
ALPHA1 GLOB SERPL ELPH-MCNC: 0.3 G/DL (ref 0–0.4)
ALPHA2 GLOB SERPL ELPH-MCNC: 0.9 G/DL (ref 0.4–1)
ALPHA2 GLOB SERPL ELPH-MCNC: 0.9 G/DL (ref 0.4–1)
B-GLOBULIN SERPL ELPH-MCNC: 1.1 G/DL (ref 0.7–1.3)
B-GLOBULIN SERPL ELPH-MCNC: 1.1 G/DL (ref 0.7–1.3)
C-ANCA TITR SER IF: NORMAL TITER
GAMMA GLOB SERPL ELPH-MCNC: 1.1 G/DL (ref 0.4–1.8)
GAMMA GLOB SERPL ELPH-MCNC: 1.1 G/DL (ref 0.4–1.8)
GLOBULIN SER CALC-MCNC: 3.3 G/DL (ref 2.2–3.9)
GLOBULIN SER CALC-MCNC: 3.3 G/DL (ref 2.2–3.9)
LABORATORY COMMENT REPORT: NORMAL
LABORATORY COMMENT REPORT: NORMAL
M PROTEIN SERPL ELPH-MCNC: NORMAL G/DL
M PROTEIN SERPL ELPH-MCNC: NORMAL G/DL
MYELOPEROXIDASE AB SER IA-ACNC: <9 U/ML (ref 0–9)
P-ANCA ATYPICAL TITR SER IF: NORMAL TITER
P-ANCA TITR SER IF: NORMAL TITER
PROT SERPL-MCNC: 7.4 G/DL (ref 6–8.5)
PROT SERPL-MCNC: 7.4 G/DL (ref 6–8.5)
PROTEINASE3 AB SER IA-ACNC: <3.5 U/ML (ref 0–3.5)

## 2019-10-21 ENCOUNTER — PATIENT MESSAGE (OUTPATIENT)
Dept: FAMILY MEDICINE | Facility: CLINIC | Age: 44
End: 2019-10-21

## 2019-10-21 LAB
A1AT PHENOTYP SERPL IFE: NORMAL
A1AT SERPL-MCNC: 173 MG/DL (ref 90–200)
CRYOGLOB SER QL 1D COLD INC: NORMAL
CRYOGLOB SER QL 1D COLD INC: NORMAL

## 2019-10-24 DIAGNOSIS — K58.1 IRRITABLE BOWEL SYNDROME WITH CONSTIPATION: ICD-10-CM

## 2019-10-24 DIAGNOSIS — G62.9 PERIPHERAL POLYNEUROPATHY: ICD-10-CM

## 2019-10-24 DIAGNOSIS — R09.82 POST-NASAL DRIP: ICD-10-CM

## 2019-10-24 DIAGNOSIS — R11.0 NAUSEA: ICD-10-CM

## 2019-10-24 DIAGNOSIS — K21.9 LARYNGOPHARYNGEAL REFLUX (LPR): ICD-10-CM

## 2019-10-24 DIAGNOSIS — J40 CHRONIC SINUSITIS WITH RECURRENT BRONCHITIS: ICD-10-CM

## 2019-10-24 DIAGNOSIS — J32.9 CHRONIC SINUSITIS WITH RECURRENT BRONCHITIS: ICD-10-CM

## 2019-10-24 RX ORDER — GABAPENTIN 400 MG/1
CAPSULE ORAL
Qty: 90 CAPSULE | Refills: 0 | Status: SHIPPED | OUTPATIENT
Start: 2019-10-24 | End: 2019-11-25 | Stop reason: SDUPTHER

## 2019-10-24 RX ORDER — FLUTICASONE PROPIONATE 50 MCG
SPRAY, SUSPENSION (ML) NASAL
Qty: 16 ML | Refills: 0 | Status: SHIPPED | OUTPATIENT
Start: 2019-10-24 | End: 2019-12-13 | Stop reason: SDUPTHER

## 2019-10-24 RX ORDER — DICYCLOMINE HYDROCHLORIDE 20 MG/1
TABLET ORAL
Qty: 60 TABLET | Refills: 0 | Status: SHIPPED | OUTPATIENT
Start: 2019-10-24 | End: 2019-11-24 | Stop reason: SDUPTHER

## 2019-10-24 RX ORDER — GABAPENTIN 100 MG/1
CAPSULE ORAL
Qty: 180 CAPSULE | Refills: 0 | Status: SHIPPED | OUTPATIENT
Start: 2019-10-24 | End: 2019-11-25 | Stop reason: SDUPTHER

## 2019-10-24 RX ORDER — ONDANSETRON 4 MG/1
TABLET, ORALLY DISINTEGRATING ORAL
Qty: 30 TABLET | Refills: 0 | Status: SHIPPED | OUTPATIENT
Start: 2019-10-24 | End: 2019-11-24 | Stop reason: SDUPTHER

## 2019-10-24 RX ORDER — AZELASTINE 1 MG/ML
SPRAY, METERED NASAL
Qty: 30 ML | Refills: 0 | Status: SHIPPED | OUTPATIENT
Start: 2019-10-24 | End: 2019-11-24 | Stop reason: SDUPTHER

## 2019-10-29 ENCOUNTER — HOSPITAL ENCOUNTER (OUTPATIENT)
Dept: PULMONOLOGY | Facility: HOSPITAL | Age: 44
Discharge: HOME | End: 2019-10-29
Attending: INTERNAL MEDICINE
Payer: MEDICAID

## 2019-10-29 ENCOUNTER — TELEPHONE (OUTPATIENT)
Dept: PULMONOLOGY | Facility: CLINIC | Age: 44
End: 2019-10-29

## 2019-10-29 VITALS — HEIGHT: 62 IN | WEIGHT: 152 LBS | BODY MASS INDEX: 27.97 KG/M2

## 2019-10-29 DIAGNOSIS — M35.00 SJOGREN'S SYNDROME WITHOUT EXTRAGLANDULAR INVOLVEMENT: ICD-10-CM

## 2019-10-29 DIAGNOSIS — J98.4 CYSTIC-BULLOUS DISEASE OF LUNG: ICD-10-CM

## 2019-10-29 DIAGNOSIS — F17.219 CIGARETTE NICOTINE DEPENDENCE WITH NICOTINE-INDUCED DISORDER: ICD-10-CM

## 2019-10-29 DIAGNOSIS — F51.04 CHRONIC INSOMNIA: ICD-10-CM

## 2019-10-29 DIAGNOSIS — F17.200 CURRENT EVERY DAY SMOKER: ICD-10-CM

## 2019-10-29 DIAGNOSIS — J32.9 CHRONIC SINUSITIS WITH RECURRENT BRONCHITIS: ICD-10-CM

## 2019-10-29 DIAGNOSIS — Z72.0 TOBACCO ABUSE: Primary | ICD-10-CM

## 2019-10-29 DIAGNOSIS — J40 CHRONIC SINUSITIS WITH RECURRENT BRONCHITIS: ICD-10-CM

## 2019-10-29 DIAGNOSIS — J84.9 INTERSTITIAL LUNG DISEASE: ICD-10-CM

## 2019-10-29 LAB
ALLENS TEST: NORMAL
DELSYS: NORMAL
HCO3 UR-SCNC: 25.4 MMOL/L (ref 24–28)
PCO2 BLDA: 37.6 MMHG (ref 35–45)
PH SMN: 7.44 [PH] (ref 7.35–7.45)
PO2 BLDA: 95 MMHG (ref 80–100)
POC BE: 1 MMOL/L
POC SATURATED O2: 98 % (ref 95–100)
POC TCO2: 27 MMOL/L (ref 23–27)
SAMPLE: NORMAL
SITE: NORMAL

## 2019-10-29 PROCEDURE — 94727 GAS DIL/WSHOT DETER LNG VOL: CPT

## 2019-10-29 PROCEDURE — 94618 PULMONARY STRESS TESTING: CPT

## 2019-10-29 PROCEDURE — 82803 BLOOD GASES ANY COMBINATION: CPT

## 2019-10-29 PROCEDURE — 36600 WITHDRAWAL OF ARTERIAL BLOOD: CPT

## 2019-10-29 PROCEDURE — 99900035 HC TECH TIME PER 15 MIN (STAT)

## 2019-10-29 PROCEDURE — 94010 BREATHING CAPACITY TEST: CPT

## 2019-10-29 PROCEDURE — 94729 DIFFUSING CAPACITY: CPT

## 2019-10-29 RX ORDER — DIAZEPAM 5 MG/1
TABLET ORAL
Qty: 60 TABLET | Refills: 0 | Status: SHIPPED | OUTPATIENT
Start: 2019-10-29 | End: 2019-12-31

## 2019-10-29 NOTE — CARE UPDATE
"   10/29/19 0932   6MW Test   Ordering Provider Dr Nate Tarango   Diagnosis Qualify for Oxygen   Height 5' 2" (1.575 m)   Weight 68.9 kg (152 lb)   BMI (Calculated) 27.9   Predicted Distance 453.21   Patient Race    6MWT Status completed without stopping   Patient Reported Dyspnea   Was O2 used? Yes   Delivery Method Cannula   6MW Distance walked (feet) 1800 feet   Distance walked (meters) 548.64 meters   Did patient stop? No   Type of assistive device(s) used? no assistive devices   Is extra documentation required for this patient? Yes   Pre-Exercise   Oxygen Saturation 92 %   Supplemental Oxygen Room Air   Heart Rate 89 bpm   [place] Dyspnea Rating  very, very light (just noticeable)   Exercise 1 Minute   Oxygen Saturation 90 %   Supplemental Oxygen Room Air   Heart Rate 90 bpm   Exercise 2 Minutes   Oxygen Saturation 87 %   Supplemental Oxygen Room Air   Heart Rate 107 bpm   Exercise 3 Minutes   Oxygen Saturation 88 %   Supplemental Oxygen 2 L/M   Heart Rate 101 bpm   Exercise 4 Minutes   Oxygen Saturation 93 %   Supplemental Oxygen 2 L/M   Heart Rate 100 bpm   Exercise 5 Minutes   Oxygen Saturation 93 %   Supplemental Oxygen 2 L/M   Heart Rate 112 bpm   Post Exercise   Oxygen Saturation 92 %   Supplemental Oxygen 2 L/M   Heart Rate 111 bpm   [place] Dyspnea Rating  moderate   Recovery   Recovery Time (seconds) 95 seconds   Supplemental Oxygen 2 L/M   Heart Rate 100 bpm   [place] Dyspnea Rating  very light   Interpretation   Is procedure ready for interpretation? Yes   Did the patient stop or pause? No   Total Time Walked (Calculated) 360 seconds   Total Laps Walked 9   Final Partial Lap Distance (feet) 0 feet   Total Distance Feet (Calculated) 1800 feet   Total Distance Meters (Calculated) 548.64 meters   Predicted Distance Meters (Calculated) 592.9 meters   Percentage of Predicted (Calculated) 92.53   Peak VO2 (Calculated) 20.44   Mets 5.84   Comments This is a hypoxemic 6 min. walk. Patient requires 2lpm of " supplemental oxygen with ambulation.    Oxygen Qualification   Oxygen Qualification? Yes

## 2019-11-05 ENCOUNTER — OFFICE VISIT (OUTPATIENT)
Dept: PULMONOLOGY | Facility: CLINIC | Age: 44
End: 2019-11-05
Payer: MEDICAID

## 2019-11-05 VITALS
SYSTOLIC BLOOD PRESSURE: 102 MMHG | DIASTOLIC BLOOD PRESSURE: 70 MMHG | BODY MASS INDEX: 27.8 KG/M2 | HEART RATE: 101 BPM | WEIGHT: 152 LBS | OXYGEN SATURATION: 93 %

## 2019-11-05 DIAGNOSIS — J84.9 INTERSTITIAL LUNG DISEASE: Primary | ICD-10-CM

## 2019-11-05 DIAGNOSIS — J98.4 CYSTIC-BULLOUS DISEASE OF LUNG: ICD-10-CM

## 2019-11-05 DIAGNOSIS — Z72.0 TOBACCO ABUSE: ICD-10-CM

## 2019-11-05 DIAGNOSIS — J96.11 CHRONIC HYPOXEMIC RESPIRATORY FAILURE: ICD-10-CM

## 2019-11-05 DIAGNOSIS — F17.219 CIGARETTE NICOTINE DEPENDENCE WITH NICOTINE-INDUCED DISORDER: ICD-10-CM

## 2019-11-05 DIAGNOSIS — F17.200 CURRENT EVERY DAY SMOKER: ICD-10-CM

## 2019-11-05 DIAGNOSIS — M35.1 MCTD (MIXED CONNECTIVE TISSUE DISEASE): ICD-10-CM

## 2019-11-05 PROCEDURE — 99215 OFFICE O/P EST HI 40 MIN: CPT | Mod: 25,S$GLB,, | Performed by: INTERNAL MEDICINE

## 2019-11-05 PROCEDURE — 90732 PPSV23 VACC 2 YRS+ SUBQ/IM: CPT | Mod: S$GLB,,, | Performed by: INTERNAL MEDICINE

## 2019-11-05 PROCEDURE — 90732 PNEUMOCOCCAL POLYSACCHARIDE VACCINE 23-VALENT =>2YO SQ IM: ICD-10-PCS | Mod: S$GLB,,, | Performed by: INTERNAL MEDICINE

## 2019-11-05 PROCEDURE — 90471 IMMUNIZATION ADMIN: CPT | Mod: S$GLB,,, | Performed by: INTERNAL MEDICINE

## 2019-11-05 PROCEDURE — 99215 PR OFFICE/OUTPT VISIT, EST, LEVL V, 40-54 MIN: ICD-10-PCS | Mod: 25,S$GLB,, | Performed by: INTERNAL MEDICINE

## 2019-11-05 PROCEDURE — 90471 PNEUMOCOCCAL POLYSACCHARIDE VACCINE 23-VALENT =>2YO SQ IM: ICD-10-PCS | Mod: S$GLB,,, | Performed by: INTERNAL MEDICINE

## 2019-11-05 RX ORDER — ASPIRIN/CALCIUM CARB/MAGNESIUM 325 MG
4 TABLET ORAL
Qty: 216 LOZENGE | Refills: 5 | Status: SHIPPED | OUTPATIENT
Start: 2019-11-05 | End: 2020-09-09

## 2019-11-05 RX ORDER — VARENICLINE TARTRATE 0.5 MG/1
0.5 TABLET, FILM COATED ORAL 2 TIMES DAILY
Qty: 180 TABLET | Refills: 5 | Status: SHIPPED | OUTPATIENT
Start: 2019-11-05 | End: 2020-05-11

## 2019-11-05 NOTE — PROGRESS NOTES
[facility]  Pulmonology  Follow-Up Clinic Visit    Subjective:     Promise Medrano is a 44 y.o. 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  Since our last visit she has not quit smoking and continues to smoke pack of cigarettes daily.  However, she is subjectively unchanged since our last visit, and has no new complaints today.  She is not required any doses of steroids nor has she seen any other healthcare providers since I last saw her a month ago.    She is not on home oxygen.  She is currently smoking.    Review of Systems   Constitutional: Positive for activity change, fatigue and weakness. Negative for fever, chills, weight loss, weight gain, appetite change and night sweats.   HENT: Negative for nosebleeds, postnasal drip, rhinorrhea, sinus pressure, sore throat, trouble swallowing, voice change and congestion.    Eyes: Negative for redness and itching.   Respiratory: Positive for cough, chest tightness, shortness of breath, previous hospitalization due to pulmonary problems and use of rescue inhaler. Negative for apnea, snoring, hemoptysis, sputum production, choking, wheezing, orthopnea, asthma nighttime symptoms, pleurisy, dyspnea on extertion, somnolence and Paroxysmal Nocturnal Dyspnea.    Cardiovascular: Negative for chest pain, palpitations and leg swelling.   Genitourinary: Negative for difficulty urinating and hematuria.   Endocrine: Negative for polydipsia, polyphagia, cold intolerance, heat intolerance and polyuria.    Musculoskeletal: Positive for arthralgias and myalgias. Negative for joint swelling.   Skin: Negative for rash.   Gastrointestinal: Negative for nausea, vomiting, abdominal pain and acid reflux.   Neurological: Positive for dizziness, weakness and light-headedness. Negative for syncope.   Hematological: Negative for adenopathy. Does not bruise/bleed easily and no excessive bruising.   Psychiatric/Behavioral:  Negative for confusion and sleep disturbance. The patient is not nervous/anxious.    All other systems reviewed and are negative.     Outpatient Medications as of 11/5/2019    albuterol-ipratropium (DUO-NEB) 2.5 mg-0.5 mg/3 mL nebulizer solution    atorvastatin (LIPITOR) 80 MG tablet    azelastine (ASTELIN) 137 mcg (0.1 %) nasal spray    blood sugar diagnostic Strp    blood sugar diagnostic Strp    blood-glucose meter kit    blood-glucose meter, drum-type (ACCU-CHEK COMPACT PLUS CARE) Kit    budesonide-formoterol 160-4.5 mcg (SYMBICORT) 160-4.5 mcg/actuation HFAA    CETAPHIL MOISTURIZING cream    diazePAM (VALIUM) 5 MG tablet    diclofenac sodium (VOLTAREN) 1 % Gel    dicyclomine (BENTYL) 20 mg tablet    ergocalciferol (ERGOCALCIFEROL) 50,000 unit Cap    ezetimibe (ZETIA) 10 mg tablet    ferrous sulfate (FEOSOL) 325 mg (65 mg iron) Tab tablet    fluticasone propionate (FLONASE) 50 mcg/actuation nasal spray    gabapentin (NEURONTIN) 100 MG capsule    gabapentin (NEURONTIN) 400 MG capsule    hydrocortisone 1 % cream    ibuprofen (ADVIL,MOTRIN) 600 MG tablet    inhalat.spacing dev,med. mask (AEROCHAMBER PLUS Z STAT MD LOPEZ) Spcr    ketoconazole (NIZORAL) 2 % cream    lancets (ACCU-CHEK SOFTCLIX LANCETS) Misc    levocetirizine (XYZAL) 5 MG tablet    midodrine (PROAMATINE) 5 MG Tab    montelukast (SINGULAIR) 10 mg tablet    mupirocin (BACTROBAN) 2 % ointment    omeprazole (PRILOSEC) 40 MG capsule    ondansetron (ZOFRAN-ODT) 4 MG TbDL    ranitidine (ZANTAC) 150 MG tablet    STIOLTO RESPIMAT 2.5-2.5 mcg/actuation Mist    triamcinolone acetonide 0.1% (KENALOG) 0.1 % cream    urea 40 % Lotn lotion    XIIDRA 5 % Dpet      I personally reviewed the following during today's visit:  active problem list, medication list, allergies, family history, social history, health maintenance, notes from last encounter, lab results, endoscopy procedure notes, imaging    Objective:        /70 (BP  Location: Left arm, Patient Position: Sitting)   Pulse 101   Wt 68.9 kg (152 lb)   SpO2 (!) 93%   BMI 27.80 kg/m²     Physical Exam   Constitutional: She is oriented to person, place, and time. Vital signs are normal. She appears well-developed and well-nourished. She is cooperative.  Non-toxic appearance. No distress.   HENT:   Head: Normocephalic and atraumatic.   Right Ear: Hearing and external ear normal.   Left Ear: Hearing and external ear normal.   Nose: Nose normal. No mucosal edema, rhinorrhea, sinus tenderness, nasal deformity, septal deviation or nasal septal hematoma. Right sinus exhibits no maxillary sinus tenderness and no frontal sinus tenderness. Left sinus exhibits no maxillary sinus tenderness and no frontal sinus tenderness.   Mouth/Throat: Uvula is midline, oropharynx is clear and moist and mucous membranes are normal. Normal dentition. No dental abscesses or dental caries. No oropharyngeal exudate or posterior oropharyngeal edema. Mallampati Score: I.   Neck: Normal range of motion. Neck supple. No JVD present. No tracheal deviation present. No thyromegaly present.   Cardiovascular: Normal rate, regular rhythm, normal heart sounds and intact distal pulses. Exam reveals no gallop and no friction rub.   No murmur heard.  Pulmonary/Chest: Normal expansion, symmetric chest wall expansion and effort normal. No stridor. No tachypnea. No respiratory distress. She has no decreased breath sounds. She has no wheezes. She has no rhonchi. She has no rales (faint bibasilar rales). Chest wall is not dull to percussion. She exhibits no tenderness. Negative for egophony. Negative for tactile fremitus.   Abdominal: Soft. Bowel sounds are normal. She exhibits no distension and no mass. There is no hepatosplenomegaly. There is no tenderness. There is no rebound and no guarding. No hernia.   Musculoskeletal: Normal range of motion. She exhibits no edema, tenderness or deformity.   Lymphadenopathy: No  supraclavicular adenopathy is present.     She has no cervical adenopathy.     She has no axillary adenopathy.   Neurological: She is alert and oriented to person, place, and time. She has normal reflexes. No cranial nerve deficit. Gait normal.   Skin: Skin is warm and intact. No lesion, no petechiae and no rash noted. No cyanosis or erythema. No pallor. Nails show clubbing.   Psychiatric: Her behavior is normal. Judgment and thought content normal. Her mood appears anxious. Her speech is rapid and/or pressured and tangential. Cognition and memory are normal.   Nursing note and vitals reviewed.     Diagnostic Studies:  I have personally reviewed the following labs/studies/images.  Chest x-ray:   (June 25, 2012)  Radiology Report::  No acute cardiopulmonary abnormality.  My impression:  Unable to fully evaluate the bases due to breast implants.  Otherwise no gross abnormalities.     (April 14, 2019)  Radiology report:  None available  My impression:  Interval increase and basilar predominant bilateral hazy airspace opacities with a decrease and overall lung volume compared to prior chest radiograph.     I independently viewed the above imaging/lab studies in addition to reviewing the report      CT Chest:  (June 25, 2012)  Radiology report:    None available    My impression:    Available images of the lung bases from CT abdomen are unremarkable.      (August 31, 2018)  Radiology report:    1. Mild mosaic attenuation pattern in the lungs, which was present on theprevious exam of 07/14/2010, with scattered minimal bronchiolitis in both upper lobes and a few thin-walled parenchymal cysts. Smoking-related interstitial lung diseases could have this appearance, with air-trapping and small airways inflammation, or inhalational lung disease of various potential etiologies, constituting additional diagnostic considerations.  2. Stable prominent mediastinal lymph nodes, and prominent to borderline enlarged bilateral axillary  lymph nodes, nonspecific but presumably reactive given stability.    My impression:    Diffuse interlobular septal thickening with some areas having a nodular appearance as well.  Diffuse ground-glass opacities with some mosaicism at both bases.  Scattered thin walled cysts of varying sizes.  Mild paraseptal emphysema.  Overall, this could be consistent with some very early interstitial lung disease among other etiologies, but appearance is not classic for any specific I LD.       (2019)  Radiology report:  1. Negative for pulmonary embolus.  2. New diffuse bilateral pulmonary ground-glass opacities.  Potential etiologies include infectious or inflammatory alveolitis/pneumonitis, alveolar edema, or alveolar hemorrhage.  Clinical and laboratory correlation is needed.    My impression:  · Interval increase in the size and number of 10 walled cysts.  · Persistent interlobular septal thickening, but is difficult to clearly appreciated due to presence of IV contrast.  · Diffuse ground-glass opacities also difficult to compare to prior CT due to the presence of IV contrast.    I independently viewed the above imaging/lab studies in addition to reviewing the report      PFTs:  Date:  2019  FEV1:  2.49  (91 % predicted), FVC:  3.25 (96 % predicted), FEV1/FVC:  77, T.71 (102 % predicted), RV/TLVC:  33 (97 % predicted), DLCO:  14.95 (74 % predicted)  Computer interpretation:  Spirometry is within normal limits.  There was a significant response to inhaled bronchodilator in small airways  Lung volumes are normal.  Diffusion is normal.      My impression:   No evidence of obstructive lung disease, or reactive airways disease.   The clinical utility of measuring the FEF 25-75 is debatable, and it is response to bronchodilators even more so.  The statement significant response to inhaled bronchodilator and small airways is an inaccurate description of the overall impression from her  PFTs.  Significantly elevated FRC and ERV are unusual but suggest some intrinsic pulmonary dysfunction.    (2019)   FEV1:  2.37 L (86% predicted)  FVC 3.17 L (94% predicted)  FEV1/FVC:  75  T.75 L (103% predicted)  RV/T (97% predicted)  ERV:  1.30 L (120% predicted)  DLCO:  12.16 (60% predicted)  My impression:  No obstruction.  No restriction.  Mild diffusion impairment.  Unchanged compared to pulmonary function test obtained 4 months prior.      Methacholine Challenge:  None on file.      6MWT:   (2019)  Predicted distance 453 m  Actual distance:548 m  SpO2:  Pre-exercise 92% / During exercise 87% on room air and improved to 88% when placed on 2 LPM / recovery 92% on 2LPM  Results of this test qualify for supplemental oxygen at home with ambulation.      PSG:  Date:  2019  No central or obstructive sleep apnea.  Significant desaturations during supine sleep.      ECG:  None available to review.      Echocardiogram:   Date:  2018  · Estimated left ventricular ejection fraction is 60-65%  · Normal left atrial pressure diastolic function.  · There is mild mitral regurgitation.  · Estimated RVSP is 28 mmHg.  · No mention of pulmonary artery pressure    (October 15, 2019)  · Normal left ventricular systolic function. The estimated ejection fraction is 70%  · Normal LV diastolic function.  · No wall motion abnormalities.  · Normal right ventricular systolic function.  · Normal central venous pressure (3 mm Hg).  · The estimated PA systolic pressure is 28 mm Hg      BNP  2019:  38      Arterial blood gas:  (2019)  7.437 / 37.6 / 95 / 25.4 (obtained on ambient air)     Serology:   (2017)                 PRASHANTH Titer:   1:  320 (elevated)                 Pattern:    Speckled                 RF:    <15                  Anti CCP: <15.6     (2019)   CRP:    0.46   ESR:    10   RF:    < 10   Anti Lesa 1 Ab:  < 0.2   SPEP:    No  M-spike observed   C3:    136   C4:    18   A1AT:    173   A1AT Phenotype:  MM   Anti-SCL Ab:   < 0.2   P-ANCA:   <1:20   MPO:    <1:20   C-ANCA:   <1:20   Proteinase 3 Ab:  <93.5   PRASHANTH:    Positive   >1:80  (speckled pattern)   Anti SSA Ab:   <0.2   Anti SSB Ab:  <0.2   HIV 1/2 Ag/Ab: Non-reactive   Cryoglobulin:   None detected   LDH:    112   ACE:    44   CK:   49   Aldolase: 3.4   MyoMarker Panel 3:  Pending   Th/To Ab:   Pending   Anti Sm/RNP Ab:  Pending   PM-Scl Ab:   Pending   RNA poly IgG:   Pending   HP Screen:   Pending   PRASHANTH by IFA:   Pending    Assessment:       1. Interstitial lung disease    2. Cystic-bullous disease of lung    3. Tobacco abuse    4. Current every day smoker    5. Cigarette nicotine dependence with nicotine-induced disorder    6. MCTD (mixed connective tissue disease)    7. Chronic hypoxemic respiratory failure        Impression:  Undifferentiated ILD with vague serology; carries diagnosis of MCTD, but speckled PRASHANTH pattern and cysts/reticular nodular appearance on CT raise the possibility of Sjogren's / LIP.  Regardless, smoking cessation is imperative!    Plan:   · Needs up to date non-contrasted chest CT to better evaluate lung parenchyma.   · Will obtain CT chest without contrast and discuss with rheumatology prior to next visit.  · Considering bronchoscopy with TBBx depending on CT results.  · No evidence of reactive airways disease or obstructive pulmonary disease.  No clear indication for bronchodilators/ICS, but on the other hand no clear contraindication either.  · Start chantix..  · Discontinue cigarettes.  · Risk associated with cigarette smoking and benefits of cessation were discussed with patient in detail for 10 minutes, and cessation encouraged.  Pharmacologic and non-pharmacologic therapeutic options were discussed.  Administer 23-valent pneumococcal vaccine today and repeat pneumococcal titers prior in 4 weeks.  · I informed the patient of my working diagnosis, it's  etiology, risk factors, expected symptoms, diagnostic work up, treatment options and prognosis., I personally reviewed the results of relevant imaging/labs/studies with the patient, and discussed their clinical significance., I spent >10 minutes counseling the patient about their condition., Plan discussed with the patient, who is in agreement., Opportunity provided for the the patient to voice any additional questions or concerns., All questions were answered to the patient's satisfaction., Educational material provided and Patient given date for next visit.    Follow up in about 2 weeks (around 11/19/2019).    Nate Tarango MD  Pulmonary / Critical Care Medicine  [facility]  11/05/2019

## 2019-11-05 NOTE — LETTER
November 5, 2019      Katiana Cline MD  [address]  [address]  [place] [address]           [facility] - Pulmonology  [address]  [place] [address]  Phone: [phone]  Fax: [phone]          Patient: Promise Medrano   MR Number: [id number]   YOB: 1975   Date of Visit: 11/5/2019       Dear Dr. Katiana Cline:    Thank you for referring Promise Medrano to me for evaluation. Attached you will find relevant portions of my assessment and plan of care.    If you have questions, please do not hesitate to call me. I look forward to following Promise Medrano along with you.    Sincerely,    Nate Tarango MD    Enclosure  CC:  No Recipients    If you would like to receive this communication electronically, please contact [email or web address] or [phone] to request more information on [email or web address] Link access.    For providers and/or their staff who would like to refer a patient to [facility], please contact us through our one-stop-shop provider referral line, [facility], at [phone].    If you feel you have received this communication in error or would no longer like to receive these types of communications, please e-mail [email or web address]

## 2019-11-05 NOTE — PATIENT INSTRUCTIONS
QUIT SMOKING!!!!!!!!  Take the chantix!!!  Go to smoking cessation class!!!!!  Get CT chest next week.  Return to clinic in 2 weeks.  Call with any questions or concerns.        How to Quit Smoking  Smoking is one of the hardest habits to break. About half of all people who have ever smoked have been able to quit. Most people who still smoke want to quit. Here are some of the best ways to stop smoking.    Keep trying  Most smokers make many attempts at quitting before they are successful. Its important not to give up.  Go cold turkey  Most former smokers quit cold turkey (all at once). Trying to cut back gradually doesn't seem to work as well, perhaps because it continues the smoking habit. Also, it is possible to inhale more while smoking fewer cigarettes. This results in the same amount of nicotine in your body.  Get support  Support programs can be a big help, especially for heavy smokers. These groups offer lectures, ways to change behavior, and peer support. Here are some ways to find a support program:  · Free national quitline: EngageQUIT-NOW (268-968-8842).  · Hospital quit-smoking programs.  · American Lung Association: (576.625.5392).  · American Cancer Society (502-143-3418).  Support at home is important too. Nonsmokers can offer praise and encouragement. If the smoker in your life finds it hard to quit, encourage them to keep trying.  Over-the-counter medicines  Nicotine replacement therapy may make quitting easier. Certain aids, such as the nicotine patch, gum, and lozenges, are available without a prescription. It is best to use these under a doctors care, though. The skin patch provides a steady supply of nicotine. Nicotine gum and lozenges give temporary bursts of low levels of nicotine. Both methods reduce the craving for cigarettes. Warning: If you have nausea, vomiting, dizziness, weakness, or a fast heartbeat, stop using these products and see your doctor.  Prescription medicines  After  "reviewing your smoking patterns and past attempts to quit, your doctor may offer a prescription medicine such as bupropion, varenicline, a nicotine inhaler, or nasal spray. Each has advantages and side effects. Your doctor can review these with you.  Health benefits of quitting  The benefits of quitting start right away and keep improving the longer you go without smoking. These benefits occur at any age.  So whether you are 17 or 70, quitting is a good decision. Some of the benefits include:  · 20 minutes: Blood pressure and pulse return to normal.  · 8 hours: Oxygen levels return to normal.  · 2 days: Ability to smell and taste begin to improve as damaged nerves regrow.  · 2 to 3 weeks: Circulation and lung function improve.  · 1 to 9 months: Coughing, congestion, and shortness of breath decrease; tiredness decreases.  · 1 year: Risk of heart attack decreases by half.  · 5 years: Risk of lung cancer decreases by half; risk of stroke becomes the same as a nonsmokers.  For more on how to quit smoking, try these online resources:   · Operative Mind.gov  · "Clearing the Air" booklet from the National Cancer [place]: smokefree.gov/sites/default/files/pdf/clearing-the-air-accessible.pdf  Date Last Reviewed: 3/1/2017  © 9867-4060 [employer]. [address] [place] [address], [place] [address]. All rights reserved. This information is not intended as a substitute for professional medical care. Always follow your healthcare professional's instructions.      Diagnosing Interstitial Lung Disease  When you have interstitial lung disease, your lungs become scarred. This makes it hard for you to breathe. To diagnose interstitial lung disease, your healthcare provider needs to know about your job, lifestyle, and symptoms. Your provider will listen to your lungs and heart and look at your nose and throat. You may also need tests.     A spirometer measures the amount of air you can inhale and the speed at which you can exhale " air.   Your health history  You may be asked some of these questions:  · Do you have shortness of breath?  · What medicines do you take?  · Have you breathed in any harmful substances over a long period of time? This could include molds, chemicals, asbestos, silica, or coal dust.  · Do you smoke?  · Have you had radiation therapy? This could be to treat breast cancer, for example.  · Have you had lung infections?  · Is there a family history of lung disease or connective tissue disorders?  Tests  You may need routine blood tests and a chest X-ray. Other tests may include:  · ECG (electrocardiogram). This test checks your heart rhythm. It helps rule out heart disease as the cause of your shortness of breath.  · CT scan. This test makes a more detailed X-ray image of your lungs.  · Pulse oximetry. This test uses a small device placed on a fingertip. It measures the level of oxygen in your blood at rest and with exercise.  · Arterial blood gas. This test measures the level of oxygen in your blood. The healthcare provider take a blood sample from an artery, usually at your wrist.  · Cardiopulmonary stress test. This test checks how well your heart and lungs work when you are active. You usually ride a stationary bike or walk on  a treadmill.  · Bronchoscopy. This test allows the doctor to look inside your airways with a thin, lighted tube (bronchoscope). The bronchoscope is passed through your mouth and throat and into your lungs. The doctor may pass tools through the bronchoscope to remove small samples of your lung tissue. This is called a biopsy. The tissue samples can help check for damage or inflammation in your lungs.  · Surgical lung biopsy. This test removes samples of your lung tissue through one or more cuts (incisions) made between your ribs or in your chest. The tissue samples are often larger than those taken with bronchoscopy. They may provide more accurate diagnosis of your lung problem.  Pulmonary  function tests (PFTs)  PFTs are done in the healthcare providers office or in the hospital lab. You may be tested both before and after taking medicine. And you may have to repeat the tests from time to time during your treatment. Your healthcare provider may order:  · Spirometry. This test measures how much air you can breathe out after a deep breath (forced vital capacity). The test also measures how much air you can breathe out in 1 second after a deep breath. This is called forced expiratory volume-1 second.  · Lung volume tests. These measure how much air you inhale and how much air is left in your lungs after you exhale.  · Lung diffusion test. This estimates how much oxygen is transported from the tiny air sacs in your lungs (alveoli) to your blood.  · 6-minute walk test. This test measures the distance you are able to walk in 6 minutes. The provider will also check your oxygen levels.  Date Last Reviewed: 9/1/2016  © 5081-1046 "[employer]. [address] [place] [address], [place] [address]. All rights reserved. This information is not intended as a substitute for professional medical care. Always follow your healthcare professional's instructions.

## 2019-11-24 DIAGNOSIS — G62.9 PERIPHERAL POLYNEUROPATHY: ICD-10-CM

## 2019-11-24 DIAGNOSIS — K21.9 LARYNGOPHARYNGEAL REFLUX (LPR): ICD-10-CM

## 2019-11-24 DIAGNOSIS — K58.1 IRRITABLE BOWEL SYNDROME WITH CONSTIPATION: ICD-10-CM

## 2019-11-24 DIAGNOSIS — J40 CHRONIC SINUSITIS WITH RECURRENT BRONCHITIS: ICD-10-CM

## 2019-11-24 DIAGNOSIS — R11.0 NAUSEA: ICD-10-CM

## 2019-11-24 DIAGNOSIS — J32.9 CHRONIC SINUSITIS WITH RECURRENT BRONCHITIS: ICD-10-CM

## 2019-11-24 DIAGNOSIS — E61.1 IRON DEFICIENCY: ICD-10-CM

## 2019-11-24 RX ORDER — DICYCLOMINE HYDROCHLORIDE 20 MG/1
TABLET ORAL
Qty: 60 TABLET | Refills: 0 | Status: SHIPPED | OUTPATIENT
Start: 2019-11-24 | End: 2019-12-30 | Stop reason: SDUPTHER

## 2019-11-24 RX ORDER — ONDANSETRON 4 MG/1
TABLET, ORALLY DISINTEGRATING ORAL
Qty: 30 TABLET | Refills: 0 | Status: SHIPPED | OUTPATIENT
Start: 2019-11-24 | End: 2019-12-30

## 2019-11-24 RX ORDER — FERROUS SULFATE 325(65) MG
TABLET ORAL
Qty: 90 TABLET | Refills: 0 | Status: SHIPPED | OUTPATIENT
Start: 2019-11-24 | End: 2020-03-21

## 2019-11-25 RX ORDER — AZELASTINE 1 MG/ML
SPRAY, METERED NASAL
Qty: 30 ML | Refills: 0 | Status: SHIPPED | OUTPATIENT
Start: 2019-11-25 | End: 2019-12-30

## 2019-11-25 RX ORDER — GABAPENTIN 100 MG/1
CAPSULE ORAL
Qty: 180 CAPSULE | Refills: 0 | Status: SHIPPED | OUTPATIENT
Start: 2019-11-25 | End: 2019-12-30

## 2019-11-25 RX ORDER — MONTELUKAST SODIUM 10 MG/1
TABLET ORAL
Qty: 90 TABLET | Refills: 0 | Status: SHIPPED | OUTPATIENT
Start: 2019-11-25 | End: 2020-03-23

## 2019-11-25 RX ORDER — GABAPENTIN 400 MG/1
CAPSULE ORAL
Qty: 90 CAPSULE | Refills: 0 | Status: SHIPPED | OUTPATIENT
Start: 2019-11-25 | End: 2019-12-30

## 2019-12-04 ENCOUNTER — LAB VISIT (OUTPATIENT)
Dept: LAB | Facility: HOSPITAL | Age: 44
End: 2019-12-04
Attending: INTERNAL MEDICINE
Payer: MEDICAID

## 2019-12-04 ENCOUNTER — OFFICE VISIT (OUTPATIENT)
Dept: PULMONOLOGY | Facility: CLINIC | Age: 44
End: 2019-12-04
Payer: MEDICAID

## 2019-12-04 VITALS
HEART RATE: 93 BPM | HEIGHT: 62 IN | SYSTOLIC BLOOD PRESSURE: 120 MMHG | OXYGEN SATURATION: 96 % | BODY MASS INDEX: 27.79 KG/M2 | DIASTOLIC BLOOD PRESSURE: 70 MMHG | WEIGHT: 151 LBS

## 2019-12-04 DIAGNOSIS — J98.4 DISEASE OF LUNG: ICD-10-CM

## 2019-12-04 DIAGNOSIS — F17.200 TOBACCO USE DISORDER: ICD-10-CM

## 2019-12-04 DIAGNOSIS — M35.1 MIXED CONNECTIVE TISSUE DISEASE: ICD-10-CM

## 2019-12-04 DIAGNOSIS — J98.4 CYSTIC-BULLOUS DISEASE OF LUNG: ICD-10-CM

## 2019-12-04 DIAGNOSIS — M35.1 MCTD (MIXED CONNECTIVE TISSUE DISEASE): ICD-10-CM

## 2019-12-04 DIAGNOSIS — J84.09 SIMPLE PULMONARY ALVEOLITIS: Primary | ICD-10-CM

## 2019-12-04 DIAGNOSIS — F17.219 CIGARETTE NICOTINE DEPENDENCE WITH NICOTINE-INDUCED DISORDER: ICD-10-CM

## 2019-12-04 DIAGNOSIS — Z72.0 TOBACCO ABUSE: ICD-10-CM

## 2019-12-04 DIAGNOSIS — F17.200 CURRENT EVERY DAY SMOKER: ICD-10-CM

## 2019-12-04 DIAGNOSIS — J84.9 ILD (INTERSTITIAL LUNG DISEASE): Primary | ICD-10-CM

## 2019-12-04 DIAGNOSIS — G44.301 INTRACTABLE POST-TRAUMATIC HEADACHE, UNSPECIFIED CHRONICITY PATTERN: ICD-10-CM

## 2019-12-04 DIAGNOSIS — J96.11 CHRONIC HYPOXEMIC RESPIRATORY FAILURE: ICD-10-CM

## 2019-12-04 PROCEDURE — 36415 COLL VENOUS BLD VENIPUNCTURE: CPT

## 2019-12-04 PROCEDURE — 99214 PR OFFICE/OUTPT VISIT, EST, LEVL IV, 30-39 MIN: ICD-10-PCS | Mod: S$GLB,,, | Performed by: INTERNAL MEDICINE

## 2019-12-04 PROCEDURE — 86317 IMMUNOASSAY INFECTIOUS AGENT: CPT | Mod: 59

## 2019-12-04 PROCEDURE — 99214 OFFICE O/P EST MOD 30 MIN: CPT | Mod: S$GLB,,, | Performed by: INTERNAL MEDICINE

## 2019-12-04 RX ORDER — VARENICLINE TARTRATE 1 MG/1
1 TABLET, FILM COATED ORAL 2 TIMES DAILY
Qty: 60 TABLET | Refills: 3 | Status: SHIPPED | OUTPATIENT
Start: 2019-12-04 | End: 2020-05-11

## 2019-12-04 NOTE — PROGRESS NOTES
[facility]  Pulmonology  Follow-Up Clinic Visit    Subjective:     Reason for Visit:    Promise Medrano is a 44 y.o. female followed for undifferentiated ILD.    Chief Complaint   Patient presents with    Interstitial Lung Disease      Shortness of Breath   This is a chronic problem. The current episode started more than 1 year ago. The problem occurs constantly. The problem has been unchanged. Associated symptoms include chest pain, neck pain and wheezing. Pertinent negatives include no abdominal pain, coryza, fever, hemoptysis, leg pain, leg swelling, rash, rhinorrhea, sore throat, sputum production or vomiting. The symptoms are aggravated by emotional upset, exercise and URIs. Risk factors include smoking. She has tried oral steroids for the symptoms. The treatment provided moderate relief. Her past medical history is significant for allergies.        Review of Systems   Constitutional: Positive for activity change, fatigue and weakness. Negative for fever, chills, weight loss, weight gain, appetite change and night sweats.   HENT: Negative for nosebleeds, postnasal drip, rhinorrhea, sinus pressure, sore throat, trouble swallowing, voice change and congestion.    Eyes: Negative for redness and itching.   Respiratory: Positive for cough, chest tightness, shortness of breath, wheezing, previous hospitalization due to pulmonary problems and use of rescue inhaler. Negative for apnea, snoring, hemoptysis, sputum production, choking, orthopnea, asthma nighttime symptoms, pleurisy, dyspnea on extertion, somnolence and Paroxysmal Nocturnal Dyspnea.    Cardiovascular: Positive for chest pain. Negative for palpitations and leg swelling.   Genitourinary: Negative for difficulty urinating and hematuria.   Endocrine: Negative for polydipsia, polyphagia, cold intolerance, heat intolerance and polyuria.    Musculoskeletal: Positive for arthralgias, myalgias and neck pain. Negative for joint swelling.   Skin:  Negative for rash.   Gastrointestinal: Negative for nausea, vomiting, abdominal pain and acid reflux.   Neurological: Positive for dizziness, weakness and light-headedness. Negative for syncope.   Hematological: Negative for adenopathy. Does not bruise/bleed easily and no excessive bruising.   Psychiatric/Behavioral: Negative for confusion and sleep disturbance. The patient is not nervous/anxious.    All other systems reviewed and are negative.     Outpatient Medications as of 12/4/2019   Medication    albuterol-ipratropium (DUO-NEB) 2.5 mg-0.5 mg/3 mL nebulizer solution    atorvastatin (LIPITOR) 80 MG tablet    azelastine (ASTELIN) 137 mcg (0.1 %) nasal spray    blood sugar diagnostic Strp    blood sugar diagnostic Strp    blood-glucose meter kit    blood-glucose meter, drum-type (ACCU-CHEK COMPACT PLUS CARE) Kit    budesonide-formoterol 160-4.5 mcg (SYMBICORT) 160-4.5 mcg/actuation HFAA    CETAPHIL MOISTURIZING cream    diazePAM (VALIUM) 5 MG tablet    diclofenac sodium (VOLTAREN) 1 % Gel    dicyclomine (BENTYL) 20 mg tablet    ergocalciferol (ERGOCALCIFEROL) 50,000 unit Cap    ezetimibe (ZETIA) 10 mg tablet    ferrous sulfate (FEOSOL) 325 mg (65 mg iron) Tab tablet    fluticasone propionate (FLONASE) 50 mcg/actuation nasal spray    gabapentin (NEURONTIN) 100 MG capsule    gabapentin (NEURONTIN) 400 MG capsule    hydrocortisone 1 % cream    ibuprofen (ADVIL,MOTRIN) 600 MG tablet    inhalat.spacing dev,med. mask (AEROCHAMBER PLUS Z STAT MD LOPEZ) Spcr    ketoconazole (NIZORAL) 2 % cream    lancets (ACCU-CHEK SOFTCLIX LANCETS) Misc    levocetirizine (XYZAL) 5 MG tablet    midodrine (PROAMATINE) 5 MG Tab    montelukast (SINGULAIR) 10 mg tablet    mupirocin (BACTROBAN) 2 % ointment    nicotine polacrilex 4 MG Lozg    omeprazole (PRILOSEC) 40 MG capsule    ondansetron (ZOFRAN-ODT) 4 MG TbDL    ranitidine (ZANTAC) 150 MG tablet    STIOLTO RESPIMAT 2.5-2.5 mcg/actuation Mist     "triamcinolone acetonide 0.1% (KENALOG) 0.1 % cream    urea 40 % Lotn lotion    varenicline (CHANTIX) 0.5 MG Tab    XIIDRA 5 % Dpet     No current facility-administered medications on file as of 12/4/2019.       Previous Reports Reviewed:   ER records, historical medical records, imaging reports: chest x-ray positive for LD, lab reports, nursing home notes, office notes, operative reports, radiology reports, referral letter/letters and x-ray reports   The following portions of the patient's history were reviewed and updated as appropriate: allergies, current medications, past family history, past medical history, past social history, past surgical history and problem list.      Objective:     /70 (BP Location: Left arm, Patient Position: Sitting, BP Method: Medium (Manual))   Pulse 93   Ht 5' 2" (1.575 m)   Wt 68.5 kg (151 lb)   SpO2 96% Comment: 2LPM PD  BMI 27.62 kg/m²   Body mass index is 27.62 kg/m².     Physical Exam   Constitutional: She is oriented to person, place, and time. Vital signs are normal. She appears well-developed and well-nourished. She is cooperative.  Non-toxic appearance. No distress.   HENT:   Head: Normocephalic and atraumatic.   Right Ear: Hearing and external ear normal.   Left Ear: Hearing and external ear normal.   Nose: Nose normal. No mucosal edema, rhinorrhea, sinus tenderness, nasal deformity, septal deviation or nasal septal hematoma. Right sinus exhibits no maxillary sinus tenderness and no frontal sinus tenderness. Left sinus exhibits no maxillary sinus tenderness and no frontal sinus tenderness.   Mouth/Throat: Uvula is midline, oropharynx is clear and moist and mucous membranes are normal. Normal dentition. No dental abscesses or dental caries. No oropharyngeal exudate or posterior oropharyngeal edema. Mallampati Score: I.   Neck: Normal range of motion. Neck supple. No JVD present. No tracheal deviation present. No thyromegaly present.   Cardiovascular: Normal " rate, regular rhythm, normal heart sounds and intact distal pulses. Exam reveals no gallop and no friction rub.   No murmur heard.  Pulmonary/Chest: Normal expansion, symmetric chest wall expansion and effort normal. No stridor. No tachypnea. No respiratory distress. She has no decreased breath sounds. She has no wheezes. She has no rhonchi. She has no rales (faint bibasilar rales). Chest wall is not dull to percussion. She exhibits no tenderness. Negative for egophony. Negative for tactile fremitus.   Abdominal: Soft. Bowel sounds are normal. She exhibits no distension and no mass. There is no hepatosplenomegaly. There is no tenderness. There is no rebound and no guarding. No hernia.   Musculoskeletal: Normal range of motion. She exhibits no edema, tenderness or deformity.   Lymphadenopathy: No supraclavicular adenopathy is present.     She has no cervical adenopathy.     She has no axillary adenopathy.   Neurological: She is alert and oriented to person, place, and time. She has normal reflexes. No cranial nerve deficit. Gait normal.   Skin: Skin is warm and intact. No lesion, no petechiae and no rash noted. No cyanosis or erythema. No pallor. Nails show clubbing.   Psychiatric: Her behavior is normal. Judgment and thought content normal. Her mood appears anxious. Her speech is rapid and/or pressured and tangential. Cognition and memory are normal.   Nursing note and vitals reviewed.       Personal Review of Relevant Diagnostic Studies:  I have personally reviewed and interpreted the following labs/studies/images.  Chest x-ray:   (June 25, 2012)  Radiology Report::  No acute cardiopulmonary abnormality.  My impression:  Unable to fully evaluate the bases due to breast implants.  Otherwise no gross abnormalities.     (April 14, 2019)  Radiology report:  None available  My impression:  Interval increase and basilar predominant bilateral hazy airspace opacities with a decrease and overall lung volume compared to  prior chest radiograph.     I independently viewed the above imaging/lab studies in addition to reviewing the report      CT Chest:  (June 25, 2012)  Radiology report:    None available     My impression:    Available images of the lung bases from CT abdomen are unremarkable.        (August 31, 2018)  Radiology report:    1. Mild mosaic attenuation pattern in the lungs, which was present on theprevious exam of 07/14/2010, with scattered minimal bronchiolitis in both upper lobes and a few thin-walled parenchymal cysts. Smoking-related interstitial lung diseases could have this appearance, with air-trapping and small airways inflammation, or inhalational lung disease of various potential etiologies, constituting additional diagnostic considerations.  2. Stable prominent mediastinal lymph nodes, and prominent to borderline enlarged bilateral axillary lymph nodes, nonspecific but presumably reactive given stability.     My impression:    Diffuse interlobular septal thickening with some areas having a nodular appearance as well.  Diffuse ground-glass opacities with some mosaicism at both bases.  Scattered thin walled cysts of varying sizes.  Mild paraseptal emphysema.  Overall, this could be consistent with some very early interstitial lung disease among other etiologies, but appearance is not classic for any specific I LD.        (September 30, 2019)  Radiology report:  1. Negative for pulmonary embolus.  2. New diffuse bilateral pulmonary ground-glass opacities.  Potential etiologies include infectious or inflammatory alveolitis/pneumonitis, alveolar edema, or alveolar hemorrhage.  Clinical and laboratory correlation is needed.     My impression:  · Interval increase in the size and number of 10 walled cysts.  · Persistent interlobular septal thickening, but is difficult to clearly appreciated due to presence of IV contrast.  · Diffuse ground-glass opacities also difficult to compare to prior CT due to the presence of  IV contrast.     I independently viewed the above imaging/lab studies in addition to reviewing the report      PFTs:  Date:  2019  FEV1:  2.49  (91 % predicted), FVC:  3.25 (96 % predicted), FEV1/FVC:  77, T.71 (102 % predicted), RV/TLVC:  33 (97 % predicted), DLCO:  14.95 (74 % predicted)  Computer interpretation:  Spirometry is within normal limits.  There was a significant response to inhaled bronchodilator in small airways  Lung volumes are normal.  Diffusion is normal.      My impression:   No evidence of obstructive lung disease, or reactive airways disease.   The clinical utility of measuring the FEF 25-75 is debatable, and it is response to bronchodilators even more so.  The statement significant response to inhaled bronchodilator and small airways is an inaccurate description of the overall impression from her PFTs.  Significantly elevated FRC and ERV are unusual but suggest some intrinsic pulmonary dysfunction.     (2019)   FEV1:  2.37 L (86% predicted)  FVC 3.17 L (94% predicted)  FEV1/FVC:  75  T.75 L (103% predicted)  RV/T (97% predicted)  ERV:  1.30 L (120% predicted)  DLCO:  12.16 (60% predicted)  My impression:  No obstruction.  No restriction.  Mild diffusion impairment.  Unchanged compared to pulmonary function test obtained 4 months prior.      Methacholine Challenge:  None on file.      6MWT:   (2019)  Predicted distance 453 m  Actual distance:548 m  SpO2:  Pre-exercise 92% / During exercise 87% on room air and improved to 88% when placed on 2 LPM / recovery 92% on 2LPM  Results of this test qualify for supplemental oxygen at home with ambulation.      PSG:  Date:  2019  No central or obstructive sleep apnea.  Significant desaturations during supine sleep.      ECG:  None available to review.      Echocardiogram:   Date:  2018  · Estimated left ventricular ejection fraction is 60-65%  · Normal left atrial pressure diastolic  function.  · There is mild mitral regurgitation.  · Estimated RVSP is 28 mmHg.  · No mention of pulmonary artery pressure     (October 15, 2019)  · Normal left ventricular systolic function. The estimated ejection fraction is 70%  · Normal LV diastolic function.  · No wall motion abnormalities.  · Normal right ventricular systolic function.  · Normal central venous pressure (3 mm Hg).  · The estimated PA systolic pressure is 28 mm Hg      BNP  October 16, 2019:  38                     Arterial blood gas:  (October 29, 2019)  7.437 / 37.6 / 95 / 25.4 (obtained on ambient air)                    Serology:                 (February 14, 2017)                 PRASHANTH Titer:              1:  320 (elevated)                 Pattern:                  Speckled                 RF:                         <15                  Anti CCP:             <15.6                    (October 2019)                 CRP:                     0.46                 ESR:                     10                 RF:                        < 10                 Anti Lesa 1 Ab:         < 0.2                 SPEP:                   No M-spike observed                 C3:                        136                 C4:                        18                 A1AT:                    173                 A1AT Phenotype:  MM                 Anti-SCL Ab:        < 0.2                 P-ANCA:               <1:20                 MPO:                     <1:20                 C-ANCA:              <1:20                 Proteinase 3 Ab:  <93.5                 PRASHANTH:                     Positive   >1:80  (speckled pattern)                 Anti SSA Ab:        <0.2                 Anti SSB Ab:        <0.2                 HIV 1/2 Ag/Ab:     Non-reactive                 Cryoglobulin:        None detected                 LDH:                      112                 ACE:                     44                 CK:                        49                 Aldolase:               3.4                 MyoMarker Panel 3:  Pending                 Th/To Ab:             Pending                 Anti Sm/RNP Ab:  Pending                 PM-Scl Ab:           Pending                 RNA poly IgG:      Pending                 HP Screen:           Pending                 PRASHANTH by IFA:         Pending      Assessment:     1. ILD (interstitial lung disease)    2. Intractable post-traumatic headache, unspecified chronicity pattern    3. Current every day smoker    4. Tobacco abuse    5. Cystic-bullous disease of lung    6. MCTD (mixed connective tissue disease)    7. Chronic hypoxemic respiratory failure    8. Cigarette nicotine dependence with nicotine-induced disorder      Impression:  Progressive undifferentiated ILD in the setting of MCTD.      Plan:     · Bronchoscopy TBBx.  · Defer to rheumatology re: MCTD.  · May warrant open lung Bx if TBBx unrevealing.     I informed the patient of my working diagnosis, it's etiology, risk factors, expected symptoms, diagnostic work up, treatment options and prognosis., I personally reviewed the results of relevant imaging/labs/studies with the patient, and discussed their clinical significance., I spent >10 minutes counseling the patient about their condition., Plan discussed with the patient, who is in agreement., Opportunity provided for the the patient to voice any additional questions or concerns., All questions were answered to the patient's satisfaction., Educational material provided and Patient given date for next visit.    Follow up in about 2 weeks (around 12/18/2019).    Orders Placed This Visit:  Orders Placed This Encounter   Procedures    Ambulatory Referral to Neurology     Referral Priority:   Routine     Referral Type:   Consultation     Referral Reason:   Specialty Services Required     Referred to Provider:   Rohit Willingham MD     Requested Specialty:   Neurology     Number of Visits Requested:   1    Case request GI: BRONCHOSCOPY, FIBEROPTIC      Order Specific Question:   Medical Necessity:     Answer:   Medically Non-Urgent [100]     Order Specific Question:   CPT Code:     Answer:   PA BRONCHOSCOPY,BIOPSY [[id number]]     Order Specific Question:   Case Referring Provider     Answer:   LEEANN TARANGO [[id number]]     Order Specific Question:   Positioning:     Answer:   Supine [1004]     Order Specific Question:   Post-Procedure Disposition:     Answer:   Amb Surgery/DOSC [2]       Leeann Tarango MD  Pulmonary / Critical Care Medicine  Formerly [facility], [facility]

## 2019-12-04 NOTE — H&P (VIEW-ONLY)
[facility]  Pulmonology  Follow-Up Clinic Visit    Subjective:     Reason for Visit:    Promise Medrano is a 44 y.o. female followed for undifferentiated ILD.    Chief Complaint   Patient presents with    Interstitial Lung Disease      Shortness of Breath   This is a chronic problem. The current episode started more than 1 year ago. The problem occurs constantly. The problem has been unchanged. Associated symptoms include chest pain, neck pain and wheezing. Pertinent negatives include no abdominal pain, coryza, fever, hemoptysis, leg pain, leg swelling, rash, rhinorrhea, sore throat, sputum production or vomiting. The symptoms are aggravated by emotional upset, exercise and URIs. Risk factors include smoking. She has tried oral steroids for the symptoms. The treatment provided moderate relief. Her past medical history is significant for allergies.        Review of Systems   Constitutional: Positive for activity change, fatigue and weakness. Negative for fever, chills, weight loss, weight gain, appetite change and night sweats.   HENT: Negative for nosebleeds, postnasal drip, rhinorrhea, sinus pressure, sore throat, trouble swallowing, voice change and congestion.    Eyes: Negative for redness and itching.   Respiratory: Positive for cough, chest tightness, shortness of breath, wheezing, previous hospitalization due to pulmonary problems and use of rescue inhaler. Negative for apnea, snoring, hemoptysis, sputum production, choking, orthopnea, asthma nighttime symptoms, pleurisy, dyspnea on extertion, somnolence and Paroxysmal Nocturnal Dyspnea.    Cardiovascular: Positive for chest pain. Negative for palpitations and leg swelling.   Genitourinary: Negative for difficulty urinating and hematuria.   Endocrine: Negative for polydipsia, polyphagia, cold intolerance, heat intolerance and polyuria.    Musculoskeletal: Positive for arthralgias, myalgias and neck pain. Negative for joint swelling.   Skin:  Negative for rash.   Gastrointestinal: Negative for nausea, vomiting, abdominal pain and acid reflux.   Neurological: Positive for dizziness, weakness and light-headedness. Negative for syncope.   Hematological: Negative for adenopathy. Does not bruise/bleed easily and no excessive bruising.   Psychiatric/Behavioral: Negative for confusion and sleep disturbance. The patient is not nervous/anxious.    All other systems reviewed and are negative.     Outpatient Medications as of 12/4/2019   Medication    albuterol-ipratropium (DUO-NEB) 2.5 mg-0.5 mg/3 mL nebulizer solution    atorvastatin (LIPITOR) 80 MG tablet    azelastine (ASTELIN) 137 mcg (0.1 %) nasal spray    blood sugar diagnostic Strp    blood sugar diagnostic Strp    blood-glucose meter kit    blood-glucose meter, drum-type (ACCU-CHEK COMPACT PLUS CARE) Kit    budesonide-formoterol 160-4.5 mcg (SYMBICORT) 160-4.5 mcg/actuation HFAA    CETAPHIL MOISTURIZING cream    diazePAM (VALIUM) 5 MG tablet    diclofenac sodium (VOLTAREN) 1 % Gel    dicyclomine (BENTYL) 20 mg tablet    ergocalciferol (ERGOCALCIFEROL) 50,000 unit Cap    ezetimibe (ZETIA) 10 mg tablet    ferrous sulfate (FEOSOL) 325 mg (65 mg iron) Tab tablet    fluticasone propionate (FLONASE) 50 mcg/actuation nasal spray    gabapentin (NEURONTIN) 100 MG capsule    gabapentin (NEURONTIN) 400 MG capsule    hydrocortisone 1 % cream    ibuprofen (ADVIL,MOTRIN) 600 MG tablet    inhalat.spacing dev,med. mask (AEROCHAMBER PLUS Z STAT MD LOPEZ) Spcr    ketoconazole (NIZORAL) 2 % cream    lancets (ACCU-CHEK SOFTCLIX LANCETS) Misc    levocetirizine (XYZAL) 5 MG tablet    midodrine (PROAMATINE) 5 MG Tab    montelukast (SINGULAIR) 10 mg tablet    mupirocin (BACTROBAN) 2 % ointment    nicotine polacrilex 4 MG Lozg    omeprazole (PRILOSEC) 40 MG capsule    ondansetron (ZOFRAN-ODT) 4 MG TbDL    ranitidine (ZANTAC) 150 MG tablet    STIOLTO RESPIMAT 2.5-2.5 mcg/actuation Mist     "triamcinolone acetonide 0.1% (KENALOG) 0.1 % cream    urea 40 % Lotn lotion    varenicline (CHANTIX) 0.5 MG Tab    XIIDRA 5 % Dpet     No current facility-administered medications on file as of 12/4/2019.       Previous Reports Reviewed:   ER records, historical medical records, imaging reports: chest x-ray positive for LD, lab reports, nursing home notes, office notes, operative reports, radiology reports, referral letter/letters and x-ray reports   The following portions of the patient's history were reviewed and updated as appropriate: allergies, current medications, past family history, past medical history, past social history, past surgical history and problem list.      Objective:     /70 (BP Location: Left arm, Patient Position: Sitting, BP Method: Medium (Manual))   Pulse 93   Ht 5' 2" (1.575 m)   Wt 68.5 kg (151 lb)   SpO2 96% Comment: 2LPM PD  BMI 27.62 kg/m²   Body mass index is 27.62 kg/m².     Physical Exam   Constitutional: She is oriented to person, place, and time. Vital signs are normal. She appears well-developed and well-nourished. She is cooperative.  Non-toxic appearance. No distress.   HENT:   Head: Normocephalic and atraumatic.   Right Ear: Hearing and external ear normal.   Left Ear: Hearing and external ear normal.   Nose: Nose normal. No mucosal edema, rhinorrhea, sinus tenderness, nasal deformity, septal deviation or nasal septal hematoma. Right sinus exhibits no maxillary sinus tenderness and no frontal sinus tenderness. Left sinus exhibits no maxillary sinus tenderness and no frontal sinus tenderness.   Mouth/Throat: Uvula is midline, oropharynx is clear and moist and mucous membranes are normal. Normal dentition. No dental abscesses or dental caries. No oropharyngeal exudate or posterior oropharyngeal edema. Mallampati Score: I.   Neck: Normal range of motion. Neck supple. No JVD present. No tracheal deviation present. No thyromegaly present.   Cardiovascular: Normal " rate, regular rhythm, normal heart sounds and intact distal pulses. Exam reveals no gallop and no friction rub.   No murmur heard.  Pulmonary/Chest: Normal expansion, symmetric chest wall expansion and effort normal. No stridor. No tachypnea. No respiratory distress. She has no decreased breath sounds. She has no wheezes. She has no rhonchi. She has no rales (faint bibasilar rales). Chest wall is not dull to percussion. She exhibits no tenderness. Negative for egophony. Negative for tactile fremitus.   Abdominal: Soft. Bowel sounds are normal. She exhibits no distension and no mass. There is no hepatosplenomegaly. There is no tenderness. There is no rebound and no guarding. No hernia.   Musculoskeletal: Normal range of motion. She exhibits no edema, tenderness or deformity.   Lymphadenopathy: No supraclavicular adenopathy is present.     She has no cervical adenopathy.     She has no axillary adenopathy.   Neurological: She is alert and oriented to person, place, and time. She has normal reflexes. No cranial nerve deficit. Gait normal.   Skin: Skin is warm and intact. No lesion, no petechiae and no rash noted. No cyanosis or erythema. No pallor. Nails show clubbing.   Psychiatric: Her behavior is normal. Judgment and thought content normal. Her mood appears anxious. Her speech is rapid and/or pressured and tangential. Cognition and memory are normal.   Nursing note and vitals reviewed.       Personal Review of Relevant Diagnostic Studies:  I have personally reviewed and interpreted the following labs/studies/images.  Chest x-ray:   (June 25, 2012)  Radiology Report::  No acute cardiopulmonary abnormality.  My impression:  Unable to fully evaluate the bases due to breast implants.  Otherwise no gross abnormalities.     (April 14, 2019)  Radiology report:  None available  My impression:  Interval increase and basilar predominant bilateral hazy airspace opacities with a decrease and overall lung volume compared to  prior chest radiograph.     I independently viewed the above imaging/lab studies in addition to reviewing the report      CT Chest:  (June 25, 2012)  Radiology report:    None available     My impression:    Available images of the lung bases from CT abdomen are unremarkable.        (August 31, 2018)  Radiology report:    1. Mild mosaic attenuation pattern in the lungs, which was present on theprevious exam of 07/14/2010, with scattered minimal bronchiolitis in both upper lobes and a few thin-walled parenchymal cysts. Smoking-related interstitial lung diseases could have this appearance, with air-trapping and small airways inflammation, or inhalational lung disease of various potential etiologies, constituting additional diagnostic considerations.  2. Stable prominent mediastinal lymph nodes, and prominent to borderline enlarged bilateral axillary lymph nodes, nonspecific but presumably reactive given stability.     My impression:    Diffuse interlobular septal thickening with some areas having a nodular appearance as well.  Diffuse ground-glass opacities with some mosaicism at both bases.  Scattered thin walled cysts of varying sizes.  Mild paraseptal emphysema.  Overall, this could be consistent with some very early interstitial lung disease among other etiologies, but appearance is not classic for any specific I LD.        (September 30, 2019)  Radiology report:  1. Negative for pulmonary embolus.  2. New diffuse bilateral pulmonary ground-glass opacities.  Potential etiologies include infectious or inflammatory alveolitis/pneumonitis, alveolar edema, or alveolar hemorrhage.  Clinical and laboratory correlation is needed.     My impression:  · Interval increase in the size and number of 10 walled cysts.  · Persistent interlobular septal thickening, but is difficult to clearly appreciated due to presence of IV contrast.  · Diffuse ground-glass opacities also difficult to compare to prior CT due to the presence of  IV contrast.     I independently viewed the above imaging/lab studies in addition to reviewing the report      PFTs:  Date:  2019  FEV1:  2.49  (91 % predicted), FVC:  3.25 (96 % predicted), FEV1/FVC:  77, T.71 (102 % predicted), RV/TLVC:  33 (97 % predicted), DLCO:  14.95 (74 % predicted)  Computer interpretation:  Spirometry is within normal limits.  There was a significant response to inhaled bronchodilator in small airways  Lung volumes are normal.  Diffusion is normal.      My impression:   No evidence of obstructive lung disease, or reactive airways disease.   The clinical utility of measuring the FEF 25-75 is debatable, and it is response to bronchodilators even more so.  The statement significant response to inhaled bronchodilator and small airways is an inaccurate description of the overall impression from her PFTs.  Significantly elevated FRC and ERV are unusual but suggest some intrinsic pulmonary dysfunction.     (2019)   FEV1:  2.37 L (86% predicted)  FVC 3.17 L (94% predicted)  FEV1/FVC:  75  T.75 L (103% predicted)  RV/T (97% predicted)  ERV:  1.30 L (120% predicted)  DLCO:  12.16 (60% predicted)  My impression:  No obstruction.  No restriction.  Mild diffusion impairment.  Unchanged compared to pulmonary function test obtained 4 months prior.      Methacholine Challenge:  None on file.      6MWT:   (2019)  Predicted distance 453 m  Actual distance:548 m  SpO2:  Pre-exercise 92% / During exercise 87% on room air and improved to 88% when placed on 2 LPM / recovery 92% on 2LPM  Results of this test qualify for supplemental oxygen at home with ambulation.      PSG:  Date:  2019  No central or obstructive sleep apnea.  Significant desaturations during supine sleep.      ECG:  None available to review.      Echocardiogram:   Date:  2018  · Estimated left ventricular ejection fraction is 60-65%  · Normal left atrial pressure diastolic  function.  · There is mild mitral regurgitation.  · Estimated RVSP is 28 mmHg.  · No mention of pulmonary artery pressure     (October 15, 2019)  · Normal left ventricular systolic function. The estimated ejection fraction is 70%  · Normal LV diastolic function.  · No wall motion abnormalities.  · Normal right ventricular systolic function.  · Normal central venous pressure (3 mm Hg).  · The estimated PA systolic pressure is 28 mm Hg      BNP  October 16, 2019:  38                     Arterial blood gas:  (October 29, 2019)  7.437 / 37.6 / 95 / 25.4 (obtained on ambient air)                    Serology:                 (February 14, 2017)                 PRASHANTH Titer:              1:  320 (elevated)                 Pattern:                  Speckled                 RF:                         <15                  Anti CCP:             <15.6                    (October 2019)                 CRP:                     0.46                 ESR:                     10                 RF:                        < 10                 Anti Lesa 1 Ab:         < 0.2                 SPEP:                   No M-spike observed                 C3:                        136                 C4:                        18                 A1AT:                    173                 A1AT Phenotype:  MM                 Anti-SCL Ab:        < 0.2                 P-ANCA:               <1:20                 MPO:                     <1:20                 C-ANCA:              <1:20                 Proteinase 3 Ab:  <93.5                 PRASHANTH:                     Positive   >1:80  (speckled pattern)                 Anti SSA Ab:        <0.2                 Anti SSB Ab:        <0.2                 HIV 1/2 Ag/Ab:     Non-reactive                 Cryoglobulin:        None detected                 LDH:                      112                 ACE:                     44                 CK:                        49                 Aldolase:               3.4                 MyoMarker Panel 3:  Pending                 Th/To Ab:             Pending                 Anti Sm/RNP Ab:  Pending                 PM-Scl Ab:           Pending                 RNA poly IgG:      Pending                 HP Screen:           Pending                 PRASHANTH by IFA:         Pending      Assessment:     1. ILD (interstitial lung disease)    2. Intractable post-traumatic headache, unspecified chronicity pattern    3. Current every day smoker    4. Tobacco abuse    5. Cystic-bullous disease of lung    6. MCTD (mixed connective tissue disease)    7. Chronic hypoxemic respiratory failure    8. Cigarette nicotine dependence with nicotine-induced disorder      Impression:  Progressive undifferentiated ILD in the setting of MCTD.      Plan:     · Bronchoscopy TBBx.  · Defer to rheumatology re: MCTD.  · May warrant open lung Bx if TBBx unrevealing.     I informed the patient of my working diagnosis, it's etiology, risk factors, expected symptoms, diagnostic work up, treatment options and prognosis., I personally reviewed the results of relevant imaging/labs/studies with the patient, and discussed their clinical significance., I spent >10 minutes counseling the patient about their condition., Plan discussed with the patient, who is in agreement., Opportunity provided for the the patient to voice any additional questions or concerns., All questions were answered to the patient's satisfaction., Educational material provided and Patient given date for next visit.    Follow up in about 2 weeks (around 12/18/2019).    Orders Placed This Visit:  Orders Placed This Encounter   Procedures    Ambulatory Referral to Neurology     Referral Priority:   Routine     Referral Type:   Consultation     Referral Reason:   Specialty Services Required     Referred to Provider:   Rohit Willingham MD     Requested Specialty:   Neurology     Number of Visits Requested:   1    Case request GI: BRONCHOSCOPY, FIBEROPTIC      Order Specific Question:   Medical Necessity:     Answer:   Medically Non-Urgent [100]     Order Specific Question:   CPT Code:     Answer:   [facility] BRONCHOSCOPY,BIOPSY [[id number]]     Order Specific Question:   Case Referring Provider     Answer:   LEEANN TARANGO [[id number]]     Order Specific Question:   Positioning:     Answer:   Supine [1004]     Order Specific Question:   Post-Procedure Disposition:     Answer:   Amb Surgery/DOSC [2]       Leeann Tarango MD  Pulmonary / Critical Care Medicine  Former[facility]

## 2019-12-13 DIAGNOSIS — E55.9 VITAMIN D DEFICIENCY: ICD-10-CM

## 2019-12-13 DIAGNOSIS — R09.82 POST-NASAL DRIP: ICD-10-CM

## 2019-12-13 LAB
STREPTOCOCCUS PNEUMONIAE 1 AB IGG: 0.5 UG/ML
STREPTOCOCCUS PNEUMONIAE 12 AB IGG: <0.1 UG/ML
STREPTOCOCCUS PNEUMONIAE 14 AB IGG: 1.2 UG/ML
STREPTOCOCCUS PNEUMONIAE 19 AB IGG: 0.9 UG/ML
STREPTOCOCCUS PNEUMONIAE 23 AB IGG: 0.3 UG/ML
STREPTOCOCCUS PNEUMONIAE 26 AB IGG: 3.9 UG/ML
STREPTOCOCCUS PNEUMONIAE 3 AB IGG: 1.2 UG/ML
STREPTOCOCCUS PNEUMONIAE 4 AB IGG: 0.2 UG/ML
STREPTOCOCCUS PNEUMONIAE 51 AB IGG: 0.2 UG/ML
STREPTOCOCCUS PNEUMONIAE 56 AB IGG: 1 UG/ML
STREPTOCOCCUS PNEUMONIAE 57 AB IGG: 1.3 UG/ML
STREPTOCOCCUS PNEUMONIAE 68 AB IGG: 0.2 UG/ML
STREPTOCOCCUS PNEUMONIAE 8 AB IGG: 2.4 UG/ML
STREPTOCOCCUS PNEUMONIAE 9 AB IGG: 0.6 UG/ML

## 2019-12-16 ENCOUNTER — OFFICE VISIT (OUTPATIENT)
Dept: ALLERGY | Facility: CLINIC | Age: 44
End: 2019-12-16
Payer: MEDICAID

## 2019-12-16 VITALS
WEIGHT: 152 LBS | DIASTOLIC BLOOD PRESSURE: 76 MMHG | HEIGHT: 62 IN | BODY MASS INDEX: 27.97 KG/M2 | SYSTOLIC BLOOD PRESSURE: 122 MMHG

## 2019-12-16 DIAGNOSIS — D80.6 DEFICIENCY OF ANTI-PNEUMOCOCCAL POLYSACCHARIDE ANTIBODY: ICD-10-CM

## 2019-12-16 DIAGNOSIS — J40 CHRONIC SINUSITIS WITH RECURRENT BRONCHITIS: Primary | ICD-10-CM

## 2019-12-16 DIAGNOSIS — J32.9 CHRONIC SINUSITIS WITH RECURRENT BRONCHITIS: Primary | ICD-10-CM

## 2019-12-16 PROCEDURE — 99214 PR OFFICE/OUTPT VISIT, EST, LEVL IV, 30-39 MIN: ICD-10-PCS | Mod: S$GLB,,, | Performed by: ALLERGY & IMMUNOLOGY

## 2019-12-16 PROCEDURE — 99214 OFFICE O/P EST MOD 30 MIN: CPT | Mod: S$GLB,,, | Performed by: ALLERGY & IMMUNOLOGY

## 2019-12-16 RX ORDER — DOXYCYCLINE 100 MG/1
CAPSULE ORAL
Qty: 20 CAPSULE | Refills: 3 | Status: SHIPPED | OUTPATIENT
Start: 2019-12-16 | End: 2020-04-21

## 2019-12-16 RX ORDER — ONDANSETRON 4 MG/1
TABLET, FILM COATED ORAL
COMMUNITY
Start: 2019-12-11 | End: 2020-05-11

## 2019-12-16 RX ORDER — FLUTICASONE PROPIONATE 50 MCG
SPRAY, SUSPENSION (ML) NASAL
Qty: 16 G | Refills: 0 | Status: SHIPPED | OUTPATIENT
Start: 2019-12-16 | End: 2019-12-30

## 2019-12-16 RX ORDER — ALBUTEROL SULFATE 90 UG/1
AEROSOL, METERED RESPIRATORY (INHALATION)
Qty: 1 INHALER | Refills: 3 | Status: SHIPPED | OUTPATIENT
Start: 2019-12-16 | End: 2020-03-23

## 2019-12-16 RX ORDER — ERGOCALCIFEROL 1.25 MG/1
CAPSULE ORAL
Qty: 4 CAPSULE | Refills: 0 | Status: SHIPPED | OUTPATIENT
Start: 2019-12-16 | End: 2020-01-20

## 2019-12-16 RX ORDER — POLYETHYLENE GLYCOL 3350 17 G/17G
POWDER, FOR SOLUTION ORAL
COMMUNITY
Start: 2019-12-11 | End: 2020-05-11

## 2019-12-16 NOTE — PROGRESS NOTES
"Subjective:       Patient ID: Promise Medrano is a 44 y.o. female.    Chief Complaint: Recurrent Bacterial Infection (Dr. Tarango did put her on oxygen prn, but she has not needed any abx since her last visit 9/23/2019)    HPI     Pt presents for allergic rhinitis and recurrent bronchitis.     Her strep pneumo titers were not responsive to her pneumovax 23 she obtained in November 2019.      Allergic Rhinitis- dust mite only   Condition: improved     Onset: childhood  Sx: pnd, rhinorrhea, congestion, ears crackle, frontal sinus pain and pressure.   Season: perennial   Trigger: uncertain   Tx: saline, azelastine- 1 sen qday or more prn, fluticasone, montelukast    ait in the past: yes  Recent testing: serum IgE dust mite only.   ct sinus: 6/2019  Essentially clear paranasal sinuses without significant mucoperiosteal sinus  disease.    Recurrent bronchitis  Condition: improved but now SAD dx 12/2019  Onset: 2018  3-4 episodes last winter  Has a pmh of lupus autoimmunity, complement levels normal.   April 2019 was dc from [facility] for pna and or bronchitis  She has a history of current smoker   Has nebulizer for albuterol  Not required nebulizer.   Sx: cough   Does have associated tobacco abuse as well.   Sx frequ: > 2 days per week    Chest ct 8/2018 shows bronchioloitis and mediastinal lymph nodes and axillary lymph nodes presumed reactive.   Pft: large airway no obstruction and partial response to bronchodilator, small airway reversed by 31%.   Tx: currently on stialto , "red inhaler" symbicort 160, "gray inhaler" ventolin.     Immune evaluation: 6/20/2019    Complement, Total (CH50) 56 ( >41 U/mL)  Complement C2                 3.3  C4 Complement 14 - 44 mg/dL 16      IgA, Serum                    281                         IgM 26 - 217 mg/dL 52      IgG, Total Serum             1259                     700-1600  mg/dL       IgG Subclass 1                710                      248-810  mg/dL       IgG Subclass 2         "        189                      130-555  mg/dL       IgG Subclass 3                 70                         mg/dL       IgG Subclass 4                 61                         2-96  mg/dL                                           Strep Pneumoniae Type 1  0.5 L >1.3 ug/mL  Strep Pneumoniae Type 3 0.6 L >1.3 ug/mL  Strep Pneumoniae Type 4 0.1 L >1.3 ug/mL  Strep Pneumoniae Type 8 1.8  >1.3 ug/mL  Strep Pneumoniae Type 9 0.5 L >1.3 ug/mL  Strep Pneumoniae Type 12 <0.1 L >1.3 ug/mL  Strep Pneumoniae Type 14 1.2 L >1.3 ug/mL  Strep Pneumoniae Type 19 0.7 L >1.3 ug/mL  Strep Pneumoniae Type 23 0.3 L >1.3 ug/mL  Strep Pneumoniae Type 26 4.3  >1.3 ug/mL  Strep Pneumoniae Type 51 0.3 L >1.3 ug/mL  Strep Pneumoniae Type 56 0.5 L >1.3 ug/mL  Strep Pneumoniae Type 57 1.5  >1.3 ug/mL  Strep Pneumoniae Type 68 <0.1 L >1.3 ug/mL    3/14 = 21%    Hematocrit                   46.0                    34.0-46.6  %           WBC                           5.7                     3.4-10.8  x10E3/uL    RBC                          4.83                    3.77-5.28  x10E6/uL    Hemoglobin                   14.7                    11.1-15.9  g/dL        Basos                           0                   Not Estab.  %           Neurtrophils                   60                   Not Estab.  %           Neurtrophils (Absolute) 3.4  1.4-7.0 x10E3/uL  Lymphs (Absolute)             1.9                      0.7-3.1  x10E3/uL    MCV                            95                        79-97  fL          MCHC                         32.0                    31.5-35.7  g/dL        MCH                          30.4                    26.6-33.0  pg          Lymphs                         34                   Not Estab.  %           Immature Granulocytes            0                   Not Estab.  %           Eos                             0                   Not Estab.  %           Monocytes                       6                   Not  "Estab.  %           Platelets                     185                      150-450  x10E3/uL    Eos (Absolute)                0.0                      0.0-0.4  x10E3/uL    BASO (Absolute)               0.0                      0.0-0.2  x10E3/uL    Monocytes (Absolute)          0.4                      0.1-0.9  x10E3/uL    % CD19+ Lymphs                9.3                     3.3-25.4  %           Abs. CD19+ Lymphs             177                         /uL         Absolute CD4 [place]           716                     359-1519  /uL         Absolute CD3                 1634                     622-2402  /uL         % CD 4 Pos. Lymph            37.7                    30.8-58.5  %           CD4/CD8 Ratio                0.78 L                  0.92-3.72              % CD3 Pos. Lymph             86.0                    57.5-86.2  %           Abs. CD8 Suppressor           923 H                    109-897  /uL         % CD8 Pos. Lymph             48.6 H                  12.0-35.5  %           RDW                          14.5                    12.3-15.4  %           % NK (CD56/16)                4.1                     1.4-19.4  %           Ab NK (CD56/16)                78       Lpr:   Ranitidine rx at last visit.   Condition: stable , not better.   She didn't start it.   "horrible acid reflux"  "gut issues" not diagnosed.   "why I got off plaquenil" "tore up my gut."       Atopic Hx    Rhinitis see above   Oral allergy: denies  Food allergy: none    Asthma see above for bronchitis   Latex tolerates   Eczema: denies, but may have a psoriasis.    Urticaria denies chronic, has doxepin qhs     Infection History    Pneumonia # in the past 12 months: bronchitis as above   Sinus infection # in the past 12 months: reports feels like sinus infections.   Otitis infection # in the past 12 months:  Denies chronic infection, but notes chronic fluid in the ears.     Dust mite only    IgE Cladosporium herbarum <0.10  Class 0 " kU/L  IgE Dog Dander              <0.10                      Class 0  kU/L        IgE Cat Epithelium          <0.10                      Class 0  kU/L        IgE Ragweed, Short          <0.10                      Class 0  kU/L        IgE D. pteronyssinus         0.13 AB                 Class 0/I  kU/L        IgE A. fumigatus            <0.10                      Class 0  kU/L        IgE Alteraria alternata <0.10  Class 0 kU/L  IgE Elm, American           <0.10                      Class 0  kU/L        IgE Bermuda Grass           <0.10                      Class 0  kU/L        IgE Judsonia, White              <0.10                      Class 0  kU/L        IgE Pigweed, Common         <0.10                      Class 0  kU/L        IgE Cockroach, [place]uran        <0.10                      Class 0  kU/L        IgE Thistle Russian         <0.10                      Class 0  kU/L        IgE D. farinae              <0.10                      Class 0  kU/L        IgE Cockroach American <0.10  Class 0 kU/L   This test was developed and its performance      characteristics determined by [facility].  It      has not been cleared or approved by the U.S.      Food and Drug Administration.      The FDA has determined that such clearance      or approval is not necessary. This test is      used for clinical purposes.  It should not      be regarded as investigational or for                             research.                                           IgE Maple/[place]         <0.10                      Class 0  kU/L        IgE Penicillium chrysogen <0.10  Class 0 kU/L  IgE [place]              <0.10                      Class 0  kU/L        IgE Gabe Grass           <0.10                      Class 0  kU/L        IgE Arik Grass           <0.10                      Class 0  kU/L        IgE Bahia Grass             <0.10                      Class 0  kU/L        IgE Sheep Estelle           <0.10                      Class 0  kU/L         IgE Plantain, English        <0.10                      Class 0  kU/L        IgE Mugwort                 <0.10                      Class 0  kU/L        IgE Pecan [place]           <0.10                      Class 0  kU/L        IgE Candida albicans        <0.10                      Class 0  kU/L        IgE Setomelanomma rostrat <0.10  Class 0 kU/L  IgE White Omro          <0.10                      Class 0  kU/L        IgE Epicoccum purpur        <0.10                      Class 0  kU/L        IgE Fusarium proliferatum <0.10  Class 0 kU/L  IgE Trichophyton rubrum <0.10  Class 0 kU/L  IgE Rough Marshelder        <0.10                      Class 0  kU/L        IgE Mouse Urine             <0.10                      Class 0  kU/L        IgE Silver Birch            <0.10                      Class 0  kU/L        IgE Maple [place] <0.10  Class 0 kU/L  IgE Bipolaris               <0.10                      Class 0  kU/L         This test was developed and its performance      characteristics determined by [email or web address].  It      has not been cleared or approved by the U.[place].      Food and Drug Administration.      The FDA has determined that such clearance      or approval is not necessary. This test is      used for clinical purposes.  It should not      be regarded as investigational or for                             research.                                           IgE Nishant, White              <0.10                      Class 0  kU/L        IgE Privet, Common          <0.10                      Class 0  kU/L        IgE Ragweed, Giant          <0.10                      Class 0  kU/L        IgE Nettle                  <0.10                      Class 0  kU/L        IgE Cocklebur               <0.10                      Class 0  kU/L        IgE Dockweed, Yellow        <0.10                      Class 0  kU/L         This test was developed and its performance      characteristics determined by [email or web address].  It       has not been cleared or approved by the U.S.      Food and Drug Administration.      The FDA has determined that such clearance      or approval is not necessary. This test is      used for clinical purposes.  It should not      be regarded as investigational or for                             research.                                           IgE Hackberry               <0.10                      Class 0  kU/L         This test was developed and its performance      characteristics determined by [facility].  It      has not been cleared or approved by the U.S.      Food and Drug Administration.      The FDA has determined that such clearance      or approval is not necessary. This test is      used for clinical purposes.  It should not      be regarded as investigational or for                             research.                                           IgE Sweet Gum               <0.10                      Class 0  kU/L         This test was developed and its performance      characteristics determined by [facility].  It      has not been cleared or approved by the U.S.      Food and Drug Administration.      The FDA has determined that such clearance      or approval is not necessary. This test is      used for clinical purposes.  It should not      be regarded as investigational or for                             research.                                           IgE Wormwood                <0.10                      Class 0  kU/L        IgE Fennel, Dog             <0.10                      Class 0  kU/L         This test was developed and its performance      characteristics determined by [facility].  It      has not been cleared or approved by the U.S.      Food and Drug Administration.      The FDA has determined that such clearance      or approval is not necessary. This test is      used for clinical purposes.  It should not      be regarded as investigational or for                             research.    "                                                                                                                     Performed at: , [address], [place], [place], [id number]      Jc Mckeon MD, Phone:  [phone]     IgE Mouse Epithelium        <0.10                      Class 0  kU/L        IgE Rush Bald            <0.10           Review of Systems      General: neg unexpected weight changes, fevers, chills, night sweats, malaise  HEENT: see hpi, Neg eye pain, vision changes, ear drainage, nose bleeds, throat tightness, sores in the mouth  CV: Neg chest pain, palpitations, swelling  Resp: see hpi, neg shortness of breath, hemoptysis  GI: see hpi, neg dysphagia, night abdominal pain, reflux, chronic diarrhea, chronic constipation  Derm: See Hpi, neg new rash, neg flushing  Mu/sk: Neg joint pain, joint swelling   Psych: Neg anxiety  neuro: neg chronic headaches, muscle weakness  Endo: neg heat/cold intolerance, chronic fatigue    Objective:     Vitals:    12/16/19 1026   BP: 122/76   Weight: 68.9 kg (152 lb)   Height: 5' 2" (1.575 m)   PF: 390 L/min        Physical Exam      General: no acute distress, well developed well nourished   HEENT:   Head:normocephalic atraumatic  Eyes: BENITEZ, EOMI, Neg injection, scleral icterus, or conjunctival papillary hypertrophy.  Ears: tm clear bilaterally, normal canal  Nose: 3+ inferior turbinates pink, neg nasal polyps            Mucosa: mild dryness with spider webbing.             Septal irritation: none   OP: mucus membranes moist, - cobblestoning, - PND, neg erythema or lesions, + tongue discoloration likely from tobacco, rust color/yellow.   Neck: supple, Full range of motion, neg lymphadenopathy  Chest: full respiratory excursion no abnormal chest abnormality  Resp: clear to ascultation bilaterally  CV: RRR, neg MRG, brisk capillary refill  Abdomen: BS+, non tender, non distended.   Ext:  Neg clubbing, cyanosis, pitting edema  Skin: Neg " rashes or lesions  Lymph: neg supraclavicular, axillary     Assessment:       1. Chronic sinusitis with recurrent bronchitis    2. Deficiency of anti-pneumococcal polysaccharide antibody        Plan:       Chronic sinusitis with recurrent bronchitis  -     albuterol (VENTOLIN HFA) 90 mcg/actuation inhaler; 2 puffs q 4-6 hours prn for cough, wheeze, shortness of breath. Rescue inhaler.  Dispense: 1 Inhaler; Refill: 3    Deficiency of anti-pneumococcal polysaccharide antibody  -     doxycycline (VIBRAMYCIN) 100 MG Cap; Take 1 capsule on Monday Wednesday Friday only. Wear sunscreen.  Dispense: 20 capsule; Refill: 3      Chronic allergic rhinitis: dust mite only   Serum IgE- not stable enough for skin prick testing. Dust mite only- mild sensitivity.   saline and azelastine    Continue fluticasone   montelukast 1 pill po qday   levocetirizine prn     Recurrent bronchitis and sinusitis  Immune eval: personally reviewed   Low strep pneumo titers- no response to ppv 23   SAD 12/2019.   Start doxycycline 100 mg BID Monday Wednesday and Friday.     [facility] labs - imaging center   Spirometry - complete PFT  -personally reviewed   Small airway reversibility by 31%   Referral to pulm for smoker with recurrent bronchitis sent.   Doesn't feel she needs rescue inhaler but has sx > 2 days per week.  symbicort 160 2 puffs bid.   Continue stialto if pulmonary recommends it.   Continue to follow with Dr. Tarango.     Prednisone x 4 days to help with sinus and lung symptoms- 9/2019    Smoking cessation.     Flu vaccine yearly recommended.     LPR:  pepcid 20 mg bid   Switch from zantac.     F/u 3 months, sooner if needed.            Katiana Cline M.D.  Allergy/Immunology  [facility]   [phone] phone  [phone] fax

## 2019-12-16 NOTE — PATIENT INSTRUCTIONS
Immune:  Doxycycline 100 mg 1 capsule twice per day to be taken on Monday Wednesday and Friday.     You did not respond to your vaccine.     This is called polysaccharide antibody deficiency .    The doxycycline is to try to protect you from major bacterial infection.     Pulmonary care, continue with Dr. Tarango.        Nose:  Continue saline over the counter and then nasal sprays.

## 2019-12-17 ENCOUNTER — ANESTHESIA EVENT (OUTPATIENT)
Dept: SURGERY | Facility: HOSPITAL | Age: 44
End: 2019-12-17
Payer: MEDICAID

## 2019-12-17 ENCOUNTER — HOSPITAL ENCOUNTER (OUTPATIENT)
Facility: HOSPITAL | Age: 44
Discharge: HOME | End: 2019-12-17
Attending: INTERNAL MEDICINE | Admitting: INTERNAL MEDICINE
Payer: MEDICAID

## 2019-12-17 ENCOUNTER — ANESTHESIA (OUTPATIENT)
Dept: SURGERY | Facility: HOSPITAL | Age: 44
End: 2019-12-17
Payer: MEDICAID

## 2019-12-17 ENCOUNTER — HOSPITAL ENCOUNTER (OUTPATIENT)
Dept: RADIOLOGY | Facility: HOSPITAL | Age: 44
Discharge: HOME | End: 2019-12-17
Attending: INTERNAL MEDICINE | Admitting: INTERNAL MEDICINE
Payer: MEDICAID

## 2019-12-17 VITALS
HEART RATE: 60 BPM | TEMPERATURE: 98 F | DIASTOLIC BLOOD PRESSURE: 63 MMHG | OXYGEN SATURATION: 98 % | RESPIRATION RATE: 20 BRPM | SYSTOLIC BLOOD PRESSURE: 128 MMHG

## 2019-12-17 DIAGNOSIS — Z01.810 PREOP CARDIOVASCULAR EXAM: ICD-10-CM

## 2019-12-17 DIAGNOSIS — R06.02 SOB (SHORTNESS OF BREATH): ICD-10-CM

## 2019-12-17 LAB
ALBUMIN SERPL BCP-MCNC: 3.9 G/DL (ref 3.5–5.2)
ALP SERPL-CCNC: 66 U/L (ref 55–135)
ALT SERPL W/O P-5'-P-CCNC: 14 U/L (ref 10–44)
ANION GAP SERPL CALC-SCNC: 6 MMOL/L (ref 8–16)
APPEARANCE FLD: CLEAR
AST SERPL-CCNC: 16 U/L (ref 10–40)
BILIRUB SERPL-MCNC: 0.6 MG/DL (ref 0.1–1)
BODY FLD TYPE: NORMAL
BUN SERPL-MCNC: 13 MG/DL (ref 6–20)
CALCIUM SERPL-MCNC: 9.3 MG/DL (ref 8.7–10.5)
CHLORIDE SERPL-SCNC: 104 MMOL/L (ref 95–110)
CO2 SERPL-SCNC: 32 MMOL/L (ref 23–29)
COLOR FLD: COLORLESS
CREAT SERPL-MCNC: 0.7 MG/DL (ref 0.5–1.4)
EST. GFR  (AFRICAN AMERICAN): >60 ML/MIN/1.73 M^2
EST. GFR  (NON AFRICAN AMERICAN): >60 ML/MIN/1.73 M^2
GLUCOSE SERPL-MCNC: 77 MG/DL (ref 70–110)
MESOTHL CELL NFR FLD MANUAL: 19 %
MONOS+MACROS NFR FLD MANUAL: 1 %
POTASSIUM SERPL-SCNC: 4.1 MMOL/L (ref 3.5–5.1)
PROT SERPL-MCNC: 7.1 G/DL (ref 6–8.4)
SODIUM SERPL-SCNC: 142 MMOL/L (ref 136–145)
WBC # FLD: 1 /CU MM

## 2019-12-17 PROCEDURE — 63600175 PHARM REV CODE 636 W HCPCS: Performed by: NURSE ANESTHETIST, CERTIFIED REGISTERED

## 2019-12-17 PROCEDURE — 31628 PR BRONCHOSCOPY,TRANSBRONCH BIOPSY: ICD-10-PCS | Mod: LT,,, | Performed by: INTERNAL MEDICINE

## 2019-12-17 PROCEDURE — 31624 DX BRONCHOSCOPE/LAVAGE: CPT | Mod: 59,LT,, | Performed by: INTERNAL MEDICINE

## 2019-12-17 PROCEDURE — 00520 ANES CLOSED CHEST PX NOS: CPT | Performed by: INTERNAL MEDICINE

## 2019-12-17 PROCEDURE — 87070 CULTURE OTHR SPECIMN AEROBIC: CPT

## 2019-12-17 PROCEDURE — 37000008 HC ANESTHESIA 1ST 15 MINUTES: Performed by: INTERNAL MEDICINE

## 2019-12-17 PROCEDURE — S0028 INJECTION, FAMOTIDINE, 20 MG: HCPCS | Performed by: NURSE ANESTHETIST, CERTIFIED REGISTERED

## 2019-12-17 PROCEDURE — 27000679 HC ADAPTOR, BRONCHOSCOPE: Performed by: INTERNAL MEDICINE

## 2019-12-17 PROCEDURE — 27200944 HC BRONCH FORCEPS DISPOSABLE: Performed by: INTERNAL MEDICINE

## 2019-12-17 PROCEDURE — 87205 SMEAR GRAM STAIN: CPT

## 2019-12-17 PROCEDURE — 31624 DX BRONCHOSCOPE/LAVAGE: CPT | Performed by: INTERNAL MEDICINE

## 2019-12-17 PROCEDURE — 31622 DX BRONCHOSCOPE/WASH: CPT | Performed by: INTERNAL MEDICINE

## 2019-12-17 PROCEDURE — 31628 BRONCHOSCOPY/LUNG BX EACH: CPT | Mod: LT

## 2019-12-17 PROCEDURE — 93005 ELECTROCARDIOGRAM TRACING: CPT | Mod: 59

## 2019-12-17 PROCEDURE — 80053 COMPREHEN METABOLIC PANEL: CPT

## 2019-12-17 PROCEDURE — 89051 BODY FLUID CELL COUNT: CPT

## 2019-12-17 PROCEDURE — 31628 BRONCHOSCOPY/LUNG BX EACH: CPT | Mod: LT,,, | Performed by: INTERNAL MEDICINE

## 2019-12-17 PROCEDURE — 37000009 HC ANESTHESIA EA ADD 15 MINS: Performed by: INTERNAL MEDICINE

## 2019-12-17 PROCEDURE — 31624 PR BRONCHOSCOPY,DIAG2STIC W LAVAGE: ICD-10-PCS | Mod: 59,LT,, | Performed by: INTERNAL MEDICINE

## 2019-12-17 PROCEDURE — 27201114 HC TRAP (ANY): Performed by: INTERNAL MEDICINE

## 2019-12-17 PROCEDURE — 25000003 PHARM REV CODE 250: Performed by: NURSE ANESTHETIST, CERTIFIED REGISTERED

## 2019-12-17 PROCEDURE — 76000 FLUOROSCOPY <1 HR PHYS/QHP: CPT | Mod: TC

## 2019-12-17 RX ORDER — PROPOFOL 10 MG/ML
VIAL (ML) INTRAVENOUS
Status: DISCONTINUED | OUTPATIENT
Start: 2019-12-17 | End: 2019-12-17

## 2019-12-17 RX ORDER — ONDANSETRON 2 MG/ML
INJECTION INTRAMUSCULAR; INTRAVENOUS
Status: DISCONTINUED | OUTPATIENT
Start: 2019-12-17 | End: 2019-12-17

## 2019-12-17 RX ORDER — SODIUM CHLORIDE, SODIUM LACTATE, POTASSIUM CHLORIDE, CALCIUM CHLORIDE 600; 310; 30; 20 MG/100ML; MG/100ML; MG/100ML; MG/100ML
INJECTION, SOLUTION INTRAVENOUS CONTINUOUS PRN
Status: DISCONTINUED | OUTPATIENT
Start: 2019-12-17 | End: 2019-12-17

## 2019-12-17 RX ORDER — MIDAZOLAM HYDROCHLORIDE 1 MG/ML
INJECTION INTRAMUSCULAR; INTRAVENOUS
Status: DISCONTINUED | OUTPATIENT
Start: 2019-12-17 | End: 2019-12-17

## 2019-12-17 RX ORDER — SUCCINYLCHOLINE CHLORIDE 20 MG/ML
INJECTION INTRAMUSCULAR; INTRAVENOUS
Status: DISCONTINUED | OUTPATIENT
Start: 2019-12-17 | End: 2019-12-17

## 2019-12-17 RX ORDER — FENTANYL CITRATE 50 UG/ML
INJECTION, SOLUTION INTRAMUSCULAR; INTRAVENOUS
Status: DISCONTINUED | OUTPATIENT
Start: 2019-12-17 | End: 2019-12-17

## 2019-12-17 RX ORDER — ROCURONIUM BROMIDE 10 MG/ML
INJECTION, SOLUTION INTRAVENOUS
Status: DISCONTINUED | OUTPATIENT
Start: 2019-12-17 | End: 2019-12-17

## 2019-12-17 RX ORDER — PROPOFOL 10 MG/ML
INJECTION, EMULSION INTRAVENOUS CONTINUOUS PRN
Status: DISCONTINUED | OUTPATIENT
Start: 2019-12-17 | End: 2019-12-17

## 2019-12-17 RX ORDER — FAMOTIDINE 10 MG/ML
INJECTION INTRAVENOUS
Status: DISCONTINUED | OUTPATIENT
Start: 2019-12-17 | End: 2019-12-17

## 2019-12-17 RX ADMIN — ONDANSETRON 4 MG: 2 INJECTION INTRAMUSCULAR; INTRAVENOUS at 12:12

## 2019-12-17 RX ADMIN — ROCURONIUM BROMIDE 5 MG: 10 INJECTION, SOLUTION INTRAVENOUS at 12:12

## 2019-12-17 RX ADMIN — PROPOFOL 400 MG/HR: 10 INJECTION, EMULSION INTRAVENOUS at 12:12

## 2019-12-17 RX ADMIN — SODIUM CHLORIDE, SODIUM LACTATE, POTASSIUM CHLORIDE, AND CALCIUM CHLORIDE: .6; .31; .03; .02 INJECTION, SOLUTION INTRAVENOUS at 12:12

## 2019-12-17 RX ADMIN — MIDAZOLAM HYDROCHLORIDE 1 MG: 1 INJECTION, SOLUTION INTRAMUSCULAR; INTRAVENOUS at 12:12

## 2019-12-17 RX ADMIN — FAMOTIDINE 20 MG: 10 INJECTION, SOLUTION INTRAVENOUS at 12:12

## 2019-12-17 RX ADMIN — PROPOFOL 150 MG: 10 INJECTION, EMULSION INTRAVENOUS at 12:12

## 2019-12-17 RX ADMIN — SUCCINYLCHOLINE CHLORIDE 120 MG: 20 INJECTION, SOLUTION INTRAMUSCULAR; INTRAVENOUS at 12:12

## 2019-12-17 RX ADMIN — PROPOFOL 50 MG: 10 INJECTION, EMULSION INTRAVENOUS at 12:12

## 2019-12-17 RX ADMIN — FENTANYL CITRATE 25 MCG: 50 INJECTION INTRAMUSCULAR; INTRAVENOUS at 12:12

## 2019-12-17 NOTE — ANESTHESIA PROCEDURE NOTES
Intubation  Performed by: Peg Castanon CRNA  Authorized by: Daniel Carter Jr., MD     Intubation:     Induction:  Intravenous    Intubated:  Postinduction    Mask Ventilation:  Easy mask    Attempts:  1    Attempted By:  CRNA    Method of Intubation:  Direct    Blade:  Smith 2    Laryngeal View Grade: Grade I - full view of chords      Difficult Airway Encountered?: No      Complications:  None    Airway Device:  Oral endotracheal tube    Airway Device Size:  8.0    Style/Cuff Inflation:  Cuffed    Tube secured:  21    Secured at:  The lips    Placement Verified By:  Capnometry    Complicating Factors:  None    Findings Post-Intubation:  BS equal bilateral and atraumatic/condition of teeth unchanged

## 2019-12-17 NOTE — ANESTHESIA POSTPROCEDURE EVALUATION
Anesthesia Post Evaluation    Patient: Promise Medrano    Procedure(s) Performed: Procedure(s) (LRB):  BRONCHOSCOPY, WITH FLUOROSCOPY (N/A)    Final Anesthesia Type: general    Patient location during evaluation: PACU  Patient participation: Yes- Able to Participate  Level of consciousness: awake and alert and oriented  Post-procedure vital signs: reviewed and stable  Pain management: adequate  Airway patency: patent    PONV status at discharge: No PONV  Anesthetic complications: no      Cardiovascular status: blood pressure returned to baseline, hemodynamically stable and stable  Respiratory status: unassisted, spontaneous ventilation and room air  Hydration status: euvolemic  Follow-up not needed.          Vitals Value Taken Time   /77 12/17/2019  1:46 PM   Temp 36.2 °C (97.2 °F) 12/17/2019  1:30 PM   Pulse 64 12/17/2019  1:46 PM   Resp 16 12/17/2019  1:46 PM   SpO2 96 % 12/17/2019  1:46 PM   Vitals shown include unvalidated device data.      No case tracking events are documented in the log.      Pain/Katelyn Score: Katelyn Score: 10 (12/17/2019  1:30 PM)

## 2019-12-17 NOTE — PROCEDURES
Bronchoscopy Procedure Note    Location: [facility] OR     Date of Operation : 12/17/2019    Pre-op Diagnosis   : ILD    Post-op Diagnosis: ILD    Surgeon: Nate Tarango    Anesthesia : General endotracheal anesthesia    Operation: Flexible fiberoptic bronchoscopy, diagnostic BAL and TBBx    Findings: normal appearing bronchial tree    Specimen: BAL and TBBx x 6    Estimated Blood Loss: Minimal     Drains: none    Complications: none    Indications and History:    The patient is a 44 y.o. female with ILD.  The risks, benefits, complications, treatment options and expected outcomes were discussed with the patient.  The possibilities of reaction to medication, pulmonary aspiration, perforation of a viscus, bleeding, failure to diagnose a condition and creating a complication requiring transfusion or operation were discussed with the patient who freely signed the consent.      Description of Procedure:  The patient was seen in the holding room and the site of surgery properly noted/marked.  The patient was taken to operating room, identified as Promise Marlowe and the procedure verified as Flexible Fiberoptic Bronchoscopy.  A Time Out was held and the above information confirmed.     After the induction of topical generalanesthesia, the patient was positioned supine and the bronchoscope was passed through the ETT . TThe scope was then passed into the trachea.Careful inspection of the tacheal lumen was accomplished. The scope was sequentially passed into the left main and then left upper and lower bronchi and segmental bronchi. TBBx of the inferior segment of the lingula was done and there was 10 cc specimen.     The scope was then withdrawn and advanced into the right main bronchus and then into the RUL, RML, and RLL bronchi and segmental bronchi. BAL was done and there was a 10 cc specimen.     Trachea: Normal mucosa  Connie: Normal mucosa  Right main bronchus: Normal mucosa  Right upper lobe bronchus: Normal mucosa  Right  upper lobe bronchus: Normal mucosa  Right upper lobe bronchus: Normal mucosa  Left main bronchus: Normal mucosa  Left upper lobe bronchus: Normal mucosa  Left lower lobe bronchus: Normal mucosa    The Patient was taken to the endoscopy recovery area in satisfactory condition.    Attestation: I was present and scrubbed for the entire procedure.    Nate Tarango

## 2019-12-17 NOTE — ANESTHESIA PREPROCEDURE EVALUATION
2019  Promise Medrano is a 44 y.o., female.  Patient Active Problem List   Diagnosis    Other forms of systemic lupus erythematosus    Vitamin D deficiency    Pure hypercholesterolemia    GERD without esophagitis    History of bariatric surgery    History of type 2 diabetes mellitus    RUQ pain    Chronic insomnia    Tobacco abuse    Irritable bowel syndrome with constipation    Peripheral polyneuropathy    Vasovagal syncope    Chronic anxiety    Sjogren's syndrome    Grade II hemorrhoids    Foreign body in left foot    Acquired keratosis (keratoderma) palmaris et plantaris    Pes cavus    Chronic sinusitis with recurrent bronchitis    Recurrent bacterial infection    Laryngopharyngeal reflux (LPR)    Deficiency of anti-pneumococcal polysaccharide antibody       Past Surgical History:   Procedure Laterality Date    BREAST SURGERY  2004     SECTION  ,2009    gastric sleeve  2010    HYSTERECTOMY      TONSILLECTOMY          Tobacco Use:  The patient  reports that she has been smoking cigarettes. She started smoking about 21 years ago. She has a 37.50 pack-year smoking history. She has never used smokeless tobacco.     No results found for this or any previous visit.          Lab Results   Component Value Date    WBC 5.79 10/16/2019    WBC 5.79 10/16/2019    WBC 5.79 10/16/2019    HGB 14.1 10/16/2019    HGB 14.1 10/16/2019    HGB 14.1 10/16/2019    HCT 42.4 10/16/2019    HCT 42.4 10/16/2019    HCT 42.4 10/16/2019    MCV 97 10/16/2019    MCV 97 10/16/2019    MCV 97 10/16/2019     10/16/2019     10/16/2019     10/16/2019     BMP  Lab Results   Component Value Date     10/16/2019    K 3.4 (L) 10/16/2019     10/16/2019    CO2 27 10/16/2019    BUN 15 10/16/2019    CREATININE 0.6 10/16/2019    CALCIUM 9.1 10/16/2019    ANIONGAP 10  10/16/2019    ESTGFRAFRICA >60.0 10/16/2019    EGFRNONAA >60.0 10/16/2019       Performed Procedure     TRANSTHORACIC ECHO (TTE) COMPLETE   Conclusion     · Normal left ventricular systolic function. The estimated ejection fraction is 70%  · Normal LV diastolic function.  · No wall motion abnormalities.  · Normal right ventricular systolic function.  · Normal central venous pressure (3 mm Hg).  · The estimated PA systolic pressure is 28 mm Hg          Anesthesia Evaluation    I have reviewed the Patient Summary Reports.    I have reviewed the Nursing Notes.   I have reviewed the Medications.     Review of Systems  Anesthesia Hx:  Neg history of prior surgery. Denies Family Hx of Anesthesia complications.   Denies Personal Hx of Anesthesia complications.   Social:  Alcohol Use, Smoker    Pulmonary:   Shortness of breath    Hepatic/GI:   GERD    Neurological:   Hands Feet Legs  Peripheral Neuropathy        Physical Exam  General:  Well nourished    Airway/Jaw/Neck:  Airway Findings: Mouth Opening: Normal Tongue: Normal  General Airway Assessment: Adult  Mallampati: II  Improves to II with phonation.  TM Distance: Normal, at least 6 cm  Jaw/Neck Findings:  Neck ROM: Normal ROM       Chest/Lungs:  Chest/Lungs Findings: Clear to auscultation, Normal Respiratory Rate     Heart/Vascular:  Heart Findings: Rate: Normal  Rhythm: Regular Rhythm  Sounds: Normal        Mental Status:  Mental Status Findings:  Cooperative, Alert and Oriented         Anesthesia Plan  Type of Anesthesia, risks & benefits discussed:  Anesthesia Type:  general  Patient's Preference:   Intra-op Monitoring Plan: standard ASA monitors  Intra-op Monitoring Plan Comments:   Post Op Pain Control Plan: multimodal analgesia  Post Op Pain Control Plan Comments:   Induction:   IV  Beta Blocker:  Patient is not currently on a Beta-Blocker (No further documentation required).       Informed Consent: Patient understands risks and agrees with Anesthesia plan.   Questions answered. Anesthesia consent signed with patient.  ASA Score: 2     Day of Surgery Review of History & Physical:    H&P update referred to the provider.         Ready For Surgery From Anesthesia Perspective.

## 2019-12-17 NOTE — TRANSFER OF CARE
Anesthesia Transfer of Care Note    Patient: Promise Medrano    Procedure(s) Performed: Procedure(s) (LRB):  BRONCHOSCOPY, WITH FLUOROSCOPY (N/A)    Patient location: PACU    Anesthesia Type: general    Transport from OR: Transported from OR on room air with adequate spontaneous ventilation    Post pain: adequate analgesia    Post assessment: no apparent anesthetic complications    Post vital signs: stable    Level of consciousness: awake, alert and oriented    Nausea/Vomiting: no nausea/vomiting    Complications: none    Transfer of care protocol was followed      Last vitals:   Visit Vitals  /60 (BP Location: Right arm, Patient Position: Lying)   Pulse 73   Temp 36.6 °C (97.8 °F) (Temporal)   Resp 16   SpO2 99%   Breastfeeding? No

## 2019-12-19 LAB
BACTERIA SPEC AEROBE CULT: NORMAL
GRAM STN SPEC: NORMAL

## 2019-12-30 DIAGNOSIS — K58.1 IRRITABLE BOWEL SYNDROME WITH CONSTIPATION: ICD-10-CM

## 2019-12-30 DIAGNOSIS — J40 CHRONIC SINUSITIS WITH RECURRENT BRONCHITIS: ICD-10-CM

## 2019-12-30 DIAGNOSIS — J32.9 CHRONIC SINUSITIS WITH RECURRENT BRONCHITIS: ICD-10-CM

## 2019-12-30 DIAGNOSIS — G62.9 PERIPHERAL POLYNEUROPATHY: ICD-10-CM

## 2019-12-30 DIAGNOSIS — F51.04 CHRONIC INSOMNIA: ICD-10-CM

## 2019-12-30 DIAGNOSIS — R09.82 POST-NASAL DRIP: ICD-10-CM

## 2019-12-30 DIAGNOSIS — R11.0 NAUSEA: ICD-10-CM

## 2019-12-30 RX ORDER — FLUTICASONE PROPIONATE 50 MCG
SPRAY, SUSPENSION (ML) NASAL
Qty: 16 G | Refills: 0 | Status: SHIPPED | OUTPATIENT
Start: 2019-12-30 | End: 2020-03-21

## 2019-12-30 RX ORDER — GABAPENTIN 400 MG/1
CAPSULE ORAL
Qty: 90 CAPSULE | Refills: 0 | Status: SHIPPED | OUTPATIENT
Start: 2019-12-30 | End: 2020-08-10 | Stop reason: SDUPTHER

## 2019-12-30 RX ORDER — GABAPENTIN 100 MG/1
CAPSULE ORAL
Qty: 180 CAPSULE | Refills: 0 | Status: SHIPPED | OUTPATIENT
Start: 2019-12-30 | End: 2020-08-10 | Stop reason: SDUPTHER

## 2019-12-30 RX ORDER — AZELASTINE 1 MG/ML
SPRAY, METERED NASAL
Qty: 30 ML | Refills: 0 | Status: SHIPPED | OUTPATIENT
Start: 2019-12-30 | End: 2020-01-30

## 2019-12-30 RX ORDER — ONDANSETRON 4 MG/1
TABLET, ORALLY DISINTEGRATING ORAL
Qty: 30 TABLET | Refills: 0 | Status: SHIPPED | OUTPATIENT
Start: 2019-12-30 | End: 2020-01-30

## 2019-12-31 RX ORDER — DIAZEPAM 5 MG/1
TABLET ORAL
Qty: 60 TABLET | Refills: 0 | Status: SHIPPED | OUTPATIENT
Start: 2019-12-31 | End: 2020-01-30

## 2019-12-31 RX ORDER — DICYCLOMINE HYDROCHLORIDE 20 MG/1
20 TABLET ORAL 2 TIMES DAILY
Qty: 60 TABLET | Refills: 0 | Status: SHIPPED | OUTPATIENT
Start: 2019-12-31 | End: 2020-01-30

## 2020-01-02 ENCOUNTER — PATIENT MESSAGE (OUTPATIENT)
Dept: PULMONOLOGY | Facility: HOSPITAL | Age: 45
End: 2020-01-02

## 2020-01-07 DIAGNOSIS — K21.9 LARYNGOPHARYNGEAL REFLUX (LPR): ICD-10-CM

## 2020-01-13 ENCOUNTER — OFFICE VISIT (OUTPATIENT)
Dept: PULMONOLOGY | Facility: CLINIC | Age: 45
End: 2020-01-13
Payer: MEDICAID

## 2020-01-13 VITALS
HEART RATE: 102 BPM | WEIGHT: 150 LBS | HEIGHT: 62 IN | OXYGEN SATURATION: 96 % | DIASTOLIC BLOOD PRESSURE: 90 MMHG | BODY MASS INDEX: 27.6 KG/M2 | SYSTOLIC BLOOD PRESSURE: 125 MMHG

## 2020-01-13 DIAGNOSIS — M35.1 MCTD (MIXED CONNECTIVE TISSUE DISEASE): ICD-10-CM

## 2020-01-13 DIAGNOSIS — J96.11 CHRONIC HYPOXEMIC RESPIRATORY FAILURE: ICD-10-CM

## 2020-01-13 DIAGNOSIS — Z72.0 TOBACCO ABUSE: Primary | ICD-10-CM

## 2020-01-13 DIAGNOSIS — J98.4 CYSTIC-BULLOUS DISEASE OF LUNG: ICD-10-CM

## 2020-01-13 DIAGNOSIS — J84.9 ILD (INTERSTITIAL LUNG DISEASE): ICD-10-CM

## 2020-01-13 PROCEDURE — 99213 PR OFFICE/OUTPT VISIT, EST, LEVL III, 20-29 MIN: ICD-10-PCS | Mod: S$GLB,,, | Performed by: INTERNAL MEDICINE

## 2020-01-13 PROCEDURE — 99213 OFFICE O/P EST LOW 20 MIN: CPT | Mod: S$GLB,,, | Performed by: INTERNAL MEDICINE

## 2020-01-13 RX ORDER — DM/P-EPHED/ACETAMINOPH/DOXYLAM 30-7.5/3
2 LIQUID (ML) ORAL
Qty: 108 LOZENGE | Refills: 3 | Status: SHIPPED | OUTPATIENT
Start: 2020-01-13 | End: 2020-09-09

## 2020-01-13 RX ORDER — IBUPROFEN 200 MG
1 TABLET ORAL DAILY
Qty: 28 PATCH | Refills: 3 | Status: SHIPPED | OUTPATIENT
Start: 2020-01-13 | End: 2020-06-17

## 2020-01-13 NOTE — PROGRESS NOTES
[facility]  Pulmonology  Follow-Up Clinic Visit    Subjective:     Reason for Visit:    Promise Medrano is a 44 y.o. female followed for suspected ILD.    Chief Complaint   Patient presents with    Follow-up      No new symptoms.  Developed agitation with chantix.  Stopped taking it and started smoking again.  Back at baseline since.     Review of Systems   Constitutional: Positive for activity change, fatigue and weakness. Negative for fever, chills, weight loss, weight gain, appetite change and night sweats.   HENT: Negative for nosebleeds, postnasal drip, rhinorrhea, sinus pressure, sore throat, trouble swallowing, voice change and congestion.    Eyes: Negative for redness and itching.   Respiratory: Positive for cough, chest tightness, shortness of breath, wheezing, previous hospitalization due to pulmonary problems and use of rescue inhaler. Negative for apnea, snoring, hemoptysis, sputum production, choking, orthopnea, asthma nighttime symptoms, pleurisy, dyspnea on extertion, somnolence and Paroxysmal Nocturnal Dyspnea.    Cardiovascular: Positive for chest pain. Negative for palpitations and leg swelling.   Genitourinary: Negative for difficulty urinating and hematuria.   Endocrine: Negative for polydipsia, polyphagia, cold intolerance, heat intolerance and polyuria.    Musculoskeletal: Positive for arthralgias and myalgias. Negative for joint swelling.   Skin: Negative for rash.   Gastrointestinal: Negative for nausea, vomiting, abdominal pain and acid reflux.   Neurological: Negative for dizziness, syncope, weakness and light-headedness.   Hematological: Negative for adenopathy. Does not bruise/bleed easily and no excessive bruising.   Psychiatric/Behavioral: Negative for confusion and sleep disturbance. The patient is nervous/anxious.    All other systems reviewed and are negative.     Outpatient Medications as of 1/13/2020   Medication    albuterol (VENTOLIN HFA) 90 mcg/actuation inhaler     albuterol-ipratropium (DUO-NEB) 2.5 mg-0.5 mg/3 mL nebulizer solution    atorvastatin (LIPITOR) 80 MG tablet    azelastine (ASTELIN) 137 mcg (0.1 %) nasal spray    blood sugar diagnostic Strp    blood sugar diagnostic Strp    blood-glucose meter kit    blood-glucose meter, drum-type (ACCU-CHEK COMPACT PLUS CARE) Kit    budesonide-formoterol 160-4.5 mcg (SYMBICORT) 160-4.5 mcg/actuation HFAA    CETAPHIL MOISTURIZING cream    diazePAM (VALIUM) 5 MG tablet    diclofenac sodium (VOLTAREN) 1 % Gel    dicyclomine (BENTYL) 20 mg tablet    ezetimibe (ZETIA) 10 mg tablet    ferrous sulfate (FEOSOL) 325 mg (65 mg iron) Tab tablet    fluticasone propionate (FLONASE) 50 mcg/actuation nasal spray    gabapentin (NEURONTIN) 100 MG capsule    gabapentin (NEURONTIN) 400 MG capsule    hydrocortisone 1 % cream    ibuprofen (ADVIL,MOTRIN) 600 MG tablet    inhalat.spacing dev,med. mask (AEROCHAMBER PLUS Z STAT MD LOPEZ) Spcr    ketoconazole (NIZORAL) 2 % cream    lancets (ACCU-CHEK SOFTCLIX LANCETS) Misc    levocetirizine (XYZAL) 5 MG tablet    midodrine (PROAMATINE) 5 MG Tab    montelukast (SINGULAIR) 10 mg tablet    mupirocin (BACTROBAN) 2 % ointment    omeprazole (PRILOSEC) 40 MG capsule    ondansetron (ZOFRAN-ODT) 4 MG TbDL    ranitidine (ZANTAC) 150 MG tablet    triamcinolone acetonide 0.1% (KENALOG) 0.1 % cream    urea 40 % Lotn lotion    XIIDRA 5 % Dpet    doxycycline (VIBRAMYCIN) 100 MG Cap    ergocalciferol (ERGOCALCIFEROL) 50,000 unit Cap    nicotine polacrilex 4 MG Lozg    ondansetron (ZOFRAN) 4 MG tablet    polyethylene glycol (GLYCOLAX) 17 gram/dose powder    STIOLTO RESPIMAT 2.5-2.5 mcg/actuation Mist    varenicline (CHANTIX) 0.5 MG Tab    varenicline (CHANTIX) 1 mg Tab     No current facility-administered medications on file as of 1/13/2020.       Previous Reports Reviewed:   ER records, historical medical records, lab reports, nursing home notes, office notes, operative reports,  "radiology reports, referral letter/letters and x-ray reports   The following portions of the patient's history were reviewed and updated as appropriate: allergies, current medications, past family history, past medical history, past social history, past surgical history and problem list.      Objective:     BP (!) 125/90 (BP Location: Left arm, Patient Position: Sitting, BP Method: Medium (Manual))   Pulse 102   Ht 5' 2" (1.575 m)   Wt 68 kg (150 lb)   SpO2 96%   BMI 27.44 kg/m²   Body mass index is 27.44 kg/m².     Physical Exam   Constitutional: She is oriented to person, place, and time. Vital signs are normal. She appears well-developed and well-nourished. She is cooperative.  Non-toxic appearance. No distress.   HENT:   Head: Normocephalic and atraumatic.   Right Ear: Hearing and external ear normal.   Left Ear: Hearing and external ear normal.   Nose: Nose normal. No mucosal edema, rhinorrhea, sinus tenderness, nasal deformity, septal deviation or nasal septal hematoma. Right sinus exhibits no maxillary sinus tenderness and no frontal sinus tenderness. Left sinus exhibits no maxillary sinus tenderness and no frontal sinus tenderness.   Mouth/Throat: Uvula is midline, oropharynx is clear and moist and mucous membranes are normal. Normal dentition. No dental abscesses or dental caries. No oropharyngeal exudate or posterior oropharyngeal edema. Mallampati Score: I.   Neck: Normal range of motion. Neck supple. No JVD present. No tracheal deviation present. No thyromegaly present.   Cardiovascular: Normal rate, regular rhythm, normal heart sounds and intact distal pulses. Exam reveals no gallop and no friction rub.   No murmur heard.  Pulmonary/Chest: Normal expansion, symmetric chest wall expansion and effort normal. No stridor. No tachypnea. No respiratory distress. She has no decreased breath sounds. She has no wheezes. She has no rhonchi. She has no rales (faint bibasilar rales). Chest wall is not dull to " percussion. She exhibits no tenderness. Negative for egophony. Negative for tactile fremitus.   Abdominal: Soft. Bowel sounds are normal. She exhibits no distension and no mass. There is no hepatosplenomegaly. There is no tenderness. There is no rebound and no guarding. No hernia.   Musculoskeletal: Normal range of motion. She exhibits no edema, tenderness or deformity.   Lymphadenopathy: No supraclavicular adenopathy is present.     She has no cervical adenopathy.     She has no axillary adenopathy.   Neurological: She is alert and oriented to person, place, and time. She has normal reflexes. No cranial nerve deficit. Gait normal.   Skin: Skin is warm and intact. No lesion, no petechiae and no rash noted. No cyanosis or erythema. No pallor. Nails show clubbing.   Psychiatric: Her behavior is normal. Judgment and thought content normal. Her mood appears anxious. Her speech is rapid and/or pressured and tangential. Cognition and memory are normal.   Nursing note and vitals reviewed.       Personal Review of Relevant Diagnostic Studies:  I have personally reviewed and interpreted the following labs/studies/images.  Chest x-ray:   (June 25, 2012)  Radiology Report::  No acute cardiopulmonary abnormality.  My impression:  Unable to fully evaluate the bases due to breast implants.  Otherwise no gross abnormalities.     (April 14, 2019)  Radiology report:  None available  My impression:  Interval increase and basilar predominant bilateral hazy airspace opacities with a decrease and overall lung volume compared to prior chest radiograph.     I independently viewed the above imaging/lab studies in addition to reviewing the report      CT Chest:  (June 25, 2012)  Radiology report:    None available     My impression:    Available images of the lung bases from CT abdomen are unremarkable.        (August 31, 2018)  Radiology report:    1. Mild mosaic attenuation pattern in the lungs, which was present on theprevious exam of  2010, with scattered minimal bronchiolitis in both upper lobes and a few thin-walled parenchymal cysts. Smoking-related interstitial lung diseases could have this appearance, with air-trapping and small airways inflammation, or inhalational lung disease of various potential etiologies, constituting additional diagnostic considerations.  2. Stable prominent mediastinal lymph nodes, and prominent to borderline enlarged bilateral axillary lymph nodes, nonspecific but presumably reactive given stability.     My impression:    Diffuse interlobular septal thickening with some areas having a nodular appearance as well.  Diffuse ground-glass opacities with some mosaicism at both bases.  Scattered thin walled cysts of varying sizes.  Mild paraseptal emphysema.  Overall, this could be consistent with some very early interstitial lung disease among other etiologies, but appearance is not classic for any specific I LD.        (2019)  Radiology report:  1. Negative for pulmonary embolus.  2. New diffuse bilateral pulmonary ground-glass opacities.  Potential etiologies include infectious or inflammatory alveolitis/pneumonitis, alveolar edema, or alveolar hemorrhage.  Clinical and laboratory correlation is needed.     My impression:  · Interval increase in the size and number of 10 walled cysts.  · Persistent interlobular septal thickening, but is difficult to clearly appreciated due to presence of IV contrast.  · Diffuse ground-glass opacities also difficult to compare to prior CT due to the presence of IV contrast.     I independently viewed the above imaging/lab studies in addition to reviewing the report      PFTs:  Date:  2019  FEV1:  2.49  (91 % predicted), FVC:  3.25 (96 % predicted), FEV1/FVC:  77, T.71 (102 % predicted), RV/TLVC:  33 (97 % predicted), DLCO:  14.95 (74 % predicted)  Computer interpretation:  Spirometry is within normal limits.  There was a significant response to inhaled  bronchodilator in small airways  Lung volumes are normal.  Diffusion is normal.      My impression:   No evidence of obstructive lung disease, or reactive airways disease.   The clinical utility of measuring the FEF 25-75 is debatable, and it is response to bronchodilators even more so.  The statement significant response to inhaled bronchodilator and small airways is an inaccurate description of the overall impression from her PFTs.  Significantly elevated FRC and ERV are unusual but suggest some intrinsic pulmonary dysfunction.     (2019)   FEV1:  2.37 L (86% predicted)  FVC 3.17 L (94% predicted)  FEV1/FVC:  75  T.75 L (103% predicted)  RV/T (97% predicted)  ERV:  1.30 L (120% predicted)  DLCO:  12.16 (60% predicted)  My impression:  No obstruction.  No restriction.  Mild diffusion impairment.  Unchanged compared to pulmonary function test obtained 4 months prior.      Methacholine Challenge:  None on file.      6MWT:   (2019)  Predicted distance 453 m  Actual distance:548 m  SpO2:  Pre-exercise 92% / During exercise 87% on room air and improved to 88% when placed on 2 LPM / recovery 92% on 2LPM  Results of this test qualify for supplemental oxygen at home with ambulation.      PSG:  Date:  2019  No central or obstructive sleep apnea.  Significant desaturations during supine sleep.      ECG:  None available to review.      Echocardiogram:   Date:  2018  · Estimated left ventricular ejection fraction is 60-65%  · Normal left atrial pressure diastolic function.  · There is mild mitral regurgitation.  · Estimated RVSP is 28 mmHg.  · No mention of pulmonary artery pressure     (October 15, 2019)  · Normal left ventricular systolic function. The estimated ejection fraction is 70%  · Normal LV diastolic function.  · No wall motion abnormalities.  · Normal right ventricular systolic function.  · Normal central venous pressure (3 mm Hg).  · The estimated PA  systolic pressure is 28 mm Hg      BNP  October 16, 2019:  38                     Arterial blood gas:  (October 29, 2019)  7.437 / 37.6 / 95 / 25.4 (obtained on ambient air)                    Serology:                 (February 14, 2017)                 PRASHANTH Titer:              1:  320 (elevated)                 Pattern:                  Speckled                 RF:                         <15                  Anti CCP:             <15.6                    (October 2019)                 CRP:                     0.46                 ESR:                     10                 RF:                        < 10                 Anti Lesa 1 Ab:         < 0.2                 SPEP:                   No M-spike observed                 C3:                        136                 C4:                        18                 A1AT:                    173                 A1AT Phenotype:  MM                 Anti-SCL Ab:        < 0.2                 P-ANCA:               <1:20                 MPO:                     <1:20                 C-ANCA:              <1:20                 Proteinase 3 Ab:  <93.5                 PRASHANTH:                     Positive   >1:80  (speckled pattern)                 Anti SSA Ab:        <0.2                 Anti SSB Ab:        <0.2                 HIV 1/2 Ag/Ab:     Non-reactive                 Cryoglobulin:        None detected                 LDH:                      112                 ACE:                     44                 CK:                        49                 Aldolase:              3.4                 MyoMarker Panel 3:  Pending                 Th/To Ab:             Pending                 Anti Sm/RNP Ab:  Pending                 PM-Scl Ab:           Pending                 RNA poly IgG:      Pending                 HP Screen:           Pending                 PRASHANTH by IFA:         Pending   TBBx:  o LEFT LUNG TRANSBRONCHIAL BIOPSIES:  o BRONCHIAL MUCOSA WITH MILD CHRONIC  INFLAMMATION.  o MINUTE FRAGMENT OF ALVEOLAR LUNG PARENCHYMA REPRESENTED SHOWN ANTHRACOTIC PIGMENT DEPOSITS AND   MINIMAL CHRONIC INFLAMMATION.  o NO GRANULOMAS ARE IDENTIFIED.  o NO DYSPLASIA OR MALIGNANCY IS IDENTIFIED.   BAL  o BRONCHIAL WASHING:  o HYPOCELLULAR SPECIMEN WITH THE PRESENCE OF PERIPHERAL BLOOD AND FEW  o BENIGN  o BRONCHIAL EPITHELIAL CELLS.    o NO EVIDENCE OF MALIGNANCY IS SEEN.       Assessment:     1. Tobacco abuse    2. Cystic-bullous disease of lung    3. ILD (interstitial lung disease)    4. Chronic hypoxemic respiratory failure    5. MCTD (mixed connective tissue disease)      Impression:  Undifferentiated ILD. Still no etiology identified.      Plan:     · Continue to work on smoking cessation.  · Start nicotine patch and gum.  · Observation and supportive care for now.  · Hold off on open lung Bx for now.  · Continue home oxygen.     I informed the patient of my working diagnosis, it's etiology, risk factors, expected symptoms, diagnostic work up, treatment options and prognosis., I personally reviewed the results of relevant imaging/labs/studies with the patient, and discussed their clinical significance., I spent >10 minutes counseling the patient about their condition., Plan discussed with the patient, who is in agreement., Opportunity provided for the the patient to voice any additional questions or concerns., All questions were answered to the patient's satisfaction., Educational material provided and Patient given date for next visit.    Follow up in about 2 months (around 3/13/2020).    Orders Placed This Visit:  No orders of the defined types were placed in this encounter.      Nate Tarango MD  Pulmonary / Critical Care Medicine  [facility]

## 2020-01-13 NOTE — PATIENT INSTRUCTIONS
How to Quit Smoking  Smoking is one of the hardest habits to break. About half of all people who have ever smoked have been able to quit. Most people who still smoke want to quit. Here are some of the best ways to stop smoking.    Keep trying  Most smokers make many attempts at quitting before they are successful. Its important not to give up.  Go cold turkey  Most former smokers quit cold turkey (all at once). Trying to cut back gradually doesn't seem to work as well, perhaps because it continues the smoking habit. Also, it is possible to inhale more while smoking fewer cigarettes. This results in the same amount of nicotine in your body.  Get support  Support programs can be a big help, especially for heavy smokers. These groups offer lectures, ways to change behavior, and peer support. Here are some ways to find a support program:  · Free national quitline: 800-QUIT-NOW (906-025-6427).  · Hospital quit-smoking programs.  · American Lung Association: (194.952.9162).  · American Cancer Society (820-281-2716).  Support at home is important too. Nonsmokers can offer praise and encouragement. If the smoker in your life finds it hard to quit, encourage them to keep trying.  Over-the-counter medicines  Nicotine replacement therapy may make quitting easier. Certain aids, such as the nicotine patch, gum, and lozenges, are available without a prescription. It is best to use these under a doctors care, though. The skin patch provides a steady supply of nicotine. Nicotine gum and lozenges give temporary bursts of low levels of nicotine. Both methods reduce the craving for cigarettes. Warning: If you have nausea, vomiting, dizziness, weakness, or a fast heartbeat, stop using these products and see your doctor.  Prescription medicines  After reviewing your smoking patterns and past attempts to quit, your doctor may offer a prescription medicine such as bupropion, varenicline, a nicotine inhaler, or nasal spray. Each has  "advantages and side effects. Your doctor can review these with you.  Health benefits of quitting  The benefits of quitting start right away and keep improving the longer you go without smoking. These benefits occur at any age.  So whether you are 17 or 70, quitting is a good decision. Some of the benefits include:  · 20 minutes: Blood pressure and pulse return to normal.  · 8 hours: Oxygen levels return to normal.  · 2 days: Ability to smell and taste begin to improve as damaged nerves regrow.  · 2 to 3 weeks: Circulation and lung function improve.  · 1 to 9 months: Coughing, congestion, and shortness of breath decrease; tiredness decreases.  · 1 year: Risk of heart attack decreases by half.  · 5 years: Risk of lung cancer decreases by half; risk of stroke becomes the same as a nonsmokers.  For more on how to quit smoking, try these online resources:   · Smokefree.gov  · "Clearing the Air" booklet from the National Cancer [place]: smokefree.gov/sites/default/files/pdf/clearing-the-air-accessible.pdf  Date Last Reviewed: 3/1/2017  © 4108-7838 [employer], [employer]. [address] [place] [address]. All rights reserved. This information is not intended as a substitute for professional medical care. Always follow your healthcare professional's instructions.          "

## 2020-01-20 DIAGNOSIS — R55 VASOVAGAL SYNCOPE: ICD-10-CM

## 2020-01-20 DIAGNOSIS — E55.9 VITAMIN D DEFICIENCY: ICD-10-CM

## 2020-01-20 RX ORDER — ERGOCALCIFEROL 1.25 MG/1
CAPSULE ORAL
Qty: 4 CAPSULE | Refills: 0 | Status: SHIPPED | OUTPATIENT
Start: 2020-01-20 | End: 2020-02-27 | Stop reason: SDUPTHER

## 2020-01-20 RX ORDER — MIDODRINE HYDROCHLORIDE 5 MG/1
TABLET ORAL
Qty: 180 TABLET | Refills: 0 | Status: SHIPPED | OUTPATIENT
Start: 2020-01-20 | End: 2020-04-20

## 2020-01-28 ENCOUNTER — OFFICE VISIT (OUTPATIENT)
Dept: FAMILY MEDICINE | Facility: CLINIC | Age: 45
End: 2020-01-28
Payer: MEDICAID

## 2020-01-28 VITALS
SYSTOLIC BLOOD PRESSURE: 120 MMHG | HEIGHT: 62 IN | WEIGHT: 152 LBS | OXYGEN SATURATION: 94 % | BODY MASS INDEX: 27.97 KG/M2 | RESPIRATION RATE: 18 BRPM | HEART RATE: 84 BPM | DIASTOLIC BLOOD PRESSURE: 84 MMHG

## 2020-01-28 DIAGNOSIS — J32.9 CHRONIC SINUSITIS WITH RECURRENT BRONCHITIS: ICD-10-CM

## 2020-01-28 DIAGNOSIS — R10.13 ABDOMINAL PAIN, EPIGASTRIC: Primary | ICD-10-CM

## 2020-01-28 DIAGNOSIS — G47.33 OSA (OBSTRUCTIVE SLEEP APNEA): ICD-10-CM

## 2020-01-28 DIAGNOSIS — J84.9 INTERSTITIAL LUNG DISEASE: Primary | ICD-10-CM

## 2020-01-28 DIAGNOSIS — F41.9 CHRONIC ANXIETY: ICD-10-CM

## 2020-01-28 DIAGNOSIS — J40 CHRONIC SINUSITIS WITH RECURRENT BRONCHITIS: ICD-10-CM

## 2020-01-28 PROBLEM — Z86.39 HISTORY OF TYPE 2 DIABETES MELLITUS: Status: RESOLVED | Noted: 2017-10-30 | Resolved: 2020-01-28

## 2020-01-28 PROCEDURE — 99213 OFFICE O/P EST LOW 20 MIN: CPT | Mod: S$PBB,,, | Performed by: INTERNAL MEDICINE

## 2020-01-28 PROCEDURE — 99213 OFFICE O/P EST LOW 20 MIN: CPT | Performed by: INTERNAL MEDICINE

## 2020-01-28 PROCEDURE — 99213 PR OFFICE/OUTPT VISIT, EST, LEVL III, 20-29 MIN: ICD-10-PCS | Mod: S$PBB,,, | Performed by: INTERNAL MEDICINE

## 2020-01-28 NOTE — PROGRESS NOTES
SUBJECTIVE:    Patient ID: Promise Medrano is a 44 y.o. female.    Chief Complaint: Follow-up    HPI     PATIENT IN FOR FOLLOW-UP OF GI ISSUES AND DIABETES.--    Since last check she had a sleep study which resulted in sleeping with oxygen--3L nasal 02    HER LAST ELECTROLYTES REVEALED A GLUCOSE OF ONLY 77, AND THIS IS RESOLVED DIABETES HAS RESULTS WERE SIGNIFICANT OF WEIGHT LOSS POST BARIATRIC SURGERY.( but she gained that weight back but then lost 40)    Complete metabolic panel was normal -CBC 3 months ago was normal, lipids 4 months ago normal.  Most recent pulmonary evaluation revealed interstitial lung disease and cystic-bullous lung disease--    Tremors--is going to see neurologist here in [place]-    1. Tobacco abuse    2. Cystic-bullous disease of lung    3. ILD (interstitial lung disease)    4. Chronic hypoxemic respiratory failure    5. MCTD (mixed connective tissue disease)         Admission on 12/17/2019, Discharged on 12/17/2019   Component Date Value Ref Range Status    Sodium 12/17/2019 142  136 - 145 mmol/L Final    Potassium 12/17/2019 4.1  3.5 - 5.1 mmol/L Final    Chloride 12/17/2019 104  95 - 110 mmol/L Final    CO2 12/17/2019 32* 23 - 29 mmol/L Final    Glucose 12/17/2019 77  70 - 110 mg/dL Final    BUN, Bld 12/17/2019 13  6 - 20 mg/dL Final    Creatinine 12/17/2019 0.7  0.5 - 1.4 mg/dL Final    Calcium 12/17/2019 9.3  8.7 - 10.5 mg/dL Final    Total Protein 12/17/2019 7.1  6.0 - 8.4 g/dL Final    Albumin 12/17/2019 3.9  3.5 - 5.2 g/dL Final    Total Bilirubin 12/17/2019 0.6  0.1 - 1.0 mg/dL Final    Alkaline Phosphatase 12/17/2019 66  55 - 135 U/L Final    AST 12/17/2019 16  10 - 40 U/L Final    ALT 12/17/2019 14  10 - 44 U/L Final    Anion Gap 12/17/2019 6* 8 - 16 mmol/L Final    eGFR if African American 12/17/2019 >60.0  >60 mL/min/1.73 m^2 Final    eGFR if non African American 12/17/2019 >60.0  >60 mL/min/1.73 m^2 Final    Respiratory Culture 12/17/2019 Reduced normal  respiratory ekaterina   Final    Gram Stain (Respiratory) 12/17/2019 <10 epithelial cells per low power field.   Final    Gram Stain (Respiratory) 12/17/2019 No WBC's   Final    Gram Stain (Respiratory) 12/17/2019 Rare  Gram positive cocci   Final    Body Fluid Type 12/17/2019 Bronchial   Final    Fluid Appearance 12/17/2019 Clear   Final    Fluid Color 12/17/2019 Colorless   Final    WBC, Body Fluid 12/17/2019 1  /cu mm Final    Monocytes/Macrophages, Fluid 12/17/2019 1  % Final    Mesothelial cells, Fluid 12/17/2019 19  % Final   Lab Visit on 12/04/2019   Component Date Value Ref Range Status    Streptococcus pneumoniae 1 Ab IgG 12/04/2019 0.5* >1.3 ug/mL Final    Streptococcus pneumoniae 3 Ab IgG 12/04/2019 1.2* >1.3 ug/mL Final    Streptococcus pneumoniae 4 Ab IgG 12/04/2019 0.2* >1.3 ug/mL Final    Streptococcus pneumoniae 8 Ab IgG 12/04/2019 2.4  >1.3 ug/mL Final    Streptococcus pneumoniae 9 Ab IgG 12/04/2019 0.6* >1.3 ug/mL Final    Streptococcus pneumoniae 12 Ab IgG 12/04/2019 <0.1* >1.3 ug/mL Final    Streptococcus pneumoniae 14 Ab IgG 12/04/2019 1.2* >1.3 ug/mL Final    Streptococcus pneumoniae 19 Ab IgG 12/04/2019 0.9* >1.3 ug/mL Final    Streptococcus pneumoniae 23 Ab IgG 12/04/2019 0.3* >1.3 ug/mL Final    Streptococcus pneumoniae 26 Ab IgG 12/04/2019 3.9  >1.3 ug/mL Final    Streptococcus pneumoniae 51 Ab IgG 12/04/2019 0.2* >1.3 ug/mL Final    Streptococcus pneumoniae 56 Ab IgG 12/04/2019 1.0* >1.3 ug/mL Final    Streptococcus pneumoniae 57 Ab IgG 12/04/2019 1.3* >1.3 ug/mL Final    Streptococcus pneumoniae 68 Ab IgG 12/04/2019 0.2* >1.3 ug/mL Final   Hospital Outpatient Visit on 10/29/2019   Component Date Value Ref Range Status    POC PH 10/29/2019 7.437  7.35 - 7.45 Final    POC PCO2 10/29/2019 37.6  35 - 45 mmHg Final    POC PO2 10/29/2019 95  80 - 100 mmHg Final    POC HCO3 10/29/2019 25.4  24 - 28 mmol/L Final    POC BE 10/29/2019 1  -2 to 2 mmol/L Final    POC  SATURATED O2 10/29/2019 98  95 - 100 % Final    POC TCO2 10/29/2019 27  23 - 27 mmol/L Final    Sample 10/29/2019 ARTERIAL   Final    Site 10/29/2019 RR   Final    Allens Test 10/29/2019 Pass   Final    DelSys 10/29/2019 Room Air   Final   Lab Visit on 10/16/2019   Component Date Value Ref Range Status    CRP 10/16/2019 0.46  0.00 - 0.75 mg/dL Final    PRASHANTH 10/16/2019 Positive*  Final    CPK 10/16/2019 49  20 - 180 U/L Final    Aldolase 10/16/2019 3.4  3.3 - 10.3 U/L Final    Rheumatoid Factor 10/16/2019 <10.0  0.0 - 13.9 IU/mL Final    Anti-SSA Antibody 10/16/2019 <0.2  0.0 - 0.9 AI Final    Anti-SSB Antibody 10/16/2019 <0.2  0.0 - 0.9 AI Final    Myeloperoxidase Ab 10/16/2019 <9.0  0.0 - 9.0 U/mL Final    Proteinase 3 Antibody 10/16/2019 <3.5  0.0 - 3.5 U/mL Final    Cytoplasmic Neutrophilic Ab 10/16/2019 <1:20  Neg:<1:20 titer Final    Perinuclear (P-ANCA) 10/16/2019 <1:20  Neg:<1:20 titer Final    Atypical pANCA 10/16/2019 <1:20  Neg:<1:20 titer Final    Total Protein 10/16/2019 7.4  6.0 - 8.5 g/dL Final    Albumin grams/dl 10/16/2019 4.1  2.9 - 4.4 g/dL Final    Alpha-1 grams/dl 10/16/2019 0.3  0.0 - 0.4 g/dL Final    Alpha-2 grams/dl 10/16/2019 0.9  0.4 - 1.0 g/dL Final    Beta grams/dl 10/16/2019 1.1  0.7 - 1.3 g/dL Final    Gamma grams/dl 10/16/2019 1.1  0.4 - 1.8 g/dL Final    M-SPIKE, PROT ELECTRO 10/16/2019 Not Observed  Not Observed g/dL Final    Globulin, Total 10/16/2019 3.3  2.2 - 3.9 g/dL Final    A/G Ratio 10/16/2019 1.2  0.7 - 1.7 Final    Please Note: 10/16/2019 Comment   Final    Scleroderma SCL- 10/16/2019 <0.2  0.0 - 0.9 AI Final    Lesa-1 Autoantibodies 10/16/2019 <0.2  0.0 - 0.9 AI Final    CRP 10/16/2019 0.46  0.00 - 0.75 mg/dL Final    PRASHANTH 10/16/2019 Positive*  Final    CPK 10/16/2019 49  20 - 180 U/L Final    Aldolase 10/16/2019 3.4  3.3 - 10.3 U/L Final    Rheumatoid Factor 10/16/2019 <10.0  0.0 - 13.9 IU/mL Final    Anti-SSA Antibody 10/16/2019 <0.2  0.0 -  0.9 AI Final    Anti-SSB Antibody 10/16/2019 <0.2  0.0 - 0.9 AI Final    Myeloperoxidase Ab 10/16/2019 <9.0  0.0 - 9.0 U/mL Final    Proteinase 3 Antibody 10/16/2019 <3.5  0.0 - 3.5 U/mL Final    Cytoplasmic Neutrophilic Ab 10/16/2019 <1:20  Neg:<1:20 titer Final    Perinuclear (P-ANCA) 10/16/2019 <1:20  Neg:<1:20 titer Final    Atypical pANCA 10/16/2019 <1:20  Neg:<1:20 titer Final    Total Protein 10/16/2019 7.4  6.0 - 8.5 g/dL Final    Albumin grams/dl 10/16/2019 4.1  2.9 - 4.4 g/dL Final    Alpha-1 grams/dl 10/16/2019 0.3  0.0 - 0.4 g/dL Final    Alpha-2 grams/dl 10/16/2019 0.9  0.4 - 1.0 g/dL Final    Beta grams/dl 10/16/2019 1.1  0.7 - 1.3 g/dL Final    Gamma grams/dl 10/16/2019 1.1  0.4 - 1.8 g/dL Final    M-SPIKE, PROT ELECTRO 10/16/2019 Not Observed  Not Observed g/dL Final    Globulin, Total 10/16/2019 3.3  2.2 - 3.9 g/dL Final    A/G Ratio 10/16/2019 1.2  0.7 - 1.7 Final    Please Note: 10/16/2019 Comment   Final    Lesa-1 Autoantibodies 10/16/2019 <0.2  0.0 - 0.9 AI Final    Cytoplasmic Neutrophilic Ab 10/16/2019 <1:20  Neg:<1:20 titer Final    Perinuclear (P-ANCA) 10/16/2019 <1:20  Neg:<1:20 titer Final    Atypical pANCA 10/16/2019 <1:20  Neg:<1:20 titer Final    Angio Convert Enzyme 10/16/2019 44  14 - 82 U/L Final    Complement (C-3) 10/16/2019 122  82 - 167 mg/dL Final    WBC 10/16/2019 5.79  3.90 - 12.70 K/uL Final    RBC 10/16/2019 4.39  4.00 - 5.40 M/uL Final    Hemoglobin 10/16/2019 14.1  12.0 - 16.0 g/dL Final    Hematocrit 10/16/2019 42.4  37.0 - 48.5 % Final    Mean Corpuscular Volume 10/16/2019 97  82 - 98 fL Final    Mean Corpuscular Hemoglobin 10/16/2019 32.1* 27.0 - 31.0 pg Final    Mean Corpuscular Hemoglobin Conc 10/16/2019 33.3  32.0 - 36.0 g/dL Final    RDW 10/16/2019 14.6* 11.5 - 14.5 % Final    Platelets 10/16/2019 214  150 - 350 K/uL Final    MPV 10/16/2019 10.4  9.2 - 12.9 fL Final    Immature Granulocytes 10/16/2019 0.3  0.0 - 0.5 % Final    Gran #  (ANC) 10/16/2019 3.8  1.8 - 7.7 K/uL Final    Immature Grans (Abs) 10/16/2019 0.02  0.00 - 0.04 K/uL Final    Lymph # 10/16/2019 1.6  1.0 - 4.8 K/uL Final    Mono # 10/16/2019 0.4  0.3 - 1.0 K/uL Final    Eos # 10/16/2019 0.0  0.0 - 0.5 K/uL Final    Baso # 10/16/2019 0.03  0.00 - 0.20 K/uL Final    nRBC 10/16/2019 0  0 /100 WBC Final    Gran% 10/16/2019 65.5  38.0 - 73.0 % Final    Lymph% 10/16/2019 27.3  18.0 - 48.0 % Final    Mono% 10/16/2019 6.2  4.0 - 15.0 % Final    Eosinophil% 10/16/2019 0.2  0.0 - 8.0 % Final    Basophil% 10/16/2019 0.5  0.0 - 1.9 % Final    Differential Method 10/16/2019 Automated   Final    C4 Complement 10/16/2019 18  14 - 44 mg/dL Final    Sed Rate 10/16/2019 10  0 - 20 mm/Hr Final    LD 10/16/2019 112  110 - 260 U/L Final    Cryoglobulin 10/16/2019 Comment  None detected Final    Cytoplasmic Neutrophilic Ab 10/16/2019 <1:20  Neg:<1:20 titer Final    Perinuclear (P-ANCA) 10/16/2019 <1:20  Neg:<1:20 titer Final    Atypical pANCA 10/16/2019 <1:20  Neg:<1:20 titer Final    Angio Convert Enzyme 10/16/2019 44  14 - 82 U/L Final    Complement (C-3) 10/16/2019 122  82 - 167 mg/dL Final    C4 Complement 10/16/2019 18  14 - 44 mg/dL Final    WBC 10/16/2019 5.79  3.90 - 12.70 K/uL Final    RBC 10/16/2019 4.39  4.00 - 5.40 M/uL Final    Hemoglobin 10/16/2019 14.1  12.0 - 16.0 g/dL Final    Hematocrit 10/16/2019 42.4  37.0 - 48.5 % Final    Mean Corpuscular Volume 10/16/2019 97  82 - 98 fL Final    Mean Corpuscular Hemoglobin 10/16/2019 32.1* 27.0 - 31.0 pg Final    Mean Corpuscular Hemoglobin Conc 10/16/2019 33.3  32.0 - 36.0 g/dL Final    RDW 10/16/2019 14.6* 11.5 - 14.5 % Final    Platelets 10/16/2019 214  150 - 350 K/uL Final    MPV 10/16/2019 10.4  9.2 - 12.9 fL Final    Immature Granulocytes 10/16/2019 0.3  0.0 - 0.5 % Final    Gran # (ANC) 10/16/2019 3.8  1.8 - 7.7 K/uL Final    Immature Grans (Abs) 10/16/2019 0.02  0.00 - 0.04 K/uL Final    Lymph #  10/16/2019 1.6  1.0 - 4.8 K/uL Final    Mono # 10/16/2019 0.4  0.3 - 1.0 K/uL Final    Eos # 10/16/2019 0.0  0.0 - 0.5 K/uL Final    Baso # 10/16/2019 0.03  0.00 - 0.20 K/uL Final    nRBC 10/16/2019 0  0 /100 WBC Final    Gran% 10/16/2019 65.5  38.0 - 73.0 % Final    Lymph% 10/16/2019 27.3  18.0 - 48.0 % Final    Mono% 10/16/2019 6.2  4.0 - 15.0 % Final    Eosinophil% 10/16/2019 0.2  0.0 - 8.0 % Final    Basophil% 10/16/2019 0.5  0.0 - 1.9 % Final    Differential Method 10/16/2019 Automated   Final    Sed Rate 10/16/2019 10  0 - 20 mm/Hr Final    LD 10/16/2019 112  110 - 260 U/L Final    Cryoglobulin 10/16/2019 Comment  None detected Final    Myeloperoxidase Ab 10/16/2019 <9.0  0.0 - 9.0 U/mL Final    Proteinase 3 Antibody 10/16/2019 <3.5  0.0 - 3.5 U/mL Final    Cytoplasmic Neutrophilic Ab 10/16/2019 <1:20  Neg:<1:20 titer Final    Perinuclear (P-ANCA) 10/16/2019 <1:20  Neg:<1:20 titer Final    Atypical pANCA 10/16/2019 <1:20  Neg:<1:20 titer Final    WBC 10/16/2019 5.79  3.90 - 12.70 K/uL Final    RBC 10/16/2019 4.39  4.00 - 5.40 M/uL Final    Hemoglobin 10/16/2019 14.1  12.0 - 16.0 g/dL Final    Hematocrit 10/16/2019 42.4  37.0 - 48.5 % Final    Mean Corpuscular Volume 10/16/2019 97  82 - 98 fL Final    Mean Corpuscular Hemoglobin 10/16/2019 32.1* 27.0 - 31.0 pg Final    Mean Corpuscular Hemoglobin Conc 10/16/2019 33.3  32.0 - 36.0 g/dL Final    RDW 10/16/2019 14.6* 11.5 - 14.5 % Final    Platelets 10/16/2019 214  150 - 350 K/uL Final    MPV 10/16/2019 10.4  9.2 - 12.9 fL Final    Immature Granulocytes 10/16/2019 0.3  0.0 - 0.5 % Final    Gran # (ANC) 10/16/2019 3.8  1.8 - 7.7 K/uL Final    Immature Grans (Abs) 10/16/2019 0.02  0.00 - 0.04 K/uL Final    Lymph # 10/16/2019 1.6  1.0 - 4.8 K/uL Final    Mono # 10/16/2019 0.4  0.3 - 1.0 K/uL Final    Eos # 10/16/2019 0.0  0.0 - 0.5 K/uL Final    Baso # 10/16/2019 0.03  0.00 - 0.20 K/uL Final    nRBC 10/16/2019 0  0 /100 WBC Final     Gran% 10/16/2019 65.5  38.0 - 73.0 % Final    Lymph% 10/16/2019 27.3  18.0 - 48.0 % Final    Mono% 10/16/2019 6.2  4.0 - 15.0 % Final    Eosinophil% 10/16/2019 0.2  0.0 - 8.0 % Final    Basophil% 10/16/2019 0.5  0.0 - 1.9 % Final    Differential Method 10/16/2019 Automated   Final    Sodium 10/16/2019 137  136 - 145 mmol/L Final    Potassium 10/16/2019 3.4* 3.5 - 5.1 mmol/L Final    Chloride 10/16/2019 100  95 - 110 mmol/L Final    CO2 10/16/2019 27  23 - 29 mmol/L Final    Glucose 10/16/2019 100  70 - 110 mg/dL Final    BUN, Bld 10/16/2019 15  6 - 20 mg/dL Final    Creatinine 10/16/2019 0.6  0.5 - 1.4 mg/dL Final    Calcium 10/16/2019 9.1  8.7 - 10.5 mg/dL Final    Total Protein 10/16/2019 8.1  6.0 - 8.4 g/dL Final    Albumin 10/16/2019 4.5  3.5 - 5.2 g/dL Final    Total Bilirubin 10/16/2019 0.4  0.1 - 1.0 mg/dL Final    Alkaline Phosphatase 10/16/2019 74  55 - 135 U/L Final    AST 10/16/2019 17  10 - 40 U/L Final    ALT 10/16/2019 13  10 - 44 U/L Final    Anion Gap 10/16/2019 10  8 - 16 mmol/L Final    eGFR if African American 10/16/2019 >60.0  >60 mL/min/1.73 m^2 Final    eGFR if non African American 10/16/2019 >60.0  >60 mL/min/1.73 m^2 Final    CRP 10/16/2019 0.46  0.00 - 0.75 mg/dL Final    HIV Screen 4th Generation wRfx 10/16/2019 Non Reactive  Non Reactive Final    Rheumatoid Factor 10/16/2019 <10.0  0.0 - 13.9 IU/mL Final    Sed Rate 10/16/2019 10  0 - 20 mm/Hr Final    A-1 Antitrypsin, Ser 10/16/2019 173  90 - 200 mg/dL Final    Alpha 1 Antitrypsin Phenotype 10/16/2019 MM   Final    BNP 10/16/2019 27  0 - 99 pg/mL Final    Homogeneous Pattern 10/16/2019 CANCELED   Final    Nucleolar Pattern 10/16/2019 CANCELED   Final    Speckled Pattern 10/16/2019 1:80   Final    Centromere Pattern 10/16/2019 CANCELED   Final    Spindle Apparatus Pattern 10/16/2019 CANCELED   Final    Nuclear Membrane Pattern 10/16/2019 CANCELED   Final    Midbody Pattern 10/16/2019 CANCELED   Final     Nuclear Dot Pattern 10/16/2019 CANCELED   Final    PCNA Pattern 10/16/2019 CANCELED   Final    Centriole Pattern 10/16/2019 CANCELED   Final    PRASHANTH Note 10/16/2019 Comment   Final   Clinical Support on 10/15/2019   Component Date Value Ref Range Status    TDI SEPTAL 10/15/2019 0.07  m/s Final    LV LATERAL E/E' RATIO 10/15/2019 10.33  m/s Final    LV SEPTAL E/E' RATIO 10/15/2019 13.29  m/s Final    AORTIC VALVE CUSP SEPERATION 10/15/2019 1.78  cm Final    TDI LATERAL 10/15/2019 0.09  m/s Final    PV PEAK VELOCITY 10/15/2019 105.11  cm/s Final    LVIDD 10/15/2019 4.47  3.5 - 6.0 cm Final    IVS 10/15/2019 0.72  0.6 - 1.1 cm Final    PW 10/15/2019 0.73  0.6 - 1.1 cm Final    Ao root annulus 10/15/2019 2.95  cm Final    LVIDS 10/15/2019 2.63  2.1 - 4.0 cm Final    FS 10/15/2019 41  28 - 44 % Final    LV mass 10/15/2019 98.91  g Final    LA size 10/15/2019 3.39  cm Final    RVDD 10/15/2019 310.00  cm Final    Left Ventricle Relative Wall Thick* 10/15/2019 0.33  cm Final    AV mean gradient 10/15/2019 5  mmHg Final    AV valve area 10/15/2019 3.00  cm2 Final    AV Velocity Ratio 10/15/2019 81.09   Final    AV index (prosthetic) 10/15/2019 0.93   Final    E/A ratio 10/15/2019 1.31   Final    Mean e' 10/15/2019 0.08  m/s Final    E wave decelartion time 10/15/2019 194.76  msec Final    LVOT diameter 10/15/2019 2.03  cm Final    LVOT area 10/15/2019 3.2  cm2 Final    LVOT peak pardeep 10/15/2019 125.69  m/s Final    LVOT peak VTI 10/15/2019 25.12  cm Final    Ao peak pardeep 10/15/2019 1.55  m/s Final    Ao VTI 10/15/2019 27.08  cm Final    LVOT stroke volume 10/15/2019 81.26  cm3 Final    AV peak gradient 10/15/2019 10  mmHg Final    E/E' ratio 10/15/2019 11.63  m/s Final    MV Peak E Pardeep 10/15/2019 0.93  m/s Final    TR Max Pardeep 10/15/2019 2.52  m/s Final    MV Peak A Pardeep 10/15/2019 0.71  m/s Final    Triscuspid Valve Regurgitation Pea* 10/15/2019 25  mmHg Final    Right Atrial Pressure  (from IVC) 10/15/2019 3  mmHg Final    TV rest pulmonary artery pressure 10/15/2019 28  mmHg Final   Lab Visit on 09/23/2019   Component Date Value Ref Range Status    Hemoglobin A1C 09/23/2019 5.4  4.5 - 6.2 % Final    Estimated Avg Glucose 09/23/2019 108  68 - 131 mg/dL Final    Microalbum.,U,Random 09/23/2019 <2.0  ug/mL Final    Creatinine, Random Ur 09/23/2019 15.0  15.0 - 325.0 mg/dL Final    Microalb Creat Ratio 09/23/2019 Unable to calculate  0.0 - 30.0 ug/mg Final    Cholesterol 09/23/2019 133  120 - 199 mg/dL Final    Triglycerides 09/23/2019 53  30 - 150 mg/dL Final    HDL 09/23/2019 54  40 - 75 mg/dL Final    LDL Cholesterol 09/23/2019 68.4  63.0 - 159.0 mg/dL Final    Hdl/Cholesterol Ratio 09/23/2019 40.6  20.0 - 50.0 % Final    Total Cholesterol/HDL Ratio 09/23/2019 2.5  2.0 - 5.0 Final    Non-HDL Cholesterol 09/23/2019 79  mg/dL Final    WBC 09/23/2019 5.98  3.90 - 12.70 K/uL Final    RBC 09/23/2019 4.43  4.00 - 5.40 M/uL Final    Hemoglobin 09/23/2019 14.0  12.0 - 16.0 g/dL Final    Hematocrit 09/23/2019 42.2  37.0 - 48.5 % Final    Mean Corpuscular Volume 09/23/2019 95  82 - 98 fL Final    Mean Corpuscular Hemoglobin 09/23/2019 31.6* 27.0 - 31.0 pg Final    Mean Corpuscular Hemoglobin Conc 09/23/2019 33.2  32.0 - 36.0 g/dL Final    RDW 09/23/2019 14.6* 11.5 - 14.5 % Final    Platelets 09/23/2019 174  150 - 350 K/uL Final    MPV 09/23/2019 10.4  9.2 - 12.9 fL Final    Immature Granulocytes 09/23/2019 0.5  0.0 - 0.5 % Final    Gran # (ANC) 09/23/2019 4.0  1.8 - 7.7 K/uL Final    Immature Grans (Abs) 09/23/2019 0.03  0.00 - 0.04 K/uL Final    Lymph # 09/23/2019 1.7  1.0 - 4.8 K/uL Final    Mono # 09/23/2019 0.3  0.3 - 1.0 K/uL Final    Eos # 09/23/2019 0.0  0.0 - 0.5 K/uL Final    Baso # 09/23/2019 0.02  0.00 - 0.20 K/uL Final    nRBC 09/23/2019 0  0 /100 WBC Final    Gran% 09/23/2019 66.2  38.0 - 73.0 % Final    Lymph% 09/23/2019 27.6  18.0 - 48.0 % Final    Mono%  09/23/2019 5.2  4.0 - 15.0 % Final    Eosinophil% 09/23/2019 0.2  0.0 - 8.0 % Final    Basophil% 09/23/2019 0.3  0.0 - 1.9 % Final    Differential Method 09/23/2019 Automated   Final    Sodium 09/23/2019 141  136 - 145 mmol/L Final    Potassium 09/23/2019 4.4  3.5 - 5.1 mmol/L Final    Chloride 09/23/2019 106  95 - 110 mmol/L Final    CO2 09/23/2019 29  23 - 29 mmol/L Final    Glucose 09/23/2019 98  70 - 110 mg/dL Final    BUN, Bld 09/23/2019 11  6 - 20 mg/dL Final    Creatinine 09/23/2019 0.5  0.5 - 1.4 mg/dL Final    Calcium 09/23/2019 9.4  8.7 - 10.5 mg/dL Final    Total Protein 09/23/2019 7.6  6.0 - 8.4 g/dL Final    Albumin 09/23/2019 4.4  3.5 - 5.2 g/dL Final    Total Bilirubin 09/23/2019 0.5  0.1 - 1.0 mg/dL Final    Alkaline Phosphatase 09/23/2019 77  55 - 135 U/L Final    AST 09/23/2019 21  10 - 40 U/L Final    ALT 09/23/2019 21  10 - 44 U/L Final    Anion Gap 09/23/2019 6* 8 - 16 mmol/L Final    eGFR if  09/23/2019 >60.0  >60 mL/min/1.73 m^2 Final    eGFR if non African American 09/23/2019 >60.0  >60 mL/min/1.73 m^2 Final    Magnesium 09/23/2019 2.0  1.6 - 2.6 mg/dL Final    Specimen UA 09/23/2019 Urine, Clean Catch   Final    Color, UA 09/23/2019 Yellow  Yellow, Straw, Violet Final    Appearance, UA 09/23/2019 Clear  Clear Final    pH, UA 09/23/2019 7.0  5.0 - 8.0 Final    Specific Gravity, UA 09/23/2019 1.000* 1.005 - 1.030 Final    Protein, UA 09/23/2019 Negative  Negative Final    Glucose, UA 09/23/2019 Negative  Negative Final    Ketones, UA 09/23/2019 Negative  Negative Final    Bilirubin (UA) 09/23/2019 Negative  Negative Final    Occult Blood UA 09/23/2019 Negative  Negative Final    Nitrite, UA 09/23/2019 Negative  Negative Final    Urobilinogen, UA 09/23/2019 Negative  Negative EU/dL Final    Leukocytes, UA 09/23/2019 Negative  Negative Final       Past Medical History:   Diagnosis Date    Allergic rhinitis     Arthritis     GERD  (gastroesophageal reflux disease)     Grade II hemorrhoids 2019    History of diabetes mellitus resolved following bariatric surgery 10/30/2017    History of type 2 diabetes mellitus 10/30/2017    Interstitial lung disease     Lupus     Sjogren's syndrome 2019     Past Surgical History:   Procedure Laterality Date    BREAST SURGERY      BRONCHOSCOPY WITH FLUOROSCOPY N/A 2019    Procedure: BRONCHOSCOPY, WITH FLUOROSCOPY;  Surgeon: Nate Tarango MD;  Location[facility];  Service: Pulmonary;  Laterality: N/A;     SECTION  ,    gastric sleeve  2010    HYSTERECTOMY      TONSILLECTOMY       Family History   Problem Relation Age of Onset    Early death Father     Heart disease Father     Stroke Father     Kidney disease Father     Diabetes Paternal Grandmother     Cancer Paternal Grandmother     Raynaud syndrome Mother     Uterine cancer Mother     Raynaud syndrome Sister     Polycystic ovary syndrome Daughter     Diabetes Maternal Grandmother     Heart disease Maternal Grandmother     Kidney disease Maternal Grandmother     Heart disease Maternal Grandfather     Cancer Paternal Grandfather     No Known Problems Daughter        Marital Status: Single  Alcohol History:  reports that she drinks alcohol.  Tobacco History:  reports that she has been smoking cigarettes. She started smoking about 21 years ago. She has a 37.50 pack-year smoking history. She has never used smokeless tobacco.  Drug History:  reports that she does not use drugs.    Review of patient's allergies indicates:   Allergen Reactions    Hay fever and allergy relief        Current Outpatient Medications:     albuterol (VENTOLIN HFA) 90 mcg/actuation inhaler, 2 puffs q 4-6 hours prn for cough, wheeze, shortness of breath. Rescue inhaler., Disp: 1 Inhaler, Rfl: 3    albuterol-ipratropium (DUO-NEB) 2.5 mg-0.5 mg/3 mL nebulizer solution, USE 1 VIAL IN NEBULIZER EVERY 6 HOURS AS  NEEDED FOR WHEEZING OR SHORTNESS OF BREATH, RESUCE, DO NOT USE WITH INHALER, Disp: 180 mL, Rfl: 0    atorvastatin (LIPITOR) 80 MG tablet, Take 1 tablet (80 mg total) by mouth once daily., Disp: 90 tablet, Rfl: 3    azelastine (ASTELIN) 137 mcg (0.1 %) nasal spray, USE 1 SPRAY IN EACH NOSTRIL TWICE DAILY, Disp: 30 mL, Rfl: 0    blood sugar diagnostic Strp, Test blood sugar as directed., Disp: 35 each, Rfl: 0    blood sugar diagnostic Strp, 1 each by Misc.(Non-Drug; Combo Route) route daily as needed. TRUE METRIX BS TEST STRIPS.Z86.39, Disp: 100 each, Rfl: 3    blood-glucose meter kit, Use as instructed, Disp: 1 each, Rfl: 0    budesonide-formoterol 160-4.5 mcg (SYMBICORT) 160-4.5 mcg/actuation HFAA, Inhale 2 puffs into the lungs every 12 (twelve) hours. Controller, Disp: 1 Inhaler, Rfl: 3    CETAPHIL MOISTURIZING cream, Apply 1 application topically as needed., Disp: 90 g, Rfl: 1    diazePAM (VALIUM) 5 MG tablet, TAKE 1 TO 2 TABLETS BY MOUTH EVERY NIGHT AT BEDTIME AS NEEDED FOR ANXIETY OR SLEEPLESSNESS, Disp: 60 tablet, Rfl: 0    diclofenac sodium (VOLTAREN) 1 % Gel, 2-4 gm bid-tid prn, Disp: 100 g, Rfl: 0    dicyclomine (BENTYL) 20 mg tablet, Take 1 tablet (20 mg total) by mouth 2 (two) times daily., Disp: 60 tablet, Rfl: 0    ergocalciferol (ERGOCALCIFEROL) 50,000 unit Cap, TAKE 1 CAPSULE BY MOUTH EVERY 7 DAYS, Disp: 4 capsule, Rfl: 0    ezetimibe (ZETIA) 10 mg tablet, Take 10 mg by mouth once daily. , Disp: , Rfl:     ferrous sulfate (FEOSOL) 325 mg (65 mg iron) Tab tablet, TAKE 1 TABLET BY MOUTH EVERY DAY, Disp: 90 tablet, Rfl: 0    fluticasone propionate (FLONASE) 50 mcg/actuation nasal spray, USE 1 SPRAY IN EACH NOSTRIL EVERY DAY, Disp: 16 g, Rfl: 0    gabapentin (NEURONTIN) 100 MG capsule, TAKE 1 TO 2 CAPSULES BY MOUTH WITH 400 MG THREE TIMES DAILY, Disp: 180 capsule, Rfl: 0    gabapentin (NEURONTIN) 400 MG capsule, TAKE 1 CAPSULE BY MOUTH THREE TIMES DAILY, Disp: 90 capsule, Rfl: 0     hydrocortisone 1 % cream, Apply to affected area 2 times daily., Disp: , Rfl:     ibuprofen (ADVIL,MOTRIN) 600 MG tablet, , Disp: , Rfl:     inhalat.spacing dev,med. mask (AEROCHAMBER PLUS Z STAT MD LOPEZ) Spcr, 1 Device by Misc.(Non-Drug; Combo Route) route 2 (two) times daily., Disp: 1 each, Rfl: 3    ketoconazole (NIZORAL) 2 % cream, , Disp: , Rfl:     lancets (ACCU-CHEK SOFTCLIX LANCETS) Misc, 1 Device by Misc.(Non-Drug; Combo Route) route daily as needed. TRUE METRIX OR GENERIC COVERED BY INS.Z86.39, Disp: 100 each, Rfl: 0    levocetirizine (XYZAL) 5 MG tablet, Take 1 tablet (5 mg total) by mouth every evening., Disp: 90 tablet, Rfl: 1    midodrine (PROAMATINE) 5 MG Tab, TAKE 1 TABLET BY MOUTH TWICE DAILY, Disp: 180 tablet, Rfl: 0    montelukast (SINGULAIR) 10 mg tablet, TAKE 1 TABLET(10 MG) BY MOUTH EVERY DAY, Disp: 90 tablet, Rfl: 0    mupirocin (BACTROBAN) 2 % ointment, , Disp: , Rfl:     nicotine (NICODERM CQ) 21 mg/24 hr, Place 1 patch onto the skin once daily., Disp: 28 patch, Rfl: 3    nicotine polacrilex 2 MG Lozg, Take 1 lozenge (2 mg total) by mouth as needed., Disp: 108 lozenge, Rfl: 3    nicotine polacrilex 4 MG Lozg, Take 1 lozenge (4 mg total) by mouth as needed., Disp: 216 lozenge, Rfl: 5    omeprazole (PRILOSEC) 40 MG capsule, TAKE 1 CAPSULE(40 MG) BY MOUTH EVERY MORNING, Disp: 30 capsule, Rfl: 5    ondansetron (ZOFRAN) 4 MG tablet, , Disp: , Rfl:     ondansetron (ZOFRAN-ODT) 4 MG TbDL, DISSOLVE 2 TABLETS UNDER TONGUE EVERY 8 HOURS AS NEEDED, Disp: 30 tablet, Rfl: 0    OXYGEN-AIR DELIVERY SYSTEMS MISC, by Misc.(Non-Drug; Combo Route) route., Disp: , Rfl:     polyethylene glycol (GLYCOLAX) 17 gram/dose powder, , Disp: , Rfl:     ranitidine (ZANTAC) 150 MG tablet, TAKE 2 TABLETS BY MOUTH EVERY NIGHT, Disp: 60 tablet, Rfl: 2    STIOLTO RESPIMAT 2.5-2.5 mcg/actuation Mist, INHALE 1 PUFF INTO THE LUNGS ONCE DAILY, Disp: 4 g, Rfl: 5    triamcinolone acetonide 0.1% (KENALOG) 0.1 %  cream, MELY SPARINGLY TO ECZEMA BID, Disp: , Rfl: 3    urea 40 % Lotn lotion, Apply topically as needed. , Disp: , Rfl:     varenicline (CHANTIX) 0.5 MG Tab, Take 1 tablet (0.5 mg total) by mouth 2 (two) times daily., Disp: 180 tablet, Rfl: 5    varenicline (CHANTIX) 1 mg Tab, Take 1 tablet (1 mg total) by mouth 2 (two) times daily., Disp: 60 tablet, Rfl: 3    XIIDRA 5 % Dpet, , Disp: , Rfl:     blood-glucose meter, drum-type (ACCU-CHEK COMPACT PLUS CARE) Kit, 1 Device by Misc.(Non-Drug; Combo Route) route daily as needed., Disp: 1 kit, Rfl: 0    doxycycline (VIBRAMYCIN) 100 MG Cap, Take 1 capsule on Monday Wednesday Friday only. Wear sunscreen. (Patient not taking: Reported on 1/13/2020), Disp: 20 capsule, Rfl: 3    Review of Systems   Constitutional: Negative for appetite change, chills, diaphoresis, fatigue, fever and unexpected weight change.   HENT: Positive for congestion (since in clinic). Negative for ear pain, hearing loss, nosebleeds, postnasal drip, sinus pressure, sinus pain, sneezing, sore throat, tinnitus, trouble swallowing and voice change.    Eyes: Negative for photophobia, pain, itching and visual disturbance.   Respiratory: Negative for apnea, cough, chest tightness, shortness of breath, wheezing and stridor.         Tried to stop smoking-chantix 1 mg caused irritability and she returned to smoking 1ppd   Cardiovascular: Negative for chest pain, palpitations and leg swelling.   Gastrointestinal: Positive for rectal pain (hemorrhoids). Negative for abdominal distention, abdominal pain, blood in stool, constipation, diarrhea, nausea and vomiting.        Recent colonoscopy   Endocrine: Negative for cold intolerance, heat intolerance, polydipsia and polyuria.   Genitourinary: Positive for difficulty urinating (stress incontinence). Negative for dyspareunia, dysuria, flank pain, frequency, hematuria, menstrual problem, pelvic pain, urgency, vaginal discharge and vaginal pain.   Musculoskeletal:  "Negative for arthralgias (chronic), back pain, joint swelling, myalgias, neck pain and neck stiffness.   Skin: Negative for pallor.   Allergic/Immunologic: Negative for environmental allergies and food allergies.   Neurological: Positive for headaches. Negative for dizziness, tremors, speech difficulty, weakness, light-headedness and numbness.   Hematological: Does not bruise/bleed easily.   Psychiatric/Behavioral: Positive for sleep disturbance (BLAKE-). Negative for agitation, confusion, decreased concentration and suicidal ideas. The patient is not nervous/anxious.           Objective:      Vitals:    01/28/20 0935   BP: 120/84   Pulse: 84   Resp: 18   SpO2: (!) 94%   Weight: 68.9 kg (152 lb)   Height: 5' 2" (1.575 m)     Physical Exam   Constitutional: She is oriented to person, place, and time. She appears well-developed and well-nourished. She is cooperative. No distress.   HENT:   Head: Normocephalic and atraumatic.   Right Ear: Tympanic membrane normal.   Left Ear: Tympanic membrane normal.   Nose: Nose normal.   Mouth/Throat: Uvula is midline and mucous membranes are normal.   sinus congestion   Eyes: Pupils are equal, round, and reactive to light. Conjunctivae and EOM are normal. Right eye exhibits no discharge. Left eye exhibits no discharge. No scleral icterus. Right pupil is round and reactive. Left pupil is round and reactive.   Neck: Trachea normal and normal range of motion. Neck supple. No JVD present. Carotid bruit is not present. No thyromegaly present.   Cardiovascular: Normal rate, regular rhythm and intact distal pulses. Exam reveals no gallop and no friction rub.   No murmur heard.  Pulmonary/Chest: Effort normal. No respiratory distress. She has no wheezes. She has no rales.   Constant wet cough-decreased BS-forced distant expiratory wheezes   Abdominal: Soft. Bowel sounds are normal. She exhibits no distension and no mass. There is no tenderness. There is no guarding. No hernia. "   Musculoskeletal: Normal range of motion. She exhibits no edema.   Neurological: She is alert and oriented to person, place, and time. She has normal strength.   Skin: Skin is warm and dry. Capillary refill takes less than 2 seconds. No lesion and no rash noted. No cyanosis. Nails show no clubbing.   Psychiatric: She has a normal mood and affect. Her speech is normal and behavior is normal. Judgment and thought content normal.   Nursing note and vitals reviewed.        Assessment:       1. Interstitial lung disease    2. Chronic anxiety    3. Chronic sinusitis with recurrent bronchitis    4. BLAKE (obstructive sleep apnea)         Plan:       Interstitial lung disease        -     Per Pulmonary    Chronic anxiety        -     Continue as is    Chronic sinusitis with recurrent bronchitis        -     Per Immunology    BLAEK (obstructive sleep apnea)        -     Continue as per Pulmonary      Follow up in about 6 months (around 7/28/2020).        1/28/2020 Mine Palmer M.D.

## 2020-01-30 DIAGNOSIS — G62.9 PERIPHERAL POLYNEUROPATHY: ICD-10-CM

## 2020-01-30 DIAGNOSIS — J32.9 CHRONIC SINUSITIS WITH RECURRENT BRONCHITIS: ICD-10-CM

## 2020-01-30 DIAGNOSIS — R11.0 NAUSEA: ICD-10-CM

## 2020-01-30 DIAGNOSIS — F51.04 CHRONIC INSOMNIA: ICD-10-CM

## 2020-01-30 DIAGNOSIS — K58.1 IRRITABLE BOWEL SYNDROME WITH CONSTIPATION: ICD-10-CM

## 2020-01-30 DIAGNOSIS — J40 CHRONIC SINUSITIS WITH RECURRENT BRONCHITIS: ICD-10-CM

## 2020-01-30 RX ORDER — DIAZEPAM 5 MG/1
TABLET ORAL
Qty: 60 TABLET | Refills: 0 | Status: SHIPPED | OUTPATIENT
Start: 2020-01-30 | End: 2020-02-25

## 2020-01-30 RX ORDER — DICYCLOMINE HYDROCHLORIDE 20 MG/1
TABLET ORAL
Qty: 60 TABLET | Refills: 0 | Status: SHIPPED | OUTPATIENT
Start: 2020-01-30 | End: 2020-02-25

## 2020-01-30 RX ORDER — GABAPENTIN 100 MG/1
CAPSULE ORAL
Qty: 180 CAPSULE | Refills: 0 | OUTPATIENT
Start: 2020-01-30

## 2020-01-30 RX ORDER — AZELASTINE 1 MG/ML
SPRAY, METERED NASAL
Qty: 30 ML | Refills: 0 | Status: SHIPPED | OUTPATIENT
Start: 2020-01-30 | End: 2020-02-26

## 2020-01-30 RX ORDER — GABAPENTIN 400 MG/1
CAPSULE ORAL
Qty: 90 CAPSULE | Refills: 0 | OUTPATIENT
Start: 2020-01-30

## 2020-01-30 RX ORDER — ONDANSETRON 4 MG/1
TABLET, ORALLY DISINTEGRATING ORAL
Qty: 30 TABLET | Refills: 0 | Status: SHIPPED | OUTPATIENT
Start: 2020-01-30 | End: 2020-02-25

## 2020-02-03 ENCOUNTER — HOSPITAL ENCOUNTER (OUTPATIENT)
Dept: RADIOLOGY | Facility: HOSPITAL | Age: 45
Discharge: HOME | End: 2020-02-03
Payer: MEDICAID

## 2020-02-03 DIAGNOSIS — R10.13 ABDOMINAL PAIN, EPIGASTRIC: ICD-10-CM

## 2020-02-03 PROCEDURE — 76700 US EXAM ABDOM COMPLETE: CPT | Mod: TC,PO

## 2020-02-25 DIAGNOSIS — K58.1 IRRITABLE BOWEL SYNDROME WITH CONSTIPATION: ICD-10-CM

## 2020-02-25 DIAGNOSIS — R11.0 NAUSEA: ICD-10-CM

## 2020-02-25 DIAGNOSIS — J40 CHRONIC SINUSITIS WITH RECURRENT BRONCHITIS: ICD-10-CM

## 2020-02-25 DIAGNOSIS — J32.9 CHRONIC SINUSITIS WITH RECURRENT BRONCHITIS: ICD-10-CM

## 2020-02-25 DIAGNOSIS — E11.40 CONTROLLED TYPE 2 DIABETES WITH NEUROPATHY: ICD-10-CM

## 2020-02-25 DIAGNOSIS — F51.04 CHRONIC INSOMNIA: ICD-10-CM

## 2020-02-25 DIAGNOSIS — Z86.39 HISTORY OF DIABETES MELLITUS: ICD-10-CM

## 2020-02-25 RX ORDER — ONDANSETRON 4 MG/1
TABLET, ORALLY DISINTEGRATING ORAL
Qty: 30 TABLET | Refills: 0 | Status: SHIPPED | OUTPATIENT
Start: 2020-02-25 | End: 2020-03-21

## 2020-02-25 RX ORDER — DICYCLOMINE HYDROCHLORIDE 20 MG/1
TABLET ORAL
Qty: 60 TABLET | Refills: 0 | Status: SHIPPED | OUTPATIENT
Start: 2020-02-25 | End: 2020-03-21

## 2020-02-25 RX ORDER — DIAZEPAM 5 MG/1
TABLET ORAL
Qty: 60 TABLET | Refills: 2 | Status: SHIPPED | OUTPATIENT
Start: 2020-02-25 | End: 2020-07-14

## 2020-02-26 RX ORDER — AZELASTINE 1 MG/ML
SPRAY, METERED NASAL
Qty: 30 ML | Refills: 0 | Status: SHIPPED | OUTPATIENT
Start: 2020-02-26 | End: 2020-03-23

## 2020-02-27 DIAGNOSIS — E55.9 VITAMIN D DEFICIENCY: ICD-10-CM

## 2020-02-27 RX ORDER — ERGOCALCIFEROL 1.25 MG/1
50000 CAPSULE ORAL
Qty: 4 CAPSULE | Refills: 0 | Status: SHIPPED | OUTPATIENT
Start: 2020-02-27 | End: 2021-12-14 | Stop reason: SDUPTHER

## 2020-03-17 RX ORDER — FAMOTIDINE 10 MG/1
10 TABLET ORAL 2 TIMES DAILY
Qty: 60 TABLET | Refills: 11 | Status: SHIPPED | OUTPATIENT
Start: 2020-03-17 | End: 2020-03-18

## 2020-03-18 ENCOUNTER — PATIENT MESSAGE (OUTPATIENT)
Dept: FAMILY MEDICINE | Facility: CLINIC | Age: 45
End: 2020-03-18

## 2020-03-18 RX ORDER — HYDROXYCHLOROQUINE SULFATE 200 MG/1
TABLET, FILM COATED ORAL
Qty: 60 TABLET | Refills: 3 | OUTPATIENT
Start: 2020-03-18

## 2020-03-18 RX ORDER — ESOMEPRAZOLE MAGNESIUM 40 MG/1
40 CAPSULE, DELAYED RELEASE ORAL
Qty: 90 CAPSULE | Refills: 0 | Status: SHIPPED | OUTPATIENT
Start: 2020-03-18 | End: 2020-07-14

## 2020-03-19 DIAGNOSIS — J84.10 PULMONARY FIBROSIS: ICD-10-CM

## 2020-03-19 RX ORDER — IPRATROPIUM BROMIDE AND ALBUTEROL SULFATE 2.5; .5 MG/3ML; MG/3ML
SOLUTION RESPIRATORY (INHALATION)
Qty: 180 ML | Refills: 0 | Status: SHIPPED | OUTPATIENT
Start: 2020-03-19 | End: 2020-04-02

## 2020-03-21 ENCOUNTER — PATIENT MESSAGE (OUTPATIENT)
Dept: FAMILY MEDICINE | Facility: CLINIC | Age: 45
End: 2020-03-21

## 2020-03-21 DIAGNOSIS — J32.9 CHRONIC SINUSITIS WITH RECURRENT BRONCHITIS: ICD-10-CM

## 2020-03-21 DIAGNOSIS — J40 CHRONIC SINUSITIS WITH RECURRENT BRONCHITIS: ICD-10-CM

## 2020-03-21 DIAGNOSIS — K58.1 IRRITABLE BOWEL SYNDROME WITH CONSTIPATION: ICD-10-CM

## 2020-03-21 DIAGNOSIS — J84.10 PULMONARY FIBROSIS: ICD-10-CM

## 2020-03-21 DIAGNOSIS — R11.0 NAUSEA: ICD-10-CM

## 2020-03-21 DIAGNOSIS — E61.1 IRON DEFICIENCY: ICD-10-CM

## 2020-03-21 DIAGNOSIS — R09.82 POST-NASAL DRIP: ICD-10-CM

## 2020-03-21 DIAGNOSIS — E55.9 VITAMIN D DEFICIENCY: ICD-10-CM

## 2020-03-21 RX ORDER — DICYCLOMINE HYDROCHLORIDE 20 MG/1
TABLET ORAL
Qty: 60 TABLET | Refills: 0 | Status: SHIPPED | OUTPATIENT
Start: 2020-03-21 | End: 2020-04-20

## 2020-03-21 RX ORDER — ERGOCALCIFEROL 1.25 MG/1
CAPSULE ORAL
Qty: 4 CAPSULE | Refills: 0 | OUTPATIENT
Start: 2020-03-21

## 2020-03-21 RX ORDER — ONDANSETRON 4 MG/1
TABLET, ORALLY DISINTEGRATING ORAL
Qty: 30 TABLET | Refills: 0 | Status: SHIPPED | OUTPATIENT
Start: 2020-03-21 | End: 2020-04-20

## 2020-03-21 RX ORDER — FLUTICASONE PROPIONATE 50 MCG
SPRAY, SUSPENSION (ML) NASAL
Qty: 16 G | Refills: 0 | Status: SHIPPED | OUTPATIENT
Start: 2020-03-21 | End: 2020-04-20

## 2020-03-21 RX ORDER — FERROUS SULFATE 325(65) MG
TABLET ORAL
Qty: 90 TABLET | Refills: 0 | Status: SHIPPED | OUTPATIENT
Start: 2020-03-21 | End: 2020-07-14

## 2020-03-21 RX ORDER — LEVOCETIRIZINE DIHYDROCHLORIDE 5 MG/1
TABLET, FILM COATED ORAL
Qty: 90 TABLET | Refills: 1 | Status: SHIPPED | OUTPATIENT
Start: 2020-03-21 | End: 2020-10-26 | Stop reason: SDUPTHER

## 2020-03-23 RX ORDER — AZELASTINE 1 MG/ML
SPRAY, METERED NASAL
Qty: 30 ML | Refills: 0 | Status: SHIPPED | OUTPATIENT
Start: 2020-03-23 | End: 2020-04-21

## 2020-03-23 RX ORDER — MONTELUKAST SODIUM 10 MG/1
TABLET ORAL
Qty: 90 TABLET | Refills: 0 | Status: SHIPPED | OUTPATIENT
Start: 2020-03-23 | End: 2020-04-21

## 2020-03-23 RX ORDER — ALBUTEROL SULFATE 90 UG/1
AEROSOL, METERED RESPIRATORY (INHALATION)
Qty: 8.5 G | Refills: 2 | Status: SHIPPED | OUTPATIENT
Start: 2020-03-23 | End: 2020-07-15

## 2020-04-02 ENCOUNTER — PATIENT MESSAGE (OUTPATIENT)
Dept: RHEUMATOLOGY | Facility: CLINIC | Age: 45
End: 2020-04-02

## 2020-04-02 DIAGNOSIS — J84.10 PULMONARY FIBROSIS: ICD-10-CM

## 2020-04-02 RX ORDER — IPRATROPIUM BROMIDE AND ALBUTEROL SULFATE 2.5; .5 MG/3ML; MG/3ML
SOLUTION RESPIRATORY (INHALATION)
Qty: 180 ML | Refills: 0 | Status: SHIPPED | OUTPATIENT
Start: 2020-04-02 | End: 2022-10-05 | Stop reason: SDUPTHER

## 2020-04-02 RX ORDER — GLY/DIMETH/PETROLAT,WHT/WATER
1 CREAM (GRAM) TOPICAL
Qty: 90 G | Refills: 1 | COMMUNITY
Start: 2020-04-02 | End: 2023-05-16

## 2020-04-03 ENCOUNTER — PATIENT MESSAGE (OUTPATIENT)
Dept: ALLERGY | Facility: CLINIC | Age: 45
End: 2020-04-03

## 2020-04-03 ENCOUNTER — PATIENT MESSAGE (OUTPATIENT)
Dept: PULMONOLOGY | Facility: CLINIC | Age: 45
End: 2020-04-03

## 2020-04-03 ENCOUNTER — OFFICE VISIT (OUTPATIENT)
Dept: ALLERGY | Facility: CLINIC | Age: 45
End: 2020-04-03
Payer: MEDICAID

## 2020-04-03 ENCOUNTER — PATIENT MESSAGE (OUTPATIENT)
Dept: RHEUMATOLOGY | Facility: CLINIC | Age: 45
End: 2020-04-03

## 2020-04-03 DIAGNOSIS — Z72.0 TOBACCO ABUSE: ICD-10-CM

## 2020-04-03 DIAGNOSIS — K21.9 LARYNGOPHARYNGEAL REFLUX (LPR): ICD-10-CM

## 2020-04-03 DIAGNOSIS — D80.6 DEFICIENCY OF ANTI-PNEUMOCOCCAL POLYSACCHARIDE ANTIBODY: ICD-10-CM

## 2020-04-03 DIAGNOSIS — J32.9 CHRONIC SINUSITIS WITH RECURRENT BRONCHITIS: Primary | ICD-10-CM

## 2020-04-03 DIAGNOSIS — J40 CHRONIC SINUSITIS WITH RECURRENT BRONCHITIS: Primary | ICD-10-CM

## 2020-04-03 PROCEDURE — 99214 OFFICE O/P EST MOD 30 MIN: CPT | Mod: 95,,, | Performed by: ALLERGY & IMMUNOLOGY

## 2020-04-03 PROCEDURE — 99214 PR OFFICE/OUTPT VISIT, EST, LEVL IV, 30-39 MIN: ICD-10-PCS | Mod: 95,,, | Performed by: ALLERGY & IMMUNOLOGY

## 2020-04-03 NOTE — PROGRESS NOTES
"Subjective:       Patient ID: Promise Medrano is a 44 y.o. female.    Chief Complaint: polysaccharide antibody deficiency    HPI     Pt presents for allergic rhinitis and recurrent bronchitis.     Her strep pneumo titers were not responsive to her pneumovax 23 she obtained in November 2019 indicating SAD.      Allergic Rhinitis- dust mite only   Condition: improved     Onset: childhood  Sx: congestion  Season: perennial   Trigger: uncertain   Tx: saline, azelastine- 1 sen qday or more prn, fluticasone, montelukast    ait in the past: yes  Recent testing: serum IgE dust mite only.   ct sinus: 6/2019  Essentially clear paranasal sinuses without significant mucoperiosteal sinus  disease.    Recurrent bronchitis felt to be due to SAD dx 12/2019  Started on doxy proph MWF 12/2019  No other abx needed     She is having elevated temperatures tmax 103. She was around others and "hung out with people" in her front yard.    Last week and this week.   Having joint pain and headaches, uncertain if having "lupus flare" vs covid possibility.  She was also having pharyngitis.   Has not discussed with Dr. Tarango coughing symptoms.     Condition: exacerbated see above   Onset: 2018  3-4 episodes last winter  Has a pmh of lupus autoimmunity, complement levels normal.   April 2019 was d/c from [facility] for pna and or bronchitis  She has a history of current smoker   Has nebulizer for albuterol  Not required nebulizer.   Sx: cough , wheezing.   Does have associated tobacco abuse as well.   Sx frequ: > 2 days per week    Chest ct 8/2018 shows bronchioloitis and mediastinal lymph nodes and axillary lymph nodes presumed reactive.   Pft: large airway no obstruction and partial response to bronchodilator, small airway reversed by 31%.   Tx: currently on stialto , "red inhaler" symbicort 160, "gray inhaler" ventolin.     Immune evaluation: 6/20/2019    Complement, Total (CH50) 56 ( >41 U/mL)  Complement C2                 3.3  C4 Complement 14 - 44 " mg/dL 16      IgA, Serum                    281                         IgM 26 - 217 mg/dL 52      IgG, Total Serum             1259                     700-1600  mg/dL       IgG Subclass 1                710                      248-810  mg/dL       IgG Subclass 2                189                      130-555  mg/dL       IgG Subclass 3                 70                         mg/dL       IgG Subclass 4                 61                         2-96  mg/dL                                           Strep Pneumoniae Type 1  0.5 L >1.3 ug/mL  Strep Pneumoniae Type 3 0.6 L >1.3 ug/mL  Strep Pneumoniae Type 4 0.1 L >1.3 ug/mL  Strep Pneumoniae Type 8 1.8  >1.3 ug/mL  Strep Pneumoniae Type 9 0.5 L >1.3 ug/mL  Strep Pneumoniae Type 12 <0.1 L >1.3 ug/mL  Strep Pneumoniae Type 14 1.2 L >1.3 ug/mL  Strep Pneumoniae Type 19 0.7 L >1.3 ug/mL  Strep Pneumoniae Type 23 0.3 L >1.3 ug/mL  Strep Pneumoniae Type 26 4.3  >1.3 ug/mL  Strep Pneumoniae Type 51 0.3 L >1.3 ug/mL  Strep Pneumoniae Type 56 0.5 L >1.3 ug/mL  Strep Pneumoniae Type 57 1.5  >1.3 ug/mL  Strep Pneumoniae Type 68 <0.1 L >1.3 ug/mL    3/14 = 21%    Hematocrit                   46.0                    34.0-46.6  %           WBC                           5.7                     3.4-10.8  x10E3/uL    RBC                          4.83                    3.77-5.28  x10E6/uL    Hemoglobin                   14.7                    11.1-15.9  g/dL        Basos                           0                   Not Estab.  %           Neurtrophils                   60                   Not Estab.  %           Neurtrophils (Absolute) 3.4  1.4-7.0 x10E3/uL  Lymphs (Absolute)             1.9                      0.7-3.1  x10E3/uL    MCV                            95                        79-97  fL          MCHC                         32.0                    31.5-35.7  g/dL        MCH                          30.4                    26.6-33.0  pg          Lymphs          "                34                   Not Estab.  %           Immature Granulocytes            0                   Not Estab.  %           Eos                             0                   Not Estab.  %           Monocytes                       6                   Not Estab.  %           Platelets                     185                      150-450  x10E3/uL    Eos (Absolute)                0.0                      0.0-0.4  x10E3/uL    BASO (Absolute)               0.0                      0.0-0.2  x10E3/uL    Monocytes (Absolute)          0.4                      0.1-0.9  x10E3/uL    % CD19+ Lymphs                9.3                     3.3-25.4  %           Abs. CD19+ Lymphs             177                         /uL         Absolute CD4 Lucile           716                     359-1519  /uL         Absolute CD3                 1634                     622-2402  /uL         % CD 4 Pos. Lymph            37.7                    30.8-58.5  %           CD4/CD8 Ratio                0.78 L                  0.92-3.72              % CD3 Pos. Lymph             86.0                    57.5-86.2  %           Abs. CD8 Suppressor           923 H                    109-897  /uL         % CD8 Pos. Lymph             48.6 H                  12.0-35.5  %           RDW                          14.5                    12.3-15.4  %           % NK (CD56/16)                4.1                     1.4-19.4  %           Ab NK (CD56/16)                78       Lpr:   Ranitidine rx at last visit.   Condition: stable , not better.   She didn't start it.   "horrible acid reflux"  "gut issues" not diagnosed.   "why I got off plaquenil" "tore up my gut."       Atopic Hx    Rhinitis see above   Oral allergy: denies  Food allergy: none    Asthma see above for bronchitis   Latex tolerates   Eczema: denies, but may have a psoriasis.    Urticaria denies chronic, has doxepin qhs     Infection History    Pneumonia # in the past 12 months: " bronchitis as above   Sinus infection # in the past 12 months: reports feels like sinus infections.   Otitis infection # in the past 12 months:  Denies chronic infection, but notes chronic fluid in the ears.     Dust mite only    IgE Cladosporium herbarum <0.10  Class 0 kU/L  IgE Dog Dander              <0.10                      Class 0  kU/L        IgE Cat Epithelium          <0.10                      Class 0  kU/L        IgE Ragweed, Short          <0.10                      Class 0  kU/L        IgE D. pteronyssinus         0.13 AB                 Class 0/I  kU/L        IgE A. fumigatus            <0.10                      Class 0  kU/L        IgE Alteraria alternata <0.10  Class 0 kU/L  IgE Elm, American           <0.10                      Class 0  kU/L        IgE Bermuda Grass           <0.10                      Class 0  kU/L        IgE [place], White              <0.10                      Class 0  kU/L        IgE Pigweed, Common         <0.10                      Class 0  kU/L        IgE Cockroach, Moroccan        <0.10                      Class 0  kU/L        IgE Thistle Russian         <0.10                      Class 0  kU/L        IgE D. farinae              <0.10                      Class 0  kU/L        IgE Cockroach American <0.10  Class 0 kU/L   This test was developed and its performance      characteristics determined by [facility].  It      has not been cleared or approved by the U.S.      Food and Drug Administration.      The FDA has determined that such clearance      or approval is not necessary. This test is      used for clinical purposes.  It should not      be regarded as investigational or for                             research.                                           IgE Maple/Box Elder         <0.10                      Class 0  kU/L        IgE Penicillium chrysogen <0.10  Class 0 kU/L  IgE Searcy              <0.10                      Class 0  kU/L        IgE Gabe Grass            <0.10                      Class 0  kU/L        IgE Arik Grass           <0.10                      Class 0  kU/L        IgE Bahia Grass             <0.10                      Class 0  kU/L        IgE Sheep Estelle           <0.10                      Class 0  kU/L        IgE Plantain, English        <0.10                      Class 0  kU/L        IgE Mugwort                 <0.10                      Class 0  kU/L        IgE Pecan Wilcox           <0.10                      Class 0  kU/L        IgE Candida albicans        <0.10                      Class 0  kU/L        IgE Setomelanomma rostrat <0.10  Class 0 kU/L  IgE [place]          <0.10                      Class 0  kU/L        IgE Epicoccum purpur        <0.10                      Class 0  kU/L        IgE Fusarium proliferatum <0.10  Class 0 kU/L  IgE Trichophyton rubrum <0.10  Class 0 kU/L  IgE Rough Marshelder        <0.10                      Class 0  kU/L        IgE Mouse Urine             <0.10                      Class 0  kU/L        IgE Silver Birch            <0.10                      Class 0  kU/L        IgE Maple [place]/Amanda <0.10  Class 0 kU/L  IgE Bipolaris               <0.10                      Class 0  kU/L         This test was developed and its performance      characteristics determined by [facility].  It      has not been cleared or approved by the U.[place].      Food and Drug Administration.      The FDA has determined that such clearance      or approval is not necessary. This test is      used for clinical purposes.  It should not      be regarded as investigational or for                             research.                                           IgE Nishant, White              <0.10                      Class 0  kU/L        IgE Privet, Common          <0.10                      Class 0  kU/L        IgE Ragweed, Giant          <0.10                      Class 0  kU/L        IgE Nettle                  <0.10                       Class 0  kU/L        IgE Cocklebur               <0.10                      Class 0  kU/L        IgE Dockweed, Yellow        <0.10                      Class 0  kU/L         This test was developed and its performance      characteristics determined by [facility].  It      has not been cleared or approved by the U.[place].      Food and Drug Administration.      The FDA has determined that such clearance      or approval is not necessary. This test is      used for clinical purposes.  It should not      be regarded as investigational or for                             research.                                           IgE Hackberry               <0.10                      Class 0  kU/L         This test was developed and its performance      characteristics determined by [facility].  It      has not been cleared or approved by the U.S.      Food and Drug Administration.      The FDA has determined that such clearance      or approval is not necessary. This test is      used for clinical purposes.  It should not      be regarded as investigational or for                             research.                                           IgE Sweet Gum               <0.10                      Class 0  kU/L         This test was developed and its performance      characteristics determined by [facility].  It      has not been cleared or approved by the U.S.      Food and Drug Administration.      The FDA has determined that such clearance      or approval is not necessary. This test is      used for clinical purposes.  It should not      be regarded as investigational or for                             research.                                           IgE Wormwood                <0.10                      Class 0  kU/L        IgE Fennel, Dog             <0.10                      Class 0  kU/L         This test was developed and its performance      characteristics determined by [facility].  It      has not been cleared or approved by  the U.[place].      Food and Drug Administration.      The FDA has determined that such clearance      or approval is not necessary. This test is      used for clinical purposes.  It should not      be regarded as investigational or for                             research.                                                                                                                        Performed at: [address], [id number]      Jc Mckeon MD, Phone:  [phone]     IgE Mouse Epithelium        <0.10                      Class 0  kU/L        IgE [place]            <0.10           Review of Systems      General: neg unexpected weight changes, fevers, chills, night sweats, malaise  HEENT: see hpi, Neg eye pain, vision changes, ear drainage, nose bleeds, throat tightness, sores in the mouth  CV: Neg chest pain, palpitations, swelling  Resp: see hpi, neg shortness of breath, hemoptysis  GI: see hpi, neg dysphagia, night abdominal pain, reflux, chronic diarrhea, chronic constipation  Derm: See Hpi, neg new rash, neg flushing  Mu/sk: Neg joint pain, joint swelling   Psych: Neg anxiety  neuro: neg chronic headaches, muscle weakness  Endo: neg heat/cold intolerance, chronic fatigue    Objective:     There were no vitals filed for this visit.     Physical Exam      General: no acute distress, well developed well nourished     Assessment:       1. Chronic sinusitis with recurrent bronchitis    2. Deficiency of anti-pneumococcal polysaccharide antibody    3. Laryngopharyngeal reflux (LPR)    4. Tobacco abuse        Plan:       Chronic sinusitis with recurrent bronchitis    Deficiency of anti-pneumococcal polysaccharide antibody    Laryngopharyngeal reflux (LPR)    Tobacco abuse      Chronic allergic rhinitis: dust mite only   Serum IgE- not stable enough for skin prick testing. Dust mite only- mild sensitivity.   saline and azelastine    Continue fluticasone   montelukast  1 pill po qday   levocetirizine prn     Recurrent bronchitis and sinusitis/SAD  Discussed for pt to reach out to Dr. Tarango about current sx and if he wants to change her regimen.   She assures me she that she has albuterol and wants to treat at home. She agrees to reach out to Dr. Tarango.   Immune eval: personally reviewed   Low strep pneumo titers- no response to ppv 23   SAD 12/2019.   doxycycline 100 mg BID Monday Wednesday and Friday.     [facility] labs - imaging center   Spirometry - complete PFT  -personally reviewed   Small airway reversibility by 31%   Doesn't feel she needs rescue inhaler but has sx > 2 days per week.  symbicort 160 2 puffs bid.   Continue stialto if pulmonary recommends it. Continue to follow with Dr. Tarango.     Prednisone x 4 days to help with sinus and lung symptoms- 9/2019    Smoking cessation.     Flu vaccine yearly recommended.     Possible covid sx but pt did not want to be tested for covid.     LPR:  pepcid 20 mg bid   Switch from zantac.     F/u 3 months, sooner if needed.     The patient location is: home   The chief complaint leading to consultation is: sad   Visit type: Virtual visit with synchronous audio and video  Total time spent with patient: 25 mins   Each patient to whom he or she provides medical services by telemedicine is:  (1) informed of the relationship between the physician and patient and the respective role of any other health care provider with respect to management of the patient; and (2) notified that he or she may decline to receive medical services by telemedicine and may withdraw from such care at any time.               Katiana Cline M.D.  Allergy/Immunology  [facility]   [phone] phone  [phone] fax

## 2020-04-03 NOTE — PATIENT INSTRUCTIONS
Nose:  Continue with saline and nasal sprays     Immune:  Continue with Monday Wednesday and Friday doxycycline , stay out of the sun.   Take with food.     Lung:  Reach out to dr. Tarango about any changes he may want to make.     Take albuterol for wheezing every 4-6 hours as needed.     [phone] office number.     Follow up in 3 months.

## 2020-04-09 ENCOUNTER — TELEPHONE (OUTPATIENT)
Dept: FAMILY MEDICINE | Facility: CLINIC | Age: 45
End: 2020-04-09

## 2020-04-20 ENCOUNTER — OFFICE VISIT (OUTPATIENT)
Dept: RHEUMATOLOGY | Facility: CLINIC | Age: 45
End: 2020-04-20

## 2020-04-20 ENCOUNTER — OFFICE VISIT (OUTPATIENT)
Dept: PULMONOLOGY | Facility: CLINIC | Age: 45
End: 2020-04-20
Payer: MEDICAID

## 2020-04-20 DIAGNOSIS — J98.4 SMALL AIRWAYS DISEASE: ICD-10-CM

## 2020-04-20 DIAGNOSIS — K58.1 IRRITABLE BOWEL SYNDROME WITH CONSTIPATION: ICD-10-CM

## 2020-04-20 DIAGNOSIS — J32.9 CHRONIC SINUSITIS WITH RECURRENT BRONCHITIS: ICD-10-CM

## 2020-04-20 DIAGNOSIS — J84.9 ILD (INTERSTITIAL LUNG DISEASE): ICD-10-CM

## 2020-04-20 DIAGNOSIS — R11.0 NAUSEA: ICD-10-CM

## 2020-04-20 DIAGNOSIS — K21.9 GASTROESOPHAGEAL REFLUX DISEASE WITHOUT ESOPHAGITIS: ICD-10-CM

## 2020-04-20 DIAGNOSIS — R09.82 POST-NASAL DRIP: ICD-10-CM

## 2020-04-20 DIAGNOSIS — Z72.0 TOBACCO ABUSE: Primary | ICD-10-CM

## 2020-04-20 DIAGNOSIS — R55 VASOVAGAL SYNCOPE: ICD-10-CM

## 2020-04-20 DIAGNOSIS — E11.40 CONTROLLED TYPE 2 DIABETES WITH NEUROPATHY: ICD-10-CM

## 2020-04-20 DIAGNOSIS — D80.6 DEFICIENCY OF ANTI-PNEUMOCOCCAL POLYSACCHARIDE ANTIBODY: ICD-10-CM

## 2020-04-20 DIAGNOSIS — M35.1 MCTD (MIXED CONNECTIVE TISSUE DISEASE): ICD-10-CM

## 2020-04-20 DIAGNOSIS — J40 CHRONIC SINUSITIS WITH RECURRENT BRONCHITIS: ICD-10-CM

## 2020-04-20 DIAGNOSIS — M35.9 CONNECTIVE TISSUE DISEASE, UNDIFFERENTIATED: Primary | ICD-10-CM

## 2020-04-20 DIAGNOSIS — M79.7 FIBROMYALGIA: ICD-10-CM

## 2020-04-20 DIAGNOSIS — J96.11 CHRONIC HYPOXEMIC RESPIRATORY FAILURE: ICD-10-CM

## 2020-04-20 PROCEDURE — 99213 OFFICE O/P EST LOW 20 MIN: CPT | Mod: 95,,, | Performed by: INTERNAL MEDICINE

## 2020-04-20 PROCEDURE — 99213 PR OFFICE/OUTPT VISIT, EST, LEVL III, 20-29 MIN: ICD-10-PCS | Mod: 95,,, | Performed by: INTERNAL MEDICINE

## 2020-04-20 RX ORDER — FLUTICASONE PROPIONATE 50 MCG
SPRAY, SUSPENSION (ML) NASAL
Qty: 16 G | Refills: 0 | Status: SHIPPED | OUTPATIENT
Start: 2020-04-20 | End: 2020-05-26

## 2020-04-20 RX ORDER — OMEPRAZOLE 40 MG/1
CAPSULE, DELAYED RELEASE ORAL
Qty: 30 CAPSULE | Refills: 5 | Status: SHIPPED | OUTPATIENT
Start: 2020-04-20 | End: 2020-09-24

## 2020-04-20 RX ORDER — DICYCLOMINE HYDROCHLORIDE 20 MG/1
TABLET ORAL
Qty: 60 TABLET | Refills: 0 | Status: SHIPPED | OUTPATIENT
Start: 2020-04-20 | End: 2020-05-26

## 2020-04-20 RX ORDER — MIDODRINE HYDROCHLORIDE 5 MG/1
TABLET ORAL
Qty: 180 TABLET | Refills: 0 | Status: SHIPPED | OUTPATIENT
Start: 2020-04-20 | End: 2020-07-28

## 2020-04-20 RX ORDER — ONDANSETRON 4 MG/1
TABLET, ORALLY DISINTEGRATING ORAL
Qty: 30 TABLET | Refills: 0 | Status: SHIPPED | OUTPATIENT
Start: 2020-04-20 | End: 2020-05-26

## 2020-04-20 RX ORDER — LANCETS
EACH MISCELLANEOUS
Qty: 100 EACH | Refills: 5 | Status: SHIPPED | OUTPATIENT
Start: 2020-04-20

## 2020-04-20 RX ORDER — PREDNISONE 10 MG/1
TABLET ORAL
Qty: 40 TABLET | Refills: 0 | Status: SHIPPED | OUTPATIENT
Start: 2020-04-20 | End: 2020-07-28

## 2020-04-20 NOTE — PROGRESS NOTES
Subjective:        The chief complaint leading to consultation is: Follow up for undifferentiated ILD  The patient location is:  Home  Visit type: Virtual visit with synchronous audio/video or audio only  This was a video visit in lieu of in-person visit due to the coronavirus emergency. Patient acknowledged and consented to the video visit encounter.     HPI   Has had some increased dyspnea and oxygen requirment since our last visit.  Was running fevers last month but these have since improved.     Past Surgical History:   Procedure Laterality Date    BREAST SURGERY      BRONCHOSCOPY WITH FLUOROSCOPY N/A 2019    Procedure: BRONCHOSCOPY, WITH FLUOROSCOPY;  Surgeon: Nate Tarango MD;  Location: [facility];  Service: Pulmonary;  Laterality: N/A;     SECTION  ,    gastric sleeve  2010    HYSTERECTOMY      TONSILLECTOMY       Past Medical History:   Diagnosis Date    Allergic rhinitis     Arthritis     GERD (gastroesophageal reflux disease)     Grade II hemorrhoids 2019    History of diabetes mellitus resolved following bariatric surgery 10/30/2017    History of type 2 diabetes mellitus 10/30/2017    Interstitial lung disease 2019    Lupus     Sjogren's syndrome 2019     Family History   Problem Relation Age of Onset    Early death Father     Heart disease Father     Stroke Father     Kidney disease Father     Diabetes Paternal Grandmother     Cancer Paternal Grandmother     Raynaud syndrome Mother     Uterine cancer Mother     Raynaud syndrome Sister     Polycystic ovary syndrome Daughter     Diabetes Maternal Grandmother     Heart disease Maternal Grandmother     Kidney disease Maternal Grandmother     Heart disease Maternal Grandfather     Cancer Paternal Grandfather     No Known Problems Daughter         Social History:   Marital Status: Single  Alcohol History:  reports that she drinks alcohol.  Tobacco History:  reports that she has been  smoking cigarettes. She started smoking about 22 years ago. She has a 37.50 pack-year smoking history. She has never used smokeless tobacco.  Drug History:  reports that she does not use drugs.    Review of patient's allergies indicates:   Allergen Reactions    Hay fever and allergy relief        Current Outpatient Medications   Medication Sig Dispense Refill    albuterol (PROVENTIL/VENTOLIN HFA) 90 mcg/actuation inhaler INHALE 2 PUFFS EVERY 4 TO 6 HOURS AS NEEDED FOR COUGH, WHEEZE, AND SHORTNESS OF BREATH 8.5 g 2    albuterol-ipratropium (DUO-NEB) 2.5 mg-0.5 mg/3 mL nebulizer solution USE 1 VIAL VIA NEBULIZER EVERY 6 HOURS AS NEEDED FOR WHEEZING OR SHORTNESS OF BREATH 180 mL 0    atorvastatin (LIPITOR) 80 MG tablet Take 1 tablet (80 mg total) by mouth once daily. 90 tablet 3    azelastine (ASTELIN) 137 mcg (0.1 %) nasal spray USE 1 SPRAY IN EACH NOSTRIL TWICE DAILY 30 mL 0    blood sugar diagnostic Strp Test blood sugar as directed. 35 each 0    blood-glucose meter kit Use as instructed 1 each 0    blood-glucose meter, drum-type (ACCU-CHEK COMPACT PLUS CARE) Kit 1 Device by Misc.(Non-Drug; Combo Route) route daily as needed. 1 kit 0    budesonide-formoterol 160-4.5 mcg (SYMBICORT) 160-4.5 mcg/actuation HFAA Inhale 2 puffs into the lungs every 12 (twelve) hours. Controller 1 Inhaler 3    CETAPHIL MOISTURIZING cream Apply 1 application topically as needed. 90 g 1    diazePAM (VALIUM) 5 MG tablet TAKE 1 TO 2 TABLETS BY MOUTH EVERY NIGHT AT BEDTIME AS NEEDED FOR ANXIETY OR SLEEPLESSNESS 60 tablet 2    diclofenac sodium (VOLTAREN) 1 % Gel 2-4 gm bid-tid prn 100 g 0    dicyclomine (BENTYL) 20 mg tablet TAKE 1 TABLET BY MOUTH TWICE DAILY 60 tablet 0    doxycycline (VIBRAMYCIN) 100 MG Cap Take 1 capsule on Monday Wednesday Friday only. Wear sunscreen. 20 capsule 3    ergocalciferol (ERGOCALCIFEROL) 50,000 unit Cap Take 1 capsule (50,000 Units total) by mouth every 7 days. 4 capsule 0    esomeprazole  (NEXIUM) 40 MG capsule Take 1 capsule (40 mg total) by mouth before breakfast. 90 capsule 0    ezetimibe (ZETIA) 10 mg tablet Take 10 mg by mouth once daily.       ferrous sulfate (FEOSOL) 325 mg (65 mg iron) Tab tablet TAKE 1 TABLET BY MOUTH EVERY DAY 90 tablet 0    fluticasone propionate (FLONASE) 50 mcg/actuation nasal spray SPRAY ONCE IN EACH NOSTRIL EVERY DAY 16 g 0    gabapentin (NEURONTIN) 100 MG capsule TAKE 1 TO 2 CAPSULES BY MOUTH WITH 400 MG THREE TIMES DAILY 180 capsule 0    gabapentin (NEURONTIN) 400 MG capsule TAKE 1 CAPSULE BY MOUTH THREE TIMES DAILY 90 capsule 0    hydrocortisone 1 % cream Apply to affected area 2 times daily.      ibuprofen (ADVIL,MOTRIN) 600 MG tablet       inhalat.spacing dev,med. mask (AEROCHAMBER PLUS Z STAT MD LOPEZ) Spcr 1 Device by Misc.(Non-Drug; Combo Route) route 2 (two) times daily. 1 each 3    ketoconazole (NIZORAL) 2 % cream       lancets (ACCU-CHEK SOFTCLIX LANCETS) Misc 1 Device by Misc.(Non-Drug; Combo Route) route daily as needed. TRUE METRIX OR GENERIC COVERED BY INS.Z86.39 100 each 0    levocetirizine (XYZAL) 5 MG tablet TAKE 1 TABLET BY MOUTH EVERY EVENING 90 tablet 1    midodrine (PROAMATINE) 5 MG Tab TAKE 1 TABLET BY MOUTH TWICE DAILY 180 tablet 0    montelukast (SINGULAIR) 10 mg tablet TAKE 1 TABLET(10 MG) BY MOUTH EVERY DAY 90 tablet 0    mupirocin (BACTROBAN) 2 % ointment       nicotine (NICODERM CQ) 21 mg/24 hr Place 1 patch onto the skin once daily. 28 patch 3    nicotine polacrilex 2 MG Lozg Take 1 lozenge (2 mg total) by mouth as needed. 108 lozenge 3    nicotine polacrilex 4 MG Lozg Take 1 lozenge (4 mg total) by mouth as needed. 216 lozenge 5    ondansetron (ZOFRAN) 4 MG tablet       ondansetron (ZOFRAN-ODT) 4 MG TbDL DISSOLVE 2 TABLETS UNDER THE TONGUE EVERY 8 HOURS AS NEEDED 30 tablet 0    ONETOUCH ULTRA BLUE TEST STRIP Strp TEST DAILY AS NEEDED 50 strip 2    OXYGEN-AIR DELIVERY SYSTEMS MISC by Misc.(Non-Drug; Combo Route) route.       polyethylene glycol (GLYCOLAX) 17 gram/dose powder       predniSONE (DELTASONE) 10 MG tablet Take 4 tabs x 4 days, then take 3 tabs x 4 days, then take 2 tabs x 4 days, then take 1 tab x 4 days. 40 tablet 0    STIOLTO RESPIMAT 2.5-2.5 mcg/actuation Mist INHALE 1 PUFF INTO THE LUNGS ONCE DAILY 4 g 5    triamcinolone acetonide 0.1% (KENALOG) 0.1 % cream MELY SPARINGLY TO ECZEMA BID  3    urea 40 % Lotn lotion Apply topically as needed.       varenicline (CHANTIX) 0.5 MG Tab Take 1 tablet (0.5 mg total) by mouth 2 (two) times daily. 180 tablet 5    varenicline (CHANTIX) 1 mg Tab Take 1 tablet (1 mg total) by mouth 2 (two) times daily. 60 tablet 3    XIIDRA 5 % Dpet        No current facility-administered medications for this visit.        Review of Systems   Constitutional: Positive for fatigue. Negative for chills and fever.   HENT: Positive for postnasal drip and rhinorrhea. Negative for nosebleeds.    Eyes: Negative for discharge, redness and itching.   Respiratory: Positive for chest tightness and shortness of breath. Negative for cough, wheezing and stridor.    Cardiovascular: Negative for chest pain, palpitations and leg swelling.   Gastrointestinal: Negative.  Negative for abdominal pain.   Genitourinary: Negative.    Musculoskeletal: Negative.    Allergic/Immunologic: Negative.    Neurological: Negative.    Hematological: Negative.    Psychiatric/Behavioral: Negative.          Objective:        Physical Exam:   Physical Exam   Constitutional: She is oriented to person, place, and time. She appears well-developed and well-nourished. No distress.   HENT:   Head: Normocephalic and atraumatic.   Nose: Nose normal.   Mouth/Throat: Oropharynx is clear and moist.   Eyes: Pupils are equal, round, and reactive to light. Conjunctivae and EOM are normal. No scleral icterus.   Neck: Normal range of motion. No JVD present. No tracheal deviation present.   Pulmonary/Chest: Effort normal.   Abdominal: She exhibits  no distension.   Musculoskeletal: She exhibits no deformity.   Neurological: She is alert and oriented to person, place, and time.   Skin: Skin is warm and dry. No erythema.   Psychiatric: She has a normal mood and affect.            Assessment:       1. Tobacco abuse    2. ILD (interstitial lung disease)    3. Chronic hypoxemic respiratory failure    4. MCTD (mixed connective tissue disease)    5. Chronic sinusitis with recurrent bronchitis      Plan:   Prednisone taper.    Follow up in about 2 weeks (around 5/4/2020).    Total time spent with patient: 25     Each patient to whom he or she provides medical services by telemedicine is:  (1) informed of the relationship between the physician and patient and the respective role of any other health care provider with respect to management of the patient; and (2) notified that he or she may decline to receive medical services by telemedicine and may withdraw from such care at any time.    This note was created using [employer] voice recognition software that occasionally misinterprets phrases or words.

## 2020-04-20 NOTE — PROGRESS NOTES
Subjective:        The chief complaint leading to consultation is: *pUCTD  The patient location is:  home  Visit type: Virtual visit with synchronous audio/video or audio only  Synchronized AV    HPI See previous note from today    Past Surgical History:   Procedure Laterality Date    BREAST SURGERY      BRONCHOSCOPY WITH FLUOROSCOPY N/A 2019    Procedure: BRONCHOSCOPY, WITH FLUOROSCOPY;  Surgeon: Nate Tarango MD;  Location: [facility];  Service: Pulmonary;  Laterality: N/A;     SECTION  ,    gastric sleeve  2010    HYSTERECTOMY      TONSILLECTOMY       Past Medical History:   Diagnosis Date    Allergic rhinitis     Arthritis     GERD (gastroesophageal reflux disease)     Grade II hemorrhoids 2019    History of diabetes mellitus resolved following bariatric surgery 10/30/2017    History of type 2 diabetes mellitus 10/30/2017    Interstitial lung disease     Lupus     Sjogren's syndrome 2019     Family History   Problem Relation Age of Onset    Early death Father     Heart disease Father     Stroke Father     Kidney disease Father     Diabetes Paternal Grandmother     Cancer Paternal Grandmother     Raynaud syndrome Mother     Uterine cancer Mother     Raynaud syndrome Sister     Polycystic ovary syndrome Daughter     Diabetes Maternal Grandmother     Heart disease Maternal Grandmother     Kidney disease Maternal Grandmother     Heart disease Maternal Grandfather     Cancer Paternal Grandfather     No Known Problems Daughter         Social History:   Marital Status: Single  Alcohol History:  reports that she drinks alcohol.  Tobacco History:  reports that she has been smoking cigarettes. She started smoking about 22 years ago. She has a 37.50 pack-year smoking history. She has never used smokeless tobacco.  Drug History:  reports that she does not use drugs.    Review of patient's allergies indicates:   Allergen Reactions    Hay fever  and allergy relief        Current Outpatient Medications   Medication Sig Dispense Refill    albuterol (PROVENTIL/VENTOLIN HFA) 90 mcg/actuation inhaler INHALE 2 PUFFS EVERY 4 TO 6 HOURS AS NEEDED FOR COUGH, WHEEZE, AND SHORTNESS OF BREATH 8.5 g 2    albuterol-ipratropium (DUO-NEB) 2.5 mg-0.5 mg/3 mL nebulizer solution USE 1 VIAL VIA NEBULIZER EVERY 6 HOURS AS NEEDED FOR WHEEZING OR SHORTNESS OF BREATH 180 mL 0    atorvastatin (LIPITOR) 80 MG tablet Take 1 tablet (80 mg total) by mouth once daily. 90 tablet 3    azelastine (ASTELIN) 137 mcg (0.1 %) nasal spray USE 1 SPRAY IN EACH NOSTRIL TWICE DAILY 30 mL 0    blood sugar diagnostic Strp Test blood sugar as directed. 35 each 0    blood-glucose meter kit Use as instructed 1 each 0    blood-glucose meter, drum-type (ACCU-CHEK COMPACT PLUS CARE) Kit 1 Device by Misc.(Non-Drug; Combo Route) route daily as needed. 1 kit 0    budesonide-formoterol 160-4.5 mcg (SYMBICORT) 160-4.5 mcg/actuation HFAA Inhale 2 puffs into the lungs every 12 (twelve) hours. Controller 1 Inhaler 3    CETAPHIL MOISTURIZING cream Apply 1 application topically as needed. 90 g 1    diazePAM (VALIUM) 5 MG tablet TAKE 1 TO 2 TABLETS BY MOUTH EVERY NIGHT AT BEDTIME AS NEEDED FOR ANXIETY OR SLEEPLESSNESS 60 tablet 2    diclofenac sodium (VOLTAREN) 1 % Gel 2-4 gm bid-tid prn 100 g 0    dicyclomine (BENTYL) 20 mg tablet TAKE 1 TABLET BY MOUTH TWICE DAILY 60 tablet 0    doxycycline (VIBRAMYCIN) 100 MG Cap Take 1 capsule on Monday Wednesday Friday only. Wear sunscreen. 20 capsule 3    ergocalciferol (ERGOCALCIFEROL) 50,000 unit Cap Take 1 capsule (50,000 Units total) by mouth every 7 days. 4 capsule 0    esomeprazole (NEXIUM) 40 MG capsule Take 1 capsule (40 mg total) by mouth before breakfast. 90 capsule 0    ezetimibe (ZETIA) 10 mg tablet Take 10 mg by mouth once daily.       ferrous sulfate (FEOSOL) 325 mg (65 mg iron) Tab tablet TAKE 1 TABLET BY MOUTH EVERY DAY 90 tablet 0     fluticasone propionate (FLONASE) 50 mcg/actuation nasal spray SPRAY ONCE IN EACH NOSTRIL EVERY DAY 16 g 0    gabapentin (NEURONTIN) 100 MG capsule TAKE 1 TO 2 CAPSULES BY MOUTH WITH 400 MG THREE TIMES DAILY 180 capsule 0    gabapentin (NEURONTIN) 400 MG capsule TAKE 1 CAPSULE BY MOUTH THREE TIMES DAILY 90 capsule 0    hydrocortisone 1 % cream Apply to affected area 2 times daily.      ibuprofen (ADVIL,MOTRIN) 600 MG tablet       inhalat.spacing dev,med. mask (AEROCHAMBER PLUS Z STAT MD LOPEZ) Spcr 1 Device by Misc.(Non-Drug; Combo Route) route 2 (two) times daily. 1 each 3    ketoconazole (NIZORAL) 2 % cream       lancets (ACCU-CHEK SOFTCLIX LANCETS) Misc 1 Device by Misc.(Non-Drug; Combo Route) route daily as needed. TRUE METRIX OR GENERIC COVERED BY INS.Z86.39 100 each 0    levocetirizine (XYZAL) 5 MG tablet TAKE 1 TABLET BY MOUTH EVERY EVENING 90 tablet 1    midodrine (PROAMATINE) 5 MG Tab TAKE 1 TABLET BY MOUTH TWICE DAILY 180 tablet 0    montelukast (SINGULAIR) 10 mg tablet TAKE 1 TABLET(10 MG) BY MOUTH EVERY DAY 90 tablet 0    mupirocin (BACTROBAN) 2 % ointment       nicotine (NICODERM CQ) 21 mg/24 hr Place 1 patch onto the skin once daily. 28 patch 3    nicotine polacrilex 2 MG Lozg Take 1 lozenge (2 mg total) by mouth as needed. 108 lozenge 3    nicotine polacrilex 4 MG Lozg Take 1 lozenge (4 mg total) by mouth as needed. 216 lozenge 5    ondansetron (ZOFRAN) 4 MG tablet       ondansetron (ZOFRAN-ODT) 4 MG TbDL DISSOLVE 2 TABLETS UNDER THE TONGUE EVERY 8 HOURS AS NEEDED 30 tablet 0    ONETOUCH ULTRA BLUE TEST STRIP Strp TEST DAILY AS NEEDED 50 strip 2    OXYGEN-AIR DELIVERY SYSTEMS MISC by [facility].(Non-Drug; Combo Route) route.      polyethylene glycol (GLYCOLAX) 17 gram/dose powder       STIOLTO RESPIMAT 2.5-2.5 mcg/actuation Mist INHALE 1 PUFF INTO THE LUNGS ONCE DAILY 4 g 5    triamcinolone acetonide 0.1% (KENALOG) 0.1 % cream MELY SPARINGLY TO ECZEMA BID  3    urea 40 % Lotn lotion Apply  topically as needed.       varenicline (CHANTIX) 0.5 MG Tab Take 1 tablet (0.5 mg total) by mouth 2 (two) times daily. 180 tablet 5    varenicline (CHANTIX) 1 mg Tab Take 1 tablet (1 mg total) by mouth 2 (two) times daily. 60 tablet 3    XIIDRA 5 % Dpet        No current facility-administered medications for this visit.        Review of Systems      Objective:        Physical Exam:   Physical Exam         Assessment:       1. Connective tissue disease, undifferentiated    2. Fibromyalgia      Plan:   Connective tissue disease, undifferentiated  -     Comprehensive metabolic panel; Future; Expected date: 04/20/2020  -     CBC auto differential; Future; Expected date: 04/20/2020  -     Urinalysis; Future    Fibromyalgia  -     Comprehensive metabolic panel; Future; Expected date: 04/20/2020  -     CBC auto differential; Future; Expected date: 04/20/2020  -     Urinalysis; Future      FU in 2 months    Total time spent with patient:13 mins    Each patient to whom he or she provides medical services by telemedicine is:  (1) informed of the relationship between the physician and patient and the respective role of any other health care provider with respect to management of the patient; and (2) notified that he or she may decline to receive medical services by telemedicine and may withdraw from such care at any time.    This note was created using [employer] voice recognition software that occasionally misinterprets phrases or words.

## 2020-04-20 NOTE — H&P
This is a telemedicine encounter do you to COVID-19 crisis. Patient is at home. Synchronized AV  Follow up for undifferentiated connective tissue disease. Patient was last seen in the office April 2019.  Patient has had some flareup of pulmonary disease. She has COPD. She still smokes five cigarettes a day. Has occasional fevers. No chest pains. Has shortness of breath relieved with inhalers. No rashes. No mucosal ulcerationsShe has  diffuse muscle pain and joint pain but no joint swelling  PE: patient is alert in no apparent distress  Assessment and plan:  Undifferentiated connective tissue disease and fibromyalgia. Appears stable. Patient wants to restart PlaquenilMainly because it helped her musculoskeletal complaints. Advised pt,Plaquenil does not prevent COVID-19 infection. Before she restarts would like to see blood work so will order a CBC ,CMP urinaly sis. Follow up in my office in two months

## 2020-04-21 RX ORDER — INHALER,ASSIST DEVICE,LG MASK
SPACER (EA) MISCELLANEOUS
Qty: 1 EACH | Refills: 1 | Status: SHIPPED | OUTPATIENT
Start: 2020-04-21 | End: 2021-01-06

## 2020-04-21 RX ORDER — DOXYCYCLINE 100 MG/1
CAPSULE ORAL
Qty: 20 CAPSULE | Refills: 3 | Status: SHIPPED | OUTPATIENT
Start: 2020-04-21 | End: 2020-09-24

## 2020-04-21 RX ORDER — AZELASTINE 1 MG/ML
SPRAY, METERED NASAL
Qty: 30 ML | Refills: 0 | Status: SHIPPED | OUTPATIENT
Start: 2020-04-21 | End: 2020-05-26

## 2020-04-21 RX ORDER — MONTELUKAST SODIUM 10 MG/1
TABLET ORAL
Qty: 90 TABLET | Refills: 0 | Status: SHIPPED | OUTPATIENT
Start: 2020-04-21 | End: 2020-09-24

## 2020-04-27 ENCOUNTER — PATIENT MESSAGE (OUTPATIENT)
Dept: ALLERGY | Facility: CLINIC | Age: 45
End: 2020-04-27

## 2020-04-27 DIAGNOSIS — K21.9 LARYNGOPHARYNGEAL REFLUX (LPR): Primary | ICD-10-CM

## 2020-04-27 RX ORDER — FAMOTIDINE 20 MG/1
20 TABLET, FILM COATED ORAL 2 TIMES DAILY
Qty: 60 TABLET | Refills: 6 | Status: SHIPPED | OUTPATIENT
Start: 2020-04-27 | End: 2020-07-28

## 2020-05-01 ENCOUNTER — LAB VISIT (OUTPATIENT)
Dept: LAB | Facility: HOSPITAL | Age: 45
End: 2020-05-01
Attending: INTERNAL MEDICINE
Payer: MEDICAID

## 2020-05-01 DIAGNOSIS — M35.9 DIFFUSE DISEASE OF CONNECTIVE TISSUE: ICD-10-CM

## 2020-05-01 DIAGNOSIS — M35.9 CONNECTIVE TISSUE DISEASE, UNDIFFERENTIATED: ICD-10-CM

## 2020-05-01 DIAGNOSIS — M79.7 FIBROMYALGIA: ICD-10-CM

## 2020-05-01 LAB
ALBUMIN SERPL BCP-MCNC: 4.1 G/DL (ref 3.5–5.2)
ALP SERPL-CCNC: 82 U/L (ref 55–135)
ALT SERPL W/O P-5'-P-CCNC: 13 U/L (ref 10–44)
ANION GAP SERPL CALC-SCNC: 7 MMOL/L (ref 8–16)
AST SERPL-CCNC: 13 U/L (ref 10–40)
BASOPHILS # BLD AUTO: 0.04 K/UL (ref 0–0.2)
BASOPHILS NFR BLD: 0.4 % (ref 0–1.9)
BILIRUB SERPL-MCNC: 0.6 MG/DL (ref 0.1–1)
BILIRUB UR QL STRIP: NEGATIVE
BUN SERPL-MCNC: 16 MG/DL (ref 6–20)
CALCIUM SERPL-MCNC: 9.1 MG/DL (ref 8.7–10.5)
CHLORIDE SERPL-SCNC: 105 MMOL/L (ref 95–110)
CLARITY UR: CLEAR
CO2 SERPL-SCNC: 28 MMOL/L (ref 23–29)
COLOR UR: YELLOW
CREAT SERPL-MCNC: 0.7 MG/DL (ref 0.5–1.4)
DIFFERENTIAL METHOD: ABNORMAL
EOSINOPHIL # BLD AUTO: 0 K/UL (ref 0–0.5)
EOSINOPHIL NFR BLD: 0.4 % (ref 0–8)
ERYTHROCYTE [DISTWIDTH] IN BLOOD BY AUTOMATED COUNT: 13.2 % (ref 11.5–14.5)
EST. GFR  (AFRICAN AMERICAN): >60 ML/MIN/1.73 M^2
EST. GFR  (NON AFRICAN AMERICAN): >60 ML/MIN/1.73 M^2
GLUCOSE SERPL-MCNC: 113 MG/DL (ref 70–110)
GLUCOSE UR QL STRIP: NEGATIVE
HCT VFR BLD AUTO: 42.1 % (ref 37–48.5)
HGB BLD-MCNC: 13.6 G/DL (ref 12–16)
HGB UR QL STRIP: NEGATIVE
IMM GRANULOCYTES # BLD AUTO: 0.05 K/UL (ref 0–0.04)
IMM GRANULOCYTES NFR BLD AUTO: 0.4 % (ref 0–0.5)
KETONES UR QL STRIP: ABNORMAL
LEUKOCYTE ESTERASE UR QL STRIP: NEGATIVE
LYMPHOCYTES # BLD AUTO: 2.1 K/UL (ref 1–4.8)
LYMPHOCYTES NFR BLD: 18.9 % (ref 18–48)
MCH RBC QN AUTO: 30.8 PG (ref 27–31)
MCHC RBC AUTO-ENTMCNC: 32.3 G/DL (ref 32–36)
MCV RBC AUTO: 96 FL (ref 82–98)
MONOCYTES # BLD AUTO: 0.4 K/UL (ref 0.3–1)
MONOCYTES NFR BLD: 3.9 % (ref 4–15)
NEUTROPHILS # BLD AUTO: 8.6 K/UL (ref 1.8–7.7)
NEUTROPHILS NFR BLD: 76 % (ref 38–73)
NITRITE UR QL STRIP: NEGATIVE
NRBC BLD-RTO: 0 /100 WBC
PH UR STRIP: 7 [PH] (ref 5–8)
PLATELET # BLD AUTO: 176 K/UL (ref 150–350)
PMV BLD AUTO: 11.6 FL (ref 9.2–12.9)
POTASSIUM SERPL-SCNC: 4.4 MMOL/L (ref 3.5–5.1)
PROT SERPL-MCNC: 7.6 G/DL (ref 6–8.4)
PROT UR QL STRIP: ABNORMAL
RBC # BLD AUTO: 4.41 M/UL (ref 4–5.4)
SODIUM SERPL-SCNC: 140 MMOL/L (ref 136–145)
SP GR UR STRIP: 1.03 (ref 1–1.03)
URN SPEC COLLECT METH UR: ABNORMAL
UROBILINOGEN UR STRIP-ACNC: ABNORMAL EU/DL
WBC # BLD AUTO: 11.25 K/UL (ref 3.9–12.7)

## 2020-05-01 PROCEDURE — 81003 URINALYSIS AUTO W/O SCOPE: CPT

## 2020-05-01 PROCEDURE — 80053 COMPREHEN METABOLIC PANEL: CPT

## 2020-05-01 PROCEDURE — 36415 COLL VENOUS BLD VENIPUNCTURE: CPT

## 2020-05-01 PROCEDURE — 85025 COMPLETE CBC W/AUTO DIFF WBC: CPT

## 2020-05-04 ENCOUNTER — TELEPHONE (OUTPATIENT)
Dept: PULMONOLOGY | Facility: CLINIC | Age: 45
End: 2020-05-04

## 2020-05-04 ENCOUNTER — PATIENT MESSAGE (OUTPATIENT)
Dept: RHEUMATOLOGY | Facility: CLINIC | Age: 45
End: 2020-05-04

## 2020-05-04 RX ORDER — HYDROXYCHLOROQUINE SULFATE 200 MG/1
200 TABLET, FILM COATED ORAL DAILY
Qty: 60 TABLET | Refills: 1 | Status: SHIPPED | OUTPATIENT
Start: 2020-05-04 | End: 2020-09-24 | Stop reason: SDUPTHER

## 2020-05-04 NOTE — TELEPHONE ENCOUNTER
----- Message from Paula Richardson sent at 5/4/2020  8:28 AM CDT -----  NEEDS 2 WEEK VIDEO VISIT WITH DR BEDOYA.

## 2020-05-11 ENCOUNTER — OFFICE VISIT (OUTPATIENT)
Dept: PULMONOLOGY | Facility: CLINIC | Age: 45
End: 2020-05-11
Payer: MEDICAID

## 2020-05-11 DIAGNOSIS — J84.9 ILD (INTERSTITIAL LUNG DISEASE): Primary | ICD-10-CM

## 2020-05-11 DIAGNOSIS — M35.1 MCTD (MIXED CONNECTIVE TISSUE DISEASE): ICD-10-CM

## 2020-05-11 DIAGNOSIS — J96.11 CHRONIC HYPOXEMIC RESPIRATORY FAILURE: ICD-10-CM

## 2020-05-11 PROCEDURE — 99213 OFFICE O/P EST LOW 20 MIN: CPT | Mod: 95,,, | Performed by: INTERNAL MEDICINE

## 2020-05-11 PROCEDURE — 99213 PR OFFICE/OUTPT VISIT, EST, LEVL III, 20-29 MIN: ICD-10-PCS | Mod: 95,,, | Performed by: INTERNAL MEDICINE

## 2020-05-11 NOTE — PATIENT INSTRUCTIONS
"  Using an Inhaler  Your healthcare provider may prescribe medicine that you breathe in using a metered-dose inhaler (MDI). An inhaler sends a measured amount of medicine in a fine mist.  Step 1:  · Shake the inhaler and remove the cap.  · Take a deep breath and let it out.  Step 2:  · Close your lips around the end of the inhaler mouthpiece. Or if you were told to use the "open-mouth" method, hold the inhaler 1 to 2 inches from your mouth.  Step 3:  · Breathe in slowly and deeply as you press down on the inhaler to release the medicine.  · Inhale fully.  Step 4:  · Hold your breath for a count of 10, or as long as you can comfortably.  · Then breathe out slowly through your mouth.  · Repeat these steps for each puff of medicine prescribed.             Important  · If the inhaler is being used for the first time or has not been used for a while, prime it as directed by the product maker. You can find important information about the medicine in the package insert. This is the paper that comes with the medicine.  · If you use more than one inhaler, make sure you know which one to use first.  · Your healthcare provider or pharmacist can show you how to use your inhaler the right way. Even if you think you are using it the right way, it is still a good idea to check.   Date Last Reviewed: 10/1/2016  © 8564-7345 [employer]. [address] [place] [address], [place] [address]. All rights reserved. This information is not intended as a substitute for professional medical care. Always follow your healthcare professional's instructions.          "

## 2020-05-11 NOTE — PROGRESS NOTES
Subjective:        The chief complaint leading to consultation is: MCTD/ILD  The patient location is:  Home  Visit type: Virtual visit with synchronous audio/video or audio only  This was a video visit in lieu of in-person visit due to the coronavirus emergency. Patient acknowledged and consented to the video visit encounter.     2020:  Overall improved compared to her last visit 2 weeks ago.  She has been taking her steroids as prescribed and reports subjective improvement respiratory symptoms.  She denies any wheezing, sputum production, or night sweats.  She was seen by rheumatology recently who has prescribed her Plaquenil in asked her to get b      Past Surgical History:   Procedure Laterality Date    BREAST SURGERY      BRONCHOSCOPY WITH FLUOROSCOPY N/A 2019    Procedure: BRONCHOSCOPY, WITH FLUOROSCOPY;  Surgeon: Nate Tarango MD;  Location: [facility];  Service: Pulmonary;  Laterality: N/A;     SECTION  ,    gastric sleeve  2010    HYSTERECTOMY      TONSILLECTOMY       Past Medical History:   Diagnosis Date    Allergic rhinitis     Arthritis     GERD (gastroesophageal reflux disease)     Grade II hemorrhoids 2019    History of diabetes mellitus resolved following bariatric surgery 10/30/2017    History of type 2 diabetes mellitus 10/30/2017    Interstitial lung disease 2019    Lupus     Sjogren's syndrome 2019     Family History   Problem Relation Age of Onset    Early death Father     Heart disease Father     Stroke Father     Kidney disease Father     Diabetes Paternal Grandmother     Cancer Paternal Grandmother     Raynaud syndrome Mother     Uterine cancer Mother     Raynaud syndrome Sister     Polycystic ovary syndrome Daughter     Diabetes Maternal Grandmother     Heart disease Maternal Grandmother     Kidney disease Maternal Grandmother     Heart disease Maternal Grandfather     Cancer Paternal Grandfather     No Known  Problems Daughter         Social History:   Marital Status: Single  Alcohol History:  reports that she drinks alcohol.  Tobacco History:  reports that she has been smoking cigarettes. She started smoking about 22 years ago. She has a 37.50 pack-year smoking history. She has never used smokeless tobacco.  Drug History:  reports that she does not use drugs.    Review of patient's allergies indicates:   Allergen Reactions    Hay fever and allergy relief        Current Outpatient Medications   Medication Sig Dispense Refill    ACCU-CHEK SOFTCLIX LANCETS Misc USE ONCE DAILY AS NEEDED 100 each 5    AEROCHAMBER PLUS FLOW-VU,L MSK Spcr USE WITH INHALER 1 each 1    albuterol (PROVENTIL/VENTOLIN HFA) 90 mcg/actuation inhaler INHALE 2 PUFFS EVERY 4 TO 6 HOURS AS NEEDED FOR COUGH, WHEEZE, AND SHORTNESS OF BREATH 8.5 g 2    albuterol-ipratropium (DUO-NEB) 2.5 mg-0.5 mg/3 mL nebulizer solution USE 1 VIAL VIA NEBULIZER EVERY 6 HOURS AS NEEDED FOR WHEEZING OR SHORTNESS OF BREATH 180 mL 0    atorvastatin (LIPITOR) 80 MG tablet Take 1 tablet (80 mg total) by mouth once daily. 90 tablet 3    azelastine (ASTELIN) 137 mcg (0.1 %) nasal spray USE 1 SPRAY IN EACH NOSTRIL TWICE DAILY 30 mL 0    blood sugar diagnostic Strp Test blood sugar as directed. 35 each 0    blood-glucose meter kit Use as instructed 1 each 0    blood-glucose meter, drum-type (ACCU-CHEK COMPACT PLUS CARE) Kit 1 Device by Misc.(Non-Drug; Combo Route) route daily as needed. 1 kit 0    budesonide-formoterol 160-4.5 mcg (SYMBICORT) 160-4.5 mcg/actuation HFAA Inhale 2 puffs into the lungs every 12 (twelve) hours. Controller 1 Inhaler 3    CETAPHIL MOISTURIZING cream Apply 1 application topically as needed. 90 g 1    diazePAM (VALIUM) 5 MG tablet TAKE 1 TO 2 TABLETS BY MOUTH EVERY NIGHT AT BEDTIME AS NEEDED FOR ANXIETY OR SLEEPLESSNESS 60 tablet 2    diclofenac sodium (VOLTAREN) 1 % Gel 2-4 gm bid-tid prn 100 g 0    dicyclomine (BENTYL) 20 mg tablet TAKE 1  TABLET BY MOUTH TWICE DAILY 60 tablet 0    doxycycline (VIBRAMYCIN) 100 MG Cap TAKE 1 CAPSULE BY MOUTH ON MONDAY, WEDNESDAY, AND FRIDAY ONLY, WEAR SUNSCREEN 20 capsule 3    ergocalciferol (ERGOCALCIFEROL) 50,000 unit Cap Take 1 capsule (50,000 Units total) by mouth every 7 days. 4 capsule 0    esomeprazole (NEXIUM) 40 MG capsule Take 1 capsule (40 mg total) by mouth before breakfast. 90 capsule 0    ezetimibe (ZETIA) 10 mg tablet Take 10 mg by mouth once daily.       famotidine (PEPCID) 20 MG tablet Take 1 tablet (20 mg total) by mouth 2 (two) times daily. 60 tablet 6    ferrous sulfate (FEOSOL) 325 mg (65 mg iron) Tab tablet TAKE 1 TABLET BY MOUTH EVERY DAY 90 tablet 0    fluticasone propionate (FLONASE) 50 mcg/actuation nasal spray USE 1 SPRAY IN EACH NOSTRIL EVERY DAY 16 g 0    gabapentin (NEURONTIN) 100 MG capsule TAKE 1 TO 2 CAPSULES BY MOUTH WITH 400 MG THREE TIMES DAILY 180 capsule 0    gabapentin (NEURONTIN) 400 MG capsule TAKE 1 CAPSULE BY MOUTH THREE TIMES DAILY 90 capsule 0    hydrocortisone 1 % cream Apply to affected area 2 times daily.      hydroxychloroquine (PLAQUENIL) 200 mg tablet Take 1 tablet (200 mg total) by mouth once daily. 60 tablet 1    ibuprofen (ADVIL,MOTRIN) 600 MG tablet       ketoconazole (NIZORAL) 2 % cream       levocetirizine (XYZAL) 5 MG tablet TAKE 1 TABLET BY MOUTH EVERY EVENING 90 tablet 1    midodrine (PROAMATINE) 5 MG Tab TAKE 1 TABLET BY MOUTH TWICE DAILY 180 tablet 0    montelukast (SINGULAIR) 10 mg tablet TAKE 1 TABLET(10 MG) BY MOUTH EVERY DAY 90 tablet 0    mupirocin (BACTROBAN) 2 % ointment       nicotine (NICODERM CQ) 21 mg/24 hr Place 1 patch onto the skin once daily. 28 patch 3    nicotine polacrilex 2 MG Lozg Take 1 lozenge (2 mg total) by mouth as needed. 108 lozenge 3    nicotine polacrilex 4 MG Lozg Take 1 lozenge (4 mg total) by mouth as needed. 216 lozenge 5    omeprazole (PRILOSEC) 40 MG capsule TAKE ONE CAPSULE BY MOUTH EVERY MORNING 30  capsule 5    ondansetron (ZOFRAN-ODT) 4 MG TbDL DISSOLVE 2 TABLETS UNDER THE TONGUE EVERY 8 HOURS AS NEEDED 30 tablet 0    ONETOUCH ULTRA BLUE TEST STRIP Strp TEST DAILY AS NEEDED 50 strip 2    OXYGEN-AIR DELIVERY SYSTEMS MISC [place] [facility] [place]Non-Drug; Combo Route) route.      predniSONE (DELTASONE) 10 MG tablet Take 4 tabs x 4 days, then take 3 tabs x 4 days, then take 2 tabs x 4 days, then take 1 tab x 4 days. 40 tablet 0    STIOLTO RESPIMAT 2.5-2.5 mcg/actuation Mist INHALE 1 PUFF INTO THE LUNGS ONCE DAILY 4 g 5    triamcinolone acetonide 0.1% (KENALOG) 0.1 % cream MELY SPARINGLY TO ECZEMA BID  3    urea 40 % Lotn lotion Apply topically as needed.       XIIDRA 5 % Dpet        No current facility-administered medications for this visit.        Review of Systems   Constitutional: Positive for fatigue. Negative for chills and fever.   HENT: Positive for postnasal drip and rhinorrhea. Negative for nosebleeds.    Eyes: Negative for discharge, redness and itching.   Respiratory: Positive for chest tightness and shortness of breath. Negative for cough, wheezing and stridor.    Cardiovascular: Negative for chest pain, palpitations and leg swelling.   Gastrointestinal: Negative.  Negative for abdominal pain.   Genitourinary: Negative.    Musculoskeletal: Negative.    Allergic/Immunologic: Negative.    Neurological: Negative.    Hematological: Negative.    Psychiatric/Behavioral: Negative.          Objective:        Physical Exam:   Physical Exam   Constitutional: She is oriented to person, place, and time. She appears well-developed and well-nourished. No distress.   HENT:   Head: Normocephalic and atraumatic.   Nose: Nose normal.   Mouth/Throat: Oropharynx is clear and moist.   Eyes: Pupils are equal, round, and reactive to light. Conjunctivae and EOM are normal. No scleral icterus.   Neck: Normal range of motion. No JVD present. No tracheal deviation present.   Pulmonary/Chest: Effort normal.   Abdominal: She exhibits  no distension.   Musculoskeletal: She exhibits no deformity.   Neurological: She is alert and oriented to person, place, and time.   Skin: Skin is warm and dry. No erythema.   Psychiatric: She has a normal mood and affect.            Assessment:       1. ILD (interstitial lung disease)    2. MCTD (mixed connective tissue disease)    3. Chronic hypoxemic respiratory failure      Impression:  Symptomatically improved with the short course of corticosteroids.    Plan:     Finish steroid taper and then start taking Plaquenil again.  Reinforced the importance of smoking cessation.    No follow-ups on file.    Total time spent with patient:  20 min    Each patient to whom he or she provides medical services by telemedicine is:  (1) informed of the relationship between the physician and patient and the respective role of any other health care provider with respect to management of the patient; and (2) notified that he or she may decline to receive medical services by telemedicine and may withdraw from such care at any time.    This note was created using [employer] voice recognition software that occasionally misinterprets phrases or words.

## 2020-05-26 DIAGNOSIS — K58.1 IRRITABLE BOWEL SYNDROME WITH CONSTIPATION: ICD-10-CM

## 2020-05-26 DIAGNOSIS — R09.82 POST-NASAL DRIP: ICD-10-CM

## 2020-05-26 DIAGNOSIS — R11.0 NAUSEA: ICD-10-CM

## 2020-05-26 DIAGNOSIS — J40 CHRONIC SINUSITIS WITH RECURRENT BRONCHITIS: ICD-10-CM

## 2020-05-26 DIAGNOSIS — J32.9 CHRONIC SINUSITIS WITH RECURRENT BRONCHITIS: ICD-10-CM

## 2020-05-26 RX ORDER — ONDANSETRON 4 MG/1
TABLET, ORALLY DISINTEGRATING ORAL
Qty: 30 TABLET | Refills: 0 | Status: SHIPPED | OUTPATIENT
Start: 2020-05-26 | End: 2020-07-14

## 2020-05-26 RX ORDER — DICYCLOMINE HYDROCHLORIDE 20 MG/1
TABLET ORAL
Qty: 60 TABLET | Refills: 0 | Status: SHIPPED | OUTPATIENT
Start: 2020-05-26 | End: 2021-05-06 | Stop reason: SDUPTHER

## 2020-05-26 RX ORDER — AZELASTINE 1 MG/ML
SPRAY, METERED NASAL
Qty: 30 ML | Refills: 0 | Status: SHIPPED | OUTPATIENT
Start: 2020-05-26 | End: 2020-06-17

## 2020-05-26 RX ORDER — FLUTICASONE PROPIONATE 50 MCG
SPRAY, SUSPENSION (ML) NASAL
Qty: 16 G | Refills: 0 | Status: SHIPPED | OUTPATIENT
Start: 2020-05-26 | End: 2020-07-14

## 2020-07-09 ENCOUNTER — TELEPHONE (OUTPATIENT)
Dept: PULMONOLOGY | Facility: CLINIC | Age: 45
End: 2020-07-09

## 2020-07-09 DIAGNOSIS — J84.9 ILD (INTERSTITIAL LUNG DISEASE): Primary | ICD-10-CM

## 2020-07-09 NOTE — TELEPHONE ENCOUNTER
Patient wants to see about getting a portable concentrator  Please put the order in an ill send it

## 2020-07-14 ENCOUNTER — HOSPITAL ENCOUNTER (OUTPATIENT)
Dept: RADIOLOGY | Facility: HOSPITAL | Age: 45
Discharge: HOME | End: 2020-07-14
Attending: INTERNAL MEDICINE
Payer: MEDICAID

## 2020-07-14 ENCOUNTER — OFFICE VISIT (OUTPATIENT)
Dept: PULMONOLOGY | Facility: CLINIC | Age: 45
End: 2020-07-14
Payer: MEDICAID

## 2020-07-14 VITALS
TEMPERATURE: 97 F | HEART RATE: 92 BPM | OXYGEN SATURATION: 96 % | WEIGHT: 144 LBS | DIASTOLIC BLOOD PRESSURE: 80 MMHG | BODY MASS INDEX: 26.34 KG/M2 | SYSTOLIC BLOOD PRESSURE: 120 MMHG

## 2020-07-14 DIAGNOSIS — D80.6 DEFICIENCY OF ANTI-PNEUMOCOCCAL POLYSACCHARIDE ANTIBODY: ICD-10-CM

## 2020-07-14 DIAGNOSIS — Z72.0 TOBACCO ABUSE: ICD-10-CM

## 2020-07-14 DIAGNOSIS — J84.9 ILD (INTERSTITIAL LUNG DISEASE): ICD-10-CM

## 2020-07-14 DIAGNOSIS — Z01.84 ENCOUNTER FOR ANTIBODY RESPONSE EXAMINATION: ICD-10-CM

## 2020-07-14 DIAGNOSIS — J96.11 CHRONIC HYPOXEMIC RESPIRATORY FAILURE: Primary | ICD-10-CM

## 2020-07-14 PROCEDURE — 99213 OFFICE O/P EST LOW 20 MIN: CPT | Mod: S$GLB,,, | Performed by: INTERNAL MEDICINE

## 2020-07-14 PROCEDURE — 99213 PR OFFICE/OUTPT VISIT, EST, LEVL III, 20-29 MIN: ICD-10-PCS | Mod: S$GLB,,, | Performed by: INTERNAL MEDICINE

## 2020-07-14 PROCEDURE — 71046 X-RAY EXAM CHEST 2 VIEWS: CPT | Mod: TC

## 2020-07-14 RX ORDER — PREDNISONE 10 MG/1
TABLET ORAL
Qty: 40 TABLET | Refills: 0 | Status: SHIPPED | OUTPATIENT
Start: 2020-07-14 | End: 2020-07-28 | Stop reason: SDUPTHER

## 2020-07-14 NOTE — PROGRESS NOTES
[facility]  Pulmonology  Follow-Up Clinic Visit    Subjective:     Reason for Visit:    Promise Medrano is a 45 y.o. female followed for suspected ILD.    Chief Complaint   Patient presents with    Interstitial Lung Disease     follow up needs oxygen       No new symptoms.  Developed agitation with chantix.  Stopped taking it and started smoking again.  Back at baseline since.     Review of Systems   Constitutional: Positive for activity change, fatigue and weakness. Negative for fever, chills, weight loss, weight gain, appetite change and night sweats.   HENT: Negative for nosebleeds, postnasal drip, rhinorrhea, sinus pressure, sore throat, trouble swallowing, voice change and congestion.    Eyes: Negative for redness and itching.   Respiratory: Positive for cough, chest tightness, shortness of breath, wheezing, previous hospitalization due to pulmonary problems and use of rescue inhaler. Negative for apnea, snoring, hemoptysis, sputum production, choking, orthopnea, asthma nighttime symptoms, pleurisy, dyspnea on extertion, somnolence and Paroxysmal Nocturnal Dyspnea.    Cardiovascular: Positive for chest pain. Negative for palpitations and leg swelling.   Genitourinary: Negative for difficulty urinating and hematuria.   Endocrine: Negative for polydipsia, polyphagia, cold intolerance, heat intolerance and polyuria.    Musculoskeletal: Positive for arthralgias and myalgias. Negative for joint swelling.   Skin: Negative for rash.   Gastrointestinal: Negative for nausea, vomiting, abdominal pain and acid reflux.   Neurological: Negative for dizziness, syncope, weakness and light-headedness.   Hematological: Negative for adenopathy. Does not bruise/bleed easily and no excessive bruising.   Psychiatric/Behavioral: Negative for confusion and sleep disturbance. The patient is nervous/anxious.    All other systems reviewed and are negative.     Outpatient Medications as of 7/14/2020   Medication     ACCU-CHEK SOFTCLIX LANCETS Misc    AEROCHAMBER PLUS FLOW-VU,L MSK Spcr    albuterol (PROVENTIL/VENTOLIN HFA) 90 mcg/actuation inhaler    albuterol-ipratropium (DUO-NEB) 2.5 mg-0.5 mg/3 mL nebulizer solution    atorvastatin (LIPITOR) 80 MG tablet    azelastine (ASTELIN) 137 mcg (0.1 %) nasal spray    blood sugar diagnostic Strp    blood-glucose meter kit    budesonide-formoterol 160-4.5 mcg (SYMBICORT) 160-4.5 mcg/actuation HFAA    CETAPHIL MOISTURIZING cream    diazePAM (VALIUM) 5 MG tablet    diclofenac sodium (VOLTAREN) 1 % Gel    dicyclomine (BENTYL) 20 mg tablet    doxycycline (VIBRAMYCIN) 100 MG Cap    ergocalciferol (ERGOCALCIFEROL) 50,000 unit Cap    esomeprazole (NEXIUM) 40 MG capsule    ezetimibe (ZETIA) 10 mg tablet    famotidine (PEPCID) 20 MG tablet    gabapentin (NEURONTIN) 100 MG capsule    hydrocortisone 1 % cream    hydroxychloroquine (PLAQUENIL) 200 mg tablet    ibuprofen (ADVIL,MOTRIN) 600 MG tablet    ketoconazole (NIZORAL) 2 % cream    levocetirizine (XYZAL) 5 MG tablet    midodrine (PROAMATINE) 5 MG Tab    montelukast (SINGULAIR) 10 mg tablet    mupirocin (BACTROBAN) 2 % ointment    nicotine (NICODERM CQ) 14 mg/24 hr    nicotine polacrilex 4 MG Lozg    omeprazole (PRILOSEC) 40 MG capsule    ONETOUCH ULTRA BLUE TEST STRIP [facility]    OXYGEN-[place] DELIVERY [place]S [facility] [place]    predniSONE (DELTASONE) 10 MG tablet    STIOLTO RESPIMAT 2.5-2.5 mcg/actuation Mist    triamcinolone acetonide 0.1% (KENALOG) 0.1 % cream    urea 40 % Lotn lotion    XIIDRA 5 % Dpet    blood-glucose meter, drum-type (ACCU-CHEK COMPACT PLUS CARE) Kit    gabapentin (NEURONTIN) 400 MG capsule    nicotine polacrilex 2 MG Lozg     No current facility-administered medications on file as of 7/14/2020.       Previous Reports Reviewed:   ER records, historical medical records, lab reports, nursing home notes, office notes, operative reports, radiology reports, referral letter/letters and x-ray reports   The  following portions of the patient's history were reviewed and updated as appropriate: allergies, current medications, past family history, past medical history, past social history, past surgical history and problem list.      Objective:     /80 (BP Location: Left arm, Patient Position: Sitting)   Pulse 92   Temp 97.3 °F (36.3 °C)   Wt 65.3 kg (144 lb)   SpO2 96%   BMI 26.34 kg/m²   Body mass index is 26.34 kg/m².     Physical Exam   Constitutional: She is oriented to person, place, and time. Vital signs are normal. She appears well-developed and well-nourished. She is cooperative.  Non-toxic appearance. No distress.   HENT:   Head: Normocephalic and atraumatic.   Right Ear: Hearing and external ear normal.   Left Ear: Hearing and external ear normal.   Nose: Nose normal. No mucosal edema, rhinorrhea, sinus tenderness, nasal deformity, septal deviation or nasal septal hematoma. Right sinus exhibits no maxillary sinus tenderness and no frontal sinus tenderness. Left sinus exhibits no maxillary sinus tenderness and no frontal sinus tenderness.   Mouth/Throat: Uvula is midline, oropharynx is clear and moist and mucous membranes are normal. Normal dentition. No dental abscesses or dental caries. No oropharyngeal exudate or posterior oropharyngeal edema. Mallampati Score: I.   Neck: Normal range of motion. Neck supple. No JVD present. No tracheal deviation present. No thyromegaly present.   Cardiovascular: Normal rate, regular rhythm, normal heart sounds and intact distal pulses. Exam reveals no gallop and no friction rub.   No murmur heard.  Pulmonary/Chest: Normal expansion, symmetric chest wall expansion and effort normal. No stridor. No tachypnea. No respiratory distress. She has no decreased breath sounds. She has no wheezes. She has no rhonchi. She has no rales (faint bibasilar rales). Chest wall is not dull to percussion. She exhibits no tenderness. Negative for egophony. Negative for tactile fremitus.    Abdominal: Soft. Bowel sounds are normal. She exhibits no distension and no mass. There is no hepatosplenomegaly. There is no abdominal tenderness. There is no rebound and no guarding. No hernia.   Musculoskeletal: Normal range of motion.         General: No tenderness, deformity or edema.   Lymphadenopathy: No supraclavicular adenopathy is present.     She has no cervical adenopathy.     She has no axillary adenopathy.   Neurological: She is alert and oriented to person, place, and time. She has normal reflexes. No cranial nerve deficit. Gait normal.   Skin: Skin is warm and intact. No lesion, no petechiae and no rash noted. No cyanosis or erythema. No pallor. Nails show clubbing.   Psychiatric: Her behavior is normal. Judgment and thought content normal. Her mood appears anxious. Her speech is rapid and/or pressured and tangential. Cognition and memory are normal.   Nursing note and vitals reviewed.       Personal Review of Relevant Diagnostic Studies:  I have personally reviewed and interpreted the following labs/studies/images.  Chest x-ray:   (June 25, 2012)  Radiology Report::  No acute cardiopulmonary abnormality.  My impression:  Unable to fully evaluate the bases due to breast implants.  Otherwise no gross abnormalities.     (April 14, 2019)  Radiology report:  None available  My impression:  Interval increase and basilar predominant bilateral hazy airspace opacities with a decrease and overall lung volume compared to prior chest radiograph.     I independently viewed the above imaging/lab studies in addition to reviewing the report      CT Chest:  (June 25, 2012)  Radiology report:    None available     My impression:    Available images of the lung bases from CT abdomen are unremarkable.        (August 31, 2018)  Radiology report:    1. Mild mosaic attenuation pattern in the lungs, which was present on theprevious exam of 07/14/2010, with scattered minimal bronchiolitis in both upper lobes and a few  thin-walled parenchymal cysts. Smoking-related interstitial lung diseases could have this appearance, with air-trapping and small airways inflammation, or inhalational lung disease of various potential etiologies, constituting additional diagnostic considerations.  2. Stable prominent mediastinal lymph nodes, and prominent to borderline enlarged bilateral axillary lymph nodes, nonspecific but presumably reactive given stability.     My impression:    Diffuse interlobular septal thickening with some areas having a nodular appearance as well.  Diffuse ground-glass opacities with some mosaicism at both bases.  Scattered thin walled cysts of varying sizes.  Mild paraseptal emphysema.  Overall, this could be consistent with some very early interstitial lung disease among other etiologies, but appearance is not classic for any specific I LD.        (2019)  Radiology report:  1. Negative for pulmonary embolus.  2. New diffuse bilateral pulmonary ground-glass opacities.  Potential etiologies include infectious or inflammatory alveolitis/pneumonitis, alveolar edema, or alveolar hemorrhage.  Clinical and laboratory correlation is needed.     My impression:  · Interval increase in the size and number of 10 walled cysts.  · Persistent interlobular septal thickening, but is difficult to clearly appreciated due to presence of IV contrast.  · Diffuse ground-glass opacities also difficult to compare to prior CT due to the presence of IV contrast.     I independently viewed the above imaging/lab studies in addition to reviewing the report      PFTs:  Date:  2019  FEV1:  2.49  (91 % predicted), FVC:  3.25 (96 % predicted), FEV1/FVC:  77, T.71 (102 % predicted), RV/TLVC:  33 (97 % predicted), DLCO:  14.95 (74 % predicted)  Computer interpretation:  Spirometry is within normal limits.  There was a significant response to inhaled bronchodilator in small airways  Lung volumes are normal.  Diffusion is  normal.      My impression:   No evidence of obstructive lung disease, or reactive airways disease.   The clinical utility of measuring the FEF 25-75 is debatable, and it is response to bronchodilators even more so.  The statement significant response to inhaled bronchodilator and small airways is an inaccurate description of the overall impression from her PFTs.  Significantly elevated FRC and ERV are unusual but suggest some intrinsic pulmonary dysfunction.     (2019)   FEV1:  2.37 L (86% predicted)  FVC 3.17 L (94% predicted)  FEV1/FVC:  75  T.75 L (103% predicted)  RV/T (97% predicted)  ERV:  1.30 L (120% predicted)  DLCO:  12.16 (60% predicted)  My impression:  No obstruction.  No restriction.  Mild diffusion impairment.  Unchanged compared to pulmonary function test obtained 4 months prior.      Methacholine Challenge:  None on file.      6MWT:   (2019)  Predicted distance 453 m  Actual distance:548 m  SpO2:  Pre-exercise 92% / During exercise 87% on room air and improved to 88% when placed on 2 LPM / recovery 92% on 2LPM  Results of this test qualify for supplemental oxygen at home with ambulation.      PSG:  Date:  2019  No central or obstructive sleep apnea.  Significant desaturations during supine sleep.      ECG:  None available to review.      Echocardiogram:   Date:  2018  · Estimated left ventricular ejection fraction is 60-65%  · Normal left atrial pressure diastolic function.  · There is mild mitral regurgitation.  · Estimated RVSP is 28 mmHg.  · No mention of pulmonary artery pressure     (October 15, 2019)  · Normal left ventricular systolic function. The estimated ejection fraction is 70%  · Normal LV diastolic function.  · No wall motion abnormalities.  · Normal right ventricular systolic function.  · Normal central venous pressure (3 mm Hg).  · The estimated PA systolic pressure is 28 mm Hg      BNP  2019:  38                      Arterial blood gas:  (October 29, 2019)  7.437 / 37.6 / 95 / 25.4 (obtained on ambient air)                    Serology:                 (February 14, 2017)                 PRASHANTH Titer:              1:  320 (elevated)                 Pattern:                  Speckled                 RF:                         <15                  Anti CCP:             <15.6                    (October 2019)                 CRP:                     0.46                 ESR:                     10                 RF:                        < 10                 Anti Lesa 1 Ab:         < 0.2                 SPEP:                   No M-spike observed                 C3:                        136                 C4:                        18                 A1AT:                    173                 A1AT Phenotype:  MM                 Anti-SCL Ab:        < 0.2                 P-ANCA:               <1:20                 MPO:                     <1:20                 C-ANCA:              <1:20                 Proteinase 3 Ab:  <93.5                 PRASHANTH:                     Positive   >1:80  (speckled pattern)                 Anti SSA Ab:        <0.2                 Anti SSB Ab:        <0.2                 HIV 1/2 Ag/Ab:     Non-reactive                 Cryoglobulin:        None detected                 LDH:                      112                 ACE:                     44                 CK:                        49                 Aldolase:              3.4           TBBx:  o LEFT LUNG TRANSBRONCHIAL BIOPSIES:  o BRONCHIAL MUCOSA WITH MILD CHRONIC INFLAMMATION.  o MINUTE FRAGMENT OF ALVEOLAR LUNG PARENCHYMA REPRESENTED SHOWN ANTHRACOTIC PIGMENT DEPOSITS AND   MINIMAL CHRONIC INFLAMMATION.  o NO GRANULOMAS ARE IDENTIFIED.  o NO DYSPLASIA OR MALIGNANCY IS IDENTIFIED.   BAL  o BRONCHIAL WASHING:  o HYPOCELLULAR SPECIMEN WITH THE PRESENCE OF PERIPHERAL BLOOD AND FEW  o BENIGN  o BRONCHIAL EPITHELIAL CELLS.    o NO  EVIDENCE OF MALIGNANCY IS SEEN.       Assessment:     1. Chronic hypoxemic respiratory failure    2. ILD (interstitial lung disease)    3. Deficiency of anti-pneumococcal polysaccharide antibody    4. Tobacco abuse      Impression:  Undifferentiated ILD. Still no etiology identified.      Plan:     · Continue to work on smoking cessation.  · CXR today.  · Steroid taper.  · CXR in 2 weeks.  · Hold off on open lung Bx for now.  · Continue home oxygen.     I informed the patient of my working diagnosis, it's etiology, risk factors, expected symptoms, diagnostic work up, treatment options and prognosis., I personally reviewed the results of relevant imaging/labs/studies with the patient, and discussed their clinical significance., I spent >10 minutes counseling the patient about their condition., Plan discussed with the patient, who is in agreement., Opportunity provided for the the patient to voice any additional questions or concerns., All questions were answered to the patient's satisfaction., Educational material provided and Patient given date for next visit.    Follow up in about 2 weeks (around 7/28/2020).    Orders Placed This Visit:  Orders Placed This Encounter   Procedures    X-Ray Chest PA And Lateral     Standing Status:   Future     Standing Expiration Date:   7/14/2021     Order Specific Question:   May the Radiologist modify the order per protocol to meet the clinical needs of the patient?     Answer:   Yes    X-Ray Chest PA And Lateral     Standing Status:   Future     Standing Expiration Date:   7/14/2021     Order Specific Question:   May the Radiologist modify the order per protocol to meet the clinical needs of the patient?     Answer:   Yes       Nate Tarango MD  Pulmonary / Critical Care Medicine  [facility]

## 2020-07-16 ENCOUNTER — TELEPHONE (OUTPATIENT)
Dept: PULMONOLOGY | Facility: HOSPITAL | Age: 45
End: 2020-07-16

## 2020-07-28 ENCOUNTER — OFFICE VISIT (OUTPATIENT)
Dept: FAMILY MEDICINE | Facility: CLINIC | Age: 45
End: 2020-07-28
Payer: MEDICAID

## 2020-07-28 VITALS
TEMPERATURE: 99 F | WEIGHT: 148 LBS | DIASTOLIC BLOOD PRESSURE: 76 MMHG | SYSTOLIC BLOOD PRESSURE: 120 MMHG | HEIGHT: 62 IN | RESPIRATION RATE: 18 BRPM | BODY MASS INDEX: 27.23 KG/M2 | OXYGEN SATURATION: 97 % | HEART RATE: 76 BPM

## 2020-07-28 DIAGNOSIS — J84.9 INTERSTITIAL LUNG DISEASE: ICD-10-CM

## 2020-07-28 DIAGNOSIS — M35.02 SJOGREN'S SYNDROME WITH LUNG INVOLVEMENT: ICD-10-CM

## 2020-07-28 DIAGNOSIS — Z20.822 CLOSE EXPOSURE TO COVID-19 VIRUS: ICD-10-CM

## 2020-07-28 DIAGNOSIS — I95.1 ORTHOSTASIS: ICD-10-CM

## 2020-07-28 DIAGNOSIS — J96.11 CHRONIC RESPIRATORY FAILURE WITH HYPOXIA, ON HOME O2 THERAPY: ICD-10-CM

## 2020-07-28 DIAGNOSIS — M32.13 OTHER SYSTEMIC LUPUS ERYTHEMATOSUS WITH LUNG INVOLVEMENT: ICD-10-CM

## 2020-07-28 DIAGNOSIS — G89.29 CHRONIC GENERALIZED PAIN: Primary | ICD-10-CM

## 2020-07-28 DIAGNOSIS — R73.09 ELEVATED GLUCOSE LEVEL: ICD-10-CM

## 2020-07-28 DIAGNOSIS — F41.9 CHRONIC ANXIETY: ICD-10-CM

## 2020-07-28 DIAGNOSIS — F51.01 PRIMARY INSOMNIA: ICD-10-CM

## 2020-07-28 DIAGNOSIS — E78.00 ELEVATED LDL CHOLESTEROL LEVEL: ICD-10-CM

## 2020-07-28 DIAGNOSIS — Z99.81 CHRONIC RESPIRATORY FAILURE WITH HYPOXIA, ON HOME O2 THERAPY: ICD-10-CM

## 2020-07-28 DIAGNOSIS — Z12.31 ENCOUNTER FOR SCREENING MAMMOGRAM FOR BREAST CANCER: ICD-10-CM

## 2020-07-28 DIAGNOSIS — R52 CHRONIC GENERALIZED PAIN: Primary | ICD-10-CM

## 2020-07-28 DIAGNOSIS — B37.0 THRUSH: ICD-10-CM

## 2020-07-28 DIAGNOSIS — J02.9 SORE THROAT: ICD-10-CM

## 2020-07-28 PROCEDURE — 99215 OFFICE O/P EST HI 40 MIN: CPT | Performed by: INTERNAL MEDICINE

## 2020-07-28 PROCEDURE — 99214 OFFICE O/P EST MOD 30 MIN: CPT | Mod: S$PBB,,, | Performed by: INTERNAL MEDICINE

## 2020-07-28 PROCEDURE — 99214 PR OFFICE/OUTPT VISIT, EST, LEVL IV, 30-39 MIN: ICD-10-PCS | Mod: S$PBB,,, | Performed by: INTERNAL MEDICINE

## 2020-07-28 RX ORDER — FLUCONAZOLE 150 MG/1
150 TABLET ORAL DAILY
Qty: 1 TABLET | Refills: 0 | Status: SHIPPED | OUTPATIENT
Start: 2020-07-28 | End: 2020-07-29

## 2020-07-28 RX ORDER — DOXEPIN HYDROCHLORIDE 25 MG/1
25 CAPSULE ORAL NIGHTLY
Qty: 30 CAPSULE | Refills: 11 | Status: SHIPPED | OUTPATIENT
Start: 2020-07-28 | End: 2021-04-14

## 2020-07-28 RX ORDER — MIDODRINE HYDROCHLORIDE 10 MG/1
1 TABLET ORAL DAILY
COMMUNITY
Start: 2020-07-14 | End: 2020-12-08

## 2020-07-28 RX ORDER — AZITHROMYCIN 250 MG/1
250 TABLET, FILM COATED ORAL DAILY
Qty: 6 TABLET | Refills: 0 | Status: SHIPPED | OUTPATIENT
Start: 2020-07-28 | End: 2020-09-09

## 2020-07-28 NOTE — PROGRESS NOTES
"  SUBJECTIVE:    Patient ID: Promise Medrano is a 45 y.o. female.    Chief Complaint: Lupus and Follow-up    HPI     This patient has chronic hypoxic respiratory failure due to interstitial lung disease.  She as recently been approved for supplemental oxygen at home.(Pulmonary)-according to pulmonary notes, the patient attempted Chantix and became agitated, and stop the Chantix and return to smoking.    Chief issue-generalized pain in joints, for two weeks now-she complains of neck pain and pain into the shoulders-sometimes the pain goes into the arms-she is also having pain in the hips-and she is seeing Rheumatology in the am...patient has SLE-describes low grade fever since March-no recent pleurisy-sun causes temp to 104-she does not get skin rashes or butterfly rash-she just gets bags-she says she has tremors but for now she has a lof of pain which makes her shake-she has had some "knots" in the low back-also she is describing some discomfort in the right throat which makes it hard to swallow and she describes a lump in the throat-    Patient also states she is having all types of neuro problems-memory is an issue-hand coordination is poor-she definitely describes brain fog-says the other day she developed severe head pain and the posterior neck swelled up for about a day-she felt her face was drooping on the left side-nobody examined her-I have referred her to Neuro and another MD has also-not clear why none has been completed-    She asks for doxepin for help with sleeping and anxiety and also helps the gut-she has been on dicyclomine from GI    Pt sees Immunology and is on doxycycline 100 mg three times weekly, but her sinuses are congested and because the throat feels uncomfortable she thinks she may need an antibiotic-because of her immunodeficiencies it is best to protect her due to the nature of the congestion she describes.     Lab Visit on 07/14/2020   Component Date Value Ref Range Status    COVID-19 " (SARS CoV-2) IgG Ab 07/14/2020 Negative  Negative Final   Lab Visit on 05/01/2020   Component Date Value Ref Range Status    Specimen UA 05/01/2020 Urine, Clean Catch   Final    Color, UA 05/01/2020 Yellow  Yellow, Straw, Violet Final    Appearance, UA 05/01/2020 Clear  Clear Final    pH, UA 05/01/2020 7.0  5.0 - 8.0 Final    Specific [place], UA 05/01/2020 1.030  1.005 - 1.030 Final    Protein, UA 05/01/2020 Trace* Negative Final    Glucose, UA 05/01/2020 Negative  Negative Final    Ketones, UA 05/01/2020 Trace* Negative Final    Bilirubin (UA) 05/01/2020 Negative  Negative Final    Occult Blood UA 05/01/2020 Negative  Negative Final    Nitrite, UA 05/01/2020 Negative  Negative Final    Urobilinogen, UA 05/01/2020 4.0-6.0* Negative EU/dL Final    Leukocytes, UA 05/01/2020 Negative  Negative Final    Sodium 05/01/2020 140  136 - 145 mmol/L Final    Potassium 05/01/2020 4.4  3.5 - 5.1 mmol/L Final    Chloride 05/01/2020 105  95 - 110 mmol/L Final    CO2 05/01/2020 28  23 - 29 mmol/L Final    Glucose 05/01/2020 113* 70 - 110 mg/dL Final    BUN, Bld 05/01/2020 16  6 - 20 mg/dL Final    Creatinine 05/01/2020 0.7  0.5 - 1.4 mg/dL Final    Calcium 05/01/2020 9.1  8.7 - 10.5 mg/dL Final    Total Protein 05/01/2020 7.6  6.0 - 8.4 g/dL Final    Albumin 05/01/2020 4.1  3.5 - 5.2 g/dL Final    Total Bilirubin 05/01/2020 0.6  0.1 - 1.0 mg/dL Final    Alkaline Phosphatase 05/01/2020 82  55 - 135 U/L Final    AST 05/01/2020 13  10 - 40 U/L Final    ALT 05/01/2020 13  10 - 44 U/L Final    Anion Gap 05/01/2020 7* 8 - 16 mmol/L Final    eGFR if African American 05/01/2020 >60.0  >60 mL/min/1.73 m^2 Final    eGFR if non African American 05/01/2020 >60.0  >60 mL/min/1.73 m^2 Final    WBC 05/01/2020 11.25  3.90 - 12.70 K/uL Final    RBC 05/01/2020 4.41  4.00 - 5.40 M/uL Final    Hemoglobin 05/01/2020 13.6  12.0 - 16.0 g/dL Final    Hematocrit 05/01/2020 42.1  37.0 - 48.5 % Final    Mean Corpuscular  Volume 2020 96  82 - 98 fL Final    Mean Corpuscular Hemoglobin 2020 30.8  27.0 - 31.0 pg Final    Mean Corpuscular Hemoglobin Conc 2020 32.3  32.0 - 36.0 g/dL Final    RDW 2020 13.2  11.5 - 14.5 % Final    Platelets 2020 176  150 - 350 K/uL Final    MPV 2020 11.6  9.2 - 12.9 fL Final    Immature Granulocytes 2020 0.4  0.0 - 0.5 % Final    Gran # (ANC) 2020 8.6* 1.8 - 7.7 K/uL Final    Immature Grans (Abs) 2020 0.05* 0.00 - 0.04 K/uL Final    Lymph # 2020 2.1  1.0 - 4.8 K/uL Final    Mono # 2020 0.4  0.3 - 1.0 K/uL Final    Eos # 2020 0.0  0.0 - 0.5 K/uL Final    Baso # 2020 0.04  0.00 - 0.20 K/uL Final    nRBC 2020 0  0 /100 WBC Final    Gran% 2020 76.0* 38.0 - 73.0 % Final    Lymph% 2020 18.9  18.0 - 48.0 % Final    Mono% 2020 3.9* 4.0 - 15.0 % Final    Eosinophil% 2020 0.4  0.0 - 8.0 % Final    Basophil% 2020 0.4  0.0 - 1.9 % Final    Differential Method 2020 Automated   Final       Past Medical History:   Diagnosis Date    Allergic rhinitis     Arthritis     Chronic anxiety 10/23/2018    GERD (gastroesophageal reflux disease)     Grade II hemorrhoids 2019    History of diabetes mellitus resolved following bariatric surgery 10/30/2017    History of type 2 diabetes mellitus 10/30/2017    Interstitial lung disease 2019    Lupus     Sjogren's syndrome 2019     Past Surgical History:   Procedure Laterality Date    BREAST SURGERY  2004    BRONCHOSCOPY WITH FLUOROSCOPY N/A 2019    Procedure: BRONCHOSCOPY, WITH FLUOROSCOPY;  Surgeon: Nate Tarango MD;  Location: [facility];  Service: Pulmonary;  Laterality: N/A;     SECTION  ,    gastric sleeve  2010    HYSTERECTOMY      TONSILLECTOMY       Family History   Problem Relation Age of Onset    Early death Father     Heart disease Father     Stroke Father     Kidney disease Father      Diabetes Paternal Grandmother     Cancer Paternal Grandmother     Raynaud syndrome Mother     Uterine cancer Mother     Raynaud syndrome Sister     Polycystic ovary syndrome Daughter     Diabetes Maternal Grandmother     Heart disease Maternal Grandmother     Kidney disease Maternal Grandmother     Heart disease Maternal Grandfather     Cancer Paternal Grandfather     No Known Problems Daughter        Marital Status: Single  Alcohol History:  reports current alcohol use.  Tobacco History:  reports that she has been smoking cigarettes. She started smoking about 22 years ago. She has a 12.50 pack-year smoking history. She has never used smokeless tobacco.  Drug History:  reports no history of drug use.    Review of patient's allergies indicates:   Allergen Reactions    Hay fever and allergy relief        Current Outpatient Medications:     ACCU-CHEK SOFTCLIX LANCETS [facility], USE ONCE DAILY AS NEEDED, Disp: 100 each, Rfl: 5    AEROCHAMBER PLUS FLOW-VU,L MSK Spcr, USE WITH INHALER, Disp: 1 each, Rfl: 1    albuterol (PROVENTIL/VENTOLIN HFA) 90 mcg/actuation inhaler, INHALE 2 PUFFS BY MOUTH EVERY 4 TO 6 HOURS AS NEEDED FOR COUGH OR WHEEZING OR SHORTNESS OF BREATH, Disp: 8.5 g, Rfl: 2    albuterol-ipratropium (DUO-NEB) 2.5 mg-0.5 mg/3 mL nebulizer solution, USE 1 VIAL VIA NEBULIZER EVERY 6 HOURS AS NEEDED FOR WHEEZING OR SHORTNESS OF BREATH, Disp: 180 mL, Rfl: 0    atorvastatin (LIPITOR) 80 MG tablet, Take 1 tablet (80 mg total) by mouth once daily., Disp: 90 tablet, Rfl: 3    azelastine (ASTELIN) 137 mcg (0.1 %) nasal spray, USE 1 SPRAY IN EACH NOSTRIL TWICE DAILY, Disp: 30 mL, Rfl: 0    blood sugar diagnostic Strp, Test blood sugar as directed., Disp: 35 each, Rfl: 0    blood-glucose meter kit, Use as instructed, Disp: 1 each, Rfl: 0    blood-glucose meter, drum-type (ACCU-CHEK COMPACT PLUS CARE) Kit, 1 Device by Misc.(Non-Drug; Combo Route) route daily as needed., Disp: 1 kit, Rfl: 0    CETAPHIL  MOISTURIZING cream, Apply 1 application topically as needed., Disp: 90 g, Rfl: 1    diazePAM (VALIUM) 5 MG tablet, TAKE 1 TO 2 TABLETS BY MOUTH EVERY NIGHT AT BEDTIME AS NEEDED FOR ANXIETY OR SLEEPLESSNESS, Disp: 60 tablet, Rfl: 0    diclofenac sodium (VOLTAREN) 1 % Gel, 2-4 gm bid-tid prn, Disp: 100 g, Rfl: 0    dicyclomine (BENTYL) 20 mg tablet, TAKE 1 TABLET BY MOUTH TWICE DAILY, Disp: 60 tablet, Rfl: 0    doxycycline (VIBRAMYCIN) 100 MG Cap, TAKE 1 CAPSULE BY MOUTH ON MONDAY, WEDNESDAY, AND FRIDAY ONLY, WEAR SUNSCREEN, Disp: 20 capsule, Rfl: 3    ergocalciferol (ERGOCALCIFEROL) 50,000 unit Cap, Take 1 capsule (50,000 Units total) by mouth every 7 days., Disp: 4 capsule, Rfl: 0    esomeprazole (NEXIUM) 40 MG capsule, TAKE ONE CAPSULE BY MOUTH EVERY DAY BEFORE BREAKFAST, Disp: 90 capsule, Rfl: 0    ezetimibe (ZETIA) 10 mg tablet, Take 10 mg by mouth once daily. , Disp: , Rfl:     ferrous sulfate (FEOSOL) 325 mg (65 mg iron) Tab tablet, TAKE 1 TABLET BY MOUTH EVERY DAY, Disp: 90 tablet, Rfl: 0    fluticasone propionate (FLONASE) 50 mcg/actuation nasal spray, USE 1 SPRAY IN EACH NOSTRIL DAILY, Disp: 16 g, Rfl: 0    gabapentin (NEURONTIN) 100 MG capsule, TAKE 1 TO 2 CAPSULES BY MOUTH WITH 400 MG THREE TIMES DAILY, Disp: 180 capsule, Rfl: 0    gabapentin (NEURONTIN) 400 MG capsule, TAKE 1 CAPSULE BY MOUTH THREE TIMES DAILY, Disp: 90 capsule, Rfl: 0    hydrocortisone 1 % cream, Apply to affected area 2 times daily., Disp: , Rfl:     hydroxychloroquine (PLAQUENIL) 200 mg tablet, Take 1 tablet (200 mg total) by mouth once daily., Disp: 60 tablet, Rfl: 1    ibuprofen (ADVIL,MOTRIN) 600 MG tablet, , Disp: , Rfl:     ketoconazole (NIZORAL) 2 % cream, , Disp: , Rfl:     levocetirizine (XYZAL) 5 MG tablet, TAKE 1 TABLET BY MOUTH EVERY EVENING, Disp: 90 tablet, Rfl: 1    midodrine (PROAMATINE) 10 MG tablet, Take 1 tablet by mouth once daily., Disp: , Rfl:     montelukast (SINGULAIR) 10 mg tablet, TAKE 1  TABLET(10 MG) BY MOUTH EVERY DAY, Disp: 90 tablet, Rfl: 0    mupirocin (BACTROBAN) 2 % ointment, , Disp: , Rfl:     omeprazole (PRILOSEC) 40 MG capsule, TAKE ONE CAPSULE BY MOUTH EVERY MORNING, Disp: 30 capsule, Rfl: 5    ondansetron (ZOFRAN-ODT) 4 MG TbDL, DISSOLVE 2 TABLETS UNDER TONGUE EVERY 8 HOURS AS NEEDED, Disp: 30 tablet, Rfl: 0    ONETOUCH ULTRA BLUE TEST STRIP Strp, TEST DAILY AS NEEDED, Disp: 50 strip, Rfl: 2    OXYGEN-AIR DELIVERY SYSTEMS MISC, by Misc.(Non-Drug; Combo Route) route., Disp: , Rfl:     triamcinolone acetonide 0.1% (KENALOG) 0.1 % cream, MELY SPARINGLY TO ECZEMA BID, Disp: , Rfl: 3    urea 40 % Lotn lotion, Apply topically as needed. , Disp: , Rfl:     XIIDRA 5 % Dpet, , Disp: , Rfl:     azithromycin (Z-GERRY) 250 MG tablet, Take 1 tablet (250 mg total) by mouth once daily. po on day 1 then 1 tab po on days 2-5, Disp: 6 tablet, Rfl: 0    doxepin (SINEQUAN) 25 MG capsule, Take 1 capsule (25 mg total) by mouth every evening., Disp: 30 capsule, Rfl: 11    fluconazole (DIFLUCAN) 150 MG Tab, Take 1 tablet (150 mg total) by mouth once daily. for 1 day, Disp: 1 tablet, Rfl: 0    nicotine (NICODERM CQ) 14 mg/24 hr, PLACE 1 PATCH ONTO THE SKIN ONCE DAILY (Patient not taking: Reported on 7/28/2020), Disp: 28 patch, Rfl: 4    nicotine polacrilex 2 MG Lozg, Take 1 lozenge (2 mg total) by mouth as needed. (Patient not taking: Reported on 7/28/2020), Disp: 108 lozenge, Rfl: 3    nicotine polacrilex 4 MG Lozg, Take 1 lozenge (4 mg total) by mouth as needed. (Patient not taking: Reported on 7/28/2020), Disp: 216 lozenge, Rfl: 5    STIOLTO RESPIMAT 2.5-2.5 mcg/actuation Mist, INHALE 1 PUFF INTO THE LUNGS ONCE DAILY (Patient not taking: Reported on 7/28/2020), Disp: 4 g, Rfl: 5    Review of Systems   Constitutional: Positive for activity change and unexpected weight change (weight loss).   HENT: Positive for hearing loss and trouble swallowing. Negative for rhinorrhea.    Eyes: Positive for  "visual disturbance. Negative for discharge.   Respiratory: Positive for cough (chronic), chest tightness, shortness of breath and wheezing (HPI).    Cardiovascular: Positive for chest pain and palpitations.   Gastrointestinal: Positive for abdominal pain (RUQ pain without any dx), blood in stool (hemorrhoids) and constipation. Negative for diarrhea and vomiting.   Endocrine: Positive for polydipsia and polyuria.   Genitourinary: Negative for difficulty urinating, dysuria, hematuria and menstrual problem.   Musculoskeletal: Positive for arthralgias (HPI), joint swelling (low back) and neck pain (HPI).   Neurological: Positive for weakness. Negative for headaches.   Psychiatric/Behavioral: Negative for confusion and dysphoric mood.          Objective:      Vitals:    07/28/20 0926   BP: 120/76   Pulse: 76   Resp: 18   Temp: 98.7 °F (37.1 °C)   SpO2: 97%   Weight: 67.1 kg (148 lb)   Height: 5' 2" (1.575 m)     Physical Exam  Vitals signs and nursing note reviewed.   Constitutional:       General: She is not in acute distress.     Appearance: Normal appearance. She is well-developed and normal weight. She is not ill-appearing or diaphoretic.   HENT:      Head: Normocephalic and atraumatic.      Right Ear: External ear normal. There is no impacted cerumen.      Left Ear: External ear normal. There is no impacted cerumen.      Mouth/Throat:      Mouth: Mucous membranes are moist.      Pharynx: Oropharynx is clear. Uvula midline.      Comments: Thrush on tongue  Eyes:      General: No scleral icterus.        Right eye: No discharge.         Left eye: No discharge.      Extraocular Movements: Extraocular movements intact.      Conjunctiva/sclera: Conjunctivae normal.      Pupils: Pupils are equal, round, and reactive to light.      Right eye: Pupil is round and reactive.      Left eye: Pupil is round and reactive.   Neck:      Musculoskeletal: Normal range of motion and neck supple.      Thyroid: No thyromegaly.      " Vascular: No carotid bruit or JVD.      Trachea: Trachea normal.   Cardiovascular:      Rate and Rhythm: Normal rate and regular rhythm.      Pulses: Normal pulses.      Heart sounds: Normal heart sounds. No murmur. No friction rub. No gallop.    Pulmonary:      Effort: Pulmonary effort is normal. No respiratory distress.      Breath sounds: Wheezing (minimal) and rhonchi present. No rales.   Abdominal:      General: There is no distension.      Palpations: Abdomen is soft. There is no mass.      Tenderness: There is no abdominal tenderness. There is no right CVA tenderness, left CVA tenderness, guarding or rebound.      Hernia: No hernia is present.   Musculoskeletal: Normal range of motion.         General: No swelling, tenderness, deformity or signs of injury.      Right lower leg: No edema.      Left lower leg: No edema.   Lymphadenopathy:      Cervical: No cervical adenopathy.   Skin:     General: Skin is warm and dry.      Capillary Refill: Capillary refill takes less than 2 seconds.      Coloration: Skin is not pale.      Findings: No bruising, erythema, lesion or rash.      Nails: There is no clubbing.     Neurological:      General: No focal deficit present.      Mental Status: She is alert and oriented to person, place, and time.      Cranial Nerves: No cranial nerve deficit.      Sensory: No sensory deficit.      Motor: No weakness.      Coordination: Coordination normal.      Gait: Gait normal.      Deep Tendon Reflexes: Reflexes normal.   Psychiatric:         Mood and Affect: Mood normal.         Speech: Speech normal.         Behavior: Behavior normal. Behavior is cooperative.         Thought Content: Thought content normal.         Judgment: Judgment normal.           Assessment:       1. Chronic generalized pain    2. Chronic anxiety    3. Thrush    4. Interstitial lung disease    5. Chronic respiratory failure with hypoxia, on home O2 therapy    6. Other systemic lupus erythematosus with lung  involvement    7. Sjogren's syndrome with lung involvement    8. Elevated glucose level    9. Elevated LDL cholesterol level    10. Encounter for screening mammogram for breast cancer    11. Close Exposure to Covid-19 Virus    12. Sore throat    13. Primary insomnia    14. Orthostasis         Plan:       Chronic generalized pain  -     doxepin (SINEQUAN) 25 MG capsule; Take 1 capsule (25 mg total) by mouth every evening.  Dispense: 30 capsule; Refill: 11    Chronic anxiety        -     Doxepin will help sleep and anxiety and pain    Thrush  -     fluconazole (DIFLUCAN) 150 MG Tab; Take 1 tablet (150 mg total) by mouth once daily. for 1 day  Dispense: 1 tablet; Refill: 0    Interstitial lung disease    Chronic respiratory failure with hypoxia, on home O2 therapy    Other systemic lupus erythematosus with lung involvement    Sjogren's syndrome with lung involvement    Elevated glucose level  -     Hemoglobin A1C; Future; Expected date: 07/28/2020    Elevated LDL cholesterol level  -     Lipid Panel; Future; Expected date: 01/28/2021    Encounter for screening mammogram for breast cancer  -     Mammo Digital Screening Bilat with Rico; Future; Expected date: 07/28/2020    Close Exposure to Covid-19 Virus  -     COVID-19 (SARS CoV-2) IgG Antibody; Future; Expected date: 07/28/2020    Sore throat  -     azithromycin (Z-GERRY) 250 MG tablet; Take 1 tablet (250 mg total) by mouth once daily. po on day 1 then 1 tab po on days 2-5  Dispense: 6 tablet; Refill: 0    Primary insomnia  -     doxepin (SINEQUAN) 25 MG capsule; Take 1 capsule (25 mg total) by mouth every evening.  Dispense: 30 capsule; Refill: 11    Orthostasis        -     Refill midodrine       Follow up in about 4 weeks (around 8/25/2020) for FOLLOW-UP STATUS.        7/28/2020 Mine Palmer M.D.

## 2020-07-29 ENCOUNTER — OFFICE VISIT (OUTPATIENT)
Dept: RHEUMATOLOGY | Facility: CLINIC | Age: 45
End: 2020-07-29
Payer: MEDICAID

## 2020-07-29 VITALS
DIASTOLIC BLOOD PRESSURE: 74 MMHG | BODY MASS INDEX: 27.05 KG/M2 | TEMPERATURE: 98 F | SYSTOLIC BLOOD PRESSURE: 115 MMHG | WEIGHT: 147.88 LBS

## 2020-07-29 DIAGNOSIS — M35.9 CONNECTIVE TISSUE DISEASE, UNDIFFERENTIATED: Primary | ICD-10-CM

## 2020-07-29 DIAGNOSIS — M79.7 FIBROMYALGIA: ICD-10-CM

## 2020-07-29 PROCEDURE — 99213 PR OFFICE/OUTPT VISIT, EST, LEVL III, 20-29 MIN: ICD-10-PCS | Mod: S$GLB,,, | Performed by: INTERNAL MEDICINE

## 2020-07-29 PROCEDURE — 99213 OFFICE O/P EST LOW 20 MIN: CPT | Mod: S$GLB,,, | Performed by: INTERNAL MEDICINE

## 2020-07-29 NOTE — PROGRESS NOTES
[facility] RHEUMATOLOGY            PROGRESS NOTE      Subjective:       Patient ID:   NAME: Promise Medrano : 1975     45 y.o. female    Referring Doc: No ref. provider found  Other Physicians:    Chief Complaint:  Connective tissue disease, undifferentiated      History of Present Illness:     Patient returns today for a regularly scheduled follow-up visit for   undifferentiated connective tissue disease    The patient is experiencing  fatigue No skin rashes, mucosal ulcerations.  No Raynaud's.  Occasional lightheadedness.  Occasional paresthesias in arms , legs.  No ophthalmologic complaints.  Pending appointment with neurologist.          ROS:   GEN:  No  fever, night sweats . weight is stable   + fatigue  SKIN: no rashes, no bruising, no ulcerations, no Raynaud's  HEENT: no HA's, No visual changes, no mucosal ulcers, no sicca symptoms,  CV:   no CP, SOB, PND, [place], no orthopnea, no palpitations  PULM: normal with no SOB, cough, hemoptysis, sputum or pleuritic pain  GI:  no abdominal pain, nausea, vomiting, constipation, diarrhea, melanotic stools, BRBPR, hematemesis, no dysphagia  :   no dysuria  NEURO: no paresthesias, headaches, visual disturbances, muscle weakness  MUSCULOSKELETAL:no joint swelling, prolonged AM stiffness, no back pain, + muscle pain  Allergies:  Review of patient's allergies indicates:   Allergen Reactions    Hay fever and allergy relief        Medications:    Current Outpatient Medications:     ACCU-CHEK SOFTCLIX LANCETS Misc, USE ONCE DAILY AS NEEDED, Disp: 100 each, Rfl: 5    AEROCHAMBER PLUS FLOW-VU,L MSK Spcr, USE WITH INHALER, Disp: 1 each, Rfl: 1    albuterol (PROVENTIL/VENTOLIN HFA) 90 mcg/actuation inhaler, INHALE 2 PUFFS BY MOUTH EVERY 4 TO 6 HOURS AS NEEDED FOR COUGH OR WHEEZING OR SHORTNESS OF BREATH, Disp: 8.5 g, Rfl: 2    albuterol-ipratropium (DUO-NEB) 2.5 mg-0.5 mg/3 mL nebulizer solution, USE 1 VIAL VIA NEBULIZER EVERY 6 HOURS AS NEEDED FOR WHEEZING OR  SHORTNESS OF BREATH, Disp: 180 mL, Rfl: 0    atorvastatin (LIPITOR) 80 MG tablet, Take 1 tablet (80 mg total) by mouth once daily., Disp: 90 tablet, Rfl: 3    azelastine (ASTELIN) 137 mcg (0.1 %) nasal spray, USE 1 SPRAY IN EACH NOSTRIL TWICE DAILY, Disp: 30 mL, Rfl: 0    azithromycin (Z-GERRY) 250 MG tablet, Take 1 tablet (250 mg total) by mouth once daily. po on day 1 then 1 tab po on days 2-5, Disp: 6 tablet, Rfl: 0    blood sugar diagnostic Strp, Test blood sugar as directed., Disp: 35 each, Rfl: 0    blood-glucose meter kit, Use as instructed, Disp: 1 each, Rfl: 0    blood-glucose meter, drum-type (ACCU-CHEK COMPACT PLUS CARE) Kit, 1 Device by Misc.(Non-Drug; Combo Route) route daily as needed., Disp: 1 kit, Rfl: 0    CETAPHIL MOISTURIZING cream, Apply 1 application topically as needed., Disp: 90 g, Rfl: 1    diazePAM (VALIUM) 5 MG tablet, TAKE 1 TO 2 TABLETS BY MOUTH EVERY NIGHT AT BEDTIME AS NEEDED FOR ANXIETY OR SLEEPLESSNESS, Disp: 60 tablet, Rfl: 0    diclofenac sodium (VOLTAREN) 1 % Gel, 2-4 gm bid-tid prn, Disp: 100 g, Rfl: 0    dicyclomine (BENTYL) 20 mg tablet, TAKE 1 TABLET BY MOUTH TWICE DAILY, Disp: 60 tablet, Rfl: 0    doxepin (SINEQUAN) 25 MG capsule, Take 1 capsule (25 mg total) by mouth every evening., Disp: 30 capsule, Rfl: 11    doxycycline (VIBRAMYCIN) 100 MG Cap, TAKE 1 CAPSULE BY MOUTH ON MONDAY, WEDNESDAY, AND FRIDAY ONLY, WEAR SUNSCREEN, Disp: 20 capsule, Rfl: 3    ergocalciferol (ERGOCALCIFEROL) 50,000 unit Cap, Take 1 capsule (50,000 Units total) by mouth every 7 days., Disp: 4 capsule, Rfl: 0    esomeprazole (NEXIUM) 40 MG capsule, TAKE ONE CAPSULE BY MOUTH EVERY DAY BEFORE BREAKFAST, Disp: 90 capsule, Rfl: 0    ezetimibe (ZETIA) 10 mg tablet, Take 10 mg by mouth once daily. , Disp: , Rfl:     ferrous sulfate (FEOSOL) 325 mg (65 mg iron) Tab tablet, TAKE 1 TABLET BY MOUTH EVERY DAY, Disp: 90 tablet, Rfl: 0    fluconazole (DIFLUCAN) 150 MG Tab, Take 1 tablet (150 mg  total) by mouth once daily. for 1 day, Disp: 1 tablet, Rfl: 0    fluticasone propionate (FLONASE) 50 mcg/actuation nasal spray, USE 1 SPRAY IN EACH NOSTRIL DAILY, Disp: 16 g, Rfl: 0    gabapentin (NEURONTIN) 100 MG capsule, TAKE 1 TO 2 CAPSULES BY MOUTH WITH 400 MG THREE TIMES DAILY, Disp: 180 capsule, Rfl: 0    gabapentin (NEURONTIN) 400 MG capsule, TAKE 1 CAPSULE BY MOUTH THREE TIMES DAILY, Disp: 90 capsule, Rfl: 0    hydrocortisone 1 % cream, Apply to affected area 2 times daily., Disp: , Rfl:     hydroxychloroquine (PLAQUENIL) 200 mg tablet, Take 1 tablet (200 mg total) by mouth once daily., Disp: 60 tablet, Rfl: 1    ibuprofen (ADVIL,MOTRIN) 600 MG tablet, , Disp: , Rfl:     ketoconazole (NIZORAL) 2 % cream, , Disp: , Rfl:     levocetirizine (XYZAL) 5 MG tablet, TAKE 1 TABLET BY MOUTH EVERY EVENING, Disp: 90 tablet, Rfl: 1    midodrine (PROAMATINE) 10 MG tablet, Take 1 tablet by mouth once daily., Disp: , Rfl:     montelukast (SINGULAIR) 10 mg tablet, TAKE 1 TABLET(10 MG) BY MOUTH EVERY DAY, Disp: 90 tablet, Rfl: 0    mupirocin (BACTROBAN) 2 % ointment, , Disp: , Rfl:     omeprazole (PRILOSEC) 40 MG capsule, TAKE ONE CAPSULE BY MOUTH EVERY MORNING, Disp: 30 capsule, Rfl: 5    ondansetron (ZOFRAN-ODT) 4 MG TbDL, DISSOLVE 2 TABLETS UNDER TONGUE EVERY 8 HOURS AS NEEDED, Disp: 30 tablet, Rfl: 0    ONETOUCH ULTRA BLUE TEST STRIP Strp, TEST DAILY AS NEEDED, Disp: 50 strip, Rfl: 2    OXYGEN-AIR DELIVERY SYSTEMS MIS, by Misc.(Non-Drug; Combo Route) route., Disp: , Rfl:     triamcinolone acetonide 0.1% (KENALOG) 0.1 % cream, MELY SPARINGLY TO ECZEMA BID, Disp: , Rfl: 3    urea 40 % Lotn lotion, Apply topically as needed. , Disp: , Rfl:     XIIDRA 5 % Dpet, , Disp: , Rfl:     nicotine (NICODERM CQ) 14 mg/24 hr, PLACE 1 PATCH ONTO THE SKIN ONCE DAILY (Patient not taking: Reported on 7/28/2020), Disp: 28 patch, Rfl: 4    nicotine polacrilex 2 MG Lozg, Take 1 lozenge (2 mg total) by mouth as needed.  (Patient not taking: Reported on 7/28/2020), Disp: 108 lozenge, Rfl: 3    nicotine polacrilex 4 MG Lozg, Take 1 lozenge (4 mg total) by mouth as needed. (Patient not taking: Reported on 7/28/2020), Disp: 216 lozenge, Rfl: 5    STIOLTO RESPIMAT 2.5-2.5 mcg/actuation Mist, INHALE 1 PUFF INTO THE LUNGS ONCE DAILY (Patient not taking: Reported on 7/28/2020), Disp: 4 g, Rfl: 5    PMHx/PSHx Updates:      Eye Exam UTD    Objective:     Vitals:  Blood pressure 115/74, temperature 97.6 °F (36.4 °C), weight 67.1 kg (147 lb 14.4 oz).    Physical Examination:   GEN: no apparent distress, comfortable; AAOx3  SKIN: no rashes,no ulceration, no Raynaud's, no petechiae, no SQ nodules,  HEAD: normal  EYES: no pallor, no icterus,  NECK: no masses, thyroid normal, trachea midline, no LAD/LN's, supple  CV: RRR with no murmur; l S1 and S2 reg. ,no gallop no rubs,   CHEST: Normal respiratory effort; CTAB; normal breath sounds; no wheeze or crackles  MUSC/Skeletal: ROM normal; no crepitus; joints without synovitis,  no deformities  No joint swelling or tenderness of PIP, MCP, wrist, elbow, shoulder, or knee joints  EXTREM: no clubbing, cyanosis, no edema,normal  pulses   NEURO: grossly intact; motor WNL; AAOx3; ,  PSYCH: normal mood, affect and behavior  LYMPH: normal cervical, supraclavicular          Labs:   Lab Results   Component Value Date    WBC 11.25 05/01/2020    HGB 13.6 05/01/2020    HCT 42.1 05/01/2020    MCV 96 05/01/2020     05/01/2020    CMP  @[facility](NA,K,CL,CO2,GLU,BUN,Creatinine,Calcium,PROT,Albumin,Bilitot,Alkphos,AST,ALT,CRP,ESR,RF,CCP,PRASHANTH,SSA,CPK,uric acid) )@  I have reviewed all available lab results and radiology reports.    Radiology/Diagnostic Studies:        Assessment/Plan:   (1) 45 y.o. female with diagnosis of UCTD ( + PRASHANTH 1:320 from 2017, inflamm arthritis)  Fibromyalgia    CBC CMP urinalysis , TSH          Discussion:     I have explained all of the above in detail and the patient understands all of  the current recommendation(s). I have answered all questions to the best of my ability and to their complete satisfaction.       The patient is to continue with the current management plan         RTC in   4 months or before if needed      Electronically signed by Jonnathan James MD

## 2020-07-31 ENCOUNTER — DOCUMENTATION ONLY (OUTPATIENT)
Dept: PULMONOLOGY | Facility: CLINIC | Age: 45
End: 2020-07-31

## 2020-07-31 NOTE — PROGRESS NOTES
Patient is going to use [facility] for he o2 tanks they are bigger than the ones that [facility] gave her . She will call to have [facility] come  the other tanks they delivered . Her insurance will not cover a portable concentrator .

## 2020-08-05 ENCOUNTER — HOSPITAL ENCOUNTER (OUTPATIENT)
Dept: RADIOLOGY | Facility: HOSPITAL | Age: 45
Discharge: HOME | End: 2020-08-05
Attending: INTERNAL MEDICINE
Payer: MEDICAID

## 2020-08-05 DIAGNOSIS — Z12.31 ENCOUNTER FOR SCREENING MAMMOGRAM FOR BREAST CANCER: ICD-10-CM

## 2020-08-05 DIAGNOSIS — J84.9 ILD (INTERSTITIAL LUNG DISEASE): ICD-10-CM

## 2020-08-05 PROCEDURE — 71046 X-RAY EXAM CHEST 2 VIEWS: CPT | Mod: TC,PO

## 2020-08-05 PROCEDURE — 77067 SCR MAMMO BI INCL CAD: CPT | Mod: TC,PO

## 2020-08-05 RX ORDER — BEMPEDOIC ACID 180 MG/1
180 TABLET, FILM COATED ORAL DAILY
Qty: 30 TABLET | Refills: 2 | Status: SHIPPED | OUTPATIENT
Start: 2020-08-05 | End: 2020-09-09

## 2020-08-05 NOTE — TELEPHONE ENCOUNTER
----- Message from Mine Palmer MD sent at 8/5/2020 12:43 PM CDT -----  Chol still too high-293 and -add nexletol 180 mg daily and continue atorvostatin

## 2020-08-07 ENCOUNTER — TELEPHONE (OUTPATIENT)
Dept: FAMILY MEDICINE | Facility: CLINIC | Age: 45
End: 2020-08-07

## 2020-08-07 RX ORDER — FAMOTIDINE 20 MG/1
TABLET, FILM COATED ORAL
COMMUNITY
Start: 2020-07-31 | End: 2021-05-17

## 2020-08-07 NOTE — TELEPHONE ENCOUNTER
The following medication needs a prior authorization:     Medication Name: bempedoic acid    Dosage: 180 mg    Frequency: daily    Directions for use: take 1 tablet by mouth once daily    Diagnosis: Pure hypercholesterolemia E78.00    Is the request for a reauthorization? no    Is the patient currently stable on therapy? New therapy    Please list all therapeutic alternatives previously used with start/end dates and outcome:  Atorvastatin7/1/17-present

## 2020-08-07 NOTE — TELEPHONE ENCOUNTER
----- Message from Zenobia Cuellar sent at 8/6/2020  1:44 PM CDT -----  PRIOR AUTHORIZATION, 8/6/20

## 2020-08-10 ENCOUNTER — OFFICE VISIT (OUTPATIENT)
Dept: PULMONOLOGY | Facility: CLINIC | Age: 45
End: 2020-08-10
Payer: MEDICAID

## 2020-08-10 DIAGNOSIS — Z72.0 TOBACCO ABUSE: Primary | ICD-10-CM

## 2020-08-10 DIAGNOSIS — J96.11 CHRONIC HYPOXEMIC RESPIRATORY FAILURE: ICD-10-CM

## 2020-08-10 DIAGNOSIS — K21.9 LARYNGOPHARYNGEAL REFLUX (LPR): ICD-10-CM

## 2020-08-10 DIAGNOSIS — G62.9 PERIPHERAL POLYNEUROPATHY: ICD-10-CM

## 2020-08-10 PROCEDURE — 99213 OFFICE O/P EST LOW 20 MIN: CPT | Mod: S$GLB,,, | Performed by: INTERNAL MEDICINE

## 2020-08-10 PROCEDURE — 99213 PR OFFICE/OUTPT VISIT, EST, LEVL III, 20-29 MIN: ICD-10-PCS | Mod: S$GLB,,, | Performed by: INTERNAL MEDICINE

## 2020-08-10 RX ORDER — GABAPENTIN 400 MG/1
400 CAPSULE ORAL 3 TIMES DAILY
Qty: 90 CAPSULE | Refills: 0 | Status: SHIPPED | OUTPATIENT
Start: 2020-08-10 | End: 2020-09-24 | Stop reason: SDUPTHER

## 2020-08-10 RX ORDER — GABAPENTIN 100 MG/1
CAPSULE ORAL
Qty: 180 CAPSULE | Refills: 0 | Status: SHIPPED | OUTPATIENT
Start: 2020-08-10 | End: 2020-09-24 | Stop reason: SDUPTHER

## 2020-08-10 NOTE — PROGRESS NOTES
[facility]  Pulmonology  Follow-Up Clinic Visit    Subjective:     Reason for Visit:    Promise Medrano is a 45 y.o. female followed for suspected ILD.    Chief Complaint   Patient presents with    Interstitial Lung Disease     FU       8/10/2020:  No major issues since our last visit.  No new complaints.  Breathing is unchanged.     Review of Systems   Constitutional: Positive for activity change, fatigue and weakness. Negative for fever, chills, weight loss, weight gain, appetite change and night sweats.   HENT: Negative for nosebleeds, postnasal drip, rhinorrhea, sinus pressure, sore throat, trouble swallowing, voice change and congestion.    Eyes: Negative for redness and itching.   Respiratory: Positive for cough, chest tightness, shortness of breath, wheezing, previous hospitalization due to pulmonary problems and use of rescue inhaler. Negative for apnea, snoring, hemoptysis, sputum production, choking, orthopnea, asthma nighttime symptoms, pleurisy, dyspnea on extertion, somnolence and Paroxysmal Nocturnal Dyspnea.    Cardiovascular: Positive for chest pain. Negative for palpitations and leg swelling.   Genitourinary: Negative for difficulty urinating and hematuria.   Endocrine: Negative for polydipsia, polyphagia, cold intolerance, heat intolerance and polyuria.    Musculoskeletal: Positive for arthralgias and myalgias. Negative for joint swelling.   Skin: Negative for rash.   Gastrointestinal: Negative for nausea, vomiting, abdominal pain and acid reflux.   Neurological: Negative for dizziness, syncope, weakness and light-headedness.   Hematological: Negative for adenopathy. Does not bruise/bleed easily and no excessive bruising.   Psychiatric/Behavioral: Negative for confusion and sleep disturbance. The patient is nervous/anxious.    All other systems reviewed and are negative.     Outpatient Medications as of 8/10/2020   Medication    ACCU-CHEK SOFTCLIX LANCETS Misc    AEROCHAMBER PLUS  FLOW-VU,L MSK Spcr    albuterol (PROVENTIL/VENTOLIN HFA) 90 mcg/actuation inhaler    albuterol-ipratropium (DUO-NEB) 2.5 mg-0.5 mg/3 mL nebulizer solution    atorvastatin (LIPITOR) 80 MG tablet    azelastine (ASTELIN) 137 mcg (0.1 %) nasal spray    azithromycin (Z-GERRY) 250 MG tablet    bempedoic acid (NEXLETOL) 180 mg Tab    blood sugar diagnostic Strp    blood-glucose meter kit    CETAPHIL MOISTURIZING cream    diazePAM (VALIUM) 5 MG tablet    diclofenac sodium (VOLTAREN) 1 % Gel    dicyclomine (BENTYL) 20 mg tablet    doxepin (SINEQUAN) 25 MG capsule    doxycycline (VIBRAMYCIN) 100 MG Cap    ergocalciferol (ERGOCALCIFEROL) 50,000 unit Cap    esomeprazole (NEXIUM) 40 MG capsule    ezetimibe (ZETIA) 10 mg tablet    famotidine (PEPCID) 20 MG tablet    ferrous sulfate (FEOSOL) 325 mg (65 mg iron) Tab tablet    fluticasone propionate (FLONASE) 50 mcg/actuation nasal spray    gabapentin (NEURONTIN) 100 MG capsule    gabapentin (NEURONTIN) 400 MG capsule    hydrocortisone 1 % cream    hydroxychloroquine (PLAQUENIL) 200 mg tablet    ibuprofen (ADVIL,MOTRIN) 600 MG tablet    ketoconazole (NIZORAL) 2 % cream    levocetirizine (XYZAL) 5 MG tablet    midodrine (PROAMATINE) 10 MG tablet    montelukast (SINGULAIR) 10 mg tablet    mupirocin (BACTROBAN) 2 % ointment    nicotine (NICODERM CQ) 14 mg/24 hr    nicotine polacrilex 2 MG Lozg    nicotine polacrilex 4 MG Lozg    omeprazole (PRILOSEC) 40 MG capsule    ondansetron (ZOFRAN-ODT) 4 MG TbDL    ONETOUCH ULTRA BLUE TEST STRIP Strp    OXYGEN-AIR DELIVERY SYSTEMS MISC    STIOLTO RESPIMAT 2.5-2.5 mcg/actuation Mist    triamcinolone acetonide 0.1% (KENALOG) 0.1 % cream    urea 40 % Lotn lotion    XIIDRA 5 % Dpet    blood-glucose meter, drum-type (ACCU-CHEK COMPACT PLUS CARE) Kit     No current facility-administered medications on file as of 8/10/2020.       Previous Reports Reviewed:   ER records, historical medical records, lab reports,  nursing home notes, office notes, operative reports, radiology reports, referral letter/letters and x-ray reports   The following portions of the patient's history were reviewed and updated as appropriate: allergies, current medications, past family history, past medical history, past social history, past surgical history and problem list.      Objective:     BP (P) 122/82   Pulse (P) 76   Temp (P) 98.6 °F (37 °C)   Wt (P) 67.1 kg (148 lb)   SpO2 (P) 95%   BMI (P) 27.07 kg/m²   Body mass index is 27.07 kg/m² (pended).     Physical Exam   Constitutional: She is oriented to person, place, and time. Vital signs are normal. She appears well-developed and well-nourished. She is cooperative.  Non-toxic appearance. No distress.   HENT:   Head: Normocephalic and atraumatic.   Right Ear: Hearing and external ear normal.   Left Ear: Hearing and external ear normal.   Nose: Nose normal. No mucosal edema, rhinorrhea, sinus tenderness, nasal deformity, septal deviation or nasal septal hematoma. Right sinus exhibits no maxillary sinus tenderness and no frontal sinus tenderness. Left sinus exhibits no maxillary sinus tenderness and no frontal sinus tenderness.   Mouth/Throat: Uvula is midline, oropharynx is clear and moist and mucous membranes are normal. Normal dentition. No dental abscesses or dental caries. No oropharyngeal exudate or posterior oropharyngeal edema. Mallampati Score: I.   Neck: Normal range of motion. Neck supple. No JVD present. No tracheal deviation present. No thyromegaly present.   Cardiovascular: Normal rate, regular rhythm, normal heart sounds and intact distal pulses. Exam reveals no gallop and no friction rub.   No murmur heard.  Pulmonary/Chest: Normal expansion, symmetric chest wall expansion and effort normal. No stridor. No tachypnea. No respiratory distress. She has no decreased breath sounds. She has no wheezes. She has no rhonchi. She has no rales (faint bibasilar rales). Chest wall is not  dull to percussion. She exhibits no tenderness. Negative for egophony. Negative for tactile fremitus.   Abdominal: Soft. Bowel sounds are normal. She exhibits no distension and no mass. There is no hepatosplenomegaly. There is no abdominal tenderness. There is no rebound and no guarding. No hernia.   Musculoskeletal: Normal range of motion.         General: No tenderness, deformity or edema.   Lymphadenopathy: No supraclavicular adenopathy is present.     She has no cervical adenopathy.     She has no axillary adenopathy.   Neurological: She is alert and oriented to person, place, and time. She has normal reflexes. No cranial nerve deficit. Gait normal.   Skin: Skin is warm and intact. No lesion, no petechiae and no rash noted. No cyanosis or erythema. No pallor. Nails show clubbing.   Psychiatric: Her behavior is normal. Judgment and thought content normal. Her mood appears anxious. Her speech is rapid and/or pressured and tangential. Cognition and memory are normal.   Nursing note and vitals reviewed.       Personal Review of Relevant Diagnostic Studies:  I have personally reviewed and interpreted the following labs/studies/images.  CXR:  (8/5/2020)  Unremarkable.    Chest x-ray:   (June 25, 2012)  Radiology Report::  No acute cardiopulmonary abnormality.  My impression:  Unable to fully evaluate the bases due to breast implants.  Otherwise no gross abnormalities.     (April 14, 2019)  Radiology report:  None available  My impression:  Interval increase and basilar predominant bilateral hazy airspace opacities with a decrease and overall lung volume compared to prior chest radiograph.     I independently viewed the above imaging/lab studies in addition to reviewing the report      CT Chest:  (June 25, 2012)  Radiology report:    None available     My impression:    Available images of the lung bases from CT abdomen are unremarkable.        (August 31, 2018)  Radiology report:    1. Mild mosaic attenuation pattern  in the lungs, which was present on theprevious exam of 2010, with scattered minimal bronchiolitis in both upper lobes and a few thin-walled parenchymal cysts. Smoking-related interstitial lung diseases could have this appearance, with air-trapping and small airways inflammation, or inhalational lung disease of various potential etiologies, constituting additional diagnostic considerations.  2. Stable prominent mediastinal lymph nodes, and prominent to borderline enlarged bilateral axillary lymph nodes, nonspecific but presumably reactive given stability.     My impression:    Diffuse interlobular septal thickening with some areas having a nodular appearance as well.  Diffuse ground-glass opacities with some mosaicism at both bases.  Scattered thin walled cysts of varying sizes.  Mild paraseptal emphysema.  Overall, this could be consistent with some very early interstitial lung disease among other etiologies, but appearance is not classic for any specific I LD.        (2019)  Radiology report:  1. Negative for pulmonary embolus.  2. New diffuse bilateral pulmonary ground-glass opacities.  Potential etiologies include infectious or inflammatory alveolitis/pneumonitis, alveolar edema, or alveolar hemorrhage.  Clinical and laboratory correlation is needed.     My impression:  · Interval increase in the size and number of 10 walled cysts.  · Persistent interlobular septal thickening, but is difficult to clearly appreciated due to presence of IV contrast.  · Diffuse ground-glass opacities also difficult to compare to prior CT due to the presence of IV contrast.     I independently viewed the above imaging/lab studies in addition to reviewing the report      PFTs:  Date:  2019  FEV1:  2.49  (91 % predicted), FVC:  3.25 (96 % predicted), FEV1/FVC:  77, T.71 (102 % predicted), RV/TLVC:  33 (97 % predicted), DLCO:  14.95 (74 % predicted)  Computer interpretation:  Spirometry is within  normal limits.  There was a significant response to inhaled bronchodilator in small airways  Lung volumes are normal.  Diffusion is normal.      My impression:   No evidence of obstructive lung disease, or reactive airways disease.   The clinical utility of measuring the FEF 25-75 is debatable, and it is response to bronchodilators even more so.  The statement significant response to inhaled bronchodilator and small airways is an inaccurate description of the overall impression from her PFTs.  Significantly elevated FRC and ERV are unusual but suggest some intrinsic pulmonary dysfunction.     (2019)   FEV1:  2.37 L (86% predicted)  FVC 3.17 L (94% predicted)  FEV1/FVC:  75  T.75 L (103% predicted)  RV/T (97% predicted)  ERV:  1.30 L (120% predicted)  DLCO:  12.16 (60% predicted)  My impression:  No obstruction.  No restriction.  Mild diffusion impairment.  Unchanged compared to pulmonary function test obtained 4 months prior.      Methacholine Challenge:  None on file.      6MWT:   (2019)  Predicted distance 453 m  Actual distance:548 m  SpO2:  Pre-exercise 92% / During exercise 87% on room air and improved to 88% when placed on 2 LPM / recovery 92% on 2LPM  Results of this test qualify for supplemental oxygen at home with ambulation.      PSG:  Date:  2019  No central or obstructive sleep apnea.  Significant desaturations during supine sleep.      ECG:  None available to review.      Echocardiogram:   Date:  2018  · Estimated left ventricular ejection fraction is 60-65%  · Normal left atrial pressure diastolic function.  · There is mild mitral regurgitation.  · Estimated RVSP is 28 mmHg.  · No mention of pulmonary artery pressure     (October 15, 2019)  · Normal left ventricular systolic function. The estimated ejection fraction is 70%  · Normal LV diastolic function.  · No wall motion abnormalities.  · Normal right ventricular systolic function.  · Normal  central venous pressure (3 mm Hg).  · The estimated PA systolic pressure is 28 mm Hg      BNP  October 16, 2019:  38                     Arterial blood gas:  (October 29, 2019)  7.437 / 37.6 / 95 / 25.4 (obtained on ambient air)                    Serology:                 (February 14, 2017)                 PRASHANTH Titer:              1:  320 (elevated)                 Pattern:                  Speckled                 RF:                         <15                  Anti CCP:             <15.6                    (October 2019)                 CRP:                     0.46                 ESR:                     10                 RF:                        < 10                 Anti Lesa 1 Ab:         < 0.2                 SPEP:                   No M-spike observed                 C3:                        136                 C4:                        18                 A1AT:                    173                 A1AT Phenotype:  MM                 Anti-SCL Ab:        < 0.2                 P-ANCA:               <1:20                 MPO:                     <1:20                 C-ANCA:              <1:20                 Proteinase 3 Ab:  <93.5                 PRASHANTH:                     Positive   >1:80  (speckled pattern)                 Anti SSA Ab:        <0.2                 Anti SSB Ab:        <0.2                 HIV 1/2 Ag/Ab:     Non-reactive                 Cryoglobulin:        None detected                 LDH:                      112                 ACE:                     44                 CK:                        49                 Aldolase:              3.4           TBBx:  o LEFT LUNG TRANSBRONCHIAL BIOPSIES:  o BRONCHIAL MUCOSA WITH MILD CHRONIC INFLAMMATION.  o MINUTE FRAGMENT OF ALVEOLAR LUNG PARENCHYMA REPRESENTED SHOWN ANTHRACOTIC PIGMENT DEPOSITS AND   MINIMAL CHRONIC INFLAMMATION.  o NO GRANULOMAS ARE IDENTIFIED.  o NO DYSPLASIA OR MALIGNANCY IS IDENTIFIED.   BAL  o BRONCHIAL  WASHING:  o HYPOCELLULAR SPECIMEN WITH THE PRESENCE OF PERIPHERAL BLOOD AND FEW  o BENIGN  o BRONCHIAL EPITHELIAL CELLS.    o NO EVIDENCE OF MALIGNANCY IS SEEN.       Assessment:     1. Tobacco abuse    2. Laryngopharyngeal reflux (LPR)    3. Chronic hypoxemic respiratory failure      Impression:  Undifferentiated ILD. Still no etiology identified.      Plan:     · Continue to work on smoking cessation.  · Hold off on open lung Bx for now.  · Continue home oxygen.     I informed the patient of my working diagnosis, it's etiology, risk factors, expected symptoms, diagnostic work up, treatment options and prognosis., I personally reviewed the results of relevant imaging/labs/studies with the patient, and discussed their clinical significance., I spent >10 minutes counseling the patient about their condition., Plan discussed with the patient, who is in agreement., Opportunity provided for the the patient to voice any additional questions or concerns., All questions were answered to the patient's satisfaction., Educational material provided and Patient given date for next visit.    Follow up in about 3 months (around 11/10/2020).    Orders Placed This Visit:  No orders of the defined types were placed in this encounter.      Nate Tarango MD  Pulmonary / Critical Care Medicine  Former[facility], later [facility]

## 2020-08-10 NOTE — PATIENT INSTRUCTIONS
How to Quit Smoking  Smoking is one of the hardest habits to break. About half of all people who have ever smoked have been able to quit. Most people who still smoke want to quit. Here are some of the best ways to stop smoking.    Keep trying  Most smokers make many attempts at quitting before they are successful. Its important not to give up.  Go cold turkey  Most former smokers quit cold turkey (all at once). Trying to cut back gradually doesn't seem to work as well, perhaps because it continues the smoking habit. Also, it is possible to inhale more while smoking fewer cigarettes. This results in the same amount of nicotine in your body.  Get support  Support programs can be a big help, especially for heavy smokers. These groups offer lectures, ways to change behavior, and peer support. Here are some ways to find a support program:  · Free national quitline: 800-QUIT-NOW (809-796-1301).  · Hospital quit-smoking programs.  · American Lung Association: (304.848.1986).  · American Cancer Society (072-967-8578).  Support at home is important too. Nonsmokers can offer praise and encouragement. If the smoker in your life finds it hard to quit, encourage them to keep trying.  Over-the-counter medicines  Nicotine replacement therapy may make quitting easier. Certain aids, such as the nicotine patch, gum, and lozenges, are available without a prescription. It is best to use these under a doctors care, though. The skin patch provides a steady supply of nicotine. Nicotine gum and lozenges give temporary bursts of low levels of nicotine. Both methods reduce the craving for cigarettes. Warning: If you have nausea, vomiting, dizziness, weakness, or a fast heartbeat, stop using these products and see your doctor.  Prescription medicines  After reviewing your smoking patterns and past attempts to quit, your doctor may offer a prescription medicine such as bupropion, varenicline, a nicotine inhaler, or nasal spray. Each has  "advantages and side effects. Your doctor can review these with you.  Health benefits of quitting  The benefits of quitting start right away and keep improving the longer you go without smoking. These benefits occur at any age.  So whether you are 17 or 70, quitting is a good decision. Some of the benefits include:  · 20 minutes: Blood pressure and pulse return to normal.  · 8 hours: Oxygen levels return to normal.  · 2 days: Ability to smell and taste begin to improve as damaged nerves regrow.  · 2 to 3 weeks: Circulation and lung function improve.  · 1 to 9 months: Coughing, congestion, and shortness of breath decrease; tiredness decreases.  · 1 year: Risk of heart attack decreases by half.  · 5 years: Risk of lung cancer decreases by half; risk of stroke becomes the same as a nonsmokers.  For more on how to quit smoking, try these online resources:   · Smokefree.gov  · "Clearing the Air" booklet from the [employer] [place]: smokefree.gov/sites/default/files/pdf/clearing-the-air-accessible.pdf  Date Last Reviewed: 3/1/2017  © 1367-5604 [employer]. [address] [place] [address]. All rights reserved. This information is not intended as a substitute for professional medical care. Always follow your healthcare professional's instructions.  "

## 2020-09-09 ENCOUNTER — OFFICE VISIT (OUTPATIENT)
Dept: FAMILY MEDICINE | Facility: CLINIC | Age: 45
End: 2020-09-09
Payer: MEDICAID

## 2020-09-09 VITALS
BODY MASS INDEX: 27.05 KG/M2 | OXYGEN SATURATION: 96 % | HEIGHT: 62 IN | WEIGHT: 147 LBS | DIASTOLIC BLOOD PRESSURE: 80 MMHG | HEART RATE: 82 BPM | SYSTOLIC BLOOD PRESSURE: 132 MMHG | RESPIRATION RATE: 16 BRPM | TEMPERATURE: 97 F

## 2020-09-09 DIAGNOSIS — J43.1 PANLOBULAR EMPHYSEMA: ICD-10-CM

## 2020-09-09 DIAGNOSIS — M32.13 OTHER SYSTEMIC LUPUS ERYTHEMATOSUS WITH LUNG INVOLVEMENT: ICD-10-CM

## 2020-09-09 DIAGNOSIS — I95.1 ORTHOSTASIS: ICD-10-CM

## 2020-09-09 DIAGNOSIS — E78.00 PURE HYPERCHOLESTEROLEMIA: ICD-10-CM

## 2020-09-09 DIAGNOSIS — R51.9 SEVERE FRONTAL HEADACHES: ICD-10-CM

## 2020-09-09 DIAGNOSIS — M32.9 LUPUS: ICD-10-CM

## 2020-09-09 DIAGNOSIS — M15.9 PRIMARY OSTEOARTHRITIS INVOLVING MULTIPLE JOINTS: ICD-10-CM

## 2020-09-09 DIAGNOSIS — J96.11 CHRONIC RESPIRATORY FAILURE WITH HYPOXIA, ON HOME O2 THERAPY: ICD-10-CM

## 2020-09-09 DIAGNOSIS — R25.1 TREMORS OF NERVOUS SYSTEM: Primary | ICD-10-CM

## 2020-09-09 DIAGNOSIS — Z99.81 CHRONIC RESPIRATORY FAILURE WITH HYPOXIA, ON HOME O2 THERAPY: ICD-10-CM

## 2020-09-09 PROCEDURE — 99214 OFFICE O/P EST MOD 30 MIN: CPT | Mod: S$PBB,,, | Performed by: INTERNAL MEDICINE

## 2020-09-09 PROCEDURE — 99215 OFFICE O/P EST HI 40 MIN: CPT | Performed by: INTERNAL MEDICINE

## 2020-09-09 PROCEDURE — 99214 PR OFFICE/OUTPT VISIT, EST, LEVL IV, 30-39 MIN: ICD-10-PCS | Mod: S$PBB,,, | Performed by: INTERNAL MEDICINE

## 2020-09-09 RX ORDER — PRAVASTATIN SODIUM 80 MG/1
80 TABLET ORAL DAILY
Qty: 90 TABLET | Refills: 3 | Status: SHIPPED | OUTPATIENT
Start: 2020-09-09 | End: 2022-06-14

## 2020-09-09 NOTE — PROGRESS NOTES
"  SUBJECTIVE:    Patient ID: Promise Medrano is a 45 y.o. female.    Chief Complaint: Results and Follow-up    HPI     In for recheck-pt complains of recurrent sinusitis-nose is congested, and sinus swelling and PND and it goes to lungs and causes bronchitis-no fever-no sinus headaches but it is more in the front      PMH:  This patient has chronic hypoxic respiratory failure due to interstitial lung disease.  She as recently been approved for supplemental oxygen at home.(Pulmonary)-according to pulmonary notes, the patient attempted Chantix and became agitated, and stop the Chantix and return to smoking.     Chief issue-last office visit was generalized pain in joints, for she complained of neck pain going into the shoulders- shoulders-sometimes the pain goes into the arms-she has well as his pain-and she is seeing Rheumatology in the am...patient has SLE-describes low grade fever since March-no recent pleurisy-sun causes temp to 104-she does not get skin rashes or butterfly rash-she just gets bags-she says she has tremors but for now she has a lof of pain which makes her shake-she has had some "knots" in the low back-also she is describing some discomfort in the right throat which makes it hard to swallow and she describes a lump in the throat-she subsequently was diagnosed with laryngeal pharyngeal reflux by Pulmonary.     Patient also stated she was having all types of neuro problems-memory is an issue-hand coordination is poor-she definitely describes brain fog-says the other day she developed severe head pain and the posterior neck swelled up for about a day-she felt her face was drooping on the left side-nobody examined her-I have referred her to Neuro and another MD has also-not clear why none has been completed-     She asked for doxepin for help with sleeping and anxiety and also helps the gut-she has been on dicyclomine from GI-it has been helping with sleep and anxiety     Pt sees Immunology and is on " doxycycline 100 mg three times weekly, but her sinuses are congested and because the throat feels uncomfortable she thinks she may need an antibiotic-because of her immunodeficiencies it is best to protect her due to the nature of the congestion she describes-nasal discharge is greenish but no mucous from cough    Syncope-she has had drops in BP-she is now on 10 mg tid -now she takes the 5 and takes an extra prn-she has been having palpitations which are being followed by Cardiology-she is due for another holter monitor-    Joints-neck is hurting, shoulders are hurting, sciatica is intermittent, and hip pain is often bad-    Labs-pt has high cholesterol-we need to change to atorvastatin    Lab Visit on 08/05/2020   Component Date Value Ref Range Status    Cholesterol 08/05/2020 293* 120 - 199 mg/dL Final    Triglycerides 08/05/2020 76  30 - 150 mg/dL Final    HDL 08/05/2020 71  40 - 75 mg/dL Final    LDL Cholesterol 08/05/2020 206.8* 63.0 - 159.0 mg/dL Final    Hdl/Cholesterol Ratio 08/05/2020 24.2  20.0 - 50.0 % Final    Total Cholesterol/HDL Ratio 08/05/2020 4.1  2.0 - 5.0 Final    Non-HDL Cholesterol 08/05/2020 222  mg/dL Final    Hemoglobin A1C 08/05/2020 5.4  4.5 - 6.2 % Final    Estimated Avg Glucose 08/05/2020 108  68 - 131 mg/dL Final    Antibody SARS CoV-2 08/05/2020 Negative  Negative Final    WBC 08/05/2020 8.07  3.90 - 12.70 K/uL Final    RBC 08/05/2020 4.34  4.00 - 5.40 M/uL Final    Hemoglobin 08/05/2020 13.8  12.0 - 16.0 g/dL Final    Hematocrit 08/05/2020 42.7  37.0 - 48.5 % Final    Mean Corpuscular Volume 08/05/2020 98  82 - 98 fL Final    Mean Corpuscular Hemoglobin 08/05/2020 31.8* 27.0 - 31.0 pg Final    Mean Corpuscular Hemoglobin Conc 08/05/2020 32.3  32.0 - 36.0 g/dL Final    RDW 08/05/2020 14.5  11.5 - 14.5 % Final    Platelets 08/05/2020 226  150 - 350 K/uL Final    MPV 08/05/2020 11.3  9.2 - 12.9 fL Final    Immature Granulocytes 08/05/2020 0.4  0.0 - 0.5 % Final     Gran # (ANC) 08/05/2020 5.3  1.8 - 7.7 K/uL Final    Immature Grans (Abs) 08/05/2020 0.03  0.00 - 0.04 K/uL Final    Lymph # 08/05/2020 2.1  1.0 - 4.8 K/uL Final    Mono # 08/05/2020 0.5  0.3 - 1.0 K/uL Final    Eos # 08/05/2020 0.1  0.0 - 0.5 K/uL Final    Baso # 08/05/2020 0.03  0.00 - 0.20 K/uL Final    nRBC 08/05/2020 0  0 /100 WBC Final    Gran% 08/05/2020 66.0  38.0 - 73.0 % Final    Lymph% 08/05/2020 26.1  18.0 - 48.0 % Final    Mono% 08/05/2020 6.4  4.0 - 15.0 % Final    Eosinophil% 08/05/2020 0.7  0.0 - 8.0 % Final    Basophil% 08/05/2020 0.4  0.0 - 1.9 % Final    Differential Method 08/05/2020 Automated   Final    Sodium 08/05/2020 140  136 - 145 mmol/L Final    Potassium 08/05/2020 4.1  3.5 - 5.1 mmol/L Final    Chloride 08/05/2020 103  95 - 110 mmol/L Final    CO2 08/05/2020 26  23 - 29 mmol/L Final    Glucose 08/05/2020 92  70 - 110 mg/dL Final    BUN, Bld 08/05/2020 17  6 - 20 mg/dL Final    Creatinine 08/05/2020 0.6  0.5 - 1.4 mg/dL Final    Calcium 08/05/2020 9.3  8.7 - 10.5 mg/dL Final    Total Protein 08/05/2020 7.9  6.0 - 8.4 g/dL Final    Albumin 08/05/2020 4.3  3.5 - 5.2 g/dL Final    Total Bilirubin 08/05/2020 0.6  0.1 - 1.0 mg/dL Final    Alkaline Phosphatase 08/05/2020 73  55 - 135 U/L Final    AST 08/05/2020 15  10 - 40 U/L Final    ALT 08/05/2020 12  10 - 44 U/L Final    Anion Gap 08/05/2020 11  8 - 16 mmol/L Final    eGFR if African American 08/05/2020 >60.0  >60 mL/min/1.73 m^2 Final    eGFR if non African American 08/05/2020 >60.0  >60 mL/min/1.73 m^2 Final    Specimen UA 08/05/2020 Urine, Clean Catch   Final    Color, UA 08/05/2020 Yellow  Yellow, Straw, Violet Final    Appearance, UA 08/05/2020 Hazy* Clear Final    pH, UA 08/05/2020 >8.0* 5.0 - 8.0 Final    Specific [place], UA 08/05/2020 1.020  1.005 - 1.030 Final    Protein, UA 08/05/2020 Trace* Negative Final    Glucose, UA 08/05/2020 Negative  Negative Final    Ketones, UA 08/05/2020 Negative   Negative Final    Bilirubin (UA) 08/05/2020 Negative  Negative Final    Occult Blood UA 08/05/2020 Negative  Negative Final    Nitrite, UA 08/05/2020 Negative  Negative Final    Urobilinogen, UA 08/05/2020 2.0-3.0* Negative EU/dL Final    Leukocytes, UA 08/05/2020 Negative  Negative Final    C4 Complement 08/05/2020 18  14 - 44 mg/dL Final    Complement (C-3) 08/05/2020 144  82 - 167 mg/dL Final    TSH 08/05/2020 1.950  0.340 - 5.600 uIU/mL Final   Lab Visit on 07/14/2020   Component Date Value Ref Range Status    Antibody SARS CoV-2 07/14/2020 Negative  Negative Final   Lab Visit on 05/01/2020   Component Date Value Ref Range Status    Specimen UA 05/01/2020 Urine, Clean Catch   Final    Color, UA 05/01/2020 Yellow  Yellow, Straw, Violet Final    Appearance, UA 05/01/2020 Clear  Clear Final    pH, UA 05/01/2020 7.0  5.0 - 8.0 Final    Specific Check, UA 05/01/2020 1.030  1.005 - 1.030 Final    Protein, UA 05/01/2020 Trace* Negative Final    Glucose, UA 05/01/2020 Negative  Negative Final    Ketones, UA 05/01/2020 Trace* Negative Final    Bilirubin (UA) 05/01/2020 Negative  Negative Final    Occult Blood UA 05/01/2020 Negative  Negative Final    Nitrite, UA 05/01/2020 Negative  Negative Final    Urobilinogen, UA 05/01/2020 4.0-6.0* Negative EU/dL Final    Leukocytes, UA 05/01/2020 Negative  Negative Final    Sodium 05/01/2020 140  136 - 145 mmol/L Final    Potassium 05/01/2020 4.4  3.5 - 5.1 mmol/L Final    Chloride 05/01/2020 105  95 - 110 mmol/L Final    CO2 05/01/2020 28  23 - 29 mmol/L Final    Glucose 05/01/2020 113* 70 - 110 mg/dL Final    BUN, Bld 05/01/2020 16  6 - 20 mg/dL Final    Creatinine 05/01/2020 0.7  0.5 - 1.4 mg/dL Final    Calcium 05/01/2020 9.1  8.7 - 10.5 mg/dL Final    Total Protein 05/01/2020 7.6  6.0 - 8.4 g/dL Final    Albumin 05/01/2020 4.1  3.5 - 5.2 g/dL Final    Total Bilirubin 05/01/2020 0.6  0.1 - 1.0 mg/dL Final    Alkaline Phosphatase 05/01/2020 82   55 - 135 U/L Final    AST 05/01/2020 13  10 - 40 U/L Final    ALT 05/01/2020 13  10 - 44 U/L Final    Anion Gap 05/01/2020 7* 8 - 16 mmol/L Final    eGFR if African American 05/01/2020 >60.0  >60 mL/min/1.73 m^2 Final    eGFR if non African American 05/01/2020 >60.0  >60 mL/min/1.73 m^2 Final    WBC 05/01/2020 11.25  3.90 - 12.70 K/uL Final    RBC 05/01/2020 4.41  4.00 - 5.40 M/uL Final    Hemoglobin 05/01/2020 13.6  12.0 - 16.0 g/dL Final    Hematocrit 05/01/2020 42.1  37.0 - 48.5 % Final    Mean Corpuscular Volume 05/01/2020 96  82 - 98 fL Final    Mean Corpuscular Hemoglobin 05/01/2020 30.8  27.0 - 31.0 pg Final    Mean Corpuscular Hemoglobin Conc 05/01/2020 32.3  32.0 - 36.0 g/dL Final    RDW 05/01/2020 13.2  11.5 - 14.5 % Final    Platelets 05/01/2020 176  150 - 350 K/uL Final    MPV 05/01/2020 11.6  9.2 - 12.9 fL Final    Immature Granulocytes 05/01/2020 0.4  0.0 - 0.5 % Final    Gran # (ANC) 05/01/2020 8.6* 1.8 - 7.7 K/uL Final    Immature Grans (Abs) 05/01/2020 0.05* 0.00 - 0.04 K/uL Final    Lymph # 05/01/2020 2.1  1.0 - 4.8 K/uL Final    Mono # 05/01/2020 0.4  0.3 - 1.0 K/uL Final    Eos # 05/01/2020 0.0  0.0 - 0.5 K/uL Final    Baso # 05/01/2020 0.04  0.00 - 0.20 K/uL Final    nRBC 05/01/2020 0  0 /100 WBC Final    Gran% 05/01/2020 76.0* 38.0 - 73.0 % Final    Lymph% 05/01/2020 18.9  18.0 - 48.0 % Final    Mono% 05/01/2020 3.9* 4.0 - 15.0 % Final    Eosinophil% 05/01/2020 0.4  0.0 - 8.0 % Final    Basophil% 05/01/2020 0.4  0.0 - 1.9 % Final    Differential Method 05/01/2020 Automated   Final       Past Medical History:   Diagnosis Date    Allergic rhinitis     Arthritis     Chronic anxiety 10/23/2018    GERD (gastroesophageal reflux disease)     Grade II hemorrhoids 1/23/2019    History of diabetes mellitus resolved following bariatric surgery 10/30/2017    History of type 2 diabetes mellitus 10/30/2017    Interstitial lung disease 2019    Lupus     Sjogren's  syndrome 2019     Past Surgical History:   Procedure Laterality Date    BREAST SURGERY      BRONCHOSCOPY WITH FLUOROSCOPY N/A 2019    Procedure: BRONCHOSCOPY, WITH FLUOROSCOPY;  Surgeon: Nate Tarango MD;  Location: [facility];  Service: Pulmonary;  Laterality: N/A;     SECTION  ,    gastric sleeve  2010    HYSTERECTOMY      TONSILLECTOMY       Family History   Problem Relation Age of Onset    Early death Father     Heart disease Father     Stroke Father     Kidney disease Father     Diabetes Paternal Grandmother     Cancer Paternal Grandmother     Raynaud syndrome Mother     Uterine cancer Mother     Breast cancer Mother     Raynaud syndrome Sister     Polycystic ovary syndrome Daughter     Diabetes Maternal Grandmother     Heart disease Maternal Grandmother     Kidney disease Maternal Grandmother     Breast cancer Maternal Grandmother     Heart disease Maternal Grandfather     Cancer Paternal Grandfather     No Known Problems Daughter        Marital Status: Single  Alcohol History:  reports current alcohol use.  Tobacco History:  reports that she has been smoking cigarettes. She started smoking about 22 years ago. She has a 12.50 pack-year smoking history. She has never used smokeless tobacco.  Drug History:  reports no history of drug use.    Review of patient's allergies indicates:   Allergen Reactions    Hay fever and allergy relief        Current Outpatient Medications:     ACCU-CHEK SOFTCLIX LANCETS Misc, USE ONCE DAILY AS NEEDED, Disp: 100 each, Rfl: 5    AEROCHAMBER PLUS FLOW-VU,L MSK Spcr, USE WITH INHALER, Disp: 1 each, Rfl: 1    albuterol (PROVENTIL/VENTOLIN HFA) 90 mcg/actuation inhaler, INHALE 2 PUFFS BY MOUTH EVERY 4 TO 6 HOURS AS NEEDED FOR COUGH OR WHEEZING OR SHORTNESS OF BREATH, Disp: 8.5 g, Rfl: 2    albuterol-ipratropium (DUO-NEB) 2.5 mg-0.5 mg/3 mL nebulizer solution, USE 1 VIAL VIA NEBULIZER EVERY 6 HOURS AS NEEDED FOR WHEEZING  OR SHORTNESS OF BREATH, Disp: 180 mL, Rfl: 0    azelastine (ASTELIN) 137 mcg (0.1 %) nasal spray, USE 1 SPRAY IN EACH NOSTRIL TWICE DAILY, Disp: 30 mL, Rfl: 0    blood-glucose meter, drum-type (ACCU-CHEK COMPACT PLUS CARE) Kit, 1 Device by Misc.(Non-Drug; Combo Route) route daily as needed., Disp: 1 kit, Rfl: 0    CETAPHIL MOISTURIZING cream, Apply 1 application topically as needed., Disp: 90 g, Rfl: 1    diazePAM (VALIUM) 5 MG tablet, TAKE 1 TO 2 TABLETS BY MOUTH EVERY NIGHT AT BEDTIME AS NEEDED FOR ANXIETY OR SLEEPLESSNESS, Disp: 60 tablet, Rfl: 0    diclofenac sodium (VOLTAREN) 1 % Gel, 2-4 gm bid-tid prn, Disp: 100 g, Rfl: 0    dicyclomine (BENTYL) 20 mg tablet, TAKE 1 TABLET BY MOUTH TWICE DAILY, Disp: 60 tablet, Rfl: 0    doxepin (SINEQUAN) 25 MG capsule, Take 1 capsule (25 mg total) by mouth every evening., Disp: 30 capsule, Rfl: 11    doxycycline (VIBRAMYCIN) 100 MG Cap, TAKE 1 CAPSULE BY MOUTH ON MONDAY, WEDNESDAY, AND FRIDAY ONLY, WEAR SUNSCREEN, Disp: 20 capsule, Rfl: 3    ergocalciferol (ERGOCALCIFEROL) 50,000 unit Cap, Take 1 capsule (50,000 Units total) by mouth every 7 days., Disp: 4 capsule, Rfl: 0    esomeprazole (NEXIUM) 40 MG capsule, TAKE ONE CAPSULE BY MOUTH EVERY DAY BEFORE BREAKFAST, Disp: 90 capsule, Rfl: 0    ezetimibe (ZETIA) 10 mg tablet, Take 10 mg by mouth once daily. , Disp: , Rfl:     famotidine (PEPCID) 20 MG tablet, , Disp: , Rfl:     ferrous sulfate (FEOSOL) 325 mg (65 mg iron) Tab tablet, TAKE 1 TABLET BY MOUTH EVERY DAY, Disp: 90 tablet, Rfl: 0    fluticasone propionate (FLONASE) 50 mcg/actuation nasal spray, USE 1 SPRAY IN EACH NOSTRIL DAILY, Disp: 16 g, Rfl: 0    gabapentin (NEURONTIN) 100 MG capsule, TAKE 1 TO 2 CAPSULES BY MOUTH WITH 400 MG THREE TIMES DAILY, Disp: 180 capsule, Rfl: 0    gabapentin (NEURONTIN) 400 MG capsule, Take 1 capsule (400 mg total) by mouth 3 (three) times daily., Disp: 90 capsule, Rfl: 0    hydrocortisone 1 % cream, Apply to affected  area 2 times daily., Disp: , Rfl:     hydroxychloroquine (PLAQUENIL) 200 mg tablet, Take 1 tablet (200 mg total) by mouth once daily., Disp: 60 tablet, Rfl: 1    ibuprofen (ADVIL,MOTRIN) 600 MG tablet, , Disp: , Rfl:     ketoconazole (NIZORAL) 2 % cream, , Disp: , Rfl:     levocetirizine (XYZAL) 5 MG tablet, TAKE 1 TABLET BY MOUTH EVERY EVENING, Disp: 90 tablet, Rfl: 1    midodrine (PROAMATINE) 10 MG tablet, Take 1 tablet by mouth once daily., Disp: , Rfl:     montelukast (SINGULAIR) 10 mg tablet, TAKE 1 TABLET(10 MG) BY MOUTH EVERY DAY, Disp: 90 tablet, Rfl: 0    mupirocin (BACTROBAN) 2 % ointment, , Disp: , Rfl:     omeprazole (PRILOSEC) 40 MG capsule, TAKE ONE CAPSULE BY MOUTH EVERY MORNING, Disp: 30 capsule, Rfl: 5    ondansetron (ZOFRAN-ODT) 4 MG TbDL, DISSOLVE 2 TABLETS UNDER TONGUE EVERY 8 HOURS AS NEEDED, Disp: 30 tablet, Rfl: 0    ONETOUCH ULTRA BLUE TEST STRIP Strp, TEST DAILY AS NEEDED, Disp: 50 strip, Rfl: 2    OXYGEN-AIR DELIVERY SYSTEMS MISC, by Misc.(Non-Drug; Combo Route) route., Disp: , Rfl:     STIOLTO RESPIMAT 2.5-2.5 mcg/actuation Mist, INHALE 1 PUFF INTO THE LUNGS ONCE DAILY, Disp: 4 g, Rfl: 5    triamcinolone acetonide 0.1% (KENALOG) 0.1 % cream, MELY SPARINGLY TO ECZEMA BID, Disp: , Rfl: 3    urea 40 % Lotn lotion, Apply topically as needed. , Disp: , Rfl:     XIIDRA 5 % Dpet, , Disp: , Rfl:     pravastatin (PRAVACHOL) 80 MG tablet, Take 1 tablet (80 mg total) by mouth once daily., Disp: 90 tablet, Rfl: 3    Review of Systems   Constitutional: Positive for chills and fever (Lupus).   HENT: Positive for ear pain and postnasal drip. Negative for rhinorrhea and sore throat.    Respiratory: Positive for cough (HPI) and wheezing. Negative for shortness of breath.    Cardiovascular: Positive for chest pain and palpitations.   Musculoskeletal: Positive for myalgias.   Skin: Negative for rash.   Allergic/Immunologic: Positive for environmental allergies.   Neurological: Negative for  "headaches.          Objective:      Vitals:    09/09/20 1702   BP: 132/80   Pulse: 82   Resp: 16   Temp: 97.1 °F (36.2 °C)   SpO2: 96%   Weight: 66.7 kg (147 lb)   Height: 5' 2" (1.575 m)     Physical Exam  Constitutional:       Appearance: Normal appearance. She is normal weight.   HENT:      Right Ear: External ear normal.      Left Ear: External ear normal.   Eyes:      General: No scleral icterus.        Right eye: No discharge.         Left eye: No discharge.      Extraocular Movements: Extraocular movements intact.      Conjunctiva/sclera: Conjunctivae normal.      Pupils: Pupils are equal, round, and reactive to light.   Cardiovascular:      Rate and Rhythm: Normal rate and regular rhythm.      Pulses: Normal pulses.      Heart sounds: Normal heart sounds.   Pulmonary:      Effort: Pulmonary effort is normal.      Breath sounds: Rhonchi present. No wheezing.      Comments: inspiratory and expiratory rhonchi  Abdominal:      General: Abdomen is flat. Bowel sounds are normal.      Palpations: Abdomen is soft.   Musculoskeletal:         General: No swelling.      Right lower leg: No edema.      Left lower leg: No edema.   Skin:     General: Skin is warm and dry.   Neurological:      General: No focal deficit present.      Mental Status: She is alert and oriented to person, place, and time.   Psychiatric:         Mood and Affect: Mood normal.         Behavior: Behavior normal.         Thought Content: Thought content normal.         Judgment: Judgment normal.           Assessment:       1. Tremors of nervous system    2. Orthostasis    3. Chronic respiratory failure with hypoxia, on home O2 therapy    4. Panlobular emphysema    5. Severe frontal headaches    6. Other systemic lupus erythematosus with lung involvement    7. Primary osteoarthritis involving multiple joints    8. Pure hypercholesterolemia         Plan:       Tremors of nervous system  -     Ambulatory referral/consult to Neurology; Future; Expected " date: 09/16/2020    Orthostasis        -     Per Cardiology    Chronic respiratory failure with hypoxia, on home O2 therapy        -     Per Pulmonary    Panlobular emphysema    Severe frontal headaches        -     amb ref Neutrology        -     Update PRASHANTH panel    Other systemic lupus erythematosus with lung involvement        -     Should see Rheumatology    Primary osteoarthritis involving multiple joints    Pure hypercholesterolemia    Other orders  -     pravastatin (PRAVACHOL) 80 MG tablet; Take 1 tablet (80 mg total) by mouth once daily.  Dispense: 90 tablet; Refill: 3      No follow-ups on file.        9/9/2020 Mine Palmer M.D.

## 2020-09-10 ENCOUNTER — PATIENT MESSAGE (OUTPATIENT)
Dept: FAMILY MEDICINE | Facility: CLINIC | Age: 45
End: 2020-09-10

## 2020-09-24 DIAGNOSIS — G62.9 PERIPHERAL POLYNEUROPATHY: ICD-10-CM

## 2020-09-24 DIAGNOSIS — M79.7 FIBROMYALGIA: ICD-10-CM

## 2020-09-24 DIAGNOSIS — M35.9 CONNECTIVE TISSUE DISEASE, UNDIFFERENTIATED: Primary | ICD-10-CM

## 2020-09-28 RX ORDER — GABAPENTIN 400 MG/1
400 CAPSULE ORAL 3 TIMES DAILY
Qty: 90 CAPSULE | Refills: 0 | Status: SHIPPED | OUTPATIENT
Start: 2020-09-28 | End: 2020-10-26 | Stop reason: SDUPTHER

## 2020-09-28 RX ORDER — GABAPENTIN 100 MG/1
CAPSULE ORAL
Qty: 180 CAPSULE | Refills: 0 | Status: SHIPPED | OUTPATIENT
Start: 2020-09-28 | End: 2020-10-26 | Stop reason: SDUPTHER

## 2020-09-28 RX ORDER — HYDROXYCHLOROQUINE SULFATE 200 MG/1
200 TABLET, FILM COATED ORAL DAILY
Qty: 60 TABLET | Refills: 1 | Status: SHIPPED | OUTPATIENT
Start: 2020-09-28 | End: 2021-04-14

## 2020-09-29 DIAGNOSIS — F51.04 CHRONIC INSOMNIA: ICD-10-CM

## 2020-09-29 DIAGNOSIS — R09.82 POST-NASAL DRIP: ICD-10-CM

## 2020-09-29 RX ORDER — FLUTICASONE PROPIONATE 50 MCG
1 SPRAY, SUSPENSION (ML) NASAL DAILY
Qty: 16 G | Refills: 0 | Status: SHIPPED | OUTPATIENT
Start: 2020-09-29 | End: 2020-10-26 | Stop reason: SDUPTHER

## 2020-09-29 RX ORDER — DIAZEPAM 5 MG/1
TABLET ORAL
Qty: 60 TABLET | Refills: 0 | Status: SHIPPED | OUTPATIENT
Start: 2020-09-29 | End: 2020-10-26 | Stop reason: SDUPTHER

## 2020-10-26 DIAGNOSIS — E61.1 IRON DEFICIENCY: ICD-10-CM

## 2020-10-26 DIAGNOSIS — R09.82 POST-NASAL DRIP: ICD-10-CM

## 2020-10-26 DIAGNOSIS — J84.10 PULMONARY FIBROSIS: ICD-10-CM

## 2020-10-26 DIAGNOSIS — G62.9 PERIPHERAL POLYNEUROPATHY: ICD-10-CM

## 2020-10-26 DIAGNOSIS — F51.04 CHRONIC INSOMNIA: ICD-10-CM

## 2020-10-26 RX ORDER — FERROUS SULFATE 325(65) MG
1 TABLET ORAL DAILY
Qty: 90 TABLET | Refills: 0 | Status: SHIPPED | OUTPATIENT
Start: 2020-10-26 | End: 2021-03-30 | Stop reason: SDUPTHER

## 2020-10-26 RX ORDER — FLUTICASONE PROPIONATE 50 MCG
1 SPRAY, SUSPENSION (ML) NASAL DAILY
Qty: 16 G | Refills: 0 | Status: SHIPPED | OUTPATIENT
Start: 2020-10-26 | End: 2020-11-23 | Stop reason: SDUPTHER

## 2020-10-26 RX ORDER — GABAPENTIN 400 MG/1
400 CAPSULE ORAL 3 TIMES DAILY
Qty: 90 CAPSULE | Refills: 0 | Status: SHIPPED | OUTPATIENT
Start: 2020-10-26 | End: 2020-12-22 | Stop reason: SDUPTHER

## 2020-10-26 RX ORDER — GABAPENTIN 100 MG/1
CAPSULE ORAL
Qty: 180 CAPSULE | Refills: 0 | Status: SHIPPED | OUTPATIENT
Start: 2020-10-26 | End: 2021-04-14

## 2020-10-26 RX ORDER — DIAZEPAM 5 MG/1
TABLET ORAL
Qty: 60 TABLET | Refills: 0 | Status: SHIPPED | OUTPATIENT
Start: 2020-10-26 | End: 2020-11-25 | Stop reason: SDUPTHER

## 2020-10-26 RX ORDER — LEVOCETIRIZINE DIHYDROCHLORIDE 5 MG/1
5 TABLET, FILM COATED ORAL NIGHTLY
Qty: 90 TABLET | Refills: 1 | Status: SHIPPED | OUTPATIENT
Start: 2020-10-26 | End: 2021-05-03 | Stop reason: SDUPTHER

## 2020-11-09 ENCOUNTER — OFFICE VISIT (OUTPATIENT)
Dept: PULMONOLOGY | Facility: CLINIC | Age: 45
End: 2020-11-09
Payer: MEDICAID

## 2020-11-09 DIAGNOSIS — Z72.0 TOBACCO ABUSE: ICD-10-CM

## 2020-11-09 DIAGNOSIS — J40 CHRONIC SINUSITIS WITH RECURRENT BRONCHITIS: ICD-10-CM

## 2020-11-09 DIAGNOSIS — K21.9 LARYNGOPHARYNGEAL REFLUX (LPR): Primary | ICD-10-CM

## 2020-11-09 DIAGNOSIS — J84.9 ILD (INTERSTITIAL LUNG DISEASE): ICD-10-CM

## 2020-11-09 DIAGNOSIS — K21.9 GERD WITHOUT ESOPHAGITIS: Primary | ICD-10-CM

## 2020-11-09 DIAGNOSIS — J32.9 CHRONIC SINUSITIS WITH RECURRENT BRONCHITIS: ICD-10-CM

## 2020-11-09 DIAGNOSIS — J96.11 CHRONIC HYPOXEMIC RESPIRATORY FAILURE: ICD-10-CM

## 2020-11-09 DIAGNOSIS — M35.1 MCTD (MIXED CONNECTIVE TISSUE DISEASE): ICD-10-CM

## 2020-11-09 PROCEDURE — 99214 PR OFFICE/OUTPT VISIT, EST, LEVL IV, 30-39 MIN: ICD-10-PCS | Mod: S$GLB,,, | Performed by: INTERNAL MEDICINE

## 2020-11-09 PROCEDURE — 99214 OFFICE O/P EST MOD 30 MIN: CPT | Mod: S$GLB,,, | Performed by: INTERNAL MEDICINE

## 2020-11-09 RX ORDER — ALBUTEROL SULFATE 90 UG/1
1 AEROSOL, METERED RESPIRATORY (INHALATION) DAILY
Qty: 18 G | Refills: 3 | Status: SHIPPED | OUTPATIENT
Start: 2020-11-09 | End: 2021-04-16

## 2020-11-09 RX ORDER — ALBUTEROL SULFATE 0.83 MG/ML
2.5 SOLUTION RESPIRATORY (INHALATION) EVERY 4 HOURS
Qty: 300 ML | Refills: 3 | Status: SHIPPED | OUTPATIENT
Start: 2020-11-09 | End: 2024-02-22

## 2020-11-09 RX ORDER — AMOXICILLIN AND CLAVULANATE POTASSIUM 875; 125 MG/1; MG/1
1 TABLET, FILM COATED ORAL 2 TIMES DAILY
Qty: 20 TABLET | Refills: 0 | Status: SHIPPED | OUTPATIENT
Start: 2020-11-09 | End: 2020-12-08

## 2020-11-09 RX ORDER — ESOMEPRAZOLE MAGNESIUM 40 MG/1
40 CAPSULE, DELAYED RELEASE ORAL
Qty: 90 CAPSULE | Refills: 1 | Status: SHIPPED | OUTPATIENT
Start: 2020-11-09 | End: 2021-01-06 | Stop reason: SDUPTHER

## 2020-11-09 NOTE — PROGRESS NOTES
[facility]  Pulmonology  Follow-Up Clinic Visit    Subjective:     Reason for Visit:    Promise Medrano is a 45 y.o. female followed for suspected ILD.    Chief Complaint   Patient presents with    Shortness of Breath    Interstitial Lung Disease      8/10/2020:  No major issues since our last visit.  No new complaints.  Breathing is unchanged.    11/9/2020:  Having a lot of nasal congestion, facial pain and green drainage.  No fevers.  Also having increased wheezing/chest tightness.     Review of Systems   Constitutional: Positive for activity change, fatigue and weakness. Negative for chills, weight loss, weight gain, appetite change and night sweats.   HENT: Negative for nosebleeds, postnasal drip, sinus pressure, trouble swallowing, voice change and congestion.    Eyes: Negative for redness and itching.   Respiratory: Positive for cough, chest tightness, previous hospitalization due to pulmonary problems and use of rescue inhaler. Negative for apnea, snoring, choking, orthopnea, asthma nighttime symptoms, pleurisy, dyspnea on extertion, somnolence and Paroxysmal Nocturnal Dyspnea.    Cardiovascular: Negative for palpitations.   Genitourinary: Negative for difficulty urinating and hematuria.   Endocrine: Negative for polydipsia, polyphagia, cold intolerance, heat intolerance and polyuria.    Musculoskeletal: Positive for arthralgias and myalgias. Negative for joint swelling.   Gastrointestinal: Negative for nausea and acid reflux.   Neurological: Negative for dizziness, syncope, weakness and light-headedness.   Hematological: Negative for adenopathy. Does not bruise/bleed easily and no excessive bruising.   Psychiatric/Behavioral: Negative for confusion and sleep disturbance. The patient is nervous/anxious.    All other systems reviewed and are negative.     Outpatient Medications as of 11/9/2020   Medication    ACCU-CHEK SOFTCLIX LANCETS Misc    AEROCHAMBER PLUS FLOW-VU,L MSK Spcr     albuterol (PROVENTIL/VENTOLIN HFA) 90 mcg/actuation inhaler    albuterol-ipratropium (DUO-NEB) 2.5 mg-0.5 mg/3 mL nebulizer solution    azelastine (ASTELIN) 137 mcg (0.1 %) nasal spray    blood-glucose meter, drum-type (ACCU-CHEK COMPACT PLUS CARE) Kit    CETAPHIL MOISTURIZING cream    diazePAM (VALIUM) 5 MG tablet    diclofenac sodium (VOLTAREN) 1 % Gel    dicyclomine (BENTYL) 20 mg tablet    doxepin (SINEQUAN) 25 MG capsule    doxycycline (VIBRAMYCIN) 100 MG Cap    ergocalciferol (ERGOCALCIFEROL) 50,000 unit Cap    esomeprazole (NEXIUM) 40 MG capsule    ezetimibe (ZETIA) 10 mg tablet    famotidine (PEPCID) 20 MG tablet    ferrous sulfate (FEOSOL) 325 mg (65 mg iron) Tab tablet    fluticasone propionate (FLONASE) 50 mcg/actuation nasal spray    gabapentin (NEURONTIN) 100 MG capsule    gabapentin (NEURONTIN) 400 MG capsule    hydrocortisone 1 % cream    hydrOXYchloroQUINE (PLAQUENIL) 200 mg tablet    ibuprofen (ADVIL,MOTRIN) 600 MG tablet    ketoconazole (NIZORAL) 2 % cream    levocetirizine (XYZAL) 5 MG tablet    midodrine (PROAMATINE) 10 MG tablet    midodrine (PROAMATINE) 5 MG Tab    montelukast (SINGULAIR) 10 mg tablet    mupirocin (BACTROBAN) 2 % ointment    omeprazole (PRILOSEC) 40 MG capsule    ondansetron (ZOFRAN-ODT) 4 MG TbDL    ONETOUCH ULTRA BLUE TEST STRIP Strp    OXYGEN-AIR DELIVERY SYSTEMS MISC    pravastatin (PRAVACHOL) 80 MG tablet    STIOLTO RESPIMAT 2.5-2.5 mcg/actuation Mist    triamcinolone acetonide 0.1% (KENALOG) 0.1 % cream    urea 40 % Lotn lotion    XIIDRA 5 % Dpet     No current facility-administered medications on file as of 11/9/2020.       Previous Reports Reviewed:   ER records, historical medical records, lab reports, nursing home notes, office notes, operative reports, radiology reports, referral letter/letters and x-ray reports.  The following portions of the patient's history were reviewed and updated as appropriate: allergies, current  medications, past family history, past medical history, past social history, past surgical history and problem list.      Objective:     BP (P) 116/78 (BP Location: Left arm, Patient Position: Sitting, BP Method: X-Large (Manual))   Pulse (P) 79   Temp (P) 96.2 °F (35.7 °C) (Temporal)   Wt (P) 71.2 kg (157 lb)   SpO2 (!) (P) 93%   BMI (P) 28.72 kg/m²   Body mass index is 28.72 kg/m² (pended).     Physical Exam   Constitutional: She is oriented to person, place, and time. Vital signs are normal. She appears well-developed and well-nourished. She is cooperative.  Non-toxic appearance. No distress.   HENT:   Head: Normocephalic and atraumatic.   Right Ear: Hearing and external ear normal.   Left Ear: Hearing and external ear normal.   Nose: Nose normal. No mucosal edema, rhinorrhea, sinus tenderness, nasal deformity, septal deviation or nasal septal hematoma. Right sinus exhibits no maxillary sinus tenderness and no frontal sinus tenderness. Left sinus exhibits no maxillary sinus tenderness and no frontal sinus tenderness.   Mouth/Throat: Uvula is midline, oropharynx is clear and moist and mucous membranes are normal. Normal dentition. No dental abscesses or dental caries. No oropharyngeal exudate or posterior oropharyngeal edema. Mallampati Score: I.   Neck: Normal range of motion. Neck supple. No JVD present. No tracheal deviation present. No thyromegaly present.   Cardiovascular: Normal rate, regular rhythm, normal heart sounds and intact distal pulses. Exam reveals no gallop and no friction rub.   No murmur heard.  Pulmonary/Chest: Normal expansion, symmetric chest wall expansion and effort normal. No stridor. No tachypnea. No respiratory distress. She has no decreased breath sounds. She has no wheezes. She has no rhonchi. She has no rales (faint bibasilar rales). Chest wall is not dull to percussion. She exhibits no tenderness. Negative for egophony. Negative for tactile fremitus.   Abdominal: Soft. Bowel  sounds are normal. She exhibits no distension and no mass. There is no hepatosplenomegaly. There is no abdominal tenderness. There is no rebound and no guarding. No hernia.   Musculoskeletal: Normal range of motion.         General: No tenderness, deformity or edema.   Lymphadenopathy: No supraclavicular adenopathy is present.     She has no cervical adenopathy.     She has no axillary adenopathy.   Neurological: She is alert and oriented to person, place, and time. She has normal reflexes. No cranial nerve deficit. Gait normal.   Skin: Skin is warm and intact. No lesion, no petechiae and no rash noted. No cyanosis or erythema. No pallor. Nails show clubbing.   Psychiatric: Her behavior is normal. Judgment and thought content normal. Her mood appears anxious. Her speech is rapid and/or pressured and tangential. Cognition and memory are normal.   Nursing note and vitals reviewed.       Personal Review of Relevant Diagnostic Studies:  I have personally reviewed and interpreted the following labs/studies/images.  CXR:  (8/5/2020)  Unremarkable.    Chest x-ray:   (June 25, 2012)  Radiology Report::  No acute cardiopulmonary abnormality.  My impression:  Unable to fully evaluate the bases due to breast implants.  Otherwise no gross abnormalities.     (April 14, 2019)  Radiology report:  None available  My impression:  Interval increase and basilar predominant bilateral hazy airspace opacities with a decrease and overall lung volume compared to prior chest radiograph.     I independently viewed the above imaging/lab studies in addition to reviewing the report      CT Chest:  (June 25, 2012)  Radiology report:    None available     My impression:    Available images of the lung bases from CT abdomen are unremarkable.        (August 31, 2018)  Radiology report:    1. Mild mosaic attenuation pattern in the lungs, which was present on theprevious exam of 07/14/2010, with scattered minimal bronchiolitis in both upper lobes and  a few thin-walled parenchymal cysts. Smoking-related interstitial lung diseases could have this appearance, with air-trapping and small airways inflammation, or inhalational lung disease of various potential etiologies, constituting additional diagnostic considerations.  2. Stable prominent mediastinal lymph nodes, and prominent to borderline enlarged bilateral axillary lymph nodes, nonspecific but presumably reactive given stability.     My impression:    Diffuse interlobular septal thickening with some areas having a nodular appearance as well.  Diffuse ground-glass opacities with some mosaicism at both bases.  Scattered thin walled cysts of varying sizes.  Mild paraseptal emphysema.  Overall, this could be consistent with some very early interstitial lung disease among other etiologies, but appearance is not classic for any specific I LD.        (2019)  Radiology report:  1. Negative for pulmonary embolus.  2. New diffuse bilateral pulmonary ground-glass opacities.  Potential etiologies include infectious or inflammatory alveolitis/pneumonitis, alveolar edema, or alveolar hemorrhage.  Clinical and laboratory correlation is needed.     My impression:  · Interval increase in the size and number of 10 walled cysts.  · Persistent interlobular septal thickening, but is difficult to clearly appreciated due to presence of IV contrast.  · Diffuse ground-glass opacities also difficult to compare to prior CT due to the presence of IV contrast.     I independently viewed the above imaging/lab studies in addition to reviewing the report      PFTs:  Date:  2019  FEV1:  2.49  (91 % predicted), FVC:  3.25 (96 % predicted), FEV1/FVC:  77, T.71 (102 % predicted), RV/TLVC:  33 (97 % predicted), DLCO:  14.95 (74 % predicted)  Computer interpretation:  Spirometry is within normal limits.  There was a significant response to inhaled bronchodilator in small airways  Lung volumes are normal.  Diffusion is  normal.      My impression:   No evidence of obstructive lung disease, or reactive airways disease.   The clinical utility of measuring the FEF 25-75 is debatable, and it is response to bronchodilators even more so.  The statement significant response to inhaled bronchodilator and small airways is an inaccurate description of the overall impression from her PFTs.  Significantly elevated FRC and ERV are unusual but suggest some intrinsic pulmonary dysfunction.     (2019)   FEV1:  2.37 L (86% predicted)  FVC 3.17 L (94% predicted)  FEV1/FVC:  75  T.75 L (103% predicted)  RV/T (97% predicted)  ERV:  1.30 L (120% predicted)  DLCO:  12.16 (60% predicted)  My impression:  No obstruction.  No restriction.  Mild diffusion impairment.  Unchanged compared to pulmonary function test obtained 4 months prior.      Methacholine Challenge:  None on file.      6MWT:   (2019)  Predicted distance 453 m  Actual distance:548 m  SpO2:  Pre-exercise 92% / During exercise 87% on room air and improved to 88% when placed on 2 LPM / recovery 92% on 2LPM  Results of this test qualify for supplemental oxygen at home with ambulation.      PSG:  Date:  2019  No central or obstructive sleep apnea.  Significant desaturations during supine sleep.      ECG:  None available to review.      Echocardiogram:   Date:  2018  · Estimated left ventricular ejection fraction is 60-65%  · Normal left atrial pressure diastolic function.  · There is mild mitral regurgitation.  · Estimated RVSP is 28 mmHg.  · No mention of pulmonary artery pressure     (October 15, 2019)  · Normal left ventricular systolic function. The estimated ejection fraction is 70%  · Normal LV diastolic function.  · No wall motion abnormalities.  · Normal right ventricular systolic function.  · Normal central venous pressure (3 mm Hg).  · The estimated PA systolic pressure is 28 mm Hg      BNP  2019:  38                      Arterial blood gas:  (October 29, 2019)  7.437 / 37.6 / 95 / 25.4 (obtained on ambient air)                    Serology:                 (February 14, 2017)                 PRASHANTH Titer:              1:  320 (elevated)                 Pattern:                  Speckled                 RF:                         <15                  Anti CCP:             <15.6                    (October 2019)                 CRP:                     0.46                 ESR:                     10                 RF:                        < 10                 Anti Lesa 1 Ab:         < 0.2                 SPEP:                   No M-spike observed                 C3:                        136                 C4:                        18                 A1AT:                    173                 A1AT Phenotype:  MM                 Anti-SCL Ab:        < 0.2                 P-ANCA:               <1:20                 MPO:                     <1:20                 C-ANCA:              <1:20                 Proteinase 3 Ab:  <93.5                 PRASHANTH:                     Positive   >1:80  (speckled pattern)                 Anti SSA Ab:        <0.2                 Anti SSB Ab:        <0.2                 HIV 1/2 Ag/Ab:     Non-reactive                 Cryoglobulin:        None detected                 LDH:                      112                 ACE:                     44                 CK:                        49                 Aldolase:              3.4           TBBx:  o LEFT LUNG TRANSBRONCHIAL BIOPSIES:  o BRONCHIAL MUCOSA WITH MILD CHRONIC INFLAMMATION.  o MINUTE FRAGMENT OF ALVEOLAR LUNG PARENCHYMA REPRESENTED SHOWN ANTHRACOTIC PIGMENT DEPOSITS AND   MINIMAL CHRONIC INFLAMMATION.  o NO GRANULOMAS ARE IDENTIFIED.  o NO DYSPLASIA OR MALIGNANCY IS IDENTIFIED.     BAL  o BRONCHIAL WASHING:  o HYPOCELLULAR SPECIMEN WITH THE PRESENCE OF PERIPHERAL BLOOD AND FEW  o BENIGN  o BRONCHIAL EPITHELIAL CELLS.    o NO  EVIDENCE OF MALIGNANCY IS SEEN.       Assessment:     1. Laryngopharyngeal reflux (LPR)    2. ILD (interstitial lung disease)    3. Chronic sinusitis with recurrent bronchitis    4. Chronic hypoxemic respiratory failure    5. Tobacco abuse    6. MCTD (mixed connective tissue disease)      Impression:  Undifferentiated ILD. Still no etiology identified.       Plan:     · 10 days of Augmentin.  · Continue to work on smoking cessation.  · Hold off on open lung Bx for now.  · PRN SOPHIE.  · Continue home oxygen.     I informed the patient of my working diagnosis, it's etiology, risk factors, expected symptoms, diagnostic work up, treatment options and prognosis., I personally reviewed the results of relevant imaging/labs/studies with the patient, and discussed their clinical significance., I spent >10 minutes counseling the patient about their condition., Plan discussed with the patient, who is in agreement., Opportunity provided for the the patient to voice any additional questions or concerns., All questions were answered to the patient's satisfaction., Educational material provided and Patient given date for next visit.    Follow up in about 3 months (around 2/9/2021).    Orders Placed This Visit:  Orders Placed This Encounter   Procedures    NEBULIZER FOR HOME USE     Order Specific Question:   Height:     Answer:   158     Order Specific Question:   Weight:     Answer:   71.2 kg (157 lb)     Order Specific Question:   Length of need (1-99 months):     Answer:   99    NEBULIZER KIT (SUPPLIES) FOR HOME USE     Order Specific Question:   Height:     Answer:   158     Order Specific Question:   Weight:     Answer:   71.2 kg (157 lb)     Order Specific Question:   Length of need (1-99 months):     Answer:   99     Order Specific Question:   Mask or Mouthpiece?     Answer:   Mouthpiece       Nate Tarango MD  Pulmonary / Critical Care Medicine  [facility]

## 2020-11-09 NOTE — PATIENT INSTRUCTIONS
"  Using an Inhaler  Your healthcare provider may prescribe medicine that you breathe in using a metered-dose inhaler (MDI). An inhaler sends a measured amount of medicine in a fine mist.  Step 1:  · Shake the inhaler and remove the cap.  · Take a deep breath and let it out.  Step 2:  · Close your lips around the end of the inhaler mouthpiece. Or if you were told to use the "open-mouth" method, hold the inhaler 1 to 2 inches from your mouth.  Step 3:  · Breathe in slowly and deeply as you press down on the inhaler to release the medicine.  · Inhale fully.  Step 4:  · Hold your breath for a count of 10, or as long as you can comfortably.  · Then breathe out slowly through your mouth.  · Repeat these steps for each puff of medicine prescribed.             Important  · If the inhaler is being used for the first time or has not been used for a while, prime it as directed by the product maker. You can find important information about the medicine in the package insert. This is the paper that comes with the medicine.  · If you use more than one inhaler, make sure you know which one to use first.  · Your healthcare provider or pharmacist can show you how to use your inhaler the right way. Even if you think you are using it the right way, it is still a good idea to check.   Date Last Reviewed: 10/1/2016  © 0177-7915 [employer] [address] [place] [address], [place] [address]. All rights reserved. This information is not intended as a substitute for professional medical care. Always follow your healthcare professional's instructions.          "

## 2020-11-23 DIAGNOSIS — R09.82 POST-NASAL DRIP: ICD-10-CM

## 2020-11-23 RX ORDER — FLUTICASONE PROPIONATE 50 MCG
1 SPRAY, SUSPENSION (ML) NASAL DAILY
Qty: 16 G | Refills: 0 | Status: SHIPPED | OUTPATIENT
Start: 2020-11-23 | End: 2020-12-28 | Stop reason: SDUPTHER

## 2020-11-25 DIAGNOSIS — F51.04 CHRONIC INSOMNIA: ICD-10-CM

## 2020-11-25 RX ORDER — DIAZEPAM 5 MG/1
TABLET ORAL
Qty: 60 TABLET | Refills: 0 | Status: SHIPPED | OUTPATIENT
Start: 2020-11-25 | End: 2021-07-27 | Stop reason: SDUPTHER

## 2020-12-08 ENCOUNTER — TELEPHONE (OUTPATIENT)
Dept: FAMILY MEDICINE | Facility: CLINIC | Age: 45
End: 2020-12-08

## 2020-12-10 ENCOUNTER — TELEPHONE (OUTPATIENT)
Dept: FAMILY MEDICINE | Facility: CLINIC | Age: 45
End: 2020-12-10

## 2020-12-10 NOTE — TELEPHONE ENCOUNTER
----- Message from Urszula Toledo sent at 12/10/2020 12:46 PM CST -----  Patient needs 600mg Ibuprofen and would like a flu shot order sent to [facility]

## 2020-12-17 ENCOUNTER — LAB VISIT (OUTPATIENT)
Dept: LAB | Facility: HOSPITAL | Age: 45
End: 2020-12-17
Attending: INTERNAL MEDICINE
Payer: MEDICAID

## 2020-12-17 DIAGNOSIS — M32.9 SYSTEMIC LUPUS ERYTHEMATOSUS: Primary | ICD-10-CM

## 2020-12-17 PROCEDURE — 36415 COLL VENOUS BLD VENIPUNCTURE: CPT

## 2020-12-17 PROCEDURE — 86038 ANTINUCLEAR ANTIBODIES: CPT

## 2020-12-18 LAB — ANA TITR SER IF: NEGATIVE {TITER}

## 2020-12-21 DIAGNOSIS — R55 VASOVAGAL SYNCOPE: ICD-10-CM

## 2020-12-22 DIAGNOSIS — G62.9 PERIPHERAL POLYNEUROPATHY: ICD-10-CM

## 2020-12-22 RX ORDER — GABAPENTIN 400 MG/1
400 CAPSULE ORAL 3 TIMES DAILY
Qty: 90 CAPSULE | Refills: 0 | Status: SHIPPED | OUTPATIENT
Start: 2020-12-22 | End: 2021-04-14

## 2020-12-22 RX ORDER — MIDODRINE HYDROCHLORIDE 5 MG/1
5 TABLET ORAL 2 TIMES DAILY
Qty: 180 TABLET | Refills: 0 | Status: SHIPPED | OUTPATIENT
Start: 2020-12-22 | End: 2021-03-30 | Stop reason: SDUPTHER

## 2020-12-25 DIAGNOSIS — J40 CHRONIC SINUSITIS WITH RECURRENT BRONCHITIS: ICD-10-CM

## 2020-12-25 DIAGNOSIS — J32.9 CHRONIC SINUSITIS WITH RECURRENT BRONCHITIS: ICD-10-CM

## 2020-12-28 DIAGNOSIS — R09.82 POST-NASAL DRIP: ICD-10-CM

## 2020-12-28 RX ORDER — MONTELUKAST SODIUM 10 MG/1
TABLET ORAL
Qty: 90 TABLET | Refills: 0 | OUTPATIENT
Start: 2020-12-28

## 2020-12-31 RX ORDER — FLUTICASONE PROPIONATE 50 MCG
1 SPRAY, SUSPENSION (ML) NASAL DAILY
Qty: 16 G | Refills: 0 | Status: SHIPPED | OUTPATIENT
Start: 2020-12-31 | End: 2022-02-15

## 2021-01-06 ENCOUNTER — OFFICE VISIT (OUTPATIENT)
Dept: FAMILY MEDICINE | Facility: CLINIC | Age: 46
End: 2021-01-06
Payer: MEDICAID

## 2021-01-06 VITALS
BODY MASS INDEX: 28.89 KG/M2 | HEIGHT: 62 IN | OXYGEN SATURATION: 97 % | HEART RATE: 80 BPM | WEIGHT: 157 LBS | TEMPERATURE: 98 F | SYSTOLIC BLOOD PRESSURE: 136 MMHG | DIASTOLIC BLOOD PRESSURE: 84 MMHG | RESPIRATION RATE: 16 BRPM

## 2021-01-06 DIAGNOSIS — H92.01 EAR PAIN, RIGHT: ICD-10-CM

## 2021-01-06 DIAGNOSIS — K21.9 GERD WITHOUT ESOPHAGITIS: ICD-10-CM

## 2021-01-06 DIAGNOSIS — Z23 FLU VACCINE NEED: Primary | ICD-10-CM

## 2021-01-06 DIAGNOSIS — B09 VIRAL EXANTHEM: ICD-10-CM

## 2021-01-06 DIAGNOSIS — M25.50 ARTHRALGIA, UNSPECIFIED JOINT: ICD-10-CM

## 2021-01-06 DIAGNOSIS — E55.9 VITAMIN D DEFICIENCY: ICD-10-CM

## 2021-01-06 PROCEDURE — 99214 PR OFFICE/OUTPT VISIT, EST, LEVL IV, 30-39 MIN: ICD-10-PCS | Mod: S$PBB,,, | Performed by: INTERNAL MEDICINE

## 2021-01-06 PROCEDURE — 99214 OFFICE O/P EST MOD 30 MIN: CPT | Mod: S$PBB,,, | Performed by: INTERNAL MEDICINE

## 2021-01-06 PROCEDURE — 99214 OFFICE O/P EST MOD 30 MIN: CPT | Performed by: INTERNAL MEDICINE

## 2021-01-06 RX ORDER — ESOMEPRAZOLE MAGNESIUM 40 MG/1
40 CAPSULE, DELAYED RELEASE ORAL
Qty: 90 CAPSULE | Refills: 1 | Status: SHIPPED | OUTPATIENT
Start: 2021-01-06 | End: 2021-05-17 | Stop reason: SDUPTHER

## 2021-01-06 RX ORDER — FLUCONAZOLE 150 MG/1
150 TABLET ORAL DAILY
Qty: 3 TABLET | Refills: 0 | Status: SHIPPED | OUTPATIENT
Start: 2021-01-06 | End: 2021-01-09

## 2021-01-06 RX ORDER — IBUPROFEN 600 MG/1
600 TABLET ORAL 2 TIMES DAILY PRN
Qty: 60 TABLET | Refills: 1 | Status: SHIPPED | OUTPATIENT
Start: 2021-01-06 | End: 2021-03-16 | Stop reason: SDUPTHER

## 2021-01-06 RX ORDER — VALACYCLOVIR HYDROCHLORIDE 1 G/1
1000 TABLET, FILM COATED ORAL EVERY 12 HOURS
Qty: 14 TABLET | Refills: 0 | Status: SHIPPED | OUTPATIENT
Start: 2021-01-06 | End: 2021-04-14

## 2021-01-06 RX ORDER — AMOXICILLIN 500 MG/1
500 TABLET, FILM COATED ORAL EVERY 8 HOURS
Qty: 30 TABLET | Refills: 0 | Status: SHIPPED | OUTPATIENT
Start: 2021-01-06 | End: 2021-01-16

## 2021-01-21 ENCOUNTER — OFFICE VISIT (OUTPATIENT)
Dept: FAMILY MEDICINE | Facility: CLINIC | Age: 46
End: 2021-01-21
Payer: MEDICAID

## 2021-01-21 VITALS
BODY MASS INDEX: 29.56 KG/M2 | WEIGHT: 160.63 LBS | TEMPERATURE: 98 F | OXYGEN SATURATION: 96 % | HEART RATE: 89 BPM | HEIGHT: 62 IN | RESPIRATION RATE: 24 BRPM

## 2021-01-21 DIAGNOSIS — B02.9 HERPES ZOSTER WITHOUT COMPLICATION: Primary | ICD-10-CM

## 2021-01-21 DIAGNOSIS — Z23 NEED FOR INFLUENZA VACCINATION: ICD-10-CM

## 2021-01-21 DIAGNOSIS — E78.2 MIXED HYPERLIPIDEMIA: ICD-10-CM

## 2021-01-21 PROCEDURE — 99213 PR OFFICE/OUTPT VISIT, EST, LEVL III, 20-29 MIN: ICD-10-PCS | Mod: S$PBB,,, | Performed by: INTERNAL MEDICINE

## 2021-01-21 PROCEDURE — 99215 OFFICE O/P EST HI 40 MIN: CPT | Performed by: INTERNAL MEDICINE

## 2021-01-21 PROCEDURE — 99213 OFFICE O/P EST LOW 20 MIN: CPT | Mod: S$PBB,,, | Performed by: INTERNAL MEDICINE

## 2021-01-21 PROCEDURE — 90471 IMMUNIZATION ADMIN: CPT | Mod: PBBFAC | Performed by: INTERNAL MEDICINE

## 2021-02-10 ENCOUNTER — TELEPHONE (OUTPATIENT)
Dept: FAMILY MEDICINE | Facility: CLINIC | Age: 46
End: 2021-02-10

## 2021-02-10 DIAGNOSIS — H92.01 EAR PAIN, RIGHT: Primary | ICD-10-CM

## 2021-03-12 ENCOUNTER — TELEPHONE (OUTPATIENT)
Dept: FAMILY MEDICINE | Facility: CLINIC | Age: 46
End: 2021-03-12

## 2021-03-16 DIAGNOSIS — M25.50 ARTHRALGIA, UNSPECIFIED JOINT: ICD-10-CM

## 2021-03-16 RX ORDER — IBUPROFEN 600 MG/1
600 TABLET ORAL 2 TIMES DAILY PRN
Qty: 60 TABLET | Refills: 1 | Status: SHIPPED | OUTPATIENT
Start: 2021-03-16 | End: 2021-05-19 | Stop reason: SDUPTHER

## 2021-03-22 ENCOUNTER — OFFICE VISIT (OUTPATIENT)
Dept: PULMONOLOGY | Facility: CLINIC | Age: 46
End: 2021-03-22
Payer: MEDICAID

## 2021-03-22 VITALS
HEART RATE: 98 BPM | BODY MASS INDEX: 28.17 KG/M2 | WEIGHT: 154 LBS | SYSTOLIC BLOOD PRESSURE: 135 MMHG | OXYGEN SATURATION: 96 % | DIASTOLIC BLOOD PRESSURE: 80 MMHG

## 2021-03-22 DIAGNOSIS — F17.200 CURRENT EVERY DAY SMOKER: ICD-10-CM

## 2021-03-22 DIAGNOSIS — J98.4 CYSTIC-BULLOUS DISEASE OF LUNG: ICD-10-CM

## 2021-03-22 DIAGNOSIS — J84.9 ILD (INTERSTITIAL LUNG DISEASE): Primary | ICD-10-CM

## 2021-03-22 DIAGNOSIS — J84.9 INTERSTITIAL PULMONARY DISEASE, UNSPECIFIED: ICD-10-CM

## 2021-03-22 DIAGNOSIS — J84.9 INTERSTITIAL LUNG DISEASE: ICD-10-CM

## 2021-03-22 PROCEDURE — 99214 OFFICE O/P EST MOD 30 MIN: CPT | Mod: S$GLB,,, | Performed by: INTERNAL MEDICINE

## 2021-03-22 PROCEDURE — 99214 PR OFFICE/OUTPT VISIT, EST, LEVL IV, 30-39 MIN: ICD-10-PCS | Mod: S$GLB,,, | Performed by: INTERNAL MEDICINE

## 2021-03-22 RX ORDER — VARENICLINE TARTRATE 0.5 (11)-1
KIT ORAL
Qty: 1 PACKAGE | Refills: 0 | Status: SHIPPED | OUTPATIENT
Start: 2021-03-22 | End: 2021-07-27

## 2021-03-29 RX ORDER — FAMOTIDINE 20 MG/1
TABLET, FILM COATED ORAL
Qty: 60 TABLET | OUTPATIENT
Start: 2021-03-29

## 2021-03-30 DIAGNOSIS — E61.1 IRON DEFICIENCY: ICD-10-CM

## 2021-03-30 DIAGNOSIS — R55 VASOVAGAL SYNCOPE: ICD-10-CM

## 2021-03-30 RX ORDER — FERROUS SULFATE 325(65) MG
1 TABLET ORAL DAILY
Qty: 90 TABLET | Refills: 0 | Status: ON HOLD | OUTPATIENT
Start: 2021-03-30 | End: 2021-08-23

## 2021-03-30 RX ORDER — MIDODRINE HYDROCHLORIDE 5 MG/1
5 TABLET ORAL 2 TIMES DAILY
Qty: 180 TABLET | Refills: 0 | Status: SHIPPED | OUTPATIENT
Start: 2021-03-30 | End: 2021-11-01

## 2021-04-01 ENCOUNTER — OFFICE VISIT (OUTPATIENT)
Dept: RHEUMATOLOGY | Facility: CLINIC | Age: 46
End: 2021-04-01
Payer: MEDICAID

## 2021-04-01 VITALS
SYSTOLIC BLOOD PRESSURE: 119 MMHG | BODY MASS INDEX: 28.55 KG/M2 | DIASTOLIC BLOOD PRESSURE: 82 MMHG | WEIGHT: 156.13 LBS

## 2021-04-01 DIAGNOSIS — M79.7 FIBROMYALGIA: ICD-10-CM

## 2021-04-01 DIAGNOSIS — M35.9 CONNECTIVE TISSUE DISEASE, UNDIFFERENTIATED: Primary | ICD-10-CM

## 2021-04-01 PROCEDURE — 99214 PR OFFICE/OUTPT VISIT, EST, LEVL IV, 30-39 MIN: ICD-10-PCS | Mod: S$GLB,,, | Performed by: INTERNAL MEDICINE

## 2021-04-01 PROCEDURE — 99214 OFFICE O/P EST MOD 30 MIN: CPT | Mod: S$GLB,,, | Performed by: INTERNAL MEDICINE

## 2021-04-01 RX ORDER — LIFITEGRAST 50 MG/ML
1 SOLUTION/ DROPS OPHTHALMIC 2 TIMES DAILY
COMMUNITY
Start: 2021-03-30 | End: 2022-09-20

## 2021-04-05 ENCOUNTER — PATIENT MESSAGE (OUTPATIENT)
Dept: PULMONOLOGY | Facility: CLINIC | Age: 46
End: 2021-04-05

## 2021-04-12 ENCOUNTER — HOSPITAL ENCOUNTER (OUTPATIENT)
Dept: RADIOLOGY | Facility: HOSPITAL | Age: 46
Discharge: HOME | End: 2021-04-12
Attending: INTERNAL MEDICINE
Payer: MEDICAID

## 2021-04-12 ENCOUNTER — TELEPHONE (OUTPATIENT)
Dept: PULMONOLOGY | Facility: HOSPITAL | Age: 46
End: 2021-04-12

## 2021-04-12 DIAGNOSIS — J84.9 INTERSTITIAL PULMONARY DISEASE, UNSPECIFIED: ICD-10-CM

## 2021-04-12 PROCEDURE — 71250 CT THORAX DX C-: CPT | Mod: TC,PO

## 2021-04-14 ENCOUNTER — OFFICE VISIT (OUTPATIENT)
Dept: FAMILY MEDICINE | Facility: CLINIC | Age: 46
End: 2021-04-14
Payer: MEDICAID

## 2021-04-14 ENCOUNTER — TELEPHONE (OUTPATIENT)
Dept: PULMONOLOGY | Facility: CLINIC | Age: 46
End: 2021-04-14

## 2021-04-14 VITALS
OXYGEN SATURATION: 97 % | TEMPERATURE: 99 F | HEART RATE: 88 BPM | BODY MASS INDEX: 27.79 KG/M2 | SYSTOLIC BLOOD PRESSURE: 136 MMHG | DIASTOLIC BLOOD PRESSURE: 82 MMHG | WEIGHT: 151 LBS | HEIGHT: 62 IN | RESPIRATION RATE: 18 BRPM

## 2021-04-14 DIAGNOSIS — J20.9 BRONCHITIS WITH ASTHMA, SUBACUTE: Primary | ICD-10-CM

## 2021-04-14 DIAGNOSIS — R52 CHRONIC GENERALIZED PAIN: ICD-10-CM

## 2021-04-14 DIAGNOSIS — G89.29 CHRONIC GENERALIZED PAIN: ICD-10-CM

## 2021-04-14 DIAGNOSIS — F51.01 PRIMARY INSOMNIA: ICD-10-CM

## 2021-04-14 DIAGNOSIS — J45.909 BRONCHITIS WITH ASTHMA, SUBACUTE: Primary | ICD-10-CM

## 2021-04-14 PROCEDURE — 99214 OFFICE O/P EST MOD 30 MIN: CPT | Mod: S$PBB,,, | Performed by: INTERNAL MEDICINE

## 2021-04-14 PROCEDURE — 99215 OFFICE O/P EST HI 40 MIN: CPT | Performed by: INTERNAL MEDICINE

## 2021-04-14 PROCEDURE — 99214 PR OFFICE/OUTPT VISIT, EST, LEVL IV, 30-39 MIN: ICD-10-PCS | Mod: S$PBB,,, | Performed by: INTERNAL MEDICINE

## 2021-04-14 RX ORDER — PREDNISONE 10 MG/1
10 TABLET ORAL DAILY
Qty: 3 TABLET | Refills: 0 | Status: SHIPPED | OUTPATIENT
Start: 2021-04-14 | End: 2021-04-22

## 2021-04-14 RX ORDER — LEVOFLOXACIN 500 MG/1
500 TABLET, FILM COATED ORAL DAILY
Qty: 10 TABLET | Refills: 0 | Status: SHIPPED | OUTPATIENT
Start: 2021-04-14 | End: 2021-05-17

## 2021-04-14 RX ORDER — DOXEPIN HYDROCHLORIDE 50 MG/1
50 CAPSULE ORAL NIGHTLY
Qty: 30 CAPSULE | Refills: 5 | Status: SHIPPED | OUTPATIENT
Start: 2021-04-14 | End: 2021-05-20 | Stop reason: SDUPTHER

## 2021-04-14 RX ORDER — HYDROCODONE POLISTIREX AND CHLORPHENIRAMINE POLISTIREX 10; 8 MG/5ML; MG/5ML
5 SUSPENSION, EXTENDED RELEASE ORAL EVERY 12 HOURS PRN
Qty: 70 ML | Refills: 0 | Status: SHIPPED | OUTPATIENT
Start: 2021-04-14 | End: 2021-05-17

## 2021-04-14 RX ORDER — EZETIMIBE 10 MG/1
10 TABLET ORAL DAILY
Qty: 90 TABLET | Refills: 1 | Status: SHIPPED | OUTPATIENT
Start: 2021-04-14 | End: 2021-08-31

## 2021-04-22 ENCOUNTER — OFFICE VISIT (OUTPATIENT)
Dept: FAMILY MEDICINE | Facility: CLINIC | Age: 46
End: 2021-04-22
Payer: MEDICAID

## 2021-04-22 VITALS
HEART RATE: 84 BPM | SYSTOLIC BLOOD PRESSURE: 106 MMHG | HEIGHT: 62 IN | BODY MASS INDEX: 27.6 KG/M2 | DIASTOLIC BLOOD PRESSURE: 64 MMHG | OXYGEN SATURATION: 96 % | WEIGHT: 150 LBS | TEMPERATURE: 99 F | RESPIRATION RATE: 18 BRPM

## 2021-04-22 DIAGNOSIS — J20.9 BRONCHITIS WITH ASTHMA, SUBACUTE: Primary | ICD-10-CM

## 2021-04-22 DIAGNOSIS — J45.909 BRONCHITIS WITH ASTHMA, SUBACUTE: Primary | ICD-10-CM

## 2021-04-22 PROCEDURE — 99214 OFFICE O/P EST MOD 30 MIN: CPT | Performed by: INTERNAL MEDICINE

## 2021-04-22 PROCEDURE — 99213 PR OFFICE/OUTPT VISIT, EST, LEVL III, 20-29 MIN: ICD-10-PCS | Mod: S$PBB,,, | Performed by: INTERNAL MEDICINE

## 2021-04-22 PROCEDURE — 99213 OFFICE O/P EST LOW 20 MIN: CPT | Mod: S$PBB,,, | Performed by: INTERNAL MEDICINE

## 2021-04-22 RX ORDER — PROMETHAZINE HYDROCHLORIDE AND CODEINE PHOSPHATE 6.25; 1 MG/5ML; MG/5ML
5 SOLUTION ORAL EVERY 4 HOURS PRN
Qty: 118 ML | Refills: 0 | Status: SHIPPED | OUTPATIENT
Start: 2021-04-22 | End: 2021-05-02

## 2021-04-27 ENCOUNTER — PATIENT MESSAGE (OUTPATIENT)
Dept: PULMONOLOGY | Facility: CLINIC | Age: 46
End: 2021-04-27

## 2021-05-03 DIAGNOSIS — J84.10 PULMONARY FIBROSIS: ICD-10-CM

## 2021-05-04 RX ORDER — LEVOCETIRIZINE DIHYDROCHLORIDE 5 MG/1
5 TABLET, FILM COATED ORAL NIGHTLY
Qty: 90 TABLET | Refills: 1 | Status: SHIPPED | OUTPATIENT
Start: 2021-05-04 | End: 2021-11-16

## 2021-05-06 ENCOUNTER — PATIENT MESSAGE (OUTPATIENT)
Dept: FAMILY MEDICINE | Facility: CLINIC | Age: 46
End: 2021-05-06

## 2021-05-06 ENCOUNTER — PATIENT MESSAGE (OUTPATIENT)
Dept: RESEARCH | Facility: HOSPITAL | Age: 46
End: 2021-05-06

## 2021-05-06 DIAGNOSIS — K58.1 IRRITABLE BOWEL SYNDROME WITH CONSTIPATION: ICD-10-CM

## 2021-05-06 RX ORDER — DICYCLOMINE HYDROCHLORIDE 20 MG/1
20 TABLET ORAL 2 TIMES DAILY
Qty: 60 TABLET | Refills: 0 | Status: SHIPPED | OUTPATIENT
Start: 2021-05-06 | End: 2021-06-18 | Stop reason: SDUPTHER

## 2021-05-12 DIAGNOSIS — J40 CHRONIC SINUSITIS WITH RECURRENT BRONCHITIS: Primary | ICD-10-CM

## 2021-05-12 DIAGNOSIS — J32.9 CHRONIC SINUSITIS WITH RECURRENT BRONCHITIS: Primary | ICD-10-CM

## 2021-05-12 RX ORDER — PROMETHAZINE HYDROCHLORIDE AND CODEINE PHOSPHATE 6.25; 1 MG/5ML; MG/5ML
5 SOLUTION ORAL EVERY 4 HOURS PRN
Qty: 118 ML | Refills: 0 | OUTPATIENT
Start: 2021-05-12 | End: 2021-05-22

## 2021-05-17 DIAGNOSIS — K21.9 GERD WITHOUT ESOPHAGITIS: ICD-10-CM

## 2021-05-17 RX ORDER — ESOMEPRAZOLE MAGNESIUM 40 MG/1
40 CAPSULE, DELAYED RELEASE ORAL
Qty: 90 CAPSULE | Refills: 1 | Status: SHIPPED | OUTPATIENT
Start: 2021-05-17 | End: 2021-07-27

## 2021-05-19 DIAGNOSIS — M25.50 ARTHRALGIA, UNSPECIFIED JOINT: ICD-10-CM

## 2021-05-19 RX ORDER — IBUPROFEN 600 MG/1
600 TABLET ORAL 2 TIMES DAILY PRN
Qty: 60 TABLET | Refills: 1 | Status: SHIPPED | OUTPATIENT
Start: 2021-05-19 | End: 2021-07-20

## 2021-05-20 ENCOUNTER — OFFICE VISIT (OUTPATIENT)
Dept: FAMILY MEDICINE | Facility: CLINIC | Age: 46
End: 2021-05-20
Payer: MEDICAID

## 2021-05-20 VITALS
OXYGEN SATURATION: 96 % | DIASTOLIC BLOOD PRESSURE: 74 MMHG | TEMPERATURE: 99 F | BODY MASS INDEX: 26.13 KG/M2 | SYSTOLIC BLOOD PRESSURE: 136 MMHG | HEIGHT: 62 IN | WEIGHT: 142 LBS | RESPIRATION RATE: 18 BRPM | HEART RATE: 94 BPM

## 2021-05-20 DIAGNOSIS — G89.29 CHRONIC GENERALIZED PAIN: ICD-10-CM

## 2021-05-20 DIAGNOSIS — F43.21 SITUATIONAL DEPRESSION: Primary | ICD-10-CM

## 2021-05-20 DIAGNOSIS — I49.9 CARDIAC ARRHYTHMIA, UNSPECIFIED CARDIAC ARRHYTHMIA TYPE: ICD-10-CM

## 2021-05-20 DIAGNOSIS — F51.01 PRIMARY INSOMNIA: ICD-10-CM

## 2021-05-20 DIAGNOSIS — E78.2 MIXED HYPERLIPIDEMIA: ICD-10-CM

## 2021-05-20 DIAGNOSIS — R52 CHRONIC GENERALIZED PAIN: ICD-10-CM

## 2021-05-20 PROCEDURE — 99214 PR OFFICE/OUTPT VISIT, EST, LEVL IV, 30-39 MIN: ICD-10-PCS | Mod: S$PBB,,, | Performed by: INTERNAL MEDICINE

## 2021-05-20 PROCEDURE — 99215 OFFICE O/P EST HI 40 MIN: CPT | Performed by: INTERNAL MEDICINE

## 2021-05-20 PROCEDURE — 99214 OFFICE O/P EST MOD 30 MIN: CPT | Mod: S$PBB,,, | Performed by: INTERNAL MEDICINE

## 2021-05-20 RX ORDER — ALIROCUMAB 150 MG/ML
150 INJECTION, SOLUTION SUBCUTANEOUS
Qty: 2 SYRINGE | Refills: 5 | Status: SHIPPED | OUTPATIENT
Start: 2021-05-20 | End: 2021-11-01

## 2021-05-20 RX ORDER — DOXEPIN HYDROCHLORIDE 75 MG/1
75 CAPSULE ORAL NIGHTLY
Qty: 30 CAPSULE | Refills: 5 | Status: SHIPPED | OUTPATIENT
Start: 2021-05-20 | End: 2021-11-01

## 2021-05-24 ENCOUNTER — PATIENT MESSAGE (OUTPATIENT)
Dept: PULMONOLOGY | Facility: CLINIC | Age: 46
End: 2021-05-24

## 2021-05-24 ENCOUNTER — TELEPHONE (OUTPATIENT)
Dept: FAMILY MEDICINE | Facility: CLINIC | Age: 46
End: 2021-05-24

## 2021-05-24 ENCOUNTER — OFFICE VISIT (OUTPATIENT)
Dept: PULMONOLOGY | Facility: CLINIC | Age: 46
End: 2021-05-24
Payer: MEDICAID

## 2021-05-24 VITALS
SYSTOLIC BLOOD PRESSURE: 125 MMHG | WEIGHT: 143 LBS | HEART RATE: 87 BPM | BODY MASS INDEX: 26.16 KG/M2 | OXYGEN SATURATION: 94 % | DIASTOLIC BLOOD PRESSURE: 80 MMHG

## 2021-05-24 DIAGNOSIS — J40 CHRONIC SINUSITIS WITH RECURRENT BRONCHITIS: ICD-10-CM

## 2021-05-24 DIAGNOSIS — J32.9 CHRONIC SINUSITIS WITH RECURRENT BRONCHITIS: ICD-10-CM

## 2021-05-24 DIAGNOSIS — F17.200 CURRENT EVERY DAY SMOKER: ICD-10-CM

## 2021-05-24 DIAGNOSIS — K21.9 LARYNGOPHARYNGEAL REFLUX (LPR): ICD-10-CM

## 2021-05-24 DIAGNOSIS — J84.9 INTERSTITIAL PULMONARY DISEASE, UNSPECIFIED: Primary | ICD-10-CM

## 2021-05-24 PROCEDURE — 99214 PR OFFICE/OUTPT VISIT, EST, LEVL IV, 30-39 MIN: ICD-10-PCS | Mod: S$GLB,,, | Performed by: INTERNAL MEDICINE

## 2021-05-24 PROCEDURE — 99214 OFFICE O/P EST MOD 30 MIN: CPT | Mod: S$GLB,,, | Performed by: INTERNAL MEDICINE

## 2021-05-24 RX ORDER — EVOLOCUMAB 140 MG/ML
140 INJECTION, SOLUTION SUBCUTANEOUS
Qty: 2 ML | Refills: 5 | Status: SHIPPED | OUTPATIENT
Start: 2021-05-24 | End: 2021-06-21

## 2021-05-26 ENCOUNTER — TELEPHONE (OUTPATIENT)
Dept: FAMILY MEDICINE | Facility: CLINIC | Age: 46
End: 2021-05-26

## 2021-05-27 ENCOUNTER — TELEPHONE (OUTPATIENT)
Dept: FAMILY MEDICINE | Facility: CLINIC | Age: 46
End: 2021-05-27

## 2021-05-27 DIAGNOSIS — I49.9 CARDIAC ARRHYTHMIA, UNSPECIFIED CARDIAC ARRHYTHMIA TYPE: ICD-10-CM

## 2021-05-27 DIAGNOSIS — E78.00 PURE HYPERCHOLESTEROLEMIA: Primary | ICD-10-CM

## 2021-05-27 DIAGNOSIS — E78.2 MIXED HYPERLIPIDEMIA: ICD-10-CM

## 2021-06-09 ENCOUNTER — HOSPITAL ENCOUNTER (EMERGENCY)
Facility: HOSPITAL | Age: 46
Discharge: HOME | End: 2021-06-09
Attending: EMERGENCY MEDICINE
Payer: MEDICAID

## 2021-06-09 VITALS
HEIGHT: 62 IN | TEMPERATURE: 99 F | RESPIRATION RATE: 23 BRPM | WEIGHT: 142 LBS | OXYGEN SATURATION: 100 % | SYSTOLIC BLOOD PRESSURE: 113 MMHG | BODY MASS INDEX: 26.13 KG/M2 | HEART RATE: 61 BPM | DIASTOLIC BLOOD PRESSURE: 71 MMHG

## 2021-06-09 DIAGNOSIS — R07.89 ATYPICAL CHEST PAIN: ICD-10-CM

## 2021-06-09 DIAGNOSIS — R55 SYNCOPE, UNSPECIFIED SYNCOPE TYPE: Primary | ICD-10-CM

## 2021-06-09 DIAGNOSIS — M25.552 LEFT HIP PAIN: ICD-10-CM

## 2021-06-09 LAB
ALBUMIN SERPL BCP-MCNC: 3.8 G/DL (ref 3.5–5.2)
ALP SERPL-CCNC: 60 U/L (ref 55–135)
ALT SERPL W/O P-5'-P-CCNC: 11 U/L (ref 10–44)
ANION GAP SERPL CALC-SCNC: 9 MMOL/L (ref 8–16)
AST SERPL-CCNC: 14 U/L (ref 10–40)
BASOPHILS # BLD AUTO: 0.03 K/UL (ref 0–0.2)
BASOPHILS NFR BLD: 0.4 % (ref 0–1.9)
BILIRUB SERPL-MCNC: 0.7 MG/DL (ref 0.1–1)
BNP SERPL-MCNC: 51 PG/ML (ref 0–99)
BUN SERPL-MCNC: 10 MG/DL (ref 6–20)
CALCIUM SERPL-MCNC: 8.8 MG/DL (ref 8.7–10.5)
CHLORIDE SERPL-SCNC: 100 MMOL/L (ref 95–110)
CO2 SERPL-SCNC: 27 MMOL/L (ref 23–29)
CREAT SERPL-MCNC: 0.6 MG/DL (ref 0.5–1.4)
D DIMER PPP IA.FEU-MCNC: 0.35 UG/ML FEU
DIFFERENTIAL METHOD: ABNORMAL
EOSINOPHIL # BLD AUTO: 0 K/UL (ref 0–0.5)
EOSINOPHIL NFR BLD: 0.4 % (ref 0–8)
ERYTHROCYTE [DISTWIDTH] IN BLOOD BY AUTOMATED COUNT: 13.4 % (ref 11.5–14.5)
EST. GFR  (AFRICAN AMERICAN): >60 ML/MIN/1.73 M^2
EST. GFR  (NON AFRICAN AMERICAN): >60 ML/MIN/1.73 M^2
GLUCOSE SERPL-MCNC: 86 MG/DL (ref 70–110)
HCT VFR BLD AUTO: 38.9 % (ref 37–48.5)
HGB BLD-MCNC: 12.9 G/DL (ref 12–16)
IMM GRANULOCYTES # BLD AUTO: 0.02 K/UL (ref 0–0.04)
IMM GRANULOCYTES NFR BLD AUTO: 0.3 % (ref 0–0.5)
INR PPP: 1
LYMPHOCYTES # BLD AUTO: 2.1 K/UL (ref 1–4.8)
LYMPHOCYTES NFR BLD: 31 % (ref 18–48)
MAGNESIUM SERPL-MCNC: 1.9 MG/DL (ref 1.6–2.6)
MCH RBC QN AUTO: 31.4 PG (ref 27–31)
MCHC RBC AUTO-ENTMCNC: 33.2 G/DL (ref 32–36)
MCV RBC AUTO: 95 FL (ref 82–98)
MONOCYTES # BLD AUTO: 0.3 K/UL (ref 0.3–1)
MONOCYTES NFR BLD: 4.6 % (ref 4–15)
NEUTROPHILS # BLD AUTO: 4.3 K/UL (ref 1.8–7.7)
NEUTROPHILS NFR BLD: 63.3 % (ref 38–73)
NRBC BLD-RTO: 0 /100 WBC
PLATELET # BLD AUTO: 228 K/UL (ref 150–450)
PMV BLD AUTO: 10.2 FL (ref 9.2–12.9)
POTASSIUM SERPL-SCNC: 3.8 MMOL/L (ref 3.5–5.1)
PROT SERPL-MCNC: 7.4 G/DL (ref 6–8.4)
PROTHROMBIN TIME: 12.6 SEC (ref 11.8–14.3)
RBC # BLD AUTO: 4.11 M/UL (ref 4–5.4)
SODIUM SERPL-SCNC: 136 MMOL/L (ref 136–145)
TROPONIN I SERPL DL<=0.01 NG/ML-MCNC: <0.03 NG/ML
TROPONIN I SERPL DL<=0.01 NG/ML-MCNC: <0.03 NG/ML
TSH SERPL DL<=0.005 MIU/L-ACNC: 1.66 UIU/ML (ref 0.34–5.6)
WBC # BLD AUTO: 6.74 K/UL (ref 3.9–12.7)

## 2021-06-09 PROCEDURE — 83880 ASSAY OF NATRIURETIC PEPTIDE: CPT | Performed by: EMERGENCY MEDICINE

## 2021-06-09 PROCEDURE — 93010 ELECTROCARDIOGRAM REPORT: CPT | Mod: ,,, | Performed by: INTERNAL MEDICINE

## 2021-06-09 PROCEDURE — 85379 FIBRIN DEGRADATION QUANT: CPT | Performed by: EMERGENCY MEDICINE

## 2021-06-09 PROCEDURE — 80053 COMPREHEN METABOLIC PANEL: CPT | Performed by: EMERGENCY MEDICINE

## 2021-06-09 PROCEDURE — 25000003 PHARM REV CODE 250: Performed by: EMERGENCY MEDICINE

## 2021-06-09 PROCEDURE — 85610 PROTHROMBIN TIME: CPT | Performed by: EMERGENCY MEDICINE

## 2021-06-09 PROCEDURE — 84484 ASSAY OF TROPONIN QUANT: CPT | Performed by: EMERGENCY MEDICINE

## 2021-06-09 PROCEDURE — 96374 THER/PROPH/DIAG INJ IV PUSH: CPT

## 2021-06-09 PROCEDURE — 84443 ASSAY THYROID STIM HORMONE: CPT | Performed by: EMERGENCY MEDICINE

## 2021-06-09 PROCEDURE — 93010 EKG 12-LEAD: ICD-10-PCS | Mod: ,,, | Performed by: INTERNAL MEDICINE

## 2021-06-09 PROCEDURE — 93005 ELECTROCARDIOGRAM TRACING: CPT | Performed by: INTERNAL MEDICINE

## 2021-06-09 PROCEDURE — 83735 ASSAY OF MAGNESIUM: CPT | Performed by: EMERGENCY MEDICINE

## 2021-06-09 PROCEDURE — 63600175 PHARM REV CODE 636 W HCPCS: Performed by: EMERGENCY MEDICINE

## 2021-06-09 PROCEDURE — 36415 COLL VENOUS BLD VENIPUNCTURE: CPT | Performed by: EMERGENCY MEDICINE

## 2021-06-09 PROCEDURE — 99285 EMERGENCY DEPT VISIT HI MDM: CPT | Mod: 25

## 2021-06-09 PROCEDURE — 93010 ELECTROCARDIOGRAM REPORT: CPT | Mod: 76,,, | Performed by: INTERNAL MEDICINE

## 2021-06-09 PROCEDURE — 85025 COMPLETE CBC W/AUTO DIFF WBC: CPT | Performed by: EMERGENCY MEDICINE

## 2021-06-09 PROCEDURE — 84484 ASSAY OF TROPONIN QUANT: CPT | Mod: 91 | Performed by: EMERGENCY MEDICINE

## 2021-06-09 RX ORDER — SODIUM CHLORIDE 9 MG/ML
INJECTION, SOLUTION INTRAVENOUS
Status: COMPLETED | OUTPATIENT
Start: 2021-06-09 | End: 2021-06-09

## 2021-06-09 RX ORDER — ONDANSETRON 4 MG/1
4 TABLET, ORALLY DISINTEGRATING ORAL EVERY 8 HOURS PRN
Qty: 12 TABLET | Refills: 0 | Status: SHIPPED | OUTPATIENT
Start: 2021-06-09 | End: 2021-06-18 | Stop reason: SDUPTHER

## 2021-06-09 RX ORDER — KETOROLAC TROMETHAMINE 30 MG/ML
15 INJECTION, SOLUTION INTRAMUSCULAR; INTRAVENOUS
Status: COMPLETED | OUTPATIENT
Start: 2021-06-09 | End: 2021-06-09

## 2021-06-09 RX ORDER — NAPROXEN SODIUM 220 MG/1
324 TABLET, FILM COATED ORAL ONCE
Status: COMPLETED | OUTPATIENT
Start: 2021-06-09 | End: 2021-06-09

## 2021-06-09 RX ORDER — METHOCARBAMOL 500 MG/1
1000 TABLET, FILM COATED ORAL 3 TIMES DAILY PRN
Qty: 30 TABLET | Refills: 0 | Status: SHIPPED | OUTPATIENT
Start: 2021-06-09 | End: 2021-06-14

## 2021-06-09 RX ORDER — NAPROXEN 500 MG/1
500 TABLET ORAL 2 TIMES DAILY PRN
Qty: 28 TABLET | Refills: 0 | Status: SHIPPED | OUTPATIENT
Start: 2021-06-09 | End: 2021-06-23

## 2021-06-09 RX ORDER — METHOCARBAMOL 500 MG/1
1000 TABLET, FILM COATED ORAL
Status: COMPLETED | OUTPATIENT
Start: 2021-06-09 | End: 2021-06-09

## 2021-06-09 RX ADMIN — KETOROLAC TROMETHAMINE 15 MG: 30 INJECTION, SOLUTION INTRAMUSCULAR; INTRAVENOUS at 09:06

## 2021-06-09 RX ADMIN — METHOCARBAMOL 1000 MG: 500 TABLET ORAL at 09:06

## 2021-06-09 RX ADMIN — SODIUM CHLORIDE: 0.9 INJECTION, SOLUTION INTRAVENOUS at 09:06

## 2021-06-09 RX ADMIN — ASPIRIN 324 MG: 81 TABLET, CHEWABLE ORAL at 07:06

## 2021-06-17 ENCOUNTER — PATIENT MESSAGE (OUTPATIENT)
Dept: FAMILY MEDICINE | Facility: CLINIC | Age: 46
End: 2021-06-17

## 2021-06-17 DIAGNOSIS — K58.1 IRRITABLE BOWEL SYNDROME WITH CONSTIPATION: ICD-10-CM

## 2021-06-17 DIAGNOSIS — K21.9 GERD WITHOUT ESOPHAGITIS: Primary | ICD-10-CM

## 2021-06-20 RX ORDER — ONDANSETRON 4 MG/1
4 TABLET, ORALLY DISINTEGRATING ORAL EVERY 8 HOURS PRN
Qty: 12 TABLET | Refills: 0 | Status: SHIPPED | OUTPATIENT
Start: 2021-06-20 | End: 2021-07-27 | Stop reason: SDUPTHER

## 2021-06-20 RX ORDER — DICYCLOMINE HYDROCHLORIDE 20 MG/1
20 TABLET ORAL 2 TIMES DAILY
Qty: 60 TABLET | Refills: 0 | Status: SHIPPED | OUTPATIENT
Start: 2021-06-20 | End: 2021-07-27 | Stop reason: SDUPTHER

## 2021-06-28 RX ORDER — ALBUTEROL SULFATE 90 UG/1
AEROSOL, METERED RESPIRATORY (INHALATION)
Qty: 8.5 G | OUTPATIENT
Start: 2021-06-28

## 2021-06-29 DIAGNOSIS — Z86.39 HISTORY OF DIABETES MELLITUS: ICD-10-CM

## 2021-06-29 DIAGNOSIS — E11.40 CONTROLLED TYPE 2 DIABETES WITH NEUROPATHY: ICD-10-CM

## 2021-07-07 ENCOUNTER — LAB VISIT (OUTPATIENT)
Dept: LAB | Facility: HOSPITAL | Age: 46
End: 2021-07-07
Attending: INTERNAL MEDICINE
Payer: MEDICAID

## 2021-07-07 ENCOUNTER — OFFICE VISIT (OUTPATIENT)
Dept: RHEUMATOLOGY | Facility: CLINIC | Age: 46
End: 2021-07-07
Payer: MEDICAID

## 2021-07-07 VITALS
DIASTOLIC BLOOD PRESSURE: 83 MMHG | SYSTOLIC BLOOD PRESSURE: 119 MMHG | BODY MASS INDEX: 26.12 KG/M2 | WEIGHT: 142.81 LBS | HEART RATE: 73 BPM

## 2021-07-07 DIAGNOSIS — M79.7 FIBROMYALGIA: ICD-10-CM

## 2021-07-07 DIAGNOSIS — E55.9 VITAMIN D DEFICIENCY: ICD-10-CM

## 2021-07-07 DIAGNOSIS — E78.2 MIXED HYPERLIPIDEMIA: ICD-10-CM

## 2021-07-07 DIAGNOSIS — M35.9 CONNECTIVE TISSUE DISEASE, UNDIFFERENTIATED: Primary | ICD-10-CM

## 2021-07-07 LAB
25(OH)D3+25(OH)D2 SERPL-MCNC: 94 NG/ML (ref 30–96)
CHOLEST SERPL-MCNC: 165 MG/DL (ref 120–199)
CHOLEST/HDLC SERPL: 3.2 {RATIO} (ref 2–5)
HDLC SERPL-MCNC: 52 MG/DL (ref 40–75)
HDLC SERPL: 31.5 % (ref 20–50)
LDLC SERPL CALC-MCNC: 102.4 MG/DL (ref 63–159)
NONHDLC SERPL-MCNC: 113 MG/DL
TRIGL SERPL-MCNC: 53 MG/DL (ref 30–150)

## 2021-07-07 PROCEDURE — 99213 PR OFFICE/OUTPT VISIT, EST, LEVL III, 20-29 MIN: ICD-10-PCS | Mod: S$GLB,,, | Performed by: INTERNAL MEDICINE

## 2021-07-07 PROCEDURE — 99213 OFFICE O/P EST LOW 20 MIN: CPT | Mod: S$GLB,,, | Performed by: INTERNAL MEDICINE

## 2021-07-07 PROCEDURE — 82306 VITAMIN D 25 HYDROXY: CPT | Performed by: INTERNAL MEDICINE

## 2021-07-07 PROCEDURE — 80061 LIPID PANEL: CPT | Performed by: INTERNAL MEDICINE

## 2021-07-07 PROCEDURE — 36415 COLL VENOUS BLD VENIPUNCTURE: CPT | Performed by: INTERNAL MEDICINE

## 2021-07-27 ENCOUNTER — PATIENT MESSAGE (OUTPATIENT)
Dept: FAMILY MEDICINE | Facility: CLINIC | Age: 46
End: 2021-07-27

## 2021-07-27 ENCOUNTER — OFFICE VISIT (OUTPATIENT)
Dept: FAMILY MEDICINE | Facility: CLINIC | Age: 46
End: 2021-07-27
Payer: MEDICAID

## 2021-07-27 VITALS
BODY MASS INDEX: 26.13 KG/M2 | DIASTOLIC BLOOD PRESSURE: 82 MMHG | SYSTOLIC BLOOD PRESSURE: 138 MMHG | HEIGHT: 62 IN | RESPIRATION RATE: 18 BRPM | WEIGHT: 142 LBS | TEMPERATURE: 99 F | OXYGEN SATURATION: 97 % | HEART RATE: 84 BPM

## 2021-07-27 DIAGNOSIS — F41.9 CHRONIC ANXIETY: Primary | ICD-10-CM

## 2021-07-27 DIAGNOSIS — K21.9 GERD WITHOUT ESOPHAGITIS: ICD-10-CM

## 2021-07-27 DIAGNOSIS — Z12.31 ENCOUNTER FOR SCREENING MAMMOGRAM FOR BREAST CANCER: ICD-10-CM

## 2021-07-27 DIAGNOSIS — F43.21 SITUATIONAL DEPRESSION: ICD-10-CM

## 2021-07-27 DIAGNOSIS — E01.0 THYROMEGALY: ICD-10-CM

## 2021-07-27 DIAGNOSIS — K58.1 IRRITABLE BOWEL SYNDROME WITH CONSTIPATION: ICD-10-CM

## 2021-07-27 DIAGNOSIS — F51.04 CHRONIC INSOMNIA: ICD-10-CM

## 2021-07-27 DIAGNOSIS — R13.12 OROPHARYNGEAL DYSPHAGIA: ICD-10-CM

## 2021-07-27 PROCEDURE — 99214 PR OFFICE/OUTPT VISIT, EST, LEVL IV, 30-39 MIN: ICD-10-PCS | Mod: S$PBB,,, | Performed by: INTERNAL MEDICINE

## 2021-07-27 PROCEDURE — 99214 OFFICE O/P EST MOD 30 MIN: CPT | Mod: S$PBB,,, | Performed by: INTERNAL MEDICINE

## 2021-07-27 PROCEDURE — 99215 OFFICE O/P EST HI 40 MIN: CPT | Performed by: INTERNAL MEDICINE

## 2021-07-27 RX ORDER — ONDANSETRON 4 MG/1
4 TABLET, ORALLY DISINTEGRATING ORAL EVERY 8 HOURS PRN
Qty: 12 TABLET | Refills: 0 | Status: SHIPPED | OUTPATIENT
Start: 2021-07-27 | End: 2021-08-11 | Stop reason: SDUPTHER

## 2021-07-27 RX ORDER — FAMOTIDINE 20 MG/1
20 TABLET, FILM COATED ORAL 2 TIMES DAILY
COMMUNITY
End: 2021-07-27 | Stop reason: SDUPTHER

## 2021-07-27 RX ORDER — FAMOTIDINE 20 MG/1
20 TABLET, FILM COATED ORAL 2 TIMES DAILY
Qty: 60 TABLET | Refills: 5 | Status: SHIPPED | OUTPATIENT
Start: 2021-07-27 | End: 2021-09-21

## 2021-07-27 RX ORDER — DIAZEPAM 5 MG/1
TABLET ORAL
Qty: 60 TABLET | Refills: 0 | Status: SHIPPED | OUTPATIENT
Start: 2021-07-27 | End: 2021-08-31

## 2021-07-27 RX ORDER — DICYCLOMINE HYDROCHLORIDE 20 MG/1
20 TABLET ORAL 2 TIMES DAILY
Qty: 60 TABLET | Refills: 0 | Status: SHIPPED | OUTPATIENT
Start: 2021-07-27 | End: 2021-09-21

## 2021-08-11 DIAGNOSIS — K21.9 GERD WITHOUT ESOPHAGITIS: ICD-10-CM

## 2021-08-11 RX ORDER — ONDANSETRON 4 MG/1
4 TABLET, ORALLY DISINTEGRATING ORAL EVERY 8 HOURS PRN
Qty: 12 TABLET | Refills: 0 | Status: SHIPPED | OUTPATIENT
Start: 2021-08-11 | End: 2021-08-16 | Stop reason: SDUPTHER

## 2021-08-16 DIAGNOSIS — K21.9 GERD WITHOUT ESOPHAGITIS: ICD-10-CM

## 2021-08-16 RX ORDER — ONDANSETRON 4 MG/1
4 TABLET, ORALLY DISINTEGRATING ORAL EVERY 8 HOURS PRN
Qty: 12 TABLET | Refills: 0 | Status: SHIPPED | OUTPATIENT
Start: 2021-08-16 | End: 2021-09-21

## 2021-08-20 ENCOUNTER — HOSPITAL ENCOUNTER (OUTPATIENT)
Dept: PREADMISSION TESTING | Facility: HOSPITAL | Age: 46
Discharge: HOME | End: 2021-08-20
Attending: SPECIALIST
Payer: MEDICAID

## 2021-08-20 DIAGNOSIS — Z01.810 PREOP CARDIOVASCULAR EXAM: Primary | ICD-10-CM

## 2021-08-20 DIAGNOSIS — Z01.810 PRE-PROCEDURAL CARDIOVASCULAR EXAMINATION: ICD-10-CM

## 2021-08-20 LAB
ALBUMIN SERPL BCP-MCNC: 4.1 G/DL (ref 3.5–5.2)
ALP SERPL-CCNC: 69 U/L (ref 55–135)
ALT SERPL W/O P-5'-P-CCNC: 14 U/L (ref 10–44)
ANION GAP SERPL CALC-SCNC: 10 MMOL/L (ref 8–16)
APTT PPP: 28.6 SEC (ref 25.6–35.8)
AST SERPL-CCNC: 16 U/L (ref 10–40)
BASOPHILS # BLD AUTO: 0.03 K/UL (ref 0–0.2)
BASOPHILS NFR BLD: 0.4 % (ref 0–1.9)
BILIRUB SERPL-MCNC: 0.6 MG/DL (ref 0.1–1)
BUN SERPL-MCNC: 15 MG/DL (ref 6–20)
CALCIUM SERPL-MCNC: 9.2 MG/DL (ref 8.7–10.5)
CHLORIDE SERPL-SCNC: 102 MMOL/L (ref 95–110)
CO2 SERPL-SCNC: 26 MMOL/L (ref 23–29)
CREAT SERPL-MCNC: 0.6 MG/DL (ref 0.5–1.4)
DIFFERENTIAL METHOD: NORMAL
EOSINOPHIL # BLD AUTO: 0.1 K/UL (ref 0–0.5)
EOSINOPHIL NFR BLD: 0.7 % (ref 0–8)
ERYTHROCYTE [DISTWIDTH] IN BLOOD BY AUTOMATED COUNT: 14.2 % (ref 11.5–14.5)
EST. GFR  (AFRICAN AMERICAN): >60 ML/MIN/1.73 M^2
EST. GFR  (NON AFRICAN AMERICAN): >60 ML/MIN/1.73 M^2
GLUCOSE SERPL-MCNC: 86 MG/DL (ref 70–110)
HCT VFR BLD AUTO: 42 % (ref 37–48.5)
HGB BLD-MCNC: 13.9 G/DL (ref 12–16)
IMM GRANULOCYTES # BLD AUTO: 0.03 K/UL (ref 0–0.04)
IMM GRANULOCYTES NFR BLD AUTO: 0.4 % (ref 0–0.5)
INR PPP: 0.9
LYMPHOCYTES # BLD AUTO: 2.3 K/UL (ref 1–4.8)
LYMPHOCYTES NFR BLD: 30.3 % (ref 18–48)
MAGNESIUM SERPL-MCNC: 2 MG/DL (ref 1.6–2.6)
MCH RBC QN AUTO: 31 PG (ref 27–31)
MCHC RBC AUTO-ENTMCNC: 33.1 G/DL (ref 32–36)
MCV RBC AUTO: 94 FL (ref 82–98)
MONOCYTES # BLD AUTO: 0.4 K/UL (ref 0.3–1)
MONOCYTES NFR BLD: 5.5 % (ref 4–15)
NEUTROPHILS # BLD AUTO: 4.7 K/UL (ref 1.8–7.7)
NEUTROPHILS NFR BLD: 62.7 % (ref 38–73)
NRBC BLD-RTO: 0 /100 WBC
PLATELET # BLD AUTO: 216 K/UL (ref 150–450)
PMV BLD AUTO: 10.8 FL (ref 9.2–12.9)
POTASSIUM SERPL-SCNC: 4 MMOL/L (ref 3.5–5.1)
PROT SERPL-MCNC: 7.9 G/DL (ref 6–8.4)
PROTHROMBIN TIME: 12 SEC (ref 11.8–14.3)
RBC # BLD AUTO: 4.49 M/UL (ref 4–5.4)
SODIUM SERPL-SCNC: 138 MMOL/L (ref 136–145)
WBC # BLD AUTO: 7.45 K/UL (ref 3.9–12.7)

## 2021-08-20 PROCEDURE — 36415 COLL VENOUS BLD VENIPUNCTURE: CPT | Performed by: SPECIALIST

## 2021-08-20 PROCEDURE — 83735 ASSAY OF MAGNESIUM: CPT | Performed by: SPECIALIST

## 2021-08-20 PROCEDURE — 85730 THROMBOPLASTIN TIME PARTIAL: CPT | Performed by: SPECIALIST

## 2021-08-20 PROCEDURE — 93005 ELECTROCARDIOGRAM TRACING: CPT | Performed by: SPECIALIST

## 2021-08-20 PROCEDURE — 93010 ELECTROCARDIOGRAM REPORT: CPT | Mod: ,,, | Performed by: SPECIALIST

## 2021-08-20 PROCEDURE — 85025 COMPLETE CBC W/AUTO DIFF WBC: CPT | Performed by: SPECIALIST

## 2021-08-20 PROCEDURE — 85610 PROTHROMBIN TIME: CPT | Performed by: SPECIALIST

## 2021-08-20 PROCEDURE — 93010 EKG 12-LEAD: ICD-10-PCS | Mod: ,,, | Performed by: SPECIALIST

## 2021-08-20 PROCEDURE — 80053 COMPREHEN METABOLIC PANEL: CPT | Performed by: SPECIALIST

## 2021-08-20 RX ORDER — GABAPENTIN 600 MG/1
600 TABLET ORAL 3 TIMES DAILY
COMMUNITY
End: 2021-11-01

## 2021-08-23 ENCOUNTER — HOSPITAL ENCOUNTER (OUTPATIENT)
Facility: HOSPITAL | Age: 46
Discharge: HOME | End: 2021-08-23
Attending: SPECIALIST | Admitting: SPECIALIST
Payer: MEDICAID

## 2021-08-23 VITALS
DIASTOLIC BLOOD PRESSURE: 82 MMHG | OXYGEN SATURATION: 95 % | SYSTOLIC BLOOD PRESSURE: 144 MMHG | RESPIRATION RATE: 20 BRPM | HEART RATE: 79 BPM

## 2021-08-23 DIAGNOSIS — R07.89 OTHER CHEST PAIN: ICD-10-CM

## 2021-08-23 DIAGNOSIS — R06.02 SOB (SHORTNESS OF BREATH): ICD-10-CM

## 2021-08-23 LAB — CATH EF QUANTITATIVE: 65 %

## 2021-08-23 PROCEDURE — 99153 MOD SED SAME PHYS/QHP EA: CPT | Performed by: SPECIALIST

## 2021-08-23 PROCEDURE — C1769 GUIDE WIRE: HCPCS | Performed by: SPECIALIST

## 2021-08-23 PROCEDURE — C1751 CATH, INF, PER/CENT/MIDLINE: HCPCS | Performed by: SPECIALIST

## 2021-08-23 PROCEDURE — 99152 MOD SED SAME PHYS/QHP 5/>YRS: CPT | Performed by: SPECIALIST

## 2021-08-23 PROCEDURE — C1887 CATHETER, GUIDING: HCPCS | Performed by: SPECIALIST

## 2021-08-23 PROCEDURE — 93460 R&L HRT ART/VENTRICLE ANGIO: CPT

## 2021-08-23 PROCEDURE — C1760 CLOSURE DEV, VASC: HCPCS | Performed by: SPECIALIST

## 2021-08-23 PROCEDURE — 96374 THER/PROPH/DIAG INJ IV PUSH: CPT | Mod: 59 | Performed by: SPECIALIST

## 2021-08-23 PROCEDURE — 27201423 OPTIME MED/SURG SUP & DEVICES STERILE SUPPLY: Performed by: SPECIALIST

## 2021-08-23 PROCEDURE — 25000003 PHARM REV CODE 250: Performed by: SPECIALIST

## 2021-08-23 PROCEDURE — C1894 INTRO/SHEATH, NON-LASER: HCPCS | Performed by: SPECIALIST

## 2021-08-23 PROCEDURE — 63600175 PHARM REV CODE 636 W HCPCS: Performed by: SPECIALIST

## 2021-08-23 DEVICE — ANGIO-SEAL VIP VASCULAR CLOSURE DEVICE
Type: IMPLANTABLE DEVICE | Site: GROIN | Status: FUNCTIONAL
Brand: ANGIO-SEAL

## 2021-08-23 RX ORDER — MIDAZOLAM HYDROCHLORIDE 1 MG/ML
INJECTION INTRAMUSCULAR; INTRAVENOUS
Status: DISCONTINUED | OUTPATIENT
Start: 2021-08-23 | End: 2021-08-23 | Stop reason: HOSPADM

## 2021-08-23 RX ORDER — SODIUM CHLORIDE 450 MG/100ML
INJECTION, SOLUTION INTRAVENOUS CONTINUOUS
Status: DISCONTINUED | OUTPATIENT
Start: 2021-08-23 | End: 2021-08-23

## 2021-08-23 RX ORDER — HYDROMORPHONE HYDROCHLORIDE 1 MG/ML
1 INJECTION, SOLUTION INTRAMUSCULAR; INTRAVENOUS; SUBCUTANEOUS ONCE
Status: COMPLETED | OUTPATIENT
Start: 2021-08-23 | End: 2021-08-23

## 2021-08-23 RX ORDER — SODIUM CHLORIDE 9 MG/ML
INJECTION, SOLUTION INTRAVENOUS ONCE
Status: COMPLETED | OUTPATIENT
Start: 2021-08-23 | End: 2021-08-23

## 2021-08-23 RX ORDER — FENTANYL CITRATE 50 UG/ML
INJECTION, SOLUTION INTRAMUSCULAR; INTRAVENOUS
Status: DISCONTINUED | OUTPATIENT
Start: 2021-08-23 | End: 2021-08-23 | Stop reason: HOSPADM

## 2021-08-23 RX ORDER — LIDOCAINE HYDROCHLORIDE 10 MG/ML
INJECTION, SOLUTION EPIDURAL; INFILTRATION; INTRACAUDAL; PERINEURAL
Status: DISCONTINUED | OUTPATIENT
Start: 2021-08-23 | End: 2021-08-23 | Stop reason: HOSPADM

## 2021-08-23 RX ADMIN — SODIUM CHLORIDE: 0.9 INJECTION, SOLUTION INTRAVENOUS at 06:08

## 2021-08-23 RX ADMIN — HYDROMORPHONE HYDROCHLORIDE 1 MG: 1 INJECTION, SOLUTION INTRAMUSCULAR; INTRAVENOUS; SUBCUTANEOUS at 09:08

## 2021-09-25 DIAGNOSIS — J40 CHRONIC SINUSITIS WITH RECURRENT BRONCHITIS: ICD-10-CM

## 2021-09-25 DIAGNOSIS — J32.9 CHRONIC SINUSITIS WITH RECURRENT BRONCHITIS: ICD-10-CM

## 2021-09-26 RX ORDER — AZELASTINE 1 MG/ML
SPRAY, METERED NASAL
Qty: 30 ML | Refills: 0 | OUTPATIENT
Start: 2021-09-26

## 2021-09-30 ENCOUNTER — PATIENT MESSAGE (OUTPATIENT)
Dept: RHEUMATOLOGY | Facility: CLINIC | Age: 46
End: 2021-09-30

## 2021-09-30 DIAGNOSIS — K21.9 GERD WITHOUT ESOPHAGITIS: Primary | ICD-10-CM

## 2021-10-03 RX ORDER — ESOMEPRAZOLE MAGNESIUM 40 MG/1
40 CAPSULE, DELAYED RELEASE ORAL
Qty: 90 CAPSULE | Refills: 0 | Status: SHIPPED | OUTPATIENT
Start: 2021-10-03 | End: 2022-06-27 | Stop reason: SDUPTHER

## 2021-10-14 ENCOUNTER — PATIENT MESSAGE (OUTPATIENT)
Dept: RHEUMATOLOGY | Facility: CLINIC | Age: 46
End: 2021-10-14
Payer: MEDICAID

## 2021-11-01 ENCOUNTER — OFFICE VISIT (OUTPATIENT)
Dept: FAMILY MEDICINE | Facility: CLINIC | Age: 46
End: 2021-11-01
Payer: MEDICAID

## 2021-11-01 ENCOUNTER — TELEPHONE (OUTPATIENT)
Dept: NEUROLOGY | Facility: CLINIC | Age: 46
End: 2021-11-01
Payer: MEDICAID

## 2021-11-01 VITALS
WEIGHT: 148 LBS | SYSTOLIC BLOOD PRESSURE: 132 MMHG | DIASTOLIC BLOOD PRESSURE: 78 MMHG | TEMPERATURE: 99 F | BODY MASS INDEX: 27.23 KG/M2 | RESPIRATION RATE: 14 BRPM | OXYGEN SATURATION: 96 % | HEART RATE: 90 BPM | HEIGHT: 62 IN

## 2021-11-01 DIAGNOSIS — Z23 NEEDS FLU SHOT: ICD-10-CM

## 2021-11-01 DIAGNOSIS — M35.1 MIXED CONNECTIVE TISSUE DISEASE: ICD-10-CM

## 2021-11-01 DIAGNOSIS — R25.1 TREMORS OF NERVOUS SYSTEM: ICD-10-CM

## 2021-11-01 DIAGNOSIS — G62.9 PERIPHERAL POLYNEUROPATHY: ICD-10-CM

## 2021-11-01 DIAGNOSIS — F43.21 SITUATIONAL DEPRESSION: ICD-10-CM

## 2021-11-01 DIAGNOSIS — F41.9 CHRONIC ANXIETY: Primary | ICD-10-CM

## 2021-11-01 PROCEDURE — 99215 OFFICE O/P EST HI 40 MIN: CPT | Performed by: INTERNAL MEDICINE

## 2021-11-01 PROCEDURE — 99214 PR OFFICE/OUTPT VISIT, EST, LEVL IV, 30-39 MIN: ICD-10-PCS | Mod: S$PBB,,, | Performed by: INTERNAL MEDICINE

## 2021-11-01 PROCEDURE — 90471 IMMUNIZATION ADMIN: CPT | Mod: PBBFAC | Performed by: INTERNAL MEDICINE

## 2021-11-01 PROCEDURE — 99214 OFFICE O/P EST MOD 30 MIN: CPT | Mod: S$PBB,,, | Performed by: INTERNAL MEDICINE

## 2021-11-01 RX ORDER — MIDODRINE HYDROCHLORIDE 10 MG/1
1 TABLET ORAL DAILY
COMMUNITY
Start: 2021-10-11 | End: 2022-06-13

## 2021-11-01 RX ORDER — DOXEPIN HYDROCHLORIDE 100 MG/1
100 CAPSULE ORAL NIGHTLY
Qty: 90 CAPSULE | Refills: 1 | Status: SHIPPED | OUTPATIENT
Start: 2021-11-01 | End: 2022-05-26

## 2021-11-01 RX ORDER — ALIROCUMAB 75 MG/ML
INJECTION, SOLUTION SUBCUTANEOUS
COMMUNITY
Start: 2021-10-20 | End: 2022-06-14

## 2021-11-01 RX ORDER — GABAPENTIN 300 MG/1
CAPSULE ORAL
Qty: 180 CAPSULE | Refills: 1 | Status: SHIPPED | OUTPATIENT
Start: 2021-11-01 | End: 2022-01-10

## 2021-11-03 ENCOUNTER — TELEPHONE (OUTPATIENT)
Dept: FAMILY MEDICINE | Facility: CLINIC | Age: 46
End: 2021-11-03
Payer: MEDICAID

## 2021-11-03 DIAGNOSIS — R25.1 TREMORS OF NERVOUS SYSTEM: ICD-10-CM

## 2021-11-03 DIAGNOSIS — G62.9 PERIPHERAL POLYNEUROPATHY: Primary | ICD-10-CM

## 2021-11-08 ENCOUNTER — PATIENT MESSAGE (OUTPATIENT)
Dept: FAMILY MEDICINE | Facility: CLINIC | Age: 46
End: 2021-11-08
Payer: MEDICAID

## 2021-11-08 DIAGNOSIS — F51.04 CHRONIC INSOMNIA: ICD-10-CM

## 2021-11-08 RX ORDER — DIAZEPAM 5 MG/1
TABLET ORAL
Qty: 60 TABLET | Refills: 0 | Status: SHIPPED | OUTPATIENT
Start: 2021-11-08 | End: 2021-12-15

## 2021-11-09 ENCOUNTER — OFFICE VISIT (OUTPATIENT)
Dept: PULMONOLOGY | Facility: CLINIC | Age: 46
End: 2021-11-09
Payer: MEDICAID

## 2021-11-09 VITALS
SYSTOLIC BLOOD PRESSURE: 140 MMHG | DIASTOLIC BLOOD PRESSURE: 80 MMHG | OXYGEN SATURATION: 94 % | WEIGHT: 150 LBS | BODY MASS INDEX: 27.44 KG/M2 | HEART RATE: 96 BPM

## 2021-11-09 DIAGNOSIS — J98.4 CYSTIC-BULLOUS DISEASE OF LUNG: ICD-10-CM

## 2021-11-09 DIAGNOSIS — F17.200 CURRENT EVERY DAY SMOKER: ICD-10-CM

## 2021-11-09 DIAGNOSIS — J84.9 INTERSTITIAL PULMONARY DISEASE, UNSPECIFIED: Primary | ICD-10-CM

## 2021-11-09 DIAGNOSIS — J84.9 ILD (INTERSTITIAL LUNG DISEASE): ICD-10-CM

## 2021-11-09 DIAGNOSIS — J32.9 CHRONIC SINUSITIS WITH RECURRENT BRONCHITIS: ICD-10-CM

## 2021-11-09 DIAGNOSIS — M35.1 MCTD (MIXED CONNECTIVE TISSUE DISEASE): ICD-10-CM

## 2021-11-09 DIAGNOSIS — J40 CHRONIC SINUSITIS WITH RECURRENT BRONCHITIS: ICD-10-CM

## 2021-11-09 PROCEDURE — 99214 OFFICE O/P EST MOD 30 MIN: CPT | Mod: S$GLB,,, | Performed by: INTERNAL MEDICINE

## 2021-11-09 PROCEDURE — 99214 PR OFFICE/OUTPT VISIT, EST, LEVL IV, 30-39 MIN: ICD-10-PCS | Mod: S$GLB,,, | Performed by: INTERNAL MEDICINE

## 2021-11-09 RX ORDER — DROXIDOPA 100 MG/1
CAPSULE ORAL
COMMUNITY
Start: 2021-11-04 | End: 2021-11-22

## 2021-11-22 ENCOUNTER — TELEPHONE (OUTPATIENT)
Dept: FAMILY MEDICINE | Facility: CLINIC | Age: 46
End: 2021-11-22
Payer: MEDICAID

## 2021-11-22 ENCOUNTER — OFFICE VISIT (OUTPATIENT)
Dept: FAMILY MEDICINE | Facility: CLINIC | Age: 46
End: 2021-11-22
Payer: MEDICAID

## 2021-11-22 VITALS
BODY MASS INDEX: 27.57 KG/M2 | HEART RATE: 71 BPM | WEIGHT: 149.81 LBS | SYSTOLIC BLOOD PRESSURE: 124 MMHG | OXYGEN SATURATION: 96 % | DIASTOLIC BLOOD PRESSURE: 82 MMHG | TEMPERATURE: 99 F | HEIGHT: 62 IN

## 2021-11-22 DIAGNOSIS — H66.001 NON-RECURRENT ACUTE SUPPURATIVE OTITIS MEDIA OF RIGHT EAR WITHOUT SPONTANEOUS RUPTURE OF TYMPANIC MEMBRANE: Primary | ICD-10-CM

## 2021-11-22 DIAGNOSIS — F51.01 PRIMARY INSOMNIA: Primary | ICD-10-CM

## 2021-11-22 PROCEDURE — 99213 OFFICE O/P EST LOW 20 MIN: CPT | Mod: S$PBB,,, | Performed by: NURSE PRACTITIONER

## 2021-11-22 PROCEDURE — 99213 PR OFFICE/OUTPT VISIT, EST, LEVL III, 20-29 MIN: ICD-10-PCS | Mod: S$PBB,,, | Performed by: NURSE PRACTITIONER

## 2021-11-22 PROCEDURE — 99214 OFFICE O/P EST MOD 30 MIN: CPT | Performed by: NURSE PRACTITIONER

## 2021-11-22 PROCEDURE — 96372 THER/PROPH/DIAG INJ SC/IM: CPT | Mod: PBBFAC | Performed by: NURSE PRACTITIONER

## 2021-11-22 RX ORDER — DEXAMETHASONE SODIUM PHOSPHATE 4 MG/ML
8 INJECTION, SOLUTION INTRA-ARTICULAR; INTRALESIONAL; INTRAMUSCULAR; INTRAVENOUS; SOFT TISSUE
Status: COMPLETED | OUTPATIENT
Start: 2021-11-22 | End: 2021-11-22

## 2021-11-22 RX ORDER — HYDROCODONE BITARTRATE AND ACETAMINOPHEN 5; 325 MG/1; MG/1
1 TABLET ORAL EVERY 12 HOURS PRN
Qty: 10 TABLET | Refills: 0 | Status: SHIPPED | OUTPATIENT
Start: 2021-11-22 | End: 2022-06-14

## 2021-11-22 RX ORDER — DROXIDOPA 200 MG/1
CAPSULE ORAL
COMMUNITY
Start: 2021-11-17 | End: 2022-06-14

## 2021-11-22 RX ORDER — LORAZEPAM 1 MG/1
TABLET ORAL
Qty: 30 TABLET | Status: SHIPPED | OUTPATIENT
Start: 2021-11-22 | End: 2021-12-14

## 2021-11-22 RX ORDER — AMOXICILLIN AND CLAVULANATE POTASSIUM 875; 125 MG/1; MG/1
1 TABLET, FILM COATED ORAL EVERY 12 HOURS
Qty: 20 TABLET | Refills: 0 | Status: SHIPPED | OUTPATIENT
Start: 2021-11-22 | End: 2021-12-14

## 2021-11-22 RX ADMIN — DEXAMETHASONE SODIUM PHOSPHATE 8 MG: 4 INJECTION, SOLUTION INTRA-ARTICULAR; INTRALESIONAL; INTRAMUSCULAR; INTRAVENOUS; SOFT TISSUE at 04:11

## 2021-12-14 ENCOUNTER — OFFICE VISIT (OUTPATIENT)
Dept: FAMILY MEDICINE | Facility: CLINIC | Age: 46
End: 2021-12-14
Payer: MEDICAID

## 2021-12-14 VITALS
SYSTOLIC BLOOD PRESSURE: 132 MMHG | BODY MASS INDEX: 27.79 KG/M2 | RESPIRATION RATE: 14 BRPM | DIASTOLIC BLOOD PRESSURE: 74 MMHG | WEIGHT: 151 LBS | TEMPERATURE: 99 F | HEIGHT: 62 IN | OXYGEN SATURATION: 97 % | HEART RATE: 88 BPM

## 2021-12-14 DIAGNOSIS — E55.9 VITAMIN D DEFICIENCY: ICD-10-CM

## 2021-12-14 DIAGNOSIS — K05.10 GINGIVITIS: Primary | ICD-10-CM

## 2021-12-14 DIAGNOSIS — M25.50 ARTHRALGIA, UNSPECIFIED JOINT: ICD-10-CM

## 2021-12-14 PROCEDURE — 99215 OFFICE O/P EST HI 40 MIN: CPT | Performed by: INTERNAL MEDICINE

## 2021-12-14 PROCEDURE — 99213 OFFICE O/P EST LOW 20 MIN: CPT | Mod: S$PBB,,, | Performed by: INTERNAL MEDICINE

## 2021-12-14 PROCEDURE — 99213 PR OFFICE/OUTPT VISIT, EST, LEVL III, 20-29 MIN: ICD-10-PCS | Mod: S$PBB,,, | Performed by: INTERNAL MEDICINE

## 2021-12-14 RX ORDER — ERGOCALCIFEROL 1.25 MG/1
50000 CAPSULE ORAL
Qty: 4 CAPSULE | Refills: 0 | Status: SHIPPED | OUTPATIENT
Start: 2021-12-14 | End: 2022-01-10

## 2021-12-14 RX ORDER — CEPHALEXIN 500 MG/1
500 CAPSULE ORAL EVERY 6 HOURS
Qty: 40 CAPSULE | Refills: 0 | Status: SHIPPED | OUTPATIENT
Start: 2021-12-14 | End: 2022-02-16 | Stop reason: ALTCHOICE

## 2021-12-14 RX ORDER — IBUPROFEN 600 MG/1
TABLET ORAL
Qty: 60 TABLET | Refills: 1 | Status: SHIPPED | OUTPATIENT
Start: 2021-12-14 | End: 2022-04-28 | Stop reason: SDUPTHER

## 2021-12-14 RX ORDER — FLUCONAZOLE 150 MG/1
150 TABLET ORAL DAILY
Qty: 1 TABLET | Refills: 0 | Status: SHIPPED | OUTPATIENT
Start: 2021-12-14 | End: 2021-12-15

## 2022-01-19 DIAGNOSIS — Z86.39 HISTORY OF DIABETES MELLITUS: ICD-10-CM

## 2022-01-19 DIAGNOSIS — E11.40 CONTROLLED TYPE 2 DIABETES WITH NEUROPATHY: ICD-10-CM

## 2022-02-11 ENCOUNTER — TELEPHONE (OUTPATIENT)
Dept: FAMILY MEDICINE | Facility: CLINIC | Age: 47
End: 2022-02-11
Payer: MEDICAID

## 2022-02-11 NOTE — TELEPHONE ENCOUNTER
The following medication needs a prior authorization:     Medication Name: Esomeprazole     Dosage: 40 mg    Frequency: daily    Directions for use: one tablet every day before breakfast    Diagnosis: GERD without esophagitis  - Primary     Is the request for a reauthorization? yes    Is the patient currently stable on therapy?  yes    Please list all therapeutic alternatives previously used with start/end dates and outcome:  Omeprazole 40 mg one daily started 6/22/2017 stopped 1/6/2021 no results  Rantidine 150 mg  Two tab every night started 6/17/2019  Stopped 5/11/2020 no results  Dicyclomine 20 mg one twice a day 20 5/31/2017 Stopped 9/21/201 no results  Esomeprozaloe 40 mg one capsule every day before breakfast 3/17/2020 to present stable  Famotidine 20 one tablet every day 3/16/2020 to present stable

## 2022-02-16 ENCOUNTER — PATIENT MESSAGE (OUTPATIENT)
Dept: ALLERGY | Facility: CLINIC | Age: 47
End: 2022-02-16

## 2022-02-16 ENCOUNTER — OFFICE VISIT (OUTPATIENT)
Dept: ALLERGY | Facility: CLINIC | Age: 47
End: 2022-02-16
Payer: MEDICAID

## 2022-02-16 VITALS
HEIGHT: 62 IN | TEMPERATURE: 98 F | DIASTOLIC BLOOD PRESSURE: 87 MMHG | BODY MASS INDEX: 29.63 KG/M2 | SYSTOLIC BLOOD PRESSURE: 132 MMHG | HEART RATE: 81 BPM | WEIGHT: 161 LBS

## 2022-02-16 DIAGNOSIS — J84.9 INTERSTITIAL LUNG DISEASE: ICD-10-CM

## 2022-02-16 DIAGNOSIS — J32.9 CHRONIC SINUSITIS WITH RECURRENT BRONCHITIS: ICD-10-CM

## 2022-02-16 DIAGNOSIS — J40 CHRONIC SINUSITIS WITH RECURRENT BRONCHITIS: ICD-10-CM

## 2022-02-16 DIAGNOSIS — Z72.0 TOBACCO ABUSE: ICD-10-CM

## 2022-02-16 DIAGNOSIS — D80.6 DEFICIENCY OF ANTI-PNEUMOCOCCAL POLYSACCHARIDE ANTIBODY: Primary | ICD-10-CM

## 2022-02-16 PROCEDURE — 1160F RVW MEDS BY RX/DR IN RCRD: CPT | Mod: S$GLB,,, | Performed by: ALLERGY & IMMUNOLOGY

## 2022-02-16 PROCEDURE — 1160F PR REVIEW ALL MEDS BY PRESCRIBER/CLIN PHARMACIST DOCUMENTED: ICD-10-PCS | Mod: S$GLB,,, | Performed by: ALLERGY & IMMUNOLOGY

## 2022-02-16 PROCEDURE — 3008F BODY MASS INDEX DOCD: CPT | Mod: S$GLB,,, | Performed by: ALLERGY & IMMUNOLOGY

## 2022-02-16 PROCEDURE — 3075F PR MOST RECENT SYSTOLIC BLOOD PRESS GE 130-139MM HG: ICD-10-PCS | Mod: S$GLB,,, | Performed by: ALLERGY & IMMUNOLOGY

## 2022-02-16 PROCEDURE — 99214 PR OFFICE/OUTPT VISIT, EST, LEVL IV, 30-39 MIN: ICD-10-PCS | Mod: S$GLB,,, | Performed by: ALLERGY & IMMUNOLOGY

## 2022-02-16 PROCEDURE — 3008F PR BODY MASS INDEX (BMI) DOCUMENTED: ICD-10-PCS | Mod: S$GLB,,, | Performed by: ALLERGY & IMMUNOLOGY

## 2022-02-16 PROCEDURE — 3079F PR MOST RECENT DIASTOLIC BLOOD PRESSURE 80-89 MM HG: ICD-10-PCS | Mod: S$GLB,,, | Performed by: ALLERGY & IMMUNOLOGY

## 2022-02-16 PROCEDURE — 3079F DIAST BP 80-89 MM HG: CPT | Mod: S$GLB,,, | Performed by: ALLERGY & IMMUNOLOGY

## 2022-02-16 PROCEDURE — 99214 OFFICE O/P EST MOD 30 MIN: CPT | Mod: S$GLB,,, | Performed by: ALLERGY & IMMUNOLOGY

## 2022-02-16 PROCEDURE — 3075F SYST BP GE 130 - 139MM HG: CPT | Mod: S$GLB,,, | Performed by: ALLERGY & IMMUNOLOGY

## 2022-02-16 RX ORDER — DOXYCYCLINE 100 MG/1
CAPSULE ORAL
Qty: 15 CAPSULE | Refills: 3 | Status: SHIPPED | OUTPATIENT
Start: 2022-02-16 | End: 2022-06-23 | Stop reason: ALTCHOICE

## 2022-02-16 RX ORDER — BUDESONIDE 0.5 MG/2ML
INHALANT ORAL
Qty: 120 ML | Refills: 3 | Status: SHIPPED | OUTPATIENT
Start: 2022-02-16 | End: 2022-07-05

## 2022-02-16 RX ORDER — AZELASTINE 1 MG/ML
SPRAY, METERED NASAL
Qty: 30 ML | Refills: 0 | Status: SHIPPED | OUTPATIENT
Start: 2022-02-16 | End: 2022-03-16

## 2022-02-16 NOTE — PROGRESS NOTES
Subjective:       Patient ID: Promise Medrano is a 46 y.o. female.    Chief Complaint: Immunodeficiency (Reestablish for low strep titers and recurrent infection. Has noticed increase in sinus sy)    HPI     Pt presents for allergic rhinitis and recurrent bronchitis.     Her strep pneumo titers were not responsive to her pneumovax 23 she obtained in November 2019 indicating SAD.      She was lost to follow up 4/2020 until now in 2022    Since her last visit,   She has noticed an increase in her sinus symptoms.     She was started on oxygen via pulmonary for interstitial pulmonary disease.     For her SAD she was on doxy [facility] back in 2020.       Allergic Rhinitis- dust mite only   Condition: exacerbated    Onset: childhood  Sx: congestion, dryness, feels fluid in the ears.   Season: perennial   Trigger: uncertain   Tx:   saline, azelastine- 1 sen qday or more prn, fluticasone, montelukast - stopped herself.   ait in the past: yes  Recent testing: serum IgE dust mite only.   ct sinus: 6/2019  Essentially clear paranasal sinuses without significant mucoperiosteal sinus  disease.    SAD  doxcycline was last used in 4/2020  [facility] - I tried to order it , but it is not orderable in epic.     Recurrent bronchitis felt to be due to SAD dx 12/2019  Started on doxy proph [facility] 12/2019  No other abx needed     Condition: exacerbated see above   Onset: 2018  3-4 episodes last winter  Has a pmh of lupus autoimmunity, complement levels normal.   April 2019 was d/c from [facility] for pna and or bronchitis  She has a history of current smoker   Has nebulizer for albuterol  Not required nebulizer.   Sx: cough , wheezing.   Does have associated tobacco abuse as well.   Sx frequ: > 2 days per week    Chest ct 8/2018 shows bronchioloitis and mediastinal lymph nodes and axillary lymph nodes presumed reactive.   Pft: large airway no obstruction and partial response to bronchodilator, small airway reversed by 31%.   Tx:  "currently on stialto , "red inhaler" symbicort 160, "gray inhaler" ventolin.     Immune evaluation: 6/20/2019    Complement, Total (CH50) 56 ( >41 U/mL)  Complement C2                 3.3  C4 Complement 14 - 44 mg/dL 16      IgA, Serum                    281                         IgM 26 - 217 mg/dL 52      IgG, Total Serum             1259                     700-1600  mg/dL       IgG Subclass 1                710                      248-810  mg/dL       IgG Subclass 2                189                      130-555  mg/dL       IgG Subclass 3                 70                         mg/dL       IgG Subclass 4                 61                         2-96  mg/dL                                           Strep Pneumoniae Type 1  0.5 L >1.3 ug/mL  Strep Pneumoniae Type 3 0.6 L >1.3 ug/mL  Strep Pneumoniae Type 4 0.1 L >1.3 ug/mL  Strep Pneumoniae Type 8 1.8  >1.3 ug/mL  Strep Pneumoniae Type 9 0.5 L >1.3 ug/mL  Strep Pneumoniae Type 12 <0.1 L >1.3 ug/mL  Strep Pneumoniae Type 14 1.2 L >1.3 ug/mL  Strep Pneumoniae Type 19 0.7 L >1.3 ug/mL  Strep Pneumoniae Type 23 0.3 L >1.3 ug/mL  Strep Pneumoniae Type 26 4.3  >1.3 ug/mL  Strep Pneumoniae Type 51 0.3 L >1.3 ug/mL  Strep Pneumoniae Type 56 0.5 L >1.3 ug/mL  Strep Pneumoniae Type 57 1.5  >1.3 ug/mL  Strep Pneumoniae Type 68 <0.1 L >1.3 ug/mL    3/14 = 21%    Hematocrit                   46.0                    34.0-46.6  %           WBC                           5.7                     3.4-10.8  x10E3/uL    RBC                          4.83                    3.77-5.28  x10E6/uL    Hemoglobin                   14.7                    11.1-15.9  g/dL        Basos                           0                   Not Estab.  %           Neurtrophils                   60                   Not Estab.  %           Neurtrophils (Absolute) 3.4  1.4-7.0 x10E3/uL  Lymphs (Absolute)             1.9                      0.7-3.1  x10E3/uL    MCV                           " " 95                        79-97  fL          MCHC                         32.0                    31.5-35.7  g/dL        MCH                          30.4                    26.6-33.0  pg          Lymphs                         34                   Not Estab.  %           Immature Granulocytes            0                   Not Estab.  %           Eos                             0                   Not Estab.  %           Monocytes                       6                   Not Estab.  %           Platelets                     185                      150-450  x10E3/uL    Eos (Absolute)                0.0                      0.0-0.4  x10E3/uL    BASO (Absolute)               0.0                      0.0-0.2  x10E3/uL    Monocytes (Absolute)          0.4                      0.1-0.9  x10E3/uL    % CD19+ Lymphs                9.3                     3.3-25.4  %           Abs. CD19+ Lymphs             177                         /uL         Absolute CD4 [place]           716                     359-1519  /uL         Absolute CD3                 1634                     622-2402  /uL         % CD 4 Pos. Lymph            37.7                    30.8-58.5  %           CD4/CD8 Ratio                0.78 L                  0.92-3.72              % CD3 Pos. Lymph             86.0                    57.5-86.2  %           Abs. CD8 Suppressor           923 H                    109-897  /uL         % CD8 Pos. Lymph             48.6 H                  12.0-35.5  %           RDW                          14.5                    12.3-15.4  %           % NK (CD56/16)                4.1                     1.4-19.4  %           Ab NK (CD56/16)                78       Lpr:   Ranitidine rx at last visit.   Condition: stable , not better.   She didn't start it.   "horrible acid reflux"  "gut issues" not diagnosed.   "why I got off plaquenil" "tore up my gut."       Atopic Hx    Rhinitis see above   Oral allergy: denies  Food " allergy: none    Asthma see above for bronchitis   Latex tolerates   Eczema: denies, but may have a psoriasis.    Urticaria denies chronic, has doxepin qhs     Infection History    Pneumonia # in the past 12 months: bronchitis as above   Sinus infection # in the past 12 months: reports feels like sinus infections.   Otitis infection # in the past 12 months:  Denies chronic infection, but notes chronic fluid in the ears.     Dust mite only    IgE Cladosporium herbarum <0.10  Class 0 kU/L  IgE Dog Dander              <0.10                      Class 0  kU/L        IgE Cat Epithelium          <0.10                      Class 0  kU/L        IgE Ragweed, Short          <0.10                      Class 0  kU/L        IgE D. pteronyssinus         0.13 AB                 Class 0/I  kU/L        IgE A. fumigatus            <0.10                      Class 0  kU/L        IgE Alteraria alternata <0.10  Class 0 kU/L  IgE Elm, American           <0.10                      Class 0  kU/L        IgE Bermuda Grass           <0.10                      Class 0  kU/L        IgE [place], White              <0.10                      Class 0  kU/L        IgE Pigweed, Common         <0.10                      Class 0  kU/L        IgE Cockroach, Croatian        <0.10                      Class 0  kU/L        IgE Thistle Russian         <0.10                      Class 0  kU/L        IgE D. farinae              <0.10                      Class 0  kU/L        IgE Cockroach American <0.10  Class 0 kU/L   This test was developed and its performance      characteristics determined by [facility].  It      has not been cleared or approved by the U.S.      Food and Drug Administration.      The FDA has determined that such clearance      or approval is not necessary. This test is      used for clinical purposes.  It should not      be regarded as investigational or for                             research.                                           IgE  Maple/Box Elder         <0.10                      Class 0  kU/L        IgE Penicillium chrysogen <0.10  Class 0 kU/L  IgE Fleming              <0.10                      Class 0  kU/L        IgE Gabe Grass           <0.10                      Class 0  kU/L        IgE Arik Grass           <0.10                      Class 0  kU/L        IgE Bahia Grass             <0.10                      Class 0  kU/L        IgE Sheep Estelle           <0.10                      Class 0  kU/L        IgE Plantain, English        <0.10                      Class 0  kU/L        IgE Mugwort                 <0.10                      Class 0  kU/L        IgE Pecan [place]           <0.10                      Class 0  kU/L        IgE Candida albicans        <0.10                      Class 0  kU/L        IgE Setomelanomma rostrat <0.10  Class 0 kU/L  IgE White [place]          <0.10                      Class 0  kU/L        IgE Epicoccum purpur        <0.10                      Class 0  kU/L        IgE Fusarium proliferatum <0.10  Class 0 kU/L  IgE Trichophyton rubrum <0.10  Class 0 kU/L  IgE Rough Marshelder        <0.10                      Class 0  kU/L        IgE Mouse Urine             <0.10                      Class 0  kU/L        IgE Silver Birch            <0.10                      Class 0  kU/L        IgE Maple [place]thete/[place] <0.10  Class 0 kU/L  IgE Bipolaris               <0.10                      Class 0  kU/L         This test was developed and its performance      characteristics determined by [employer].  It      has not been cleared or approved by the U.S.      Food and Drug Administration.      The FDA has determined that such clearance      or approval is not necessary. This test is      used for clinical purposes.  It should not      be regarded as investigational or for                             research.                                           IgE Nishant, White              <0.10                      Class  0  kU/L        IgE Privet, Common          <0.10                      Class 0  kU/L        IgE Ragweed, Giant          <0.10                      Class 0  kU/L        IgE Nettle                  <0.10                      Class 0  kU/L        IgE Cocklebur               <0.10                      Class 0  kU/L        IgE Dockweed, Yellow        <0.10                      Class 0  kU/L         This test was developed and its performance      characteristics determined by [employer].  It      has not been cleared or approved by the U.S.      Food and Drug Administration.      The FDA has determined that such clearance      or approval is not necessary. This test is      used for clinical purposes.  It should not      be regarded as investigational or for                             research.                                           IgE Hackberry               <0.10                      Class 0  kU/L         This test was developed and its performance      characteristics determined by [facility].  It      has not been cleared or approved by the U.S.      Food and Drug Administration.      The FDA has determined that such clearance      or approval is not necessary. This test is      used for clinical purposes.  It should not      be regarded as investigational or for                             research.                                           IgE Sweet Gum               <0.10                      Class 0  kU/L         This test was developed and its performance      characteristics determined by [facility].  It      has not been cleared or approved by the U.S.      Food and Drug Administration.      The FDA has determined that such clearance      or approval is not necessary. This test is      used for clinical purposes.  It should not      be regarded as investigational or for                             research.                                           IgE W[place]wood                <0.10                      Class 0  " kU/L        IgE Fennel, Dog             <0.10                      Class 0  kU/L         This test was developed and its performance      characteristics determined by [facility].  It      has not been cleared or approved by the U.S.      Food and Drug Administration.      The FDA has determined that such clearance      or approval is not necessary. This test is      used for clinical purposes.  It should not      be regarded as investigational or for                             research.                                                                                                                        Performed at: , [address], [id number]      Jc Mckeon MD, Phone:  [phone]     IgE Mouse Epithelium        <0.10                      Class 0  kU/L        IgE [place] Bald            <0.10           Review of Systems      General: neg unexpected weight changes, fevers, chills, night sweats, malaise  HEENT: see hpi, Neg eye pain, vision changes, ear drainage, nose bleeds, throat tightness, sores in the mouth  CV: Neg chest pain, palpitations, swelling  Resp: see hpi, neg shortness of breath, hemoptysis  GI: see hpi, neg dysphagia, night abdominal pain, reflux, chronic diarrhea, chronic constipation  Derm: See Hpi, neg new rash, neg flushing  Mu/sk: Neg joint pain, joint swelling   Psych: Neg anxiety  neuro: neg chronic headaches, muscle weakness  Endo: neg heat/cold intolerance, chronic fatigue    Objective:     Vitals:    02/16/22 1003   BP: 132/87   Pulse: 81   Temp: 97.9 °F (36.6 °C)   Weight: 73 kg (161 lb)   Height: 5' 2" (1.575 m)        Physical Exam      General: no acute distress, well developed well nourished     Assessment:       1. Deficiency of anti-pneumococcal polysaccharide antibody    2. Chronic sinusitis with recurrent bronchitis    3. Tobacco abuse    4. Interstitial lung disease        Plan:       Deficiency of anti-pneumococcal polysaccharide " antibody  -     doxycycline (VIBRAMYCIN) 100 MG Cap; ONLY take 1 capsule on Monday Wednesday Friday  Dispense: 15 capsule; Refill: 3    Chronic sinusitis with recurrent bronchitis  -     azelastine (ASTELIN) 137 mcg (0.1 %) nasal spray; USE 1 SPRAY IN EACH NOSTRIL TWICE DAILY  Dispense: 30 mL; Refill: 0  -     budesonide (PULMICORT) 0.5 mg/2 mL nebulizer solution; 1 vial into sinus rinse twice per day  Dispense: 120 mL; Refill: 3    Tobacco abuse    Interstitial lung disease      Chronic allergic rhinitis: dust mite only   2/22  Serum IgE- not stable enough for skin prick testing. Dust mite only- mild sensitivity.   saline and azelastine    disContinue fluticasone   Discontinue montelukast 1 pill po qday - pt self d/c   levocetirizine prn   Start budesonide in saline rinse     Recurrent bronchitis and sinusitis/SAD  2/22    Continue with pulmonary and Dr. Tarango.     Discussed for pt to reach out to Dr. Tarango about current sx and if he wants to change her regimen.   She assures me she that she has albuterol and wants to treat at home. She agrees to reach out to Dr. Tarango.   Immune eval: personally reviewed   Low strep pneumo titers- no response to ppv 23   SAD 12/2019.   doxycycline 100 mg BID Monday Wednesday and Friday.     [facility] labs - imaging center   Spirometry - complete PFT  -personally reviewed   Small airway reversibility by 31%     Prednisone x 4 days to help with sinus and lung symptoms- 9/2019    Smoking cessation recommended.      Flu vaccine yearly recommended.   covid vaccination obtained     F/u 3 months, sooner if needed.              Katiana Cline M.D.  Allergy/Immunology  [facility]   [phone] phone  [phone] fax

## 2022-02-16 NOTE — PATIENT INSTRUCTIONS
over the counter      Put distilled water into the kath med sinus rinse bottle and stop at the black line. Then pour that amount into a microwavable mug, microwave 10 seconds then put into the distilled water back into the bottle.   Then place xlear packet into and mix. The packets that come with the kath med can be used instead or kept for backup if you run out of xlear.      - over the counter, can buy online at [email or web address].        Then place budesonide- respules into mixture, this will replace the intranasal steroid spray. (flonase/fluticasone, rhinocort, nasacort)              Hold breath, squeeze GENTLY ! And blow out. Easiest probably in the shower.      Must be used daily or this will not be effective.     Use 2 vials daily or 2 vials twice per day x 4 weeks. When you feel better use 2 vials daily.     Azelastine- use as needed for congestion and post nasal drip. Use 1 spray per nare every 6 hours.      Use antihistamines as needed for itching or sneezing.     Immune:  Start Doxycycline M W F 1 capsule once per day only on those days     Will look into [place], I couldn't order it in epic.       Follow up in 3 months, sooner if needed.

## 2022-02-23 DIAGNOSIS — D84.9 IMMUNOSUPPRESSED STATUS: ICD-10-CM

## 2022-03-02 ENCOUNTER — PATIENT MESSAGE (OUTPATIENT)
Dept: FAMILY MEDICINE | Facility: CLINIC | Age: 47
End: 2022-03-02
Payer: MEDICAID

## 2022-03-23 DIAGNOSIS — F51.04 CHRONIC INSOMNIA: ICD-10-CM

## 2022-03-23 RX ORDER — DIAZEPAM 5 MG/1
TABLET ORAL
Qty: 60 TABLET | Refills: 0 | Status: SHIPPED | OUTPATIENT
Start: 2022-03-23 | End: 2022-04-25 | Stop reason: SDUPTHER

## 2022-03-23 NOTE — TELEPHONE ENCOUNTER
Refill Diazepam 5 mg tabs  Take one to two lab qhs  Last office visit 12/14/21  Follow up 03/30/22  Medication last written 01/13/22 #60 no refills   Last filled on 02/10/22 #60 no refills

## 2022-04-25 DIAGNOSIS — E78.00 PURE HYPERCHOLESTEROLEMIA: Primary | ICD-10-CM

## 2022-04-25 DIAGNOSIS — F51.04 CHRONIC INSOMNIA: ICD-10-CM

## 2022-04-25 RX ORDER — DIAZEPAM 5 MG/1
TABLET ORAL
Qty: 60 TABLET | Refills: 0 | Status: SHIPPED | OUTPATIENT
Start: 2022-04-25 | End: 2022-06-14 | Stop reason: SDUPTHER

## 2022-04-25 RX ORDER — EZETIMIBE 10 MG/1
10 TABLET ORAL DAILY
Qty: 90 TABLET | Refills: 1 | Status: SHIPPED | OUTPATIENT
Start: 2022-04-25 | End: 2022-10-23

## 2022-04-25 NOTE — TELEPHONE ENCOUNTER
Refill request    Ezetimibe 10 mg   Last office visit     12/14/21  Follow up visit     05/16/22  Sent to pharmacy    08/31/21 (0   Last filled at pharmacy    01/17/22 #90 no refills   Medication pended

## 2022-04-25 NOTE — TELEPHONE ENCOUNTER
Refill request    Diazepam 5 mg tab   Last office visit     12-14-21   Follow up visit     05/16/22  Sent to pharmacy    03/23/22  #60 no refills   Last filled at pharmacy    03/30/22  #60 no refils  Medication pended

## 2022-04-28 DIAGNOSIS — M25.50 ARTHRALGIA, UNSPECIFIED JOINT: ICD-10-CM

## 2022-04-28 RX ORDER — IBUPROFEN 600 MG/1
TABLET ORAL
Qty: 60 TABLET | Refills: 1 | Status: SHIPPED | OUTPATIENT
Start: 2022-04-28 | End: 2022-07-06

## 2022-05-26 ENCOUNTER — PATIENT MESSAGE (OUTPATIENT)
Dept: FAMILY MEDICINE | Facility: CLINIC | Age: 47
End: 2022-05-26

## 2022-05-31 DIAGNOSIS — F51.04 CHRONIC INSOMNIA: ICD-10-CM

## 2022-05-31 RX ORDER — DIAZEPAM 5 MG/1
TABLET ORAL
Qty: 60 TABLET | Refills: 0 | OUTPATIENT
Start: 2022-05-31

## 2022-05-31 NOTE — TELEPHONE ENCOUNTER
Refill request    Diazepam 5 mg tab   Last office visit     12/14/21  Follow up visit     06/27/22  Sent to pharmacy    04/25/22 #60 no refill  Last filled at pharmacy    04/28/22 #60 no refill   Medication pended

## 2022-06-13 RX ORDER — MIDODRINE HYDROCHLORIDE 2.5 MG/1
2.5 TABLET ORAL 3 TIMES DAILY
COMMUNITY
Start: 2022-05-31 | End: 2023-07-19

## 2022-06-14 ENCOUNTER — PATIENT MESSAGE (OUTPATIENT)
Dept: FAMILY MEDICINE | Facility: CLINIC | Age: 47
End: 2022-06-14
Payer: MEDICAID

## 2022-06-14 ENCOUNTER — OFFICE VISIT (OUTPATIENT)
Dept: FAMILY MEDICINE | Facility: CLINIC | Age: 47
End: 2022-06-14
Payer: MEDICAID

## 2022-06-14 ENCOUNTER — OFFICE VISIT (OUTPATIENT)
Dept: ALLERGY | Facility: CLINIC | Age: 47
End: 2022-06-14
Payer: MEDICAID

## 2022-06-14 VITALS
SYSTOLIC BLOOD PRESSURE: 140 MMHG | TEMPERATURE: 98 F | BODY MASS INDEX: 33.06 KG/M2 | DIASTOLIC BLOOD PRESSURE: 82 MMHG | HEART RATE: 88 BPM | WEIGHT: 179.63 LBS | OXYGEN SATURATION: 94 % | HEIGHT: 62 IN

## 2022-06-14 VITALS
DIASTOLIC BLOOD PRESSURE: 84 MMHG | HEIGHT: 62 IN | HEART RATE: 58 BPM | OXYGEN SATURATION: 100 % | WEIGHT: 178 LBS | SYSTOLIC BLOOD PRESSURE: 130 MMHG | TEMPERATURE: 99 F | BODY MASS INDEX: 32.76 KG/M2 | RESPIRATION RATE: 16 BRPM

## 2022-06-14 DIAGNOSIS — I10 BENIGN HYPERTENSION: ICD-10-CM

## 2022-06-14 DIAGNOSIS — H65.06 RECURRENT ACUTE SEROUS OTITIS MEDIA OF BOTH EARS: ICD-10-CM

## 2022-06-14 DIAGNOSIS — E78.00 PURE HYPERCHOLESTEROLEMIA: Primary | ICD-10-CM

## 2022-06-14 DIAGNOSIS — F51.04 CHRONIC INSOMNIA: ICD-10-CM

## 2022-06-14 DIAGNOSIS — D80.6 DEFICIENCY OF ANTI-PNEUMOCOCCAL POLYSACCHARIDE ANTIBODY: Primary | ICD-10-CM

## 2022-06-14 DIAGNOSIS — Z12.31 ENCOUNTER FOR SCREENING MAMMOGRAM FOR BREAST CANCER: ICD-10-CM

## 2022-06-14 DIAGNOSIS — Z72.0 TOBACCO ABUSE: ICD-10-CM

## 2022-06-14 DIAGNOSIS — A49.9 RECURRENT BACTERIAL INFECTION: ICD-10-CM

## 2022-06-14 DIAGNOSIS — E55.9 VITAMIN D DEFICIENCY: ICD-10-CM

## 2022-06-14 PROCEDURE — 1160F PR REVIEW ALL MEDS BY PRESCRIBER/CLIN PHARMACIST DOCUMENTED: ICD-10-PCS | Mod: CPTII,,, | Performed by: INTERNAL MEDICINE

## 2022-06-14 PROCEDURE — 3008F PR BODY MASS INDEX (BMI) DOCUMENTED: ICD-10-PCS | Mod: CPTII,,, | Performed by: INTERNAL MEDICINE

## 2022-06-14 PROCEDURE — 3077F PR MOST RECENT SYSTOLIC BLOOD PRESSURE >= 140 MM HG: ICD-10-PCS | Mod: CPTII,S$GLB,, | Performed by: ALLERGY & IMMUNOLOGY

## 2022-06-14 PROCEDURE — 99214 PR OFFICE/OUTPT VISIT, EST, LEVL IV, 30-39 MIN: ICD-10-PCS | Mod: S$GLB,,, | Performed by: ALLERGY & IMMUNOLOGY

## 2022-06-14 PROCEDURE — 3079F DIAST BP 80-89 MM HG: CPT | Mod: CPTII,,, | Performed by: INTERNAL MEDICINE

## 2022-06-14 PROCEDURE — 99214 OFFICE O/P EST MOD 30 MIN: CPT | Mod: S$PBB,,, | Performed by: INTERNAL MEDICINE

## 2022-06-14 PROCEDURE — 1160F RVW MEDS BY RX/DR IN RCRD: CPT | Mod: CPTII,S$GLB,, | Performed by: ALLERGY & IMMUNOLOGY

## 2022-06-14 PROCEDURE — 3075F PR MOST RECENT SYSTOLIC BLOOD PRESS GE 130-139MM HG: ICD-10-PCS | Mod: CPTII,,, | Performed by: INTERNAL MEDICINE

## 2022-06-14 PROCEDURE — 1160F RVW MEDS BY RX/DR IN RCRD: CPT | Mod: CPTII,,, | Performed by: INTERNAL MEDICINE

## 2022-06-14 PROCEDURE — 3079F PR MOST RECENT DIASTOLIC BLOOD PRESSURE 80-89 MM HG: ICD-10-PCS | Mod: CPTII,S$GLB,, | Performed by: ALLERGY & IMMUNOLOGY

## 2022-06-14 PROCEDURE — 99214 OFFICE O/P EST MOD 30 MIN: CPT | Mod: S$GLB,,, | Performed by: ALLERGY & IMMUNOLOGY

## 2022-06-14 PROCEDURE — 99214 PR OFFICE/OUTPT VISIT, EST, LEVL IV, 30-39 MIN: ICD-10-PCS | Mod: S$PBB,,, | Performed by: INTERNAL MEDICINE

## 2022-06-14 PROCEDURE — 1159F PR MEDICATION LIST DOCUMENTED IN MEDICAL RECORD: ICD-10-PCS | Mod: CPTII,,, | Performed by: INTERNAL MEDICINE

## 2022-06-14 PROCEDURE — 3077F SYST BP >= 140 MM HG: CPT | Mod: CPTII,S$GLB,, | Performed by: ALLERGY & IMMUNOLOGY

## 2022-06-14 PROCEDURE — 99214 OFFICE O/P EST MOD 30 MIN: CPT | Performed by: INTERNAL MEDICINE

## 2022-06-14 PROCEDURE — 3075F SYST BP GE 130 - 139MM HG: CPT | Mod: CPTII,,, | Performed by: INTERNAL MEDICINE

## 2022-06-14 PROCEDURE — 3008F PR BODY MASS INDEX (BMI) DOCUMENTED: ICD-10-PCS | Mod: CPTII,S$GLB,, | Performed by: ALLERGY & IMMUNOLOGY

## 2022-06-14 PROCEDURE — 1159F MED LIST DOCD IN RCRD: CPT | Mod: CPTII,S$GLB,, | Performed by: ALLERGY & IMMUNOLOGY

## 2022-06-14 PROCEDURE — 3079F PR MOST RECENT DIASTOLIC BLOOD PRESSURE 80-89 MM HG: ICD-10-PCS | Mod: CPTII,,, | Performed by: INTERNAL MEDICINE

## 2022-06-14 PROCEDURE — 3079F DIAST BP 80-89 MM HG: CPT | Mod: CPTII,S$GLB,, | Performed by: ALLERGY & IMMUNOLOGY

## 2022-06-14 PROCEDURE — 1159F PR MEDICATION LIST DOCUMENTED IN MEDICAL RECORD: ICD-10-PCS | Mod: CPTII,S$GLB,, | Performed by: ALLERGY & IMMUNOLOGY

## 2022-06-14 PROCEDURE — 1160F PR REVIEW ALL MEDS BY PRESCRIBER/CLIN PHARMACIST DOCUMENTED: ICD-10-PCS | Mod: CPTII,S$GLB,, | Performed by: ALLERGY & IMMUNOLOGY

## 2022-06-14 PROCEDURE — 3008F BODY MASS INDEX DOCD: CPT | Mod: CPTII,,, | Performed by: INTERNAL MEDICINE

## 2022-06-14 PROCEDURE — 1159F MED LIST DOCD IN RCRD: CPT | Mod: CPTII,,, | Performed by: INTERNAL MEDICINE

## 2022-06-14 PROCEDURE — 3008F BODY MASS INDEX DOCD: CPT | Mod: CPTII,S$GLB,, | Performed by: ALLERGY & IMMUNOLOGY

## 2022-06-14 RX ORDER — ATORVASTATIN CALCIUM 40 MG/1
40 TABLET, FILM COATED ORAL DAILY
COMMUNITY
End: 2022-07-10

## 2022-06-14 RX ORDER — IMMUNE GLOBULIN SUBCUTANEOUS (HUMAN) 200 MG/ML
11 INJECTION, SOLUTION SUBCUTANEOUS WEEKLY
Qty: 200 ML | Refills: 12 | Status: SHIPPED | OUTPATIENT
Start: 2022-06-14 | End: 2022-11-28 | Stop reason: SDUPTHER

## 2022-06-14 RX ORDER — DIAZEPAM 5 MG/1
TABLET ORAL
Qty: 60 TABLET | Refills: 2 | Status: SHIPPED | OUTPATIENT
Start: 2022-06-14 | End: 2022-09-20 | Stop reason: SDUPTHER

## 2022-06-14 RX ORDER — ERGOCALCIFEROL 1.25 MG/1
50000 CAPSULE ORAL
Qty: 4 CAPSULE | Refills: 0 | Status: SHIPPED | OUTPATIENT
Start: 2022-06-14 | End: 2022-07-20 | Stop reason: SDUPTHER

## 2022-06-14 NOTE — PROGRESS NOTES
Subjective:       Patient ID: Promise Medrano is a 46 y.o. female.    Chief Complaint: Follow-up (Pt states all has been well, except she has a double ear infection and an extra bone growing out of her jaw.. Spencer referred her to ENT today)    HPI     Pt presents for allergic rhinitis and recurrent bronchitis.     Her strep pneumo titers were not responsive to her pneumovax 23 she obtained in November 2019 indicating SAD.    Last visit: 2/22  - budesonide in saline rinse, continued doxy  mg BID for SAD proph,     Since her last visit,     She has had otalgia bilaterally and it was found she has bilateral serous effusion.      No other abx needed while on proph.       Allergic Rhinitis- dust mite only   Condition: exacerbated    Onset: childhood  Sx: congestion, dryness, feels fluid in the ears.   Season: perennial   Trigger: uncertain   Tx:   saline, azelastine- 1 sen qday or more prn, fluticasone, montelukast - stopped herself.   ait in the past: yes  Recent testing: serum IgE dust mite only.   ct sinus: 6/2019  Essentially clear paranasal sinuses without significant mucoperiosteal sinus  disease.    SAD  doxcycline was last used in 4/2020  [facility]. - I tried to order it , but it is not orderable in epic.     Recurrent bronchitis felt to be due to SAD dx 12/2019  Started on doxy proph MWF 12/2019  No other abx needed when on proph.     Condition:     Infections:  Type infections:   Onset: 2018  Bronchitis 3-4 episodes last winter  Has a pmh of lupus autoimmunity, complement levels normal.   April 2019 was d/c from [facility] for pna and or bronchitis  She has a history of current smoker   Does have associated tobacco abuse as well.     Chest ct 8/2018 shows bronchioloitis and mediastinal lymph nodes and axillary lymph nodes presumed reactive.   Pft: large airway no obstruction and partial response to bronchodilator, small airway reversed by 31%.     Immune evaluation: 6/20/2019    Complement,  Total (CH50) 56 ( >41 U/mL)  Complement C2                 3.3  C4 Complement 14 - 44 mg/dL 16      IgA, Serum                    281                         IgM 26 - 217 mg/dL 52      IgG, Total Serum             1259                     700-1600  mg/dL       IgG Subclass 1                710                      248-810  mg/dL       IgG Subclass 2                189                      130-555  mg/dL       IgG Subclass 3                 70                         mg/dL       IgG Subclass 4                 61                         2-96  mg/dL                                           Strep Pneumoniae Type 1  0.5 L >1.3 ug/mL  Strep Pneumoniae Type 3 0.6 L >1.3 ug/mL  Strep Pneumoniae Type 4 0.1 L >1.3 ug/mL  Strep Pneumoniae Type 8 1.8  >1.3 ug/mL  Strep Pneumoniae Type 9 0.5 L >1.3 ug/mL  Strep Pneumoniae Type 12 <0.1 L >1.3 ug/mL  Strep Pneumoniae Type 14 1.2 L >1.3 ug/mL  Strep Pneumoniae Type 19 0.7 L >1.3 ug/mL  Strep Pneumoniae Type 23 0.3 L >1.3 ug/mL  Strep Pneumoniae Type 26 4.3  >1.3 ug/mL  Strep Pneumoniae Type 51 0.3 L >1.3 ug/mL  Strep Pneumoniae Type 56 0.5 L >1.3 ug/mL  Strep Pneumoniae Type 57 1.5  >1.3 ug/mL  Strep Pneumoniae Type 68 <0.1 L >1.3 ug/mL    3/14 = 21%    Hematocrit                   46.0                    34.0-46.6  %           WBC                           5.7                     3.4-10.8  x10E3/uL    RBC                          4.83                    3.77-5.28  x10E6/uL    Hemoglobin                   14.7                    11.1-15.9  g/dL        Basos                           0                   Not Estab.  %           Neurtrophils                   60                   Not Estab.  %           Neurtrophils (Absolute) 3.4  1.4-7.0 x10E3/uL  Lymphs (Absolute)             1.9                      0.7-3.1  x10E3/uL    MCV                            95                        79-97  fL          MCHC                         32.0                    31.5-35.7  g/dL        MCH  "                         30.4                    26.6-33.0  pg          Lymphs                         34                   Not Estab.  %           Immature Granulocytes            0                   Not Estab.  %           Eos                             0                   Not Estab.  %           Monocytes                       6                   Not Estab.  %           Platelets                     185                      150-450  x10E3/uL    Eos (Absolute)                0.0                      0.0-0.4  x10E3/uL    BASO (Absolute)               0.0                      0.0-0.2  x10E3/uL    Monocytes (Absolute)          0.4                      0.1-0.9  x10E3/uL    % CD19+ Lymphs                9.3                     3.3-25.4  %           Abs. CD19+ Lymphs             177                         /uL         Absolute CD4 Fenton           716                     359-1519  /uL         Absolute CD3                 1634                     622-2402  /uL         % CD 4 Pos. Lymph            37.7                    30.8-58.5  %           CD4/CD8 Ratio                0.78 L                  0.92-3.72              % CD3 Pos. Lymph             86.0                    57.5-86.2  %           Abs. CD8 Suppressor           923 H                    109-897  /uL         % CD8 Pos. Lymph             48.6 H                  12.0-35.5  %           RDW                          14.5                    12.3-15.4  %           % NK (CD56/16)                4.1                     1.4-19.4  %           Ab NK (CD56/16)                78       Lpr:   Ranitidine rx at last visit.   Condition: stable , not better.   She didn't start it.   "horrible acid reflux"  "gut issues" not diagnosed.   "why I got off plaquenil" "tore up my gut."       Atopic Hx    Rhinitis see above   Oral allergy: denies  Food allergy: none    Asthma see above for bronchitis   Latex tolerates   Eczema: denies, but may have a psoriasis.    Urticaria denies " chronic, has doxepin qhs     Infection History    Pneumonia # in the past 12 months: bronchitis as above   Sinus infection # in the past 12 months: reports feels like sinus infections.   Otitis infection # in the past 12 months:  Denies chronic infection, but notes chronic fluid in the ears.     Dust mite only    IgE Cladosporium herbarum <0.10  Class 0 kU/L  IgE Dog Dander              <0.10                      Class 0  kU/L        IgE Cat Epithelium          <0.10                      Class 0  kU/L        IgE Ragweed, Short          <0.10                      Class 0  kU/L        IgE D. pteronyssinus         0.13 AB                 Class 0/I  kU/L        IgE A. fumigatus            <0.10                      Class 0  kU/L        IgE Alteraria alternata <0.10  Class 0 kU/L  IgE Elm, American           <0.10                      Class 0  kU/L        IgE Bermuda Grass           <0.10                      Class 0  kU/L        IgE Bronx, White              <0.10                      Class 0  kU/L        IgE Pigweed, Common         <0.10                      Class 0  kU/L        IgE Cockroach, Guyanese        <0.10                      Class 0  kU/L        IgE Thistle Russian         <0.10                      Class 0  kU/L        IgE D. farinae              <0.10                      Class 0  kU/L        IgE Cockroach American <0.10  Class 0 kU/L   This test was developed and its performance      characteristics determined by [facility].  It      has not been cleared or approved by the U.S.      Food and Drug Administration.      The FDA has determined that such clearance      or approval is not necessary. This test is      used for clinical purposes.  It should not      be regarded as investigational or for                             research.                                           IgE Maple/Box Elder         <0.10                      Class 0  kU/L        IgE Penicillium chrysogen <0.10  Class 0 kU/L  IgE Hamtramck               <0.10                      Class 0  kU/L        IgE Gabe Grass           <0.10                      Class 0  kU/L        IgE Arik Grass           <0.10                      Class 0  kU/L        IgE Bahia Grass             <0.10                      Class 0  kU/L        IgE Sheep Estelle           <0.10                      Class 0  kU/L        IgE Plantain, English        <0.10                      Class 0  kU/L        IgE Mugwort                 <0.10                      Class 0  kU/L        IgE Pecan Pierce           <0.10                      Class 0  kU/L        IgE Candida albicans        <0.10                      Class 0  kU/L        IgE Setomelanomma rostrat <0.10  Class 0 kU/L  IgE White [place]          <0.10                      Class 0  kU/L        IgE Epicoccum purpur        <0.10                      Class 0  kU/L        IgE Fusarium proliferatum <0.10  Class 0 kU/L  IgE Trichophyton rubrum <0.10  Class 0 kU/L  IgE Rough Marshelder        <0.10                      Class 0  kU/L        IgE Mouse Urine             <0.10                      Class 0  kU/L        IgE Silver Birch            <0.10                      Class 0  kU/L        IgE Maple Knobel/Kenova <0.10  Class 0 kU/L  IgE Bipolaris               <0.10                      Class 0  kU/L         This test was developed and its performance      characteristics determined by [facility].  It      has not been cleared or approved by the U.[place].      Food and Drug Administration.      The FDA has determined that such clearance      or approval is not necessary. This test is      used for clinical purposes.  It should not      be regarded as investigational or for                             research.                                           IgE Nishant, White              <0.10                      Class 0  kU/L        IgE Privet, Common          <0.10                      Class 0  kU/L        IgE Ragweed, Giant          <0.10                       Class 0  kU/L        IgE Nettle                  <0.10                      Class 0  kU/L        IgE Cocklebur               <0.10                      Class 0  kU/L        IgE Dockweed, Yellow        <0.10                      Class 0  kU/L         This test was developed and its performance      characteristics determined by [facility].  It      has not been cleared or approved by the U.[place].      Food and Drug Administration.      The FDA has determined that such clearance      or approval is not necessary. This test is      used for clinical purposes.  It should not      be regarded as investigational or for                             research.                                           IgE Hackberry               <0.10                      Class 0  kU/L         This test was developed and its performance      characteristics determined by [facility].  It      has not been cleared or approved by the U.S.      Food and Drug Administration.      The FDA has determined that such clearance      or approval is not necessary. This test is      used for clinical purposes.  It should not      be regarded as investigational or for                             research.                                           IgE Sweet Gum               <0.10                      Class 0  kU/L         This test was developed and its performance      characteristics determined by [facility].  It      has not been cleared or approved by the U.S.      Food and Drug Administration.      The FDA has determined that such clearance      or approval is not necessary. This test is      used for clinical purposes.  It should not      be regarded as investigational or for                             research.                                           IgE Wormwood                <0.10                      Class 0  kU/L        IgE Fennel, Dog             <0.10                      Class 0  kU/L         This test was developed and its performance    "   characteristics determined by [facility].  It      has not been cleared or approved by the U.S.      Food and Drug Administration.      The FDA has determined that such clearance      or approval is not necessary. This test is      used for clinical purposes.  It should not      be regarded as investigational or for                             research.                                                                                                                        Performed at: , [facility] [address], [id number]      Jc Mckeon MD, Phone:  [phone]     IgE Mouse Epithelium        <0.10                      Class 0  kU/L        IgE Brimhall Bald            <0.10           Review of Systems      General: neg unexpected weight changes, fevers, chills, night sweats, malaise  HEENT: see hpi, Neg eye pain, vision changes, ear drainage, nose bleeds, throat tightness, sores in the mouth  CV: Neg chest pain, palpitations, swelling  Resp: see hpi, neg shortness of breath, hemoptysis  GI: see hpi, neg dysphagia, night abdominal pain, reflux, chronic diarrhea, chronic constipation  Derm: See Hpi, neg new rash, neg flushing  Mu/sk: Neg joint pain, joint swelling   Psych: Neg anxiety  neuro: neg chronic headaches, muscle weakness  Endo: neg heat/cold intolerance, chronic fatigue    Objective:     Vitals:    06/14/22 0959   BP: (!) 140/82   Pulse: 88   Temp: 97.9 °F (36.6 °C)   SpO2: (!) 94%   Weight: 81.5 kg (179 lb 9.6 oz)   Height: 5' 2" (1.575 m)        Physical Exam      General: no acute distress, well developed well nourished   HEENT:   Head:normocephalic atraumatic  Eyes: BENITEZ, EOMI, Neg injection, scleral icterus, or conjunctival papillary hypertrophy.  Ears: tm clear bilaterally, normal canal, nothing infected   Skin: Neg rashes or lesions      Assessment:       1. Deficiency of anti-pneumococcal polysaccharide antibody    2. Recurrent bacterial infection    3. Tobacco " abuse        Plan:       Deficiency of anti-pneumococcal polysaccharide antibody  -     immun glob G,IgG,-gly-IgA ov50 (CUVITRU) 10 gram/50 mL (20 %) Soln; Inject 11 g into the skin once a week.  Dispense: 200 mL; Refill: 12    Recurrent bacterial infection    Tobacco abuse      Chronic allergic rhinitis: dust mite only   6/22:  Continue budesonide rinses   Continue azelastine   levocetirizine prn     2/22  Serum IgE- not stable enough for skin prick testing. Dust mite only- mild sensitivity.   saline and azelastine    disContinue fluticasone   Discontinue montelukast 1 pill po qday - pt self d/c   levocetirizine prn   Start budesonide in saline rinse     Recurrent bronchitis and sinusitis/SAD    6/2022:  Start cuvitru weekly 11 grams weekly (550 mg/kg/month - based upon 81 kg)- having thrush and undesirable side effects.   Discontinue doxy  mg MWF only for proph once on infusions x 3 months.      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     Tobacco abuse and oxygen dependent   Smoking cessation recommended.      Flu vaccine yearly recommended.   covid vaccination obtained     F/u 3 months, sooner if needed.              Katiana Cline M.D.  Allergy/Immunology  [facility]   [phone] phone  [phone] fax

## 2022-06-14 NOTE — PATIENT INSTRUCTIONS
Continue doxycycline Monday, Wed, Fri until on infusions x 3 months.     Start cuvitru 11 grams weekly - start after you see rheumatology.     You must drink plenty of water prior to infusions this will help tolerate them.   This will be delivered by specialty pharmacy. Must approve shipment monthly.     Subcutaneous infusions. Nothing in the muscle. Perform at home.     [facility]      Follow up 3 months

## 2022-06-14 NOTE — PROGRESS NOTES
SUBJECTIVE:    Patient ID: Promise Medrano is a 46 y.o. female.    Chief Complaint: Otalgia, Jaw Pain, and Follow-up    Otalgia   There is pain in both ears. This is a new problem. The current episode started more than 1 month ago. The problem occurs constantly. The problem has been gradually worsening. There has been no fever. The pain is moderate. Associated symptoms include headaches, hearing loss, neck pain and a sore throat. Pertinent negatives include no abdominal pain, coughing, diarrhea, rhinorrhea or vomiting. She has tried acetaminophen, heat packs and antibiotics for the symptoms. The treatment provided mild relief.     Bilateral jaw pain, has seen dentist and was referred to [facility]. Ear pain is associated with whatever side of jaw pain. Also having dry mouth and sore throat with unilateral node involvement.     Admission on 08/23/2021, Discharged on 08/23/2021   Component Date Value Ref Range Status    Cath EF Quantitative 08/23/2021 65  % Final   Hospital Outpatient Visit on 08/20/2021   Component Date Value Ref Range Status    PT 08/20/2021 12.0  11.8 - 14.3 sec Final    INR 08/20/2021 0.9   Final    aPTT 08/20/2021 28.6  25.6 - 35.8 sec Final    Sodium 08/20/2021 138  136 - 145 mmol/L Final    Potassium 08/20/2021 4.0  3.5 - 5.1 mmol/L Final    Chloride 08/20/2021 102  95 - 110 mmol/L Final    CO2 08/20/2021 26  23 - 29 mmol/L Final    Glucose 08/20/2021 86  70 - 110 mg/dL Final    BUN 08/20/2021 15  6 - 20 mg/dL Final    Creatinine 08/20/2021 0.6  0.5 - 1.4 mg/dL Final    Calcium 08/20/2021 9.2  8.7 - 10.5 mg/dL Final    Total Protein 08/20/2021 7.9  6.0 - 8.4 g/dL Final    Albumin 08/20/2021 4.1  3.5 - 5.2 g/dL Final    Total Bilirubin 08/20/2021 0.6  0.1 - 1.0 mg/dL Final    Alkaline Phosphatase 08/20/2021 69  55 - 135 U/L Final    AST 08/20/2021 16  10 - 40 U/L Final    ALT 08/20/2021 14  10 - 44 U/L Final    Anion Gap 08/20/2021 10  8 - 16 mmol/L Final    eGFR if African  American 08/20/2021 >60.0  >60 mL/min/1.73 m^2 Final    eGFR if non African American 08/20/2021 >60.0  >60 mL/min/1.73 m^2 Final    WBC 08/20/2021 7.45  3.90 - 12.70 K/uL Final    RBC 08/20/2021 4.49  4.00 - 5.40 M/uL Final    Hemoglobin 08/20/2021 13.9  12.0 - 16.0 g/dL Final    Hematocrit 08/20/2021 42.0  37.0 - 48.5 % Final    MCV 08/20/2021 94  82 - 98 fL Final    MCH 08/20/2021 31.0  27.0 - 31.0 pg Final    MCHC 08/20/2021 33.1  32.0 - 36.0 g/dL Final    RDW 08/20/2021 14.2  11.5 - 14.5 % Final    Platelets 08/20/2021 216  150 - 450 K/uL Final    MPV 08/20/2021 10.8  9.2 - 12.9 fL Final    Immature Granulocytes 08/20/2021 0.4  0.0 - 0.5 % Final    Gran # (ANC) 08/20/2021 4.7  1.8 - 7.7 K/uL Final    Immature Grans (Abs) 08/20/2021 0.03  0.00 - 0.04 K/uL Final    Lymph # 08/20/2021 2.3  1.0 - 4.8 K/uL Final    Mono # 08/20/2021 0.4  0.3 - 1.0 K/uL Final    Eos # 08/20/2021 0.1  0.0 - 0.5 K/uL Final    Baso # 08/20/2021 0.03  0.00 - 0.20 K/uL Final    nRBC 08/20/2021 0  0 /100 WBC Final    Gran % 08/20/2021 62.7  38.0 - 73.0 % Final    Lymph % 08/20/2021 30.3  18.0 - 48.0 % Final    Mono % 08/20/2021 5.5  4.0 - 15.0 % Final    Eosinophil % 08/20/2021 0.7  0.0 - 8.0 % Final    Basophil % 08/20/2021 0.4  0.0 - 1.9 % Final    Differential Method 08/20/2021 Automated   Final    Magnesium 08/20/2021 2.0  1.6 - 2.6 mg/dL Final   Lab Visit on 07/07/2021   Component Date Value Ref Range Status    WBC 07/07/2021 7.60  3.90 - 12.70 K/uL Final    RBC 07/07/2021 4.44  4.00 - 5.40 M/uL Final    Hemoglobin 07/07/2021 13.9  12.0 - 16.0 g/dL Final    Hematocrit 07/07/2021 42.4  37.0 - 48.5 % Final    MCV 07/07/2021 96  82 - 98 fL Final    MCH 07/07/2021 31.3 (A) 27.0 - 31.0 pg Final    MCHC 07/07/2021 32.8  32.0 - 36.0 g/dL Final    RDW 07/07/2021 14.1  11.5 - 14.5 % Final    Platelets 07/07/2021 286  150 - 450 K/uL Final    MPV 07/07/2021 9.7  9.2 - 12.9 fL Final    Immature Granulocytes  07/07/2021 0.5  0.0 - 0.5 % Final    Gran # (ANC) 07/07/2021 5.7  1.8 - 7.7 K/uL Final    Immature Grans (Abs) 07/07/2021 0.04  0.00 - 0.04 K/uL Final    Lymph # 07/07/2021 1.4  1.0 - 4.8 K/uL Final    Mono # 07/07/2021 0.4  0.3 - 1.0 K/uL Final    Eos # 07/07/2021 0.0  0.0 - 0.5 K/uL Final    Baso # 07/07/2021 0.04  0.00 - 0.20 K/uL Final    nRBC 07/07/2021 0  0 /100 WBC Final    Gran % 07/07/2021 74.8 (A) 38.0 - 73.0 % Final    Lymph % 07/07/2021 18.8  18.0 - 48.0 % Final    Mono % 07/07/2021 4.9  4.0 - 15.0 % Final    Eosinophil % 07/07/2021 0.5  0.0 - 8.0 % Final    Basophil % 07/07/2021 0.5  0.0 - 1.9 % Final    Differential Method 07/07/2021 Automated   Final    Sodium 07/07/2021 137  136 - 145 mmol/L Final    Potassium 07/07/2021 4.3  3.5 - 5.1 mmol/L Final    Chloride 07/07/2021 98  95 - 110 mmol/L Final    CO2 07/07/2021 29  23 - 29 mmol/L Final    Glucose 07/07/2021 78  70 - 110 mg/dL Final    BUN 07/07/2021 7  6 - 20 mg/dL Final    Creatinine 07/07/2021 0.6  0.5 - 1.4 mg/dL Final    Calcium 07/07/2021 9.1  8.7 - 10.5 mg/dL Final    Total Protein 07/07/2021 8.0  6.0 - 8.4 g/dL Final    Albumin 07/07/2021 3.8  3.5 - 5.2 g/dL Final    Total Bilirubin 07/07/2021 0.4  0.1 - 1.0 mg/dL Final    Alkaline Phosphatase 07/07/2021 72  55 - 135 U/L Final    AST 07/07/2021 16  10 - 40 U/L Final    ALT 07/07/2021 14  10 - 44 U/L Final    Anion Gap 07/07/2021 10  8 - 16 mmol/L Final    eGFR if African American 07/07/2021 >60.0  >60 mL/min/1.73 m^2 Final    eGFR if non African American 07/07/2021 >60.0  >60 mL/min/1.73 m^2 Final    Anti-SSA Antibody 07/07/2021 <0.2  0.0 - 0.9 AI Final    C4 Complement 07/07/2021 16  12 - 38 mg/dL Final    Complement (C-3) 07/07/2021 137  82 - 167 mg/dL Final    WBC 07/07/2021 7.60  3.90 - 12.70 K/uL Final    RBC 07/07/2021 4.44  4.00 - 5.40 M/uL Final    Hemoglobin 07/07/2021 13.9  12.0 - 16.0 g/dL Final    Hematocrit 07/07/2021 42.4  37.0 - 48.5 %  Final    MCV 07/07/2021 96  82 - 98 fL Final    MCH 07/07/2021 31.3 (A) 27.0 - 31.0 pg Final    MCHC 07/07/2021 32.8  32.0 - 36.0 g/dL Final    RDW 07/07/2021 14.1  11.5 - 14.5 % Final    Platelets 07/07/2021 286  150 - 450 K/uL Final    MPV 07/07/2021 9.7  9.2 - 12.9 fL Final    Immature Granulocytes 07/07/2021 0.5  0.0 - 0.5 % Final    Gran # (ANC) 07/07/2021 5.7  1.8 - 7.7 K/uL Final    Immature Grans (Abs) 07/07/2021 0.04  0.00 - 0.04 K/uL Final    Lymph # 07/07/2021 1.4  1.0 - 4.8 K/uL Final    Mono # 07/07/2021 0.4  0.3 - 1.0 K/uL Final    Eos # 07/07/2021 0.0  0.0 - 0.5 K/uL Final    Baso # 07/07/2021 0.04  0.00 - 0.20 K/uL Final    nRBC 07/07/2021 0  0 /100 WBC Final    Gran % 07/07/2021 74.8 (A) 38.0 - 73.0 % Final    Lymph % 07/07/2021 18.8  18.0 - 48.0 % Final    Mono % 07/07/2021 4.9  4.0 - 15.0 % Final    Eosinophil % 07/07/2021 0.5  0.0 - 8.0 % Final    Basophil % 07/07/2021 0.5  0.0 - 1.9 % Final    Differential Method 07/07/2021 Automated   Final    CRP 07/07/2021 1.94 (A) <0.76 mg/dL Final    C4 Complement 07/07/2021 16  12 - 38 mg/dL Final    Complement (C-3) 07/07/2021 137  82 - 167 mg/dL Final    Anti-SSA Antibody 07/07/2021 <0.2  0.0 - 0.9 AI Final    Specimen UA 07/07/2021 Urine, Clean CatchUrine, UnspecifiedUrin   Final    Color, UA 07/07/2021 Yellow  Yellow, Straw, Violet Final    Appearance, UA 07/07/2021 Clear  Clear Final    pH, UA 07/07/2021 8.0  5.0 - 8.0 Final    Specific Gravity, UA 07/07/2021 1.005  1.005 - 1.030 Final    Protein, UA 07/07/2021 Negative  Negative Final    Glucose, UA 07/07/2021 Negative  Negative Final    Ketones, UA 07/07/2021 Negative  Negative Final    Bilirubin (UA) 07/07/2021 Negative  Negative Final    Occult Blood UA 07/07/2021 Negative  Negative Final    Nitrite, UA 07/07/2021 Negative  Negative Final    Urobilinogen, UA 07/07/2021 Negative  Negative EU/dL Final    Leukocytes, UA 07/07/2021 Negative  Negative Final   Lab  Visit on 2021   Component Date Value Ref Range Status    Vit D, 25-Hydroxy 2021 94  30 - 96 ng/mL Final    Cholesterol 2021 165  120 - 199 mg/dL Final    Triglycerides 2021 53  30 - 150 mg/dL Final    HDL 2021 52  40 - 75 mg/dL Final    LDL Cholesterol 2021 102.4  63.0 - 159.0 mg/dL Final    HDL/Cholesterol Ratio 2021 31.5  20.0 - 50.0 % Final    Total Cholesterol/HDL Ratio 2021 3.2  2.0 - 5.0 Final    Non-HDL Cholesterol 2021 113  mg/dL Final       Past Medical History:   Diagnosis Date    Allergic rhinitis     Arthritis     Chronic anxiety 10/23/2018    Connective tissue disease     joints b/c lupus    Diabetes mellitus     Fibromyalgia     GERD (gastroesophageal reflux disease)     Grade II hemorrhoids 2019    Hyperlipemia     Interstitial lung disease     Lupus     Lupus     Sjogren's syndrome 2019     Past Surgical History:   Procedure Laterality Date    BREAST SURGERY      BRONCHOSCOPY WITH FLUOROSCOPY N/A 2019    Procedure: BRONCHOSCOPY, WITH FLUOROSCOPY;  Surgeon: Nate Tarango MD;  Location: [facility] ENDO;  Service: Pulmonary;  Laterality: N/A;    CATHETERIZATION OF BOTH LEFT AND RIGHT HEART Left 2021    Procedure: CATHETERIZATION, HEART, BOTH LEFT AND RIGHT;  Surgeon: Luca Wilks MD;  Location: [facility] CATH/EP LAB;  Service: Cardiology;  Laterality: Left;     SECTION  ,    COLONOSCOPY      ESOPHAGOGASTRODUODENOSCOPY      gastric sleeve  2010    HYSTERECTOMY  2010    TONSILLECTOMY  1996    TUMOR REMOVAL      benign fatty     Family History   Problem Relation Age of Onset    Early death Father     Heart disease Father     Stroke Father     Kidney disease Father     Diabetes Paternal Grandmother     Cancer Paternal Grandmother     Raynaud syndrome Mother     Uterine cancer Mother     Breast cancer Mother     Raynaud syndrome Sister     Polycystic ovary syndrome Daughter      Diabetes Maternal Grandmother     Heart disease Maternal Grandmother     Kidney disease Maternal Grandmother     Breast cancer Maternal Grandmother     Heart disease Maternal Grandfather     Cancer Paternal Grandfather     No Known Problems Daughter        Marital Status: Single  Alcohol History:  reports previous alcohol use.  Tobacco History:  reports that she has been smoking cigarettes. She started smoking about 24 years ago. She has a 12.50 pack-year smoking history. She has never used smokeless tobacco.  Drug History:  reports no history of drug use.    Review of patient's allergies indicates:   Allergen Reactions    Ativan [lorazepam] Other (See Comments)     depression    Hay fever and allergy relief     Nitroglycerin      Pt says it could stop her heart       Current Outpatient Medications:     ACCU-CHEK SOFTCLIX LANCETS Misc, USE ONCE DAILY AS NEEDED, Disp: 100 each, Rfl: 5    albuterol (PROVENTIL) 2.5 mg /3 mL (0.083 %) nebulizer solution, Take 3 mLs (2.5 mg total) by nebulization every 4 (four) hours., Disp: 300 mL, Rfl: 3    albuterol (PROVENTIL/VENTOLIN HFA) 90 mcg/actuation inhaler, INHALE 1 PUFF INTO THE LUNGS ONCE DAILY, Disp: 18 g, Rfl: 3    albuterol-ipratropium (DUO-NEB) 2.5 mg-0.5 mg/3 mL nebulizer solution, USE 1 VIAL VIA NEBULIZER EVERY 6 HOURS AS NEEDED FOR WHEEZING OR SHORTNESS OF BREATH, Disp: 180 mL, Rfl: 0    atorvastatin (LIPITOR) 40 MG tablet, Take 40 mg by mouth once daily., Disp: , Rfl:     azelastine (ASTELIN) 137 mcg (0.1 %) nasal spray, USE 1 SPRAY IN EACH NOSTRIL TWICE DAILY, Disp: 90 mL, Rfl: 1    blood sugar diagnostic (ONETOUCH ULTRA BLUE TEST STRIP) Strp, Use strips to take blood sugar bid, Disp: 200 strip, Rfl: 2    budesonide (PULMICORT) 0.5 mg/2 mL nebulizer solution, 1 vial into sinus rinse twice per day, Disp: 120 mL, Rfl: 3    CETAPHIL MOISTURIZING cream, Apply 1 application topically as needed., Disp: 90 g, Rfl: 1    dicyclomine (BENTYL) 20 mg tablet,  TAKE 1 TABLET(20 MG) BY MOUTH TWICE DAILY, Disp: 60 tablet, Rfl: 0    doxepin (SINEQUAN) 100 MG capsule, TAKE 1 CAPSULE(100 MG) BY MOUTH EVERY EVENING, Disp: 90 capsule, Rfl: 0    doxycycline (VIBRAMYCIN) 100 MG Cap, ONLY take 1 capsule on Monday Wednesday Friday, Disp: 15 capsule, Rfl: 3    esomeprazole (NEXIUM) 40 MG capsule, Take 1 capsule (40 mg total) by mouth before breakfast., Disp: 90 capsule, Rfl: 0    ezetimibe (ZETIA) 10 mg tablet, Take 1 tablet (10 mg total) by mouth once daily., Disp: 90 tablet, Rfl: 1    famotidine (PEPCID) 20 MG tablet, TAKE 1 TABLET(20 MG) BY MOUTH TWICE DAILY, Disp: 60 tablet, Rfl: 5    FEROSUL 325 mg (65 mg iron) Tab tablet, TAKE 1 TABLET BY MOUTH EVERY DAY, Disp: 90 tablet, Rfl: 0    fluticasone propionate (FLONASE) 50 mcg/actuation nasal spray, SHAKE LIQUID AND USE 1 SPRAY(50 MCG) IN EACH NOSTRIL EVERY DAY, Disp: 16 g, Rfl: 0    gabapentin (NEURONTIN) 300 MG capsule, TAKE 1 TO 2 CAPSULES BY MOUTH THREE TIMES DAILY, Disp: 180 capsule, Rfl: 1    ibuprofen (ADVIL,MOTRIN) 600 MG tablet, TAKE 1 TABLET(600 MG) BY MOUTH TWICE DAILY AS NEEDED, Disp: 60 tablet, Rfl: 1    levocetirizine (XYZAL) 5 MG tablet, TAKE 1 TABLET(5 MG) BY MOUTH EVERY EVENING, Disp: 90 tablet, Rfl: 1    midodrine (PROAMATINE) 2.5 MG Tab, Take 2.5 mg by mouth 3 (three) times daily., Disp: , Rfl:     ondansetron (ZOFRAN-ODT) 4 MG TbDL, DISSOLVE 2 TABLETS UNDER THE TONGUE EVERY 8 HOURS AS NEEDED, Disp: 30 tablet, Rfl: 5    OXYGEN-AIR DELIVERY SYSTEMS MISC, by Misc.(Non-Drug; Combo Route) route., Disp: , Rfl:     XIIDRA 5 % Dpet, Place 1 drop into both eyes 2 (two) times daily., Disp: , Rfl:     diazePAM (VALIUM) 5 MG tablet, Take 1 to 2 tabs by mouth qhs, Disp: 60 tablet, Rfl: 2    ergocalciferol (ERGOCALCIFEROL) 50,000 unit Cap, Take 1 capsule (50,000 Units total) by mouth every 7 days., Disp: 4 capsule, Rfl: 0    Review of Systems   Constitutional: Positive for activity change and unexpected weight  change. Negative for appetite change, chills, diaphoresis, fatigue and fever.   HENT: Positive for ear pain, hearing loss, sore throat and trouble swallowing. Negative for congestion, nosebleeds, postnasal drip, rhinorrhea, sinus pressure, sinus pain, sneezing, tinnitus and voice change.    Eyes: Positive for visual disturbance. Negative for photophobia, pain, discharge and itching.   Respiratory: Positive for chest tightness and wheezing. Negative for apnea, cough, shortness of breath and stridor.    Cardiovascular: Positive for chest pain and palpitations. Negative for leg swelling.   Gastrointestinal: Positive for constipation. Negative for abdominal distention, abdominal pain, blood in stool, diarrhea, nausea and vomiting.   Endocrine: Positive for polyuria. Negative for cold intolerance, heat intolerance and polydipsia.   Genitourinary: Positive for difficulty urinating and dysuria. Negative for dyspareunia, flank pain, frequency, hematuria, menstrual problem, pelvic pain, urgency, vaginal discharge and vaginal pain.   Musculoskeletal: Positive for arthralgias, joint swelling and neck pain. Negative for back pain, myalgias and neck stiffness.   Skin: Negative for pallor.   Allergic/Immunologic: Negative for environmental allergies and food allergies.   Neurological: Positive for weakness and headaches. Negative for dizziness, tremors, speech difficulty, light-headedness and numbness.   Hematological: Does not bruise/bleed easily.   Psychiatric/Behavioral: Positive for dysphoric mood. Negative for agitation, confusion, decreased concentration, sleep disturbance and suicidal ideas. The patient is not nervous/anxious.           Objective:      Last 3 sets of Vitals    Vitals - 1 value per visit 2/16/2022 6/14/2022 6/14/2022   SYSTOLIC 132 - 130   DIASTOLIC 87 - 84   Pulse 81 - 58   Temp 97.9 - 99   Resp - - 16   SPO2 - - 100   Weight (lb) 161 - 178   Weight (kg) 73.029 - 80.74   Height 62 - 62   BMI (Calculated)  29.4 - 32.5   VISIT REPORT - - -   Pain Score  - 0 -   Some recent data might be hidden       Physical Exam  Constitutional:       Appearance: Normal appearance. She is obese.   HENT:      Head: Normocephalic and atraumatic.      Right Ear: Ear canal and external ear normal.      Left Ear: Ear canal and external ear normal.      Ears:      Comments: Both TMs opaque with fluid behind each TM     Nose: Nose normal.      Mouth/Throat:      Mouth: Mucous membranes are moist.      Pharynx: Oropharynx is clear.   Eyes:      Extraocular Movements: Extraocular movements intact.      Conjunctiva/sclera: Conjunctivae normal.      Pupils: Pupils are equal, round, and reactive to light.   Cardiovascular:      Rate and Rhythm: Normal rate and regular rhythm.      Pulses: Normal pulses.   Pulmonary:      Effort: Pulmonary effort is normal.      Breath sounds: Normal breath sounds.   Abdominal:      General: Bowel sounds are normal.      Palpations: Abdomen is soft.   Musculoskeletal:         General: Normal range of motion.   Lymphadenopathy:      Cervical: Cervical adenopathy (full left anterior cervicqal chain) present.   Skin:     General: Skin is warm and dry.      Capillary Refill: Capillary refill takes less than 2 seconds.   Neurological:      General: No focal deficit present.      Mental Status: She is alert and oriented to person, place, and time. Mental status is at baseline.   Psychiatric:         Mood and Affect: Mood normal.         Behavior: Behavior normal.         Thought Content: Thought content normal.         Judgment: Judgment normal.           Assessment:       1. Pure hypercholesterolemia    2. Recurrent acute serous otitis media of both ears    3. Benign hypertension    4. Vitamin D deficiency    5. Chronic insomnia    6. Encounter for screening mammogram for breast cancer         Plan:       Pure hypercholesterolemia  -     Lipid Panel; Future; Expected date: 06/14/2022    Recurrent acute serous otitis  media of both ears  -     Mammo Digital Screening Bilat; Future; Expected date: 06/14/2022  -     Ambulatory referral/consult to ENT; Future; Expected date: 06/21/2022    Benign hypertension  -     CBC Auto Differential; Future; Expected date: 06/14/2022  -     Comprehensive Metabolic Panel; Future; Expected date: 06/14/2022    Vitamin D deficiency  -     ergocalciferol (ERGOCALCIFEROL) 50,000 unit Cap; Take 1 capsule (50,000 Units total) by mouth every 7 days.  Dispense: 4 capsule; Refill: 0    Chronic insomnia  -     diazePAM (VALIUM) 5 MG tablet; Take 1 to 2 tabs by mouth qhs  Dispense: 60 tablet; Refill: 2    Encounter for screening mammogram for breast cancer  -     Mammo Digital Screening Bilat; Future; Expected date: 06/14/2022      Follow up in about 3 months (around 9/14/2022) for FOLLOW UP LABS, FOLLOW UP MEDICATIONS, FOLLOW-UP STATUS.        6/14/2022 Mine Palmer M.D.

## 2022-06-15 ENCOUNTER — TELEPHONE (OUTPATIENT)
Dept: FAMILY MEDICINE | Facility: CLINIC | Age: 47
End: 2022-06-15
Payer: MEDICAID

## 2022-06-20 DIAGNOSIS — K21.9 GERD WITHOUT ESOPHAGITIS: ICD-10-CM

## 2022-06-20 RX ORDER — ONDANSETRON 4 MG/1
TABLET, ORALLY DISINTEGRATING ORAL
Qty: 30 TABLET | Refills: 5 | Status: SHIPPED | OUTPATIENT
Start: 2022-06-20 | End: 2022-10-04

## 2022-06-20 NOTE — TELEPHONE ENCOUNTER
Refill request   Ondansetron ODT 4 mg Tabs  Last office visit    06/14/22  Follow up visit     09/15/22  Medication pended

## 2022-06-23 ENCOUNTER — OFFICE VISIT (OUTPATIENT)
Dept: OTOLARYNGOLOGY | Facility: CLINIC | Age: 47
End: 2022-06-23
Payer: MEDICAID

## 2022-06-23 ENCOUNTER — HOSPITAL ENCOUNTER (OUTPATIENT)
Dept: RADIOLOGY | Facility: HOSPITAL | Age: 47
Discharge: HOME | End: 2022-06-23
Attending: INTERNAL MEDICINE
Payer: MEDICAID

## 2022-06-23 VITALS — BODY MASS INDEX: 33.15 KG/M2 | HEIGHT: 62 IN | WEIGHT: 180.13 LBS | TEMPERATURE: 99 F

## 2022-06-23 DIAGNOSIS — G50.1 ATYPICAL FACIAL PAIN: ICD-10-CM

## 2022-06-23 DIAGNOSIS — Z78.9 ON SUPPLEMENTAL OXYGEN BY NASAL CANNULA: ICD-10-CM

## 2022-06-23 DIAGNOSIS — H65.06 RECURRENT ACUTE SEROUS OTITIS MEDIA OF BOTH EARS: ICD-10-CM

## 2022-06-23 DIAGNOSIS — M26.629 TMJPDS (TEMPOROMANDIBULAR JOINT PAIN DYSFUNCTION SYNDROME): Primary | ICD-10-CM

## 2022-06-23 DIAGNOSIS — Z12.31 ENCOUNTER FOR SCREENING MAMMOGRAM FOR BREAST CANCER: ICD-10-CM

## 2022-06-23 DIAGNOSIS — H69.01 PATULOUS EUSTACHIAN TUBE OF RIGHT EAR: ICD-10-CM

## 2022-06-23 DIAGNOSIS — T50.905A SIDE EFFECT OF ALLERGY MEDICATION: ICD-10-CM

## 2022-06-23 DIAGNOSIS — H93.8X3 EAR FULLNESS, BILATERAL: ICD-10-CM

## 2022-06-23 DIAGNOSIS — M35.00 SJOGREN'S SYNDROME, WITH UNSPECIFIED ORGAN INVOLVEMENT: ICD-10-CM

## 2022-06-23 DIAGNOSIS — H92.03 REFERRED OTALGIA OF BOTH EARS: ICD-10-CM

## 2022-06-23 PROCEDURE — 77063 BREAST TOMOSYNTHESIS BI: CPT | Mod: TC,PO

## 2022-06-23 PROCEDURE — 1160F PR REVIEW ALL MEDS BY PRESCRIBER/CLIN PHARMACIST DOCUMENTED: ICD-10-PCS | Mod: CPTII,,, | Performed by: OTOLARYNGOLOGY

## 2022-06-23 PROCEDURE — 99999 PR PBB SHADOW E&M-EST. PATIENT-LVL V: CPT | Mod: PBBFAC,,, | Performed by: OTOLARYNGOLOGY

## 2022-06-23 PROCEDURE — 99999 PR PBB SHADOW E&M-EST. PATIENT-LVL V: ICD-10-PCS | Mod: PBBFAC,,, | Performed by: OTOLARYNGOLOGY

## 2022-06-23 PROCEDURE — 1160F RVW MEDS BY RX/DR IN RCRD: CPT | Mod: CPTII,,, | Performed by: OTOLARYNGOLOGY

## 2022-06-23 PROCEDURE — 1159F PR MEDICATION LIST DOCUMENTED IN MEDICAL RECORD: ICD-10-PCS | Mod: CPTII,,, | Performed by: OTOLARYNGOLOGY

## 2022-06-23 PROCEDURE — 3008F PR BODY MASS INDEX (BMI) DOCUMENTED: ICD-10-PCS | Mod: CPTII,,, | Performed by: OTOLARYNGOLOGY

## 2022-06-23 PROCEDURE — 3008F BODY MASS INDEX DOCD: CPT | Mod: CPTII,,, | Performed by: OTOLARYNGOLOGY

## 2022-06-23 PROCEDURE — 77067 SCR MAMMO BI INCL CAD: CPT | Mod: TC,PO

## 2022-06-23 PROCEDURE — 1159F MED LIST DOCD IN RCRD: CPT | Mod: CPTII,,, | Performed by: OTOLARYNGOLOGY

## 2022-06-23 PROCEDURE — 99204 OFFICE O/P NEW MOD 45 MIN: CPT | Mod: S$PBB,,, | Performed by: OTOLARYNGOLOGY

## 2022-06-23 PROCEDURE — 99215 OFFICE O/P EST HI 40 MIN: CPT | Mod: PBBFAC,PO | Performed by: OTOLARYNGOLOGY

## 2022-06-23 PROCEDURE — 99204 PR OFFICE/OUTPT VISIT, NEW, LEVL IV, 45-59 MIN: ICD-10-PCS | Mod: S$PBB,,, | Performed by: OTOLARYNGOLOGY

## 2022-06-23 NOTE — PROGRESS NOTES
"[facility]    Subjective:      Patient: Promise Medrano Patient PCP: Mine Palmer MD         :  1975     Sex:  female      MRN:  [id number]          Date of Visit: 2022      Chief Complaint: Otalgia (Bilateral ear pain, pain in jaw. States has crackling in ears, flaky wax. States started in October. States that had ear infection about 6 months ago. States that PCP told her that she "could not see in the ear, states that it was opaque". States did get dx with extra bone growth on both top and bottom jaw, and TMJ. ) and Sinus Problem (States has recurrent sinus infection. )      Patient ID: Promise Medrano is a 46 y.o. female current smoker with SLE and Sjogrens referred to me by Dr. Mine Palmer in consultation for otalgia and jaw pain.      CT of the head 2021 images reviewed today reveal minimal left-sided nasal septal deviation and no mucoperiosteal thickening or any destructive process.  No osteoneogenesis.  Mastoids and middle ears appear well developed a normal in aeration.    Patient has a complex medical history.  She continues to smoke, has connective tissue disease/SLE and Sjogren's with significant dry eye needing silicone plugs and dry mouth.  She also suffers from chronic congestion and recurrent sinus infections.  These symptoms are sinus headaches.  She definitely feels a dryness in the nose she even feels a sense of pulling and discomfort.  She also describes chronic fullness in the ears like there is fluid in the ears especially the right side.  She knows she has some TMJ issues in clenching and feels a fair amount of tightness in the TMJ and masseter area especially on the right side.  She was referred by dentistry to [facility] at [facility] but is not yet made that appointment.    Review of Systems   Constitutional: Negative.    HENT: Positive for congestion, hearing loss (Ear fullness), sinus pressure and sinus pain.    Eyes: Negative.    Respiratory: Negative.    Cardiovascular: " Negative.    Gastrointestinal: Negative.    Endocrine: Negative.    Genitourinary: Negative.    Musculoskeletal: Negative.    Allergic/Immunologic: Negative.    Neurological: Negative.    Hematological: Negative.    Psychiatric/Behavioral: Negative.         Past Medical History  She has a past medical history of Allergic rhinitis, Arthritis, Chronic anxiety, Connective tissue disease, Diabetes mellitus, Fibromyalgia, GERD (gastroesophageal reflux disease), Grade II hemorrhoids, Hyperlipemia, Interstitial lung disease, Lupus, Lupus, and Sjogren's syndrome.    Family / Surgical / Social History  Her family history includes Breast cancer in her maternal grandmother and mother; Cancer in her paternal grandfather and paternal grandmother; Diabetes in her maternal grandmother and paternal grandmother; Early death in her father; Heart disease in her father, maternal grandfather, and maternal grandmother; Kidney disease in her father and maternal grandmother; No Known Problems in her daughter; Polycystic ovary syndrome in her daughter; Raynaud syndrome in her mother and sister; Stroke in her father; Uterine cancer in her mother.    Past Surgical History:   Procedure Laterality Date    AUGMENTATION OF BREAST      BREAST SURGERY      BRONCHOSCOPY WITH FLUOROSCOPY N/A 2019    Procedure: BRONCHOSCOPY, WITH FLUOROSCOPY;  Surgeon: Nate Tarango MD;  Location: [facility] ENDO;  Service: Pulmonary;  Laterality: N/A;    CATHETERIZATION OF BOTH LEFT AND RIGHT HEART Left 2021    Procedure: CATHETERIZATION, HEART, BOTH LEFT AND RIGHT;  Surgeon: Luca Wilks MD;  Location: [facility] CATH/EP LAB;  Service: Cardiology;  Laterality: Left;     SECTION  ,    COLONOSCOPY      ESOPHAGOGASTRODUODENOSCOPY      gastric sleeve  2010    HYSTERECTOMY      TONSILLECTOMY  1996    TUMOR REMOVAL      benign fatty       Social History     Tobacco Use    Smoking status: Current Every Day Smoker     Packs/day: 1.00      Years: 25.00     Pack years: 25.00     Types: Cigarettes     Start date: 2/9/1998    Smokeless tobacco: Never Used   Substance and Sexual Activity    Alcohol use: Not Currently    Drug use: No    Sexual activity: Yes     Partners: Male     Birth control/protection: None       Medications  She has a current medication list which includes the following prescription(s): accu-chek softclix lancets, albuterol, albuterol, albuterol-ipratropium, atorvastatin, azelastine, blood sugar diagnostic, budesonide, cetaphil moisturizing, diazepam, dicyclomine, doxepin, ergocalciferol, esomeprazole, ezetimibe, famotidine, ferosul, fluticasone propionate, gabapentin, ibuprofen, cuvitru, levocetirizine, midodrine, ondansetron, oxygen-air delivery systems, and xiidra.      Allergies  Review of patient's allergies indicates:   Allergen Reactions    Ativan [lorazepam] Other (See Comments)     depression    Hay fever and allergy relief     Nitroglycerin      Pt says it could stop her heart       All medications, allergies, and past history have been reviewed.    Objective:      Vitals:  Vitals - 1 value per visit 6/14/2022 6/23/2022 6/23/2022   SYSTOLIC 140 - -   DIASTOLIC 82 - -   Pulse 88 - -   Temp 97.9 - 98.5   Resp - - -   SPO2 94 - -   Weight (lb) 179.6 - 180.12   Weight (kg) 81.466 - 81.7   Height 62 - 62   BMI (Calculated) 32.8 - 32.9   VISIT REPORT - - -   Pain Score  - 5 -   Some recent data might be hidden       Body surface area is 1.89 meters squared.    Physical Exam:    GENERAL  APPEARANCE -  alert, appears stated age, cooperative and mildly obese  BARRIER(S) TO COMMUNICATION -  none VOICE - rough and thick (smoker)    INTEGUMENTARY  no suspicious head and neck lesions    HEENT  HEAD: Normocephalic, without obvious abnormality, atraumatic  FACE: INSPECTION - Symmetric, no signs of trauma, no suspicious lesion(s)  PALPATION -  No masses SALIVARY GLANDS - non-tender with no appreciable mass  STRENGTH - facial  symmetry  NECK/THYROID: normal atraumatic, no neck masses, normal thyroid, no jvd    EYES  Normal occular alignment and mobility with no visible nystagmus at rest    EARS/NOSE/MOUTH/THROAT  EARS  PINNAE AND EXTERNAL EARS - no suspicious lesion OTOSCOPIC EXAM (surgical microscopy was used for visualization/instrumentation): EAR EXAM - patuluous thin posterior right TM segment, no movement on left, no retraction or effusion  HEARING - grossly intact to voice/finger rub    NOSE AND SINUSES  EXTERNAL NOSE - Grossly normal for age/sex  SEPTUM - normal/no obstruction on anterior exam without decongestion TURBINATES - mild enlargement MUCOSA - sticky smoker's rhinitis, mild congestion     MOUTH AND THROAT   ORAL CAVITY, LIPS, TEETH, GUMS & TONGUE - moist, no suspicious lesions  OROPHARYNX /TONSILS/PHARYNGEAL WALLS/HYPOPHARYNX - no erythema or exudates  NASOPHARYNX - limited mirror exam - unable to visualize due to anatomy/gag  LARYNX -  - limited mirror exam - unable to visualize due to anatomy/gag      CHEST AND LUNG   INSPECTION & AUSCULTATION - normal effort, no stridor    CARDIOVASCULAR  AUSCULTATION & PERIPHERAL VASCULAR - regular rate and rhythm.    NEUROLOGIC  MENTAL STATUS - alert, interactive CRANIAL NERVES - normal    LYMPHATIC  HEAD AND NECK - non-palpable; SUPRACLAVICULAR - deferred; AXILLARY - deferred; INGUINAL - deferred; LIVER/SPLEEN - deferred        Procedure(s):  None    Labs:  WBC   Date Value Ref Range Status   08/20/2021 7.45 3.90 - 12.70 K/uL Final     Hemoglobin   Date Value Ref Range Status   08/20/2021 13.9 12.0 - 16.0 g/dL Final     Platelets   Date Value Ref Range Status   08/20/2021 216 150 - 450 K/uL Final     Creatinine   Date Value Ref Range Status   08/20/2021 0.6 0.5 - 1.4 mg/dL Final     TSH   Date Value Ref Range Status   06/09/2021 1.660 0.340 - 5.600 uIU/mL Final     Glucose   Date Value Ref Range Status   08/20/2021 86 70 - 110 mg/dL Final     Hemoglobin A1C   Date Value Ref Range  Status   08/05/2020 5.4 4.5 - 6.2 % Final     Comment:     According to ADA guidelines, hemoglobin A1C <7.0% represents  optimal control in non-pregnant diabetic patients.  Different  metrics may apply to specific populations.   Standards of Medical Care in Diabetes - 2016.  For the purpose of screening for the presence of diabetes:  <5.7%     Consistent with the absence of diabetes  5.7-6.4%  Consistent with increasing risk for diabetes   (prediabetes)  >or=6.5%  Consistent with diabetes  Currently no consensus exists for use of hemoglobin A1C  for diagnosis of diabetes for children.           Assessment:      Problem List Items Addressed This Visit        ENT    Patulous eustachian tube of right ear       Immunology/Multi System    Sjogren's syndrome      Other Visit Diagnoses     TMJPDS (temporomandibular joint pain dysfunction syndrome)    -  Primary    On supplemental oxygen by nasal cannula        Side effect of allergy medication        Ear fullness, bilateral        Referred otalgia of both ears        Atypical facial pain                   Plan:      Patulous eustachian tube (overly open eustachian tube causing fullness and sense of hearing loss) needs further evaluation and treatment.  We will start with an audiogram and decrease medications which cause dryness and decongestant.  Astelin/azelastine should be stopped completely as this can exacerbate symptoms of Sjogren's, intranasal irrigations with budesonide or Flonase should also be discontinued for now on LEs there is dramatic worsening of nasal congestion symptoms.  Sinus rinses as discussed either with [facility] or with [facility] possibly to include xylitol which can help moisten chronic dryness of membranes causing facial pain which does not appear to be driven by recurrent acute sinusitis.  If another sinus infection occurs high-resolution CT scanning the sinuses recommended prior to treatment determine if this is indeed a sinus infection or just  "referred pain from dryness of the nose and/or underlying migraine in TMJ issues.    If facial pressure and specifically the ear fullness are not improving with these treatments proprietary nasal drops called "Patulend" online can be tried to see if that will help congest the right eustachian tube in alleviate the full feeling, alternatively we can prescribe saturated iodine drops which will increase eustachian tube congestion but may also make nasal congestion worse.  It is our hope that by eliminating the irritative effects of drying medications on top of Sjogren's with lots of saline including possibly xylitol this will not be the case.    For the TMJ issues follow-up with [facility] oral surgery as recommended by dentistry.  Consider a mouth guard as discussed.  Physical therapy for TMJ referral provided.  General exercises and measures as outlined.    "

## 2022-06-23 NOTE — PATIENT INSTRUCTIONS
Patulous eustachian tube (overly open eustachian tube causing fullness and sense of hearing loss) needs further evaluation and treatment.  We will start with an audiogram and decrease medications which cause dryness and decongestant.  Astelin/azelastine should be stopped completely as this can exacerbate symptoms of Sjogren's, intranasal irrigations with budesonide or Flonase should also be discontinued for now on LEs there is dramatic worsening of nasal congestion symptoms.  Sinus rinses as discussed either with [facility] or with [employer]ián possibly to include xylitol which can help moisten chronic dryness of membranes causing facial pain which does not appear to be driven by recurrent acute sinusitis.  If another sinus infection occurs high-resolution CT scanning the sinuses recommended prior to treatment determine if this is indeed a sinus infection or just referred pain from dryness of the nose and/or underlying migraine in TMJ issues.

## 2022-06-24 DIAGNOSIS — R73.01 ELEVATED FASTING GLUCOSE: Primary | ICD-10-CM

## 2022-06-24 DIAGNOSIS — E78.00 PURE HYPERCHOLESTEROLEMIA: ICD-10-CM

## 2022-06-24 NOTE — TELEPHONE ENCOUNTER
----- Message from Mine Palmer MD sent at 6/23/2022  3:10 PM CDT -----  Glucose 127 needs A1C-prob needs to go up on atorvastatin to 80

## 2022-06-26 RX ORDER — ATORVASTATIN CALCIUM 80 MG/1
80 TABLET, FILM COATED ORAL DAILY
Qty: 90 TABLET | Refills: 3 | Status: SHIPPED | OUTPATIENT
Start: 2022-06-26 | End: 2022-07-10

## 2022-06-27 DIAGNOSIS — K21.9 GERD WITHOUT ESOPHAGITIS: ICD-10-CM

## 2022-06-28 RX ORDER — ESOMEPRAZOLE MAGNESIUM 40 MG/1
40 CAPSULE, DELAYED RELEASE ORAL
Qty: 90 CAPSULE | Refills: 2 | Status: SHIPPED | OUTPATIENT
Start: 2022-06-28 | End: 2023-04-03 | Stop reason: SDUPTHER

## 2022-07-18 ENCOUNTER — LAB VISIT (OUTPATIENT)
Dept: LAB | Facility: HOSPITAL | Age: 47
End: 2022-07-18
Attending: INTERNAL MEDICINE
Payer: MEDICAID

## 2022-07-18 DIAGNOSIS — R73.01 ELEVATED FASTING GLUCOSE: ICD-10-CM

## 2022-07-18 PROCEDURE — 36415 COLL VENOUS BLD VENIPUNCTURE: CPT | Performed by: INTERNAL MEDICINE

## 2022-07-18 PROCEDURE — 83036 HEMOGLOBIN GLYCOSYLATED A1C: CPT | Performed by: INTERNAL MEDICINE

## 2022-07-19 LAB
ESTIMATED AVG GLUCOSE: 114 MG/DL (ref 68–131)
HBA1C MFR BLD: 5.6 % (ref 4.5–6.2)

## 2022-07-20 DIAGNOSIS — E55.9 VITAMIN D DEFICIENCY: ICD-10-CM

## 2022-07-20 RX ORDER — ERGOCALCIFEROL 1.25 MG/1
50000 CAPSULE ORAL
Qty: 4 CAPSULE | Refills: 0 | Status: SHIPPED | OUTPATIENT
Start: 2022-07-20 | End: 2022-09-22 | Stop reason: SDUPTHER

## 2022-07-20 NOTE — TELEPHONE ENCOUNTER
Refill request    Vitamin D2 50,000 IU   Last office visit     06/14/22  Follow up visit     09/15/22  Medication pended

## 2022-09-12 DIAGNOSIS — F41.9 CHRONIC ANXIETY: ICD-10-CM

## 2022-09-12 DIAGNOSIS — M25.50 ARTHRALGIA, UNSPECIFIED JOINT: ICD-10-CM

## 2022-09-12 DIAGNOSIS — F43.21 SITUATIONAL DEPRESSION: ICD-10-CM

## 2022-09-12 RX ORDER — IBUPROFEN 600 MG/1
600 TABLET ORAL 2 TIMES DAILY PRN
Qty: 60 TABLET | Refills: 1 | Status: SHIPPED | OUTPATIENT
Start: 2022-09-12 | End: 2022-11-29

## 2022-09-12 RX ORDER — DOXEPIN HYDROCHLORIDE 100 MG/1
CAPSULE ORAL
Qty: 90 CAPSULE | Refills: 0 | Status: SHIPPED | OUTPATIENT
Start: 2022-09-12 | End: 2022-09-20

## 2022-09-19 NOTE — PROGRESS NOTES
SUBJECTIVE:    Patient ID: Promise Medrano is a 47 y.o. female.    Chief Complaint: Medication Refill and Follow-up    Shortness of Breath  This is a new problem. The current episode started in the past 7 days. The problem occurs constantly. The problem has been unchanged. The average episode lasts 7 Days. Associated symptoms include abdominal pain (RUQ discomfort), chest pain (HPI), claudication, ear pain, headaches, leg swelling (minimal), neck pain, orthopnea, PND, swollen glands, syncope and wheezing. Pertinent negatives include no coryza, fever, hemoptysis, leg pain, rash, rhinorrhea, sore throat, sputum production or vomiting. The symptoms are aggravated by exercise. Risk factors include prolonged immobilization and smoking. She has tried beta agonist inhalers, body position changes, cool air, leukotriene antagonists and rest for the symptoms. The treatment provided no relief. Her past medical history is significant for allergies, bronchiolitis, CAD, chronic lung disease, DVT, PE and pneumonia. There is no history of aspirin allergies, asthma, COPD, a heart failure or a recent surgery.     Last week she had some chest discomfort with radiation down the arm, labile BP, and left arm and leg weakness-lasted about 45 minutes-now she feels slight pinching upper mid chest-some associated weakness-some dizziness since-weakness on the left side lasted til this am-no facial droop-she had slight dysphagia (?) and she said she had a difficult time speaking but then said she was short of breath and it may have been due to that.Episode lasted about 45 minutes    She has a hx of interstitial lung disease-with the above she had a drop in oxygen saturation to 86 and she increased her nasal oxygen to 10 litres for a 45 minute period of time-    She is taking immuno therapy-for immune deficiency-    Chol 211  on pravastatin 80 mg    She has gained 10 pounds since June-says its related to bed rest due to shortness of  "breath and generalized pain-says she has "lupus in the vagus nerve"  Last 3 sets of Vitals    Vitals - 1 value per visit 6/23/2022 9/20/2022 9/20/2022   SYSTOLIC - - 122   DIASTOLIC - - 80   Pulse - - 90   Temp 98.5 - 99   Resp - - 16   SPO2 - - 96   Weight (lb) 180.12 - 190   Weight (kg) 81.7 - 86.183   Height 62 - 62   BMI (Calculated) 32.9 - 34.7   VISIT REPORT - - -   Pain Score  - 0 -   Some recent data might be hidden        Lab Visit on 07/18/2022   Component Date Value Ref Range Status    Hemoglobin A1C 07/18/2022 5.6  4.5 - 6.2 % Final    Estimated Avg Glucose 07/18/2022 114  68 - 131 mg/dL Final   Lab Visit on 06/23/2022   Component Date Value Ref Range Status    WBC 06/23/2022 16.06 (H)  3.90 - 12.70 K/uL Final    RBC 06/23/2022 4.75  4.00 - 5.40 M/uL Final    Hemoglobin 06/23/2022 14.8  12.0 - 16.0 g/dL Final    Hematocrit 06/23/2022 45.8  37.0 - 48.5 % Final    MCV 06/23/2022 96  82 - 98 fL Final    MCH 06/23/2022 31.2 (H)  27.0 - 31.0 pg Final    MCHC 06/23/2022 32.3  32.0 - 36.0 g/dL Final    RDW 06/23/2022 14.1  11.5 - 14.5 % Final    Platelets 06/23/2022 290  150 - 450 K/uL Final    MPV 06/23/2022 10.7  9.2 - 12.9 fL Final    Immature Granulocytes 06/23/2022 0.6 (H)  0.0 - 0.5 % Final    Gran # (ANC) 06/23/2022 11.7 (H)  1.8 - 7.7 K/uL Final    Immature Grans (Abs) 06/23/2022 0.10 (H)  0.00 - 0.04 K/uL Final    Lymph # 06/23/2022 3.3  1.0 - 4.8 K/uL Final    Mono # 06/23/2022 0.8  0.3 - 1.0 K/uL Final    Eos # 06/23/2022 0.1  0.0 - 0.5 K/uL Final    Baso # 06/23/2022 0.06  0.00 - 0.20 K/uL Final    nRBC 06/23/2022 0  0 /100 WBC Final    Gran % 06/23/2022 72.9  38.0 - 73.0 % Final    Lymph % 06/23/2022 20.2  18.0 - 48.0 % Final    Mono % 06/23/2022 5.2  4.0 - 15.0 % Final    Eosinophil % 06/23/2022 0.7  0.0 - 8.0 % Final    Basophil % 06/23/2022 0.4  0.0 - 1.9 % Final    Differential Method 06/23/2022 Automated   Final    Sodium 06/23/2022 137  136 - 145 mmol/L Final    Potassium 06/23/2022 4.0  3.5 " - 5.1 mmol/L Final    Chloride 06/23/2022 101  95 - 110 mmol/L Final    CO2 06/23/2022 27  23 - 29 mmol/L Final    Glucose 06/23/2022 127 (H)  70 - 110 mg/dL Final    BUN 06/23/2022 16  6 - 20 mg/dL Final    Creatinine 06/23/2022 0.6  0.5 - 1.4 mg/dL Final    Calcium 06/23/2022 9.1  8.7 - 10.5 mg/dL Final    Total Protein 06/23/2022 7.8  6.0 - 8.4 g/dL Final    Albumin 06/23/2022 4.2  3.5 - 5.2 g/dL Final    Total Bilirubin 06/23/2022 0.3  0.1 - 1.0 mg/dL Final    Alkaline Phosphatase 06/23/2022 108  55 - 135 U/L Final    AST 06/23/2022 24  10 - 40 U/L Final    ALT 06/23/2022 26  10 - 44 U/L Final    Anion Gap 06/23/2022 9  8 - 16 mmol/L Final    eGFR if African American 06/23/2022 >60.0  >60 mL/min/1.73 m^2 Final    eGFR if non African American 06/23/2022 >60.0  >60 mL/min/1.73 m^2 Final    Cholesterol 06/23/2022 211 (H)  120 - 199 mg/dL Final    Triglycerides 06/23/2022 91  30 - 150 mg/dL Final    HDL 06/23/2022 71  40 - 75 mg/dL Final    LDL Cholesterol 06/23/2022 121.8  63.0 - 159.0 mg/dL Final    HDL/Cholesterol Ratio 06/23/2022 33.6  20.0 - 50.0 % Final    Total Cholesterol/HDL Ratio 06/23/2022 3.0  2.0 - 5.0 Final    Non-HDL Cholesterol 06/23/2022 140  mg/dL Final       Past Medical History:   Diagnosis Date    Allergic rhinitis     Arthritis     Chronic anxiety 10/23/2018    Connective tissue disease     joints b/c lupus    Diabetes mellitus     Fibromyalgia     GERD (gastroesophageal reflux disease)     Grade II hemorrhoids 1/23/2019    Hyperlipemia     Interstitial lung disease 2019    Lupus     Lupus     Sjogren's syndrome 1/23/2019     Past Surgical History:   Procedure Laterality Date    AUGMENTATION OF BREAST      BREAST SURGERY  2004    BRONCHOSCOPY WITH FLUOROSCOPY N/A 12/17/2019    Procedure: BRONCHOSCOPY, WITH FLUOROSCOPY;  Surgeon: Nate Tarango MD;  Location: [facility] ENDO;  Service: Pulmonary;  Laterality: N/A;    CATHETERIZATION OF BOTH LEFT AND RIGHT HEART Left 08/23/2021    Procedure:  CATHETERIZATION, HEART, BOTH LEFT AND RIGHT;  Surgeon: Luca Wilks MD;  Location: [facility] CATH/EP LAB;  Service: Cardiology;  Laterality: Left;     SECTION  ,2009    COLONOSCOPY      ESOPHAGOGASTRODUODENOSCOPY      gastric sleeve  2010    HYSTERECTOMY  2010    TONSILLECTOMY  1996    TUMOR REMOVAL      benign fatty     Family History   Problem Relation Age of Onset    Early death Father     Heart disease Father     Stroke Father     Kidney disease Father     Diabetes Paternal Grandmother     Cancer Paternal Grandmother     Raynaud syndrome Mother     Uterine cancer Mother     Breast cancer Mother     Raynaud syndrome Sister     Polycystic ovary syndrome Daughter     Diabetes Maternal Grandmother     Heart disease Maternal Grandmother     Kidney disease Maternal Grandmother     Breast cancer Maternal Grandmother     Heart disease Maternal Grandfather     Cancer Paternal Grandfather     No Known Problems Daughter        Marital Status: Single  Alcohol History:  reports that she does not currently use alcohol.  Tobacco History:  reports that she has been smoking cigarettes. She started smoking about 24 years ago. She has a 25.00 pack-year smoking history. She has never used smokeless tobacco.  Drug History:  reports no history of drug use.    Review of patient's allergies indicates:   Allergen Reactions    Ativan [lorazepam] Other (See Comments)     depression    Hay fever and allergy relief     Nitroglycerin      Pt says it could stop her heart       Current Outpatient Medications:     ACCU-CHEK SOFTCLIX LANCETS Misc, USE ONCE DAILY AS NEEDED, Disp: 100 each, Rfl: 5    albuterol (PROVENTIL) 2.5 mg /3 mL (0.083 %) nebulizer solution, Take 3 mLs (2.5 mg total) by nebulization every 4 (four) hours., Disp: 300 mL, Rfl: 3    albuterol (PROVENTIL/VENTOLIN HFA) 90 mcg/actuation inhaler, INHALE 1 PUFF INTO THE LUNGS ONCE DAILY, Disp: 18 g, Rfl: 3    albuterol-ipratropium (DUO-NEB) 2.5 mg-0.5 mg/3 mL nebulizer  solution, USE 1 VIAL VIA NEBULIZER EVERY 6 HOURS AS NEEDED FOR WHEEZING OR SHORTNESS OF BREATH, Disp: 180 mL, Rfl: 0    azelastine (ASTELIN) 137 mcg (0.1 %) nasal spray, USE 1 SPRAY IN EACH NOSTRIL TWICE DAILY, Disp: 90 mL, Rfl: 1    blood sugar diagnostic (ONETOUCH ULTRA BLUE TEST STRIP) Strp, Use strips to take blood sugar bid, Disp: 200 strip, Rfl: 2    budesonide (PULMICORT) 0.5 mg/2 mL nebulizer solution, ADD 1 VIAL INTO SINUS RINSE TWICE DAILY, Disp: 120 mL, Rfl: 3    CETAPHIL MOISTURIZING cream, Apply 1 application topically as needed., Disp: 90 g, Rfl: 1    cyclobenzaprine (FLEXERIL) 10 MG tablet, Take 10 mg by mouth every evening., Disp: , Rfl:     dicyclomine (BENTYL) 20 mg tablet, TAKE 1 TABLET(20 MG) BY MOUTH TWICE DAILY, Disp: 60 tablet, Rfl: 0    doxepin (SINEQUAN) 150 MG Cap, Take 150 mg by mouth every evening., Disp: , Rfl:     ergocalciferol (ERGOCALCIFEROL) 50,000 unit Cap, Take 1 capsule (50,000 Units total) by mouth every 7 days., Disp: 4 capsule, Rfl: 0    esomeprazole (NEXIUM) 40 MG capsule, Take 1 capsule (40 mg total) by mouth before breakfast., Disp: 90 capsule, Rfl: 2    ezetimibe (ZETIA) 10 mg tablet, Take 1 tablet (10 mg total) by mouth once daily., Disp: 90 tablet, Rfl: 1    famotidine (PEPCID) 20 MG tablet, TAKE 1 TABLET(20 MG) BY MOUTH TWICE DAILY, Disp: 60 tablet, Rfl: 5    FEROSUL 325 mg (65 mg iron) Tab tablet, TAKE 1 TABLET BY MOUTH EVERY DAY, Disp: 90 tablet, Rfl: 0    fluticasone propionate (FLONASE) 50 mcg/actuation nasal spray, SHAKE LIQUID AND USE 1 SPRAY(50 MCG) IN EACH NOSTRIL EVERY DAY, Disp: 16 g, Rfl: 0    ibuprofen (ADVIL,MOTRIN) 600 MG tablet, Take 1 tablet (600 mg total) by mouth 2 (two) times daily as needed for Pain., Disp: 60 tablet, Rfl: 1    immun glob G,IgG,-gly-IgA ov50 (CUVITRU) 10 gram/50 mL (20 %) Soln, Inject 11 g into the skin once a week., Disp: 200 mL, Rfl: 12    levocetirizine (XYZAL) 5 MG tablet, TAKE 1 TABLET(5 MG) BY MOUTH EVERY EVENING, Disp: 90  tablet, Rfl: 1    midodrine (PROAMATINE) 2.5 MG Tab, Take 2.5 mg by mouth 3 (three) times daily., Disp: , Rfl:     ondansetron (ZOFRAN-ODT) 4 MG TbDL, DISSOLVE 2 TABLETS UNDER THE TONGUE EVERY 8 HOURS AS NEEDED, Disp: 30 tablet, Rfl: 5    OXYGEN-AIR DELIVERY SYSTEMS MISC, by Misc.(Non-Drug; Combo Route) route., Disp: , Rfl:     pravastatin (PRAVACHOL) 80 MG tablet, TAKE 1 TABLET BY MOUTH ONCE DAILY, Disp: 90 tablet, Rfl: 3    diazePAM (VALIUM) 5 MG tablet, Take 1 to 2 tabs by mouth qhs, Disp: 60 tablet, Rfl: 2    gabapentin (NEURONTIN) 300 MG capsule, Take one hs, Disp: 90 capsule, Rfl: 1    Review of Systems   Constitutional:  Negative for appetite change, chills, diaphoresis, fatigue, fever and unexpected weight change.   HENT:  Positive for ear pain. Negative for congestion, hearing loss, nosebleeds, postnasal drip, rhinorrhea, sinus pressure, sinus pain, sneezing, sore throat, tinnitus, trouble swallowing and voice change.    Eyes:  Negative for photophobia, pain, itching and visual disturbance.   Respiratory:  Positive for shortness of breath (HPI) and wheezing. Negative for apnea, cough, hemoptysis, sputum production, chest tightness and stridor.         Smoking 1 ppd   Cardiovascular:  Positive for chest pain (HPI), orthopnea, claudication, leg swelling (minimal), syncope and PND. Negative for palpitations.   Gastrointestinal:  Positive for abdominal pain (RUQ discomfort). Negative for abdominal distention, blood in stool, constipation, diarrhea, nausea and vomiting.   Endocrine: Negative for cold intolerance, heat intolerance, polydipsia and polyuria.   Genitourinary:  Negative for difficulty urinating, dyspareunia, dysuria, flank pain, frequency, hematuria, menstrual problem, pelvic pain, urgency, vaginal discharge and vaginal pain.   Musculoskeletal:  Positive for neck pain. Negative for arthralgias, back pain, joint swelling, myalgias and neck stiffness.   Skin:  Negative for pallor and rash.    Allergic/Immunologic: Negative for environmental allergies and food allergies.   Neurological:  Positive for headaches. Negative for dizziness, tremors, speech difficulty, weakness, light-headedness and numbness.   Hematological:  Does not bruise/bleed easily.   Psychiatric/Behavioral:  Negative for agitation, confusion, decreased concentration, sleep disturbance and suicidal ideas. The patient is not nervous/anxious.         Objective:      Vitals    Vitals - 1 value per visit 6/14/2022 6/23/2022 6/23/2022 9/20/2022 9/20/2022   SYSTOLIC 140 - - - 122   DIASTOLIC 82 - - - 80   Pulse 88 - - - 90   Temp 97.9 - 98.5 - 99   Resp - - - - 16   SPO2 94 - - - 96   Weight (lb) 179.6 - 180.12 - 190   Weight (kg) 81.466 - 81.7 - 86.183   Height 62 - 62 - 62   BMI (Calculated) 32.8 - 32.9 - 34.7   VISIT REPORT - - - - -   Pain Score  - 5 - 0 -   Some recent data might be hidden       Physical Exam  Constitutional:       General: She is not in acute distress.     Appearance: She is obese. She is not ill-appearing.   HENT:      Head: Normocephalic and atraumatic.   Eyes:      Extraocular Movements: Extraocular movements intact.      Pupils: Pupils are equal, round, and reactive to light.   Neck:      Vascular: No carotid bruit.   Cardiovascular:      Rate and Rhythm: Normal rate and regular rhythm.      Heart sounds: No murmur heard.  Pulmonary:      Breath sounds: Rhonchi present.   Abdominal:      General: Bowel sounds are normal.      Palpations: Abdomen is soft.   Musculoskeletal:      Right lower leg: No edema.      Left lower leg: No edema.   Lymphadenopathy:      Cervical: No cervical adenopathy.   Skin:     General: Skin is warm and dry.   Neurological:      Mental Status: She is alert and oriented to person, place, and time. Mental status is at baseline.   Psychiatric:         Mood and Affect: Mood normal.         Thought Content: Thought content normal.         Assessment:       1. Left-sided weakness    2. Bronchitis  with asthma, subacute    3. Chronic insomnia    4. Peripheral polyneuropathy    5. Elevated glucose         Plan:       Left-sided weakness  -     Ambulatory referral/consult to Neurology; Future; Expected date: 09/27/2022    Bronchitis with asthma, subacute    Chronic insomnia  -     diazePAM (VALIUM) 5 MG tablet; Take 1 to 2 tabs by mouth qhs  Dispense: 60 tablet; Refill: 2  -     Comprehensive Metabolic Panel; Future; Expected date: 09/20/2022    Peripheral polyneuropathy  -     gabapentin (NEURONTIN) 300 MG capsule; Take one hs  Dispense: 90 capsule; Refill: 1  -     Comprehensive Metabolic Panel; Future; Expected date: 09/20/2022    Elevated glucose  -     Hemoglobin A1C; Future; Expected date: 09/20/2022    No follow-ups on file.        9/22/2022 Mine Palmer M.D.

## 2022-09-20 ENCOUNTER — OFFICE VISIT (OUTPATIENT)
Dept: FAMILY MEDICINE | Facility: CLINIC | Age: 47
End: 2022-09-20
Payer: MEDICAID

## 2022-09-20 VITALS
BODY MASS INDEX: 34.96 KG/M2 | HEART RATE: 90 BPM | RESPIRATION RATE: 16 BRPM | OXYGEN SATURATION: 96 % | DIASTOLIC BLOOD PRESSURE: 80 MMHG | TEMPERATURE: 99 F | SYSTOLIC BLOOD PRESSURE: 122 MMHG | WEIGHT: 190 LBS | HEIGHT: 62 IN

## 2022-09-20 DIAGNOSIS — R73.09 ELEVATED GLUCOSE: ICD-10-CM

## 2022-09-20 DIAGNOSIS — G62.9 PERIPHERAL POLYNEUROPATHY: ICD-10-CM

## 2022-09-20 DIAGNOSIS — F51.04 CHRONIC INSOMNIA: ICD-10-CM

## 2022-09-20 DIAGNOSIS — J45.909 BRONCHITIS WITH ASTHMA, SUBACUTE: ICD-10-CM

## 2022-09-20 DIAGNOSIS — J20.9 BRONCHITIS WITH ASTHMA, SUBACUTE: ICD-10-CM

## 2022-09-20 DIAGNOSIS — R53.1 LEFT-SIDED WEAKNESS: Primary | ICD-10-CM

## 2022-09-20 PROCEDURE — 1160F RVW MEDS BY RX/DR IN RCRD: CPT | Mod: CPTII,,, | Performed by: INTERNAL MEDICINE

## 2022-09-20 PROCEDURE — 3079F DIAST BP 80-89 MM HG: CPT | Mod: CPTII,,, | Performed by: INTERNAL MEDICINE

## 2022-09-20 PROCEDURE — 3044F HG A1C LEVEL LT 7.0%: CPT | Mod: CPTII,,, | Performed by: INTERNAL MEDICINE

## 2022-09-20 PROCEDURE — 1160F PR REVIEW ALL MEDS BY PRESCRIBER/CLIN PHARMACIST DOCUMENTED: ICD-10-PCS | Mod: CPTII,,, | Performed by: INTERNAL MEDICINE

## 2022-09-20 PROCEDURE — 1159F PR MEDICATION LIST DOCUMENTED IN MEDICAL RECORD: ICD-10-PCS | Mod: CPTII,,, | Performed by: INTERNAL MEDICINE

## 2022-09-20 PROCEDURE — 3044F PR MOST RECENT HEMOGLOBIN A1C LEVEL <7.0%: ICD-10-PCS | Mod: CPTII,,, | Performed by: INTERNAL MEDICINE

## 2022-09-20 PROCEDURE — 99213 PR OFFICE/OUTPT VISIT, EST, LEVL III, 20-29 MIN: ICD-10-PCS | Mod: S$PBB,,, | Performed by: INTERNAL MEDICINE

## 2022-09-20 PROCEDURE — 1159F MED LIST DOCD IN RCRD: CPT | Mod: CPTII,,, | Performed by: INTERNAL MEDICINE

## 2022-09-20 PROCEDURE — 3074F PR MOST RECENT SYSTOLIC BLOOD PRESSURE < 130 MM HG: ICD-10-PCS | Mod: CPTII,,, | Performed by: INTERNAL MEDICINE

## 2022-09-20 PROCEDURE — 99213 OFFICE O/P EST LOW 20 MIN: CPT | Mod: S$PBB,,, | Performed by: INTERNAL MEDICINE

## 2022-09-20 PROCEDURE — 3079F PR MOST RECENT DIASTOLIC BLOOD PRESSURE 80-89 MM HG: ICD-10-PCS | Mod: CPTII,,, | Performed by: INTERNAL MEDICINE

## 2022-09-20 PROCEDURE — 3074F SYST BP LT 130 MM HG: CPT | Mod: CPTII,,, | Performed by: INTERNAL MEDICINE

## 2022-09-20 PROCEDURE — 3008F PR BODY MASS INDEX (BMI) DOCUMENTED: ICD-10-PCS | Mod: CPTII,,, | Performed by: INTERNAL MEDICINE

## 2022-09-20 PROCEDURE — 99215 OFFICE O/P EST HI 40 MIN: CPT | Performed by: INTERNAL MEDICINE

## 2022-09-20 PROCEDURE — 3008F BODY MASS INDEX DOCD: CPT | Mod: CPTII,,, | Performed by: INTERNAL MEDICINE

## 2022-09-20 RX ORDER — GABAPENTIN 300 MG/1
CAPSULE ORAL
Qty: 90 CAPSULE | Refills: 1 | Status: SHIPPED | OUTPATIENT
Start: 2022-09-20 | End: 2023-04-03

## 2022-09-20 RX ORDER — DIAZEPAM 5 MG/1
TABLET ORAL
Qty: 60 TABLET | Refills: 2 | Status: SHIPPED | OUTPATIENT
Start: 2022-09-20 | End: 2023-03-29 | Stop reason: SDUPTHER

## 2022-09-20 RX ORDER — ATORVASTATIN CALCIUM 40 MG/1
40 TABLET, FILM COATED ORAL DAILY
COMMUNITY
Start: 2022-09-10 | End: 2022-09-20

## 2022-09-20 RX ORDER — DOXEPIN HYDROCHLORIDE 150 MG/1
150 CAPSULE ORAL NIGHTLY
COMMUNITY
Start: 2022-09-16 | End: 2023-01-10 | Stop reason: SDUPTHER

## 2022-09-20 RX ORDER — CYCLOBENZAPRINE HCL 10 MG
10 TABLET ORAL NIGHTLY
COMMUNITY
Start: 2022-09-12

## 2022-09-22 ENCOUNTER — PATIENT MESSAGE (OUTPATIENT)
Dept: FAMILY MEDICINE | Facility: CLINIC | Age: 47
End: 2022-09-22

## 2022-09-22 DIAGNOSIS — E55.9 VITAMIN D DEFICIENCY: Primary | ICD-10-CM

## 2022-09-22 RX ORDER — AMOXICILLIN AND CLAVULANATE POTASSIUM 875; 125 MG/1; MG/1
1 TABLET, FILM COATED ORAL 2 TIMES DAILY
Qty: 20 TABLET | Refills: 0 | Status: SHIPPED | OUTPATIENT
Start: 2022-09-22 | End: 2022-11-09

## 2022-09-22 RX ORDER — ERGOCALCIFEROL 1.25 MG/1
50000 CAPSULE ORAL
Qty: 4 CAPSULE | Refills: 0 | Status: SHIPPED | OUTPATIENT
Start: 2022-09-22 | End: 2022-10-25

## 2022-10-05 ENCOUNTER — PATIENT MESSAGE (OUTPATIENT)
Dept: ALLERGY | Facility: CLINIC | Age: 47
End: 2022-10-05

## 2022-11-03 ENCOUNTER — PATIENT MESSAGE (OUTPATIENT)
Dept: FAMILY MEDICINE | Facility: CLINIC | Age: 47
End: 2022-11-03

## 2022-11-03 DIAGNOSIS — K21.9 GERD WITHOUT ESOPHAGITIS: Primary | ICD-10-CM

## 2022-11-04 RX ORDER — PANTOPRAZOLE SODIUM 40 MG/1
40 TABLET, DELAYED RELEASE ORAL DAILY
Qty: 90 TABLET | Refills: 3 | Status: SHIPPED | OUTPATIENT
Start: 2022-11-04 | End: 2023-03-01

## 2022-11-28 ENCOUNTER — LAB VISIT (OUTPATIENT)
Dept: LAB | Facility: HOSPITAL | Age: 47
End: 2022-11-28
Attending: INTERNAL MEDICINE
Payer: MEDICAID

## 2022-11-28 ENCOUNTER — OFFICE VISIT (OUTPATIENT)
Dept: ALLERGY | Facility: CLINIC | Age: 47
End: 2022-11-28
Payer: MEDICAID

## 2022-11-28 VITALS
TEMPERATURE: 99 F | BODY MASS INDEX: 36.65 KG/M2 | SYSTOLIC BLOOD PRESSURE: 127 MMHG | HEART RATE: 89 BPM | OXYGEN SATURATION: 96 % | HEIGHT: 62 IN | DIASTOLIC BLOOD PRESSURE: 86 MMHG | WEIGHT: 199.19 LBS

## 2022-11-28 DIAGNOSIS — J32.9 CHRONIC SINUSITIS WITH RECURRENT BRONCHITIS: ICD-10-CM

## 2022-11-28 DIAGNOSIS — A49.9 RECURRENT BACTERIAL INFECTION: ICD-10-CM

## 2022-11-28 DIAGNOSIS — R73.09 ELEVATED GLUCOSE: ICD-10-CM

## 2022-11-28 DIAGNOSIS — J40 CHRONIC SINUSITIS WITH RECURRENT BRONCHITIS: ICD-10-CM

## 2022-11-28 DIAGNOSIS — F51.04 CHRONIC INSOMNIA: ICD-10-CM

## 2022-11-28 DIAGNOSIS — D80.6 DEFICIENCY OF ANTI-PNEUMOCOCCAL POLYSACCHARIDE ANTIBODY: ICD-10-CM

## 2022-11-28 DIAGNOSIS — R06.89 DYSPNEA AND RESPIRATORY ABNORMALITIES: Primary | ICD-10-CM

## 2022-11-28 DIAGNOSIS — R06.00 DYSPNEA AND RESPIRATORY ABNORMALITIES: Primary | ICD-10-CM

## 2022-11-28 DIAGNOSIS — Z72.0 TOBACCO ABUSE: ICD-10-CM

## 2022-11-28 DIAGNOSIS — G62.9 PERIPHERAL POLYNEUROPATHY: ICD-10-CM

## 2022-11-28 LAB
ALBUMIN SERPL BCP-MCNC: 4 G/DL (ref 3.5–5.2)
ALP SERPL-CCNC: 101 U/L (ref 55–135)
ALT SERPL W/O P-5'-P-CCNC: 23 U/L (ref 10–44)
ANION GAP SERPL CALC-SCNC: 7 MMOL/L (ref 8–16)
AST SERPL-CCNC: 21 U/L (ref 10–40)
BILIRUB SERPL-MCNC: 0.5 MG/DL (ref 0.1–1)
BUN SERPL-MCNC: 11 MG/DL (ref 6–20)
CALCIUM SERPL-MCNC: 8.9 MG/DL (ref 8.7–10.5)
CHLORIDE SERPL-SCNC: 99 MMOL/L (ref 95–110)
CO2 SERPL-SCNC: 27 MMOL/L (ref 23–29)
CREAT SERPL-MCNC: 0.6 MG/DL (ref 0.5–1.4)
EST. GFR  (NO RACE VARIABLE): >60 ML/MIN/1.73 M^2
GLUCOSE SERPL-MCNC: 117 MG/DL (ref 70–110)
POTASSIUM SERPL-SCNC: 3.8 MMOL/L (ref 3.5–5.1)
PROT SERPL-MCNC: 8.1 G/DL (ref 6–8.4)
SODIUM SERPL-SCNC: 133 MMOL/L (ref 136–145)

## 2022-11-28 PROCEDURE — 1160F PR REVIEW ALL MEDS BY PRESCRIBER/CLIN PHARMACIST DOCUMENTED: ICD-10-PCS | Mod: CPTII,S$GLB,, | Performed by: ALLERGY & IMMUNOLOGY

## 2022-11-28 PROCEDURE — 36415 COLL VENOUS BLD VENIPUNCTURE: CPT | Performed by: INTERNAL MEDICINE

## 2022-11-28 PROCEDURE — 99214 OFFICE O/P EST MOD 30 MIN: CPT | Mod: S$GLB,,, | Performed by: ALLERGY & IMMUNOLOGY

## 2022-11-28 PROCEDURE — 1159F MED LIST DOCD IN RCRD: CPT | Mod: CPTII,S$GLB,, | Performed by: ALLERGY & IMMUNOLOGY

## 2022-11-28 PROCEDURE — 80053 COMPREHEN METABOLIC PANEL: CPT | Performed by: INTERNAL MEDICINE

## 2022-11-28 PROCEDURE — 99214 PR OFFICE/OUTPT VISIT, EST, LEVL IV, 30-39 MIN: ICD-10-PCS | Mod: S$GLB,,, | Performed by: ALLERGY & IMMUNOLOGY

## 2022-11-28 PROCEDURE — 3044F PR MOST RECENT HEMOGLOBIN A1C LEVEL <7.0%: ICD-10-PCS | Mod: CPTII,S$GLB,, | Performed by: ALLERGY & IMMUNOLOGY

## 2022-11-28 PROCEDURE — 1159F PR MEDICATION LIST DOCUMENTED IN MEDICAL RECORD: ICD-10-PCS | Mod: CPTII,S$GLB,, | Performed by: ALLERGY & IMMUNOLOGY

## 2022-11-28 PROCEDURE — 3079F DIAST BP 80-89 MM HG: CPT | Mod: CPTII,S$GLB,, | Performed by: ALLERGY & IMMUNOLOGY

## 2022-11-28 PROCEDURE — 3074F SYST BP LT 130 MM HG: CPT | Mod: CPTII,S$GLB,, | Performed by: ALLERGY & IMMUNOLOGY

## 2022-11-28 PROCEDURE — 3008F BODY MASS INDEX DOCD: CPT | Mod: CPTII,S$GLB,, | Performed by: ALLERGY & IMMUNOLOGY

## 2022-11-28 PROCEDURE — 3044F HG A1C LEVEL LT 7.0%: CPT | Mod: CPTII,S$GLB,, | Performed by: ALLERGY & IMMUNOLOGY

## 2022-11-28 PROCEDURE — 3074F PR MOST RECENT SYSTOLIC BLOOD PRESSURE < 130 MM HG: ICD-10-PCS | Mod: CPTII,S$GLB,, | Performed by: ALLERGY & IMMUNOLOGY

## 2022-11-28 PROCEDURE — 3008F PR BODY MASS INDEX (BMI) DOCUMENTED: ICD-10-PCS | Mod: CPTII,S$GLB,, | Performed by: ALLERGY & IMMUNOLOGY

## 2022-11-28 PROCEDURE — 3079F PR MOST RECENT DIASTOLIC BLOOD PRESSURE 80-89 MM HG: ICD-10-PCS | Mod: CPTII,S$GLB,, | Performed by: ALLERGY & IMMUNOLOGY

## 2022-11-28 PROCEDURE — 83036 HEMOGLOBIN GLYCOSYLATED A1C: CPT | Performed by: INTERNAL MEDICINE

## 2022-11-28 PROCEDURE — 1160F RVW MEDS BY RX/DR IN RCRD: CPT | Mod: CPTII,S$GLB,, | Performed by: ALLERGY & IMMUNOLOGY

## 2022-11-28 RX ORDER — PREDNISONE 10 MG/1
10 TABLET ORAL DAILY
Qty: 5 TABLET | Refills: 0 | Status: SHIPPED | OUTPATIENT
Start: 2022-11-28 | End: 2022-12-03

## 2022-11-28 RX ORDER — IMMUNE GLOBULIN SUBCUTANEOUS (HUMAN) 200 MG/ML
12 INJECTION, SOLUTION SUBCUTANEOUS WEEKLY
Qty: 200 ML | Refills: 12 | Status: SHIPPED | OUTPATIENT
Start: 2022-11-28 | End: 2023-11-07 | Stop reason: SDUPTHER

## 2022-11-28 NOTE — PROGRESS NOTES
"Subjective:       Patient ID: Promise Medrano is a 47 y.o. female.    Chief Complaint: Deficiency of anti-pneumococcal polysaccharide antibody (Started infusions and wants to discuss getting them increased)    HPI     Pt presents for allergic rhinitis and recurrent bronchitis.     Her strep pneumo titers were not responsive to her pneumovax 23 she obtained in November 2019 indicating SAD.    Last visit: 6/22  - started cuvitru 11 grams weekly     Since her last visit,     She states she has been "flared" since July.     She states it has helped but thinks she needs an increase in her dose.   She is seeing rheumatology- [facility].     No bacterial infections on subq igg therapy.     Still using tobacco.     Allergic Rhinitis- dust mite only   Condition: exacerbation     Onset: childhood  Sx: congestion, dryness, feels fluid in the ears.   Season: perennial   Trigger: uncertain   Tx:   saline, azelastine- 1 sen qday or more prn, fluticasone, montelukast - stopped herself.   ait in the past: yes  Recent testing: serum IgE dust mite only.   ct sinus: 6/2019  Essentially clear paranasal sinuses without significant mucoperiosteal sinus  disease.    SAD  6/2022:  Started cuvitru 11 grams weekly     doxcycline was last used in 4/2020  She is interested in [employer]. - I tried to order it , but it is not orderable in epic.     Recurrent bronchitis felt to be due to SAD dx 12/2019  Started on doxy proph MWF 12/2019  No other abx needed when on proph.     Condition:   Having increased dyspnea. 1-2 weeks.     Infections:  Type infections:   Non since her last visit. But feeling"funk" 1-2 weeks on and off.     Onset: 2018  Bronchitis 3-4 episodes last winter  Has a pmh of lupus autoimmunity, complement levels normal.   April 2019 was d/c from [facility] for pna and or bronchitis  She has a history of current smoker   Does have associated tobacco abuse as well.     Chest ct 8/2018 shows bronchioloitis and mediastinal lymph nodes and axillary " lymph nodes presumed reactive.   Pft: large airway no obstruction and partial response to bronchodilator, small airway reversed by 31%.     Immune evaluation: 6/20/2019    Complement, Total (CH50) 56 ( >41 U/mL)  Complement C2                 3.3  C4 Complement 14 - 44 mg/dL 16      IgA, Serum                    281                         IgM 26 - 217 mg/dL 52      IgG, Total Serum             1259                     700-1600  mg/dL       IgG Subclass 1                710                      248-810  mg/dL       IgG Subclass 2                189                      130-555  mg/dL       IgG Subclass 3                 70                         mg/dL       IgG Subclass 4                 61                         2-96  mg/dL                                           Strep Pneumoniae Type 1  0.5 L >1.3 ug/mL  Strep Pneumoniae Type 3 0.6 L >1.3 ug/mL  Strep Pneumoniae Type 4 0.1 L >1.3 ug/mL  Strep Pneumoniae Type 8 1.8  >1.3 ug/mL  Strep Pneumoniae Type 9 0.5 L >1.3 ug/mL  Strep Pneumoniae Type 12 <0.1 L >1.3 ug/mL  Strep Pneumoniae Type 14 1.2 L >1.3 ug/mL  Strep Pneumoniae Type 19 0.7 L >1.3 ug/mL  Strep Pneumoniae Type 23 0.3 L >1.3 ug/mL  Strep Pneumoniae Type 26 4.3  >1.3 ug/mL  Strep Pneumoniae Type 51 0.3 L >1.3 ug/mL  Strep Pneumoniae Type 56 0.5 L >1.3 ug/mL  Strep Pneumoniae Type 57 1.5  >1.3 ug/mL  Strep Pneumoniae Type 68 <0.1 L >1.3 ug/mL    3/14 = 21%    Hematocrit                   46.0                    34.0-46.6  %           WBC                           5.7                     3.4-10.8  x10E3/uL    RBC                          4.83                    3.77-5.28  x10E6/uL    Hemoglobin                   14.7                    11.1-15.9  g/dL        Basos                           0                   Not Estab.  %           Neurtrophils                   60                   Not Estab.  %           Neurtrophils (Absolute) 3.4  1.4-7.0 x10E3/uL  Lymphs (Absolute)             1.9                 "      0.7-3.1  x10E3/uL    MCV                            95                        79-97  fL          MCHC                         32.0                    31.5-35.7  g/dL        MCH                          30.4                    26.6-33.0  pg          Lymphs                         34                   Not Estab.  %           Immature Granulocytes            0                   Not Estab.  %           Eos                             0                   Not Estab.  %           Monocytes                       6                   Not Estab.  %           Platelets                     185                      150-450  x10E3/uL    Eos (Absolute)                0.0                      0.0-0.4  x10E3/uL    BASO (Absolute)               0.0                      0.0-0.2  x10E3/uL    Monocytes (Absolute)          0.4                      0.1-0.9  x10E3/uL    % CD19+ Lymphs                9.3                     3.3-25.4  %           Abs. CD19+ Lymphs             177                         /uL         Absolute CD4 Heppner           716                     359-1519  /uL         Absolute CD3                 1634                     622-2402  /uL         % CD 4 Pos. Lymph            37.7                    30.8-58.5  %           CD4/CD8 Ratio                0.78 L                  0.92-3.72              % CD3 Pos. Lymph             86.0                    57.5-86.2  %           Abs. CD8 Suppressor           923 H                    109-897  /uL         % CD8 Pos. Lymph             48.6 H                  12.0-35.5  %           RDW                          14.5                    12.3-15.4  %           % NK (CD56/16)                4.1                     1.4-19.4  %           Ab NK (CD56/16)                78       Lpr:   Ranitidine rx at last visit.   Condition: stable , not better.   She didn't start it.   "horrible acid reflux"  "gut issues" not diagnosed.   "why I got off plaquenil" "tore up my gut."       Atopic " Hx    Rhinitis see above   Oral allergy: denies  Food allergy: none    Asthma see above for bronchitis   Latex tolerates   Eczema: denies, but may have a psoriasis.    Urticaria denies chronic, has doxepin qhs     Infection History    Pneumonia # in the past 12 months: bronchitis as above   Sinus infection # in the past 12 months: reports feels like sinus infections.   Otitis infection # in the past 12 months:  Denies chronic infection, but notes chronic fluid in the ears.     Dust mite only    IgE Cladosporium herbarum <0.10  Class 0 kU/L  IgE Dog Dander              <0.10                      Class 0  kU/L        IgE Cat Epithelium          <0.10                      Class 0  kU/L        IgE Ragweed, Short          <0.10                      Class 0  kU/L        IgE D. pteronyssinus         0.13 AB                 Class 0/I  kU/L        IgE A. fumigatus            <0.10                      Class 0  kU/L        IgE Alteraria alternata <0.10  Class 0 kU/L  IgE Elm, American           <0.10                      Class 0  kU/L        IgE Bermuda Grass           <0.10                      Class 0  kU/L        IgE [place], White              <0.10                      Class 0  kU/L        IgE Pigweed, Common         <0.10                      Class 0  kU/L        IgE Cockroach, Danish        <0.10                      Class 0  kU/L        IgE Thistle Russian         <0.10                      Class 0  kU/L        IgE D. farinae              <0.10                      Class 0  kU/L        IgE Cockroach American <0.10  Class 0 kU/L   This test was developed and its performance      characteristics determined by [employer].  It      has not been cleared or approved by the U.S.      Food and Drug Administration.      The FDA has determined that such clearance      or approval is not necessary. This test is      used for clinical purposes.  It should not      be regarded as investigational or for                              research.                                           IgE Maple/Box Elder         <0.10                      Class 0  kU/L        IgE Penicillium chrysogen <0.10  Class 0 kU/L  IgE Angier              <0.10                      Class 0  kU/L        IgE Gabe Grass           <0.10                      Class 0  kU/L        IgE Arik Grass           <0.10                      Class 0  kU/L        IgE Bahia Grass             <0.10                      Class 0  kU/L        IgE Sheep Estelle           <0.10                      Class 0  kU/L        IgE Plantain, English        <0.10                      Class 0  kU/L        IgE Mugwort                 <0.10                      Class 0  kU/L        IgE Pecan Pettis           <0.10                      Class 0  kU/L        IgE Candida albicans        <0.10                      Class 0  kU/L        IgE Setomelanomma rostrat <0.10  Class 0 kU/L  IgE White [place]          <0.10                      Class 0  kU/L        IgE Epicoccum purpur        <0.10                      Class 0  kU/L        IgE Fusarium proliferatum <0.10  Class 0 kU/L  IgE Trichophyton rubrum <0.10  Class 0 kU/L  IgE Rough [place]er        <0.10                      Class 0  kU/L        IgE Mouse Urine             <0.10                      Class 0  kU/L        IgE Silver Birch            <0.10                      Class 0  kU/L        IgE Maple [place] <0.10  Class 0 kU/L  IgE Bipolaris               <0.10                      Class 0  kU/L         This test was developed and its performance      characteristics determined by [employer].  It      has not been cleared or approved by the U.S.      Food and Drug Administration.      The FDA has determined that such clearance      or approval is not necessary. This test is      used for clinical purposes.  It should not      be regarded as investigational or for                             research.                                           IgE  Nishant, White              <0.10                      Class 0  kU/L        IgE Privet, Common          <0.10                      Class 0  kU/L        IgE Ragweed, Giant          <0.10                      Class 0  kU/L        IgE Nettle                  <0.10                      Class 0  kU/L        IgE Cocklebur               <0.10                      Class 0  kU/L        IgE Dockweed, Yellow        <0.10                      Class 0  kU/L         This test was developed and its performance      characteristics determined by [employer].  It      has not been cleared or approved by the U.S.      Food and Drug Administration.      The FDA has determined that such clearance      or approval is not necessary. This test is      used for clinical purposes.  It should not      be regarded as investigational or for                             research.                                           IgE Hackberry               <0.10                      Class 0  kU/L         This test was developed and its performance      characteristics determined by [employer].  It      has not been cleared or approved by the U.S.      Food and Drug Administration.      The FDA has determined that such clearance      or approval is not necessary. This test is      used for clinical purposes.  It should not      be regarded as investigational or for                             research.                                           IgE Sweet Gum               <0.10                      Class 0  kU/L         This test was developed and its performance      characteristics determined by [employer].  It      has not been cleared or approved by the U.S.      Food and Drug Administration.      The FDA has determined that such clearance      or approval is not necessary. This test is      used for clinical purposes.  It should not      be regarded as investigational or for                             research.                                           IgE  "[place]                <0.10                      Class 0  kU/L        IgE Fennel, Dog             <0.10                      Class 0  kU/L         This test was developed and its performance      characteristics determined by [facility].  It      has not been cleared or approved by the U.S.      Food and Drug Administration.      The FDA has determined that such clearance      or approval is not necessary. This test is      used for clinical purposes.  It should not      be regarded as investigational or for                             research.                                                                                                                        Performed at: , [address], [id number]      Jc Mckeon MD, Phone:  [phone]     IgE Mouse Epithelium        <0.10                      Class 0  kU/L        IgE Bethel Bald            <0.10           Review of Systems      General: neg unexpected weight changes, fevers, chills, night sweats, malaise  HEENT: see hpi, Neg eye pain, vision changes, ear drainage, nose bleeds, throat tightness, sores in the mouth  CV: Neg chest pain, palpitations, swelling  Resp: see hpi, neg shortness of breath, hemoptysis  GI: see hpi, neg dysphagia, night abdominal pain, reflux, chronic diarrhea, chronic constipation  Derm: See Hpi, neg new rash, neg flushing  Mu/sk: Neg joint pain, joint swelling   Psych: Neg anxiety  neuro: neg chronic headaches, muscle weakness  Endo: neg heat/cold intolerance, chronic fatigue    Objective:     Vitals:    11/28/22 1115   BP: 127/86   Pulse: 89   Temp: 98.8 °F (37.1 °C)   SpO2: 96%   Weight: 90.4 kg (199 lb 3.2 oz)   Height: 5' 2" (1.575 m)        Physical Exam      General: no acute distress, well developed well nourished   HEENT:   Head:normocephalic atraumatic  Eyes: BENITEZ, EOMI, Neg injection, scleral icterus, or conjunctival papillary hypertrophy.  Ears: tm clear bilaterally, normal " canal  Nose: 3+ inferior turbinates pink, neg nasal polyps            Mucosa: dryness            Septal irritation:none   Neg drainage  OP: mucus membranes moist, - cobblestoning, - PND, neg erythema or lesions  Neck: supple, Full range of motion, neg lymphadenopathy  Chest: full respiratory excursion no abnormal chest abnormality  Resp: inspiratory rhonchi , neg expiratory wheezing.   CV: RRR, neg MRG, brisk capillary refill  Ext:  Neg clubbing, cyanosis, pitting edema  Skin: Neg rashes or lesions      Assessment:       1. Dyspnea and respiratory abnormalities    2. Deficiency of anti-pneumococcal polysaccharide antibody    3. Recurrent bacterial infection    4. Tobacco abuse    5. Chronic sinusitis with recurrent bronchitis          Plan:       Dyspnea and respiratory abnormalities  -     predniSONE (DELTASONE) 10 MG tablet; Take 1 tablet (10 mg total) by mouth once daily. for 5 days  Dispense: 5 tablet; Refill: 0    Deficiency of anti-pneumococcal polysaccharide antibody  -     immun glob G,IgG,-gly-IgA ov50 (CUVITRU) 10 gram/50 mL (20 %) Soln; Inject 12 g into the skin once a week.  Dispense: 200 mL; Refill: 12    Recurrent bacterial infection    Tobacco abuse    Chronic sinusitis with recurrent bronchitis      Chronic allergic rhinitis: dust mite only   11/22:   Somewhat flared.     6/22:  Continue budesonide rinses   Continue azelastine   levocetirizine prn     2/22  Serum IgE- not stable enough for skin prick testing. Dust mite only- mild sensitivity.   saline and azelastine    disContinue fluticasone   Discontinue montelukast 1 pill po qday - pt self d/c   levocetirizine prn   Start budesonide in saline rinse     Recurrent bronchitis and sinusitis/SAD  11/22:  Increased to 12 grams weekly to account for recent wt gain 500mg/kg / monthly dose     6/2022:  Start cuvitru weekly 11 grams weekly (550 mg/kg/month - based upon 81 kg)- having thrush and undesirable side effects.   Discontinue doxy  mg MWF  only for proph once on infusions x 3 months.      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     Tobacco abuse and oxygen dependent   Smoking cessation recommended.      Flu vaccine yearly recommended.   covid vaccination obtained     F/u 3 months, sooner if needed.              Katiana Cline M.D.  Allergy/Immunology  [facility]   [phone] phone  [phone] fax

## 2022-11-28 NOTE — LETTER
November 28, 2022        Mine Palmer MD  [address]  [place] [address]             [facility] - Allergy  [address]  [address]  [place] [address]  Phone: [phone]  Fax: [phone]   Patient: Promise Medrano   MR Number: [id number]   YOB: 1975   Date of Visit: 11/28/2022       Dear Dr. Palmer:    Thank you for referring Promise Medrano to me for evaluation. Below are the relevant portions of my assessment and plan of care.            If you have questions, please do not hesitate to call me. I look forward to following Promise along with you.    Sincerely,      Katiana Cline MD           CC  No Recipients

## 2022-11-28 NOTE — PATIENT INSTRUCTIONS
Increased cuvitru 12 grams weekly.     Start prednisone 10 mg daily in the morning x 5 days.   Take with food.   Check your blood sugar     Follow up in 3 months, sooner if needed

## 2022-11-29 LAB
ESTIMATED AVG GLUCOSE: 137 MG/DL (ref 68–131)
HBA1C MFR BLD: 6.4 % (ref 4.5–6.2)

## 2022-11-30 NOTE — PROGRESS NOTES
SUBJECTIVE:    Patient ID: Promise Medrano is a 47 y.o. female.    Chief Complaint: Anxiety, Results, Medication Refill, and Follow-up    HPI    Patient in for follow-up on anxiety-doing well this time of the year    [place] -oxygen sat dropped to 70-80-she has done a sleep study -needs CPAP-has oxygen-she is still smoking one pack a day    Glu 117 A1C 6.4-discussed ozempic    Lipids-due this month-on [facility]    Lab Visit on 11/28/2022   Component Date Value Ref Range Status    Sodium 11/28/2022 133 (L)  136 - 145 mmol/L Final    Potassium 11/28/2022 3.8  3.5 - 5.1 mmol/L Final    Chloride 11/28/2022 99  95 - 110 mmol/L Final    CO2 11/28/2022 27  23 - 29 mmol/L Final    Glucose 11/28/2022 117 (H)  70 - 110 mg/dL Final    BUN 11/28/2022 11  6 - 20 mg/dL Final    Creatinine 11/28/2022 0.6  0.5 - 1.4 mg/dL Final    Calcium 11/28/2022 8.9  8.7 - 10.5 mg/dL Final    Total Protein 11/28/2022 8.1  6.0 - 8.4 g/dL Final    Albumin 11/28/2022 4.0  3.5 - 5.2 g/dL Final    Total Bilirubin 11/28/2022 0.5  0.1 - 1.0 mg/dL Final    Alkaline Phosphatase 11/28/2022 101  55 - 135 U/L Final    AST 11/28/2022 21  10 - 40 U/L Final    ALT 11/28/2022 23  10 - 44 U/L Final    Anion Gap 11/28/2022 7 (L)  8 - 16 mmol/L Final    eGFR 11/28/2022 >60.0  >60 mL/min/1.73 m^2 Final    Hemoglobin A1C 11/28/2022 6.4 (H)  4.5 - 6.2 % Final    Estimated Avg Glucose 11/28/2022 137 (H)  68 - 131 mg/dL Final   Lab Visit on 07/18/2022   Component Date Value Ref Range Status    Hemoglobin A1C 07/18/2022 5.6  4.5 - 6.2 % Final    Estimated Avg Glucose 07/18/2022 114  68 - 131 mg/dL Final   Lab Visit on 06/23/2022   Component Date Value Ref Range Status    WBC 06/23/2022 16.06 (H)  3.90 - 12.70 K/uL Final    RBC 06/23/2022 4.75  4.00 - 5.40 M/uL Final    Hemoglobin 06/23/2022 14.8  12.0 - 16.0 g/dL Final    Hematocrit 06/23/2022 45.8  37.0 - 48.5 % Final    MCV 06/23/2022 96  82 - 98 fL Final    MCH 06/23/2022 31.2  (H)  27.0 - 31.0 pg Final    MCHC 06/23/2022 32.3  32.0 - 36.0 g/dL Final    RDW 06/23/2022 14.1  11.5 - 14.5 % Final    Platelets 06/23/2022 290  150 - 450 K/uL Final    MPV 06/23/2022 10.7  9.2 - 12.9 fL Final    Immature Granulocytes 06/23/2022 0.6 (H)  0.0 - 0.5 % Final    Gran # (ANC) 06/23/2022 11.7 (H)  1.8 - 7.7 K/uL Final    Immature Grans (Abs) 06/23/2022 0.10 (H)  0.00 - 0.04 K/uL Final    Lymph # 06/23/2022 3.3  1.0 - 4.8 K/uL Final    Mono # 06/23/2022 0.8  0.3 - 1.0 K/uL Final    Eos # 06/23/2022 0.1  0.0 - 0.5 K/uL Final    Baso # 06/23/2022 0.06  0.00 - 0.20 K/uL Final    nRBC 06/23/2022 0  0 /100 WBC Final    Gran % 06/23/2022 72.9  38.0 - 73.0 % Final    Lymph % 06/23/2022 20.2  18.0 - 48.0 % Final    Mono % 06/23/2022 5.2  4.0 - 15.0 % Final    Eosinophil % 06/23/2022 0.7  0.0 - 8.0 % Final    Basophil % 06/23/2022 0.4  0.0 - 1.9 % Final    Differential Method 06/23/2022 Automated   Final    Sodium 06/23/2022 137  136 - 145 mmol/L Final    Potassium 06/23/2022 4.0  3.5 - 5.1 mmol/L Final    Chloride 06/23/2022 101  95 - 110 mmol/L Final    CO2 06/23/2022 27  23 - 29 mmol/L Final    Glucose 06/23/2022 127 (H)  70 - 110 mg/dL Final    BUN 06/23/2022 16  6 - 20 mg/dL Final    Creatinine 06/23/2022 0.6  0.5 - 1.4 mg/dL Final    Calcium 06/23/2022 9.1  8.7 - 10.5 mg/dL Final    Total Protein 06/23/2022 7.8  6.0 - 8.4 g/dL Final    Albumin 06/23/2022 4.2  3.5 - 5.2 g/dL Final    Total Bilirubin 06/23/2022 0.3  0.1 - 1.0 mg/dL Final    Alkaline Phosphatase 06/23/2022 108  55 - 135 U/L Final    AST 06/23/2022 24  10 - 40 U/L Final    ALT 06/23/2022 26  10 - 44 U/L Final    Anion Gap 06/23/2022 9  8 - 16 mmol/L Final    eGFR if African American 06/23/2022 >60.0  >60 mL/min/1.73 m^2 Final    eGFR if non African American 06/23/2022 >60.0  >60 mL/min/1.73 m^2 Final    Cholesterol 06/23/2022 211 (H)  120 - 199 mg/dL Final    Triglycerides 06/23/2022 91  30 - 150 mg/dL Final    HDL 06/23/2022 71  40 - 75 mg/dL  Final    LDL Cholesterol 2022 121.8  63.0 - 159.0 mg/dL Final    HDL/Cholesterol Ratio 2022 33.6  20.0 - 50.0 % Final    Total Cholesterol/HDL Ratio 2022 3.0  2.0 - 5.0 Final    Non-HDL Cholesterol 2022 140  mg/dL Final       Past Medical History:   Diagnosis Date    Allergic rhinitis     Arthritis     Chronic anxiety 10/23/2018    Connective tissue disease     joints b/c lupus    Diabetes mellitus     Fibromyalgia     GERD (gastroesophageal reflux disease)     Grade II hemorrhoids 2019    Hyperlipemia     Interstitial lung disease     Lupus     Lupus     Sjogren's syndrome 2019     Past Surgical History:   Procedure Laterality Date    AUGMENTATION OF BREAST      BREAST SURGERY      BRONCHOSCOPY WITH FLUOROSCOPY N/A 2019    Procedure: BRONCHOSCOPY, WITH FLUOROSCOPY;  Surgeon: Nate Tarango MD;  Location: [facility] ENDO;  Service: Pulmonary;  Laterality: N/A;    CATHETERIZATION OF BOTH LEFT AND RIGHT HEART Left 2021    Procedure: CATHETERIZATION, HEART, BOTH LEFT AND RIGHT;  Surgeon: Luca Wilks MD;  Location: [facility] CATH/EP LAB;  Service: Cardiology;  Laterality: Left;     SECTION  ,    COLONOSCOPY      ESOPHAGOGASTRODUODENOSCOPY      gastric sleeve  2010    HYSTERECTOMY  2010    TONSILLECTOMY  1996    TUMOR REMOVAL      benign fatty     Family History   Problem Relation Age of Onset    Early death Father     Heart disease Father     Stroke Father     Kidney disease Father     Diabetes Paternal Grandmother     Cancer Paternal Grandmother     Raynaud syndrome Mother     Uterine cancer Mother     Breast cancer Mother     Raynaud syndrome Sister     Polycystic ovary syndrome Daughter     Diabetes Maternal Grandmother     Heart disease Maternal Grandmother     Kidney disease Maternal Grandmother     Breast cancer Maternal Grandmother     Heart disease Maternal Grandfather     Cancer Paternal Grandfather     No Known Problems Daughter        Marital  Status: Single  Alcohol History:  reports that she does not currently use alcohol.  Tobacco History:  reports that she has been smoking cigarettes. She started smoking about 24 years ago. She has a 25.00 pack-year smoking history. She has never used smokeless tobacco.  Drug History:  reports no history of drug use.    Review of patient's allergies indicates:   Allergen Reactions    Ativan [lorazepam] Other (See Comments)     depression    Hay fever and allergy relief     Nitroglycerin      Pt says it could stop her heart       Current Outpatient Medications:     ACCU-CHEK SOFTCLIX LANCETS Misc, USE ONCE DAILY AS NEEDED, Disp: 100 each, Rfl: 5    albuterol (PROVENTIL) 2.5 mg /3 mL (0.083 %) nebulizer solution, Take 3 mLs (2.5 mg total) by nebulization every 4 (four) hours., Disp: 300 mL, Rfl: 3    albuterol (PROVENTIL/VENTOLIN HFA) 90 mcg/actuation inhaler, INHALE 1 PUFF INTO THE LUNGS ONCE DAILY, Disp: 18 g, Rfl: 3    albuterol-ipratropium (DUO-NEB) 2.5 mg-0.5 mg/3 mL nebulizer solution, USE 1 VIAL VIA NEBULIZER EVERY 6 HOURS AS NEEDED FOR WHEEZING OR SHORTNESS OF BREATH, Disp: 180 mL, Rfl: 0    azelastine (ASTELIN) 137 mcg (0.1 %) nasal spray, SPRAY ONCE IN EACH NOSTRIL TWICE DAILY, Disp: 90 mL, Rfl: 1    blood sugar diagnostic (ONETOUCH ULTRA BLUE TEST STRIP) Strp, Use strips to take blood sugar bid, Disp: 200 strip, Rfl: 2    budesonide (PULMICORT) 0.5 mg/2 mL nebulizer solution, ADD 1 VIAL TO SINUS RINSE AND USE TWICE DAILY AS DIRECTED, Disp: 120 mL, Rfl: 3    CETAPHIL MOISTURIZING cream, Apply 1 application topically as needed., Disp: 90 g, Rfl: 1    cyclobenzaprine (FLEXERIL) 10 MG tablet, Take 10 mg by mouth every evening., Disp: , Rfl:     diazePAM (VALIUM) 5 MG tablet, Take 1 to 2 tabs by mouth qhs, Disp: 60 tablet, Rfl: 2    dicyclomine (BENTYL) 20 mg tablet, TAKE 1 TABLET(20 MG) BY MOUTH TWICE DAILY, Disp: 60 tablet, Rfl: 0    doxepin (SINEQUAN) 150 MG Cap, Take 150 mg by mouth every evening., Disp: ,  Rfl:     ergocalciferol (ERGOCALCIFEROL) 50,000 unit Cap, TAKE 1 CAPSULE BY MOUTH EVERY 7 DAYS, Disp: 4 capsule, Rfl: 3    esomeprazole (NEXIUM) 40 MG capsule, Take 1 capsule (40 mg total) by mouth before breakfast., Disp: 90 capsule, Rfl: 2    ezetimibe (ZETIA) 10 mg tablet, TAKE 1 TABLET(10 MG) BY MOUTH EVERY DAY, Disp: 90 tablet, Rfl: 1    famotidine (PEPCID) 20 MG tablet, TAKE 1 TABLET(20 MG) BY MOUTH TWICE DAILY, Disp: 60 tablet, Rfl: 5    ferrous sulfate (FEROSUL) 325 mg (65 mg iron) Tab tablet, Take 1 tablet (325 mg total) by mouth once daily., Disp: 90 tablet, Rfl: 0    fluticasone propionate (FLONASE) 50 mcg/actuation nasal spray, SHAKE LIQUID AND USE 1 SPRAY(50 MCG) IN EACH NOSTRIL EVERY DAY, Disp: 16 g, Rfl: 0    gabapentin (NEURONTIN) 300 MG capsule, Take one hs, Disp: 90 capsule, Rfl: 1    ibuprofen (ADVIL,MOTRIN) 600 MG tablet, TAKE 1 TABLET(600 MG) BY MOUTH TWICE DAILY AS NEEDED FOR PAIN, Disp: 60 tablet, Rfl: 1    immun glob G,IgG,-gly-IgA ov50 (CUVITRU) 10 gram/50 mL (20 %) Soln, Inject 12 g into the skin once a week., Disp: 200 mL, Rfl: 12    levocetirizine (XYZAL) 5 MG tablet, TAKE 1 TABLET(5 MG) BY MOUTH EVERY EVENING, Disp: 90 tablet, Rfl: 1    midodrine (PROAMATINE) 2.5 MG Tab, Take 2.5 mg by mouth 3 (three) times daily., Disp: , Rfl:     ondansetron (ZOFRAN-ODT) 4 MG TbDL, DISSOLVE 2 TABLETS UNDER THE TONGUE EVERY 8 HOURS AS NEEDED, Disp: 30 tablet, Rfl: 5    OXYGEN-AIR DELIVERY SYSTEMS MISC, by Misc.(Non-Drug; Combo Route) route., Disp: , Rfl:     pravastatin (PRAVACHOL) 80 MG tablet, TAKE 1 TABLET BY MOUTH ONCE DAILY, Disp: 90 tablet, Rfl: 3    predniSONE (DELTASONE) 10 MG tablet, Take 1 tablet (10 mg total) by mouth once daily. for 5 days, Disp: 5 tablet, Rfl: 0    pantoprazole (PROTONIX) 40 MG tablet, Take 1 tablet (40 mg total) by mouth once daily. (Patient not taking: Reported on 12/1/2022), Disp: 90 tablet, Rfl: 3    semaglutide (OZEMPIC) 0.25 mg or 0.5 mg(2 mg/1.5 mL) pen injector,  Inject 0.25 mg into the skin every 7 days., Disp: 1 pen, Rfl: 5    Review of Systems   Constitutional:  Positive for unexpected weight change (gain). Negative for activity change, appetite change, chills, diaphoresis, fatigue and fever.   HENT:  Positive for trouble swallowing (aspirates at times). Negative for congestion, ear pain, hearing loss, nosebleeds, postnasal drip, rhinorrhea, sinus pressure, sinus pain, sneezing, sore throat, tinnitus and voice change.    Eyes:  Positive for visual disturbance. Negative for photophobia, pain, discharge and itching.   Respiratory:  Positive for chest tightness and wheezing. Negative for apnea, cough, shortness of breath and stridor.    Cardiovascular:  Positive for chest pain and palpitations (flutters). Negative for leg swelling.   Gastrointestinal:  Positive for constipation. Negative for abdominal distention, abdominal pain, blood in stool, diarrhea, nausea and vomiting.   Endocrine: Positive for polydipsia. Negative for cold intolerance, heat intolerance and polyuria.   Genitourinary:  Negative for difficulty urinating, dyspareunia, dysuria, flank pain, frequency, hematuria, menstrual problem, pelvic pain, urgency, vaginal discharge and vaginal pain.   Musculoskeletal:  Positive for arthralgias (hips), back pain (lower back), joint swelling and neck pain. Negative for myalgias and neck stiffness.   Skin:  Negative for pallor.   Allergic/Immunologic: Negative for environmental allergies and food allergies.   Neurological:  Positive for weakness and headaches. Negative for dizziness, tremors, speech difficulty, light-headedness and numbness.   Hematological:  Does not bruise/bleed easily.   Psychiatric/Behavioral:  Negative for agitation, confusion, decreased concentration, dysphoric mood, sleep disturbance and suicidal ideas. The patient is not nervous/anxious.         Objective:      Vitals    Vitals - 1 value per visit 9/20/2022 9/20/2022 11/28/2022 12/1/2022 12/1/2022    SYSTOLIC - 122 127 - 126   DIASTOLIC - 80 86 - 78   Pulse - 90 89 - 94   Temp - 99 98.8 - 99   Resp - 16 - - 16   SPO2 - 96 96 - 97   Weight (lb) - 190 199.2 - 199   Weight (kg) - 86.183 90.357 - 90.266   Height - 62 62 - 62   BMI (Calculated) - 34.7 36.4 - 36.4   VISIT REPORT - - - - -   Pain Score  0 - - 0 -   Some recent data might be hidden       Physical Exam  Constitutional:       General: She is not in acute distress.     Appearance: She is obese. She is not ill-appearing.   HENT:      Head: Normocephalic.   Eyes:      Extraocular Movements: Extraocular movements intact.      Pupils: Pupils are equal, round, and reactive to light.   Cardiovascular:      Rate and Rhythm: Normal rate and regular rhythm.      Pulses: Normal pulses.      Heart sounds: Normal heart sounds.   Pulmonary:      Breath sounds: Rhonchi present.      Comments: Congested cough  Abdominal:      General: Bowel sounds are normal.      Palpations: Abdomen is soft.   Musculoskeletal:      Right lower leg: No edema.      Left lower leg: No edema.   Lymphadenopathy:      Cervical: No cervical adenopathy.   Skin:     General: Skin is warm and dry.   Neurological:      General: No focal deficit present.      Mental Status: She is alert and oriented to person, place, and time.   Psychiatric:         Mood and Affect: Mood normal.         Behavior: Behavior normal.         Thought Content: Thought content normal.         Assessment:       1. Pure hypercholesterolemia    2. Elevated glucose    3. Benign hypertension    4. Panlobular emphysema    5. Flu vaccine need    6. BMI 50.0-59.9, adult         Plan:       Pure hypercholesterolemia  -     Lipid Panel; Future; Expected date: 12/22/2022    Elevated glucose  -     Hemoglobin A1C; Future; Expected date: 05/28/2023  -     Comprehensive Metabolic Panel; Future; Expected date: 05/28/2023  -     semaglutide (OZEMPIC) 0.25 mg or 0.5 mg(2 mg/1.5 mL) pen injector; Inject 0.25 mg into the skin every 7  days.  Dispense: 1 pen; Refill: 5    Benign hypertension  -     CBC Auto Differential; Future; Expected date: 12/22/2022    Panlobular emphysema        -     needs to speak with MD ordering her last sleep test and arrange CPAP-? Of whether she will need new study    Flu vaccine need  -     Influenza - Quadrivalent - High Dose (65+) (PF) (IM)    BMI 50.0-59.9, adult    Follow up in about 4 weeks (around 12/29/2022) for FOLLOW UP LABS, FOLLOW-UP STATUS, FOLLOW UP MEDICATIONS.        12/1/2022 Mine Palmer M.D.

## 2022-12-01 ENCOUNTER — OFFICE VISIT (OUTPATIENT)
Dept: FAMILY MEDICINE | Facility: CLINIC | Age: 47
End: 2022-12-01
Payer: MEDICAID

## 2022-12-01 VITALS
BODY MASS INDEX: 36.62 KG/M2 | HEART RATE: 94 BPM | OXYGEN SATURATION: 97 % | WEIGHT: 199 LBS | DIASTOLIC BLOOD PRESSURE: 78 MMHG | HEIGHT: 62 IN | TEMPERATURE: 99 F | RESPIRATION RATE: 16 BRPM | SYSTOLIC BLOOD PRESSURE: 126 MMHG

## 2022-12-01 DIAGNOSIS — E78.00 PURE HYPERCHOLESTEROLEMIA: Primary | ICD-10-CM

## 2022-12-01 DIAGNOSIS — Z23 FLU VACCINE NEED: ICD-10-CM

## 2022-12-01 DIAGNOSIS — J43.1 PANLOBULAR EMPHYSEMA: ICD-10-CM

## 2022-12-01 DIAGNOSIS — I10 BENIGN HYPERTENSION: ICD-10-CM

## 2022-12-01 DIAGNOSIS — R73.09 ELEVATED GLUCOSE: ICD-10-CM

## 2022-12-01 PROCEDURE — 99214 PR OFFICE/OUTPT VISIT, EST, LEVL IV, 30-39 MIN: ICD-10-PCS | Mod: 25,S$PBB,, | Performed by: INTERNAL MEDICINE

## 2022-12-01 PROCEDURE — 3008F BODY MASS INDEX DOCD: CPT | Mod: CPTII,,, | Performed by: INTERNAL MEDICINE

## 2022-12-01 PROCEDURE — 99214 OFFICE O/P EST MOD 30 MIN: CPT | Mod: 25,S$PBB,, | Performed by: INTERNAL MEDICINE

## 2022-12-01 PROCEDURE — 3044F HG A1C LEVEL LT 7.0%: CPT | Mod: CPTII,,, | Performed by: INTERNAL MEDICINE

## 2022-12-01 PROCEDURE — 3078F DIAST BP <80 MM HG: CPT | Mod: CPTII,,, | Performed by: INTERNAL MEDICINE

## 2022-12-01 PROCEDURE — 1159F PR MEDICATION LIST DOCUMENTED IN MEDICAL RECORD: ICD-10-PCS | Mod: CPTII,,, | Performed by: INTERNAL MEDICINE

## 2022-12-01 PROCEDURE — 3074F PR MOST RECENT SYSTOLIC BLOOD PRESSURE < 130 MM HG: ICD-10-PCS | Mod: CPTII,,, | Performed by: INTERNAL MEDICINE

## 2022-12-01 PROCEDURE — 3074F SYST BP LT 130 MM HG: CPT | Mod: CPTII,,, | Performed by: INTERNAL MEDICINE

## 2022-12-01 PROCEDURE — 99214 OFFICE O/P EST MOD 30 MIN: CPT | Performed by: INTERNAL MEDICINE

## 2022-12-01 PROCEDURE — 1159F MED LIST DOCD IN RCRD: CPT | Mod: CPTII,,, | Performed by: INTERNAL MEDICINE

## 2022-12-01 PROCEDURE — 3008F PR BODY MASS INDEX (BMI) DOCUMENTED: ICD-10-PCS | Mod: CPTII,,, | Performed by: INTERNAL MEDICINE

## 2022-12-01 PROCEDURE — 3044F PR MOST RECENT HEMOGLOBIN A1C LEVEL <7.0%: ICD-10-PCS | Mod: CPTII,,, | Performed by: INTERNAL MEDICINE

## 2022-12-01 PROCEDURE — 1160F PR REVIEW ALL MEDS BY PRESCRIBER/CLIN PHARMACIST DOCUMENTED: ICD-10-PCS | Mod: CPTII,,, | Performed by: INTERNAL MEDICINE

## 2022-12-01 PROCEDURE — 90662 IIV NO PRSV INCREASED AG IM: CPT | Mod: PBBFAC | Performed by: INTERNAL MEDICINE

## 2022-12-01 PROCEDURE — 3078F PR MOST RECENT DIASTOLIC BLOOD PRESSURE < 80 MM HG: ICD-10-PCS | Mod: CPTII,,, | Performed by: INTERNAL MEDICINE

## 2022-12-01 PROCEDURE — 1160F RVW MEDS BY RX/DR IN RCRD: CPT | Mod: CPTII,,, | Performed by: INTERNAL MEDICINE

## 2022-12-01 RX ORDER — SEMAGLUTIDE 1.34 MG/ML
0.25 INJECTION, SOLUTION SUBCUTANEOUS
Qty: 1 PEN | Refills: 5 | Status: SHIPPED | OUTPATIENT
Start: 2022-12-01 | End: 2023-04-04 | Stop reason: SDUPTHER

## 2022-12-15 ENCOUNTER — PATIENT MESSAGE (OUTPATIENT)
Dept: FAMILY MEDICINE | Facility: CLINIC | Age: 47
End: 2022-12-15

## 2022-12-15 DIAGNOSIS — R73.09 ELEVATED GLUCOSE: Primary | ICD-10-CM

## 2022-12-15 RX ORDER — METFORMIN HYDROCHLORIDE 500 MG/1
500 TABLET ORAL 2 TIMES DAILY WITH MEALS
Qty: 180 TABLET | Refills: 0 | Status: SHIPPED | OUTPATIENT
Start: 2022-12-15 | End: 2023-03-01 | Stop reason: SDUPTHER

## 2022-12-15 RX ORDER — METFORMIN HYDROCHLORIDE 500 MG/1
500 TABLET ORAL 2 TIMES DAILY WITH MEALS
Qty: 60 TABLET | Refills: 1 | Status: SHIPPED | OUTPATIENT
Start: 2022-12-15 | End: 2023-04-10 | Stop reason: SDUPTHER

## 2022-12-30 ENCOUNTER — LAB VISIT (OUTPATIENT)
Dept: LAB | Facility: HOSPITAL | Age: 47
End: 2022-12-30
Attending: INTERNAL MEDICINE
Payer: MEDICAID

## 2022-12-30 DIAGNOSIS — E78.00 PURE HYPERCHOLESTEROLEMIA: ICD-10-CM

## 2022-12-30 DIAGNOSIS — I10 BENIGN HYPERTENSION: ICD-10-CM

## 2022-12-30 DIAGNOSIS — R73.09 ELEVATED GLUCOSE: ICD-10-CM

## 2022-12-30 LAB
ALBUMIN SERPL BCP-MCNC: 4 G/DL (ref 3.5–5.2)
ALP SERPL-CCNC: 97 U/L (ref 55–135)
ALT SERPL W/O P-5'-P-CCNC: 28 U/L (ref 10–44)
ANION GAP SERPL CALC-SCNC: 8 MMOL/L (ref 8–16)
AST SERPL-CCNC: 25 U/L (ref 10–40)
BASOPHILS # BLD AUTO: 0.04 K/UL (ref 0–0.2)
BASOPHILS NFR BLD: 0.5 % (ref 0–1.9)
BILIRUB SERPL-MCNC: 0.5 MG/DL (ref 0.1–1)
BUN SERPL-MCNC: 15 MG/DL (ref 6–20)
CALCIUM SERPL-MCNC: 9.5 MG/DL (ref 8.7–10.5)
CHLORIDE SERPL-SCNC: 99 MMOL/L (ref 95–110)
CHOLEST SERPL-MCNC: 172 MG/DL (ref 120–199)
CHOLEST/HDLC SERPL: 2.5 {RATIO} (ref 2–5)
CO2 SERPL-SCNC: 27 MMOL/L (ref 23–29)
CREAT SERPL-MCNC: 0.6 MG/DL (ref 0.5–1.4)
DIFFERENTIAL METHOD: ABNORMAL
EOSINOPHIL # BLD AUTO: 0 K/UL (ref 0–0.5)
EOSINOPHIL NFR BLD: 0.4 % (ref 0–8)
ERYTHROCYTE [DISTWIDTH] IN BLOOD BY AUTOMATED COUNT: 14.3 % (ref 11.5–14.5)
EST. GFR  (NO RACE VARIABLE): >60 ML/MIN/1.73 M^2
ESTIMATED AVG GLUCOSE: 140 MG/DL (ref 68–131)
GLUCOSE SERPL-MCNC: 111 MG/DL (ref 70–110)
HBA1C MFR BLD: 6.5 % (ref 4.5–6.2)
HCT VFR BLD AUTO: 42.3 % (ref 37–48.5)
HDLC SERPL-MCNC: 70 MG/DL (ref 40–75)
HDLC SERPL: 40.7 % (ref 20–50)
HGB BLD-MCNC: 13.8 G/DL (ref 12–16)
IMM GRANULOCYTES # BLD AUTO: 0.04 K/UL (ref 0–0.04)
IMM GRANULOCYTES NFR BLD AUTO: 0.5 % (ref 0–0.5)
LDLC SERPL CALC-MCNC: 84.4 MG/DL (ref 63–159)
LYMPHOCYTES # BLD AUTO: 2.7 K/UL (ref 1–4.8)
LYMPHOCYTES NFR BLD: 33.5 % (ref 18–48)
MCH RBC QN AUTO: 31.5 PG (ref 27–31)
MCHC RBC AUTO-ENTMCNC: 32.6 G/DL (ref 32–36)
MCV RBC AUTO: 97 FL (ref 82–98)
MONOCYTES # BLD AUTO: 0.5 K/UL (ref 0.3–1)
MONOCYTES NFR BLD: 6.6 % (ref 4–15)
NEUTROPHILS # BLD AUTO: 4.7 K/UL (ref 1.8–7.7)
NEUTROPHILS NFR BLD: 58.5 % (ref 38–73)
NONHDLC SERPL-MCNC: 102 MG/DL
NRBC BLD-RTO: 0 /100 WBC
PLATELET # BLD AUTO: 258 K/UL (ref 150–450)
PMV BLD AUTO: 10.5 FL (ref 9.2–12.9)
POTASSIUM SERPL-SCNC: 4 MMOL/L (ref 3.5–5.1)
PROT SERPL-MCNC: 8.2 G/DL (ref 6–8.4)
RBC # BLD AUTO: 4.38 M/UL (ref 4–5.4)
SODIUM SERPL-SCNC: 134 MMOL/L (ref 136–145)
TRIGL SERPL-MCNC: 88 MG/DL (ref 30–150)
WBC # BLD AUTO: 7.98 K/UL (ref 3.9–12.7)

## 2022-12-30 PROCEDURE — 80053 COMPREHEN METABOLIC PANEL: CPT | Performed by: INTERNAL MEDICINE

## 2022-12-30 PROCEDURE — 83036 HEMOGLOBIN GLYCOSYLATED A1C: CPT | Performed by: INTERNAL MEDICINE

## 2022-12-30 PROCEDURE — 85025 COMPLETE CBC W/AUTO DIFF WBC: CPT | Performed by: INTERNAL MEDICINE

## 2022-12-30 PROCEDURE — 80061 LIPID PANEL: CPT | Performed by: INTERNAL MEDICINE

## 2022-12-30 PROCEDURE — 36415 COLL VENOUS BLD VENIPUNCTURE: CPT | Performed by: INTERNAL MEDICINE

## 2023-01-11 RX ORDER — DOXEPIN HYDROCHLORIDE 150 MG/1
150 CAPSULE ORAL NIGHTLY
Qty: 90 CAPSULE | Refills: 1 | Status: SHIPPED | OUTPATIENT
Start: 2023-01-11 | End: 2023-04-03 | Stop reason: SDUPTHER

## 2023-01-30 DIAGNOSIS — R09.82 POST-NASAL DRIP: ICD-10-CM

## 2023-01-30 RX ORDER — FLUTICASONE PROPIONATE 50 MCG
SPRAY, SUSPENSION (ML) NASAL
Qty: 16 G | Refills: 0 | Status: SHIPPED | OUTPATIENT
Start: 2023-01-30 | End: 2023-03-06

## 2023-02-26 NOTE — PROGRESS NOTES
SUBJECTIVE:    Patient ID: Promise Medrano is a 47 y.o. female.    Chief Complaint: Medication Refill and Follow-up    Diabetes  She has type 2 diabetes mellitus. No MedicAlert identification noted. The initial diagnosis of diabetes was made 4 months ago. Hypoglycemia symptoms include dizziness, headaches, hunger, nervousness/anxiousness, sleepiness, sweats and tremors. Pertinent negatives for hypoglycemia include no confusion, mood changes, pallor, seizures or speech difficulty. Associated symptoms include blurred vision, chest pain, fatigue, foot paresthesias, polydipsia, polyphagia, visual change and weakness. Pertinent negatives for diabetes include no foot ulcerations, no polyuria and no weight loss. Hypoglycemia complications include required assistance. Pertinent negatives for hypoglycemia complications include no blackouts, no hospitalization, no nocturnal hypoglycemia and no required glucagon injection. Symptoms are stable. Diabetic complications include autonomic neuropathy, heart disease, nephropathy, peripheral neuropathy, PVD and retinopathy. Pertinent negatives for diabetic complications include no CVA. Risk factors for coronary artery disease include dyslipidemia, family history, obesity, post-menopausal, sedentary lifestyle, stress, tobacco exposure and diabetes mellitus. Current diabetic treatment includes diet and oral agent (monotherapy). She is compliant with treatment most of the time. Her weight is increasing steadily. She is following a generally unhealthy diet. Meal planning includes avoidance of concentrated sweets. She has not had a previous visit with a dietitian. She rarely participates in exercise. She monitors blood glucose at home 1-2 x per week. She monitors urine at home <1 x per month. Blood glucose monitoring compliance is fair. Her home blood glucose trend is fluctuating dramatically. She does not see a podiatrist.Eye exam is current.     Recheck meds    Labs from December  include hemoglobin A1c of 6.5 sodium 134 glucose 111 potassium 4.0 BUN 15 creatinine 0.6 liver functions normal WBC  7.98 hemoglobin 13.8 hematocrit 42.3 platelets 258,000 cholesterol 172 HDL 70 LDL 84 triglycerides 88   Lab Visit on 12/30/2022   Component Date Value Ref Range Status    Hemoglobin A1C 12/30/2022 6.5 (H)  4.5 - 6.2 % Final    Estimated Avg Glucose 12/30/2022 140 (H)  68 - 131 mg/dL Final    Sodium 12/30/2022 134 (L)  136 - 145 mmol/L Final    Potassium 12/30/2022 4.0  3.5 - 5.1 mmol/L Final    Chloride 12/30/2022 99  95 - 110 mmol/L Final    CO2 12/30/2022 27  23 - 29 mmol/L Final    Glucose 12/30/2022 111 (H)  70 - 110 mg/dL Final    BUN 12/30/2022 15  6 - 20 mg/dL Final    Creatinine 12/30/2022 0.6  0.5 - 1.4 mg/dL Final    Calcium 12/30/2022 9.5  8.7 - 10.5 mg/dL Final    Total Protein 12/30/2022 8.2  6.0 - 8.4 g/dL Final    Albumin 12/30/2022 4.0  3.5 - 5.2 g/dL Final    Total Bilirubin 12/30/2022 0.5  0.1 - 1.0 mg/dL Final    Alkaline Phosphatase 12/30/2022 97  55 - 135 U/L Final    AST 12/30/2022 25  10 - 40 U/L Final    ALT 12/30/2022 28  10 - 44 U/L Final    Anion Gap 12/30/2022 8  8 - 16 mmol/L Final    eGFR 12/30/2022 >60.0  >60 mL/min/1.73 m^2 Final    WBC 12/30/2022 7.98  3.90 - 12.70 K/uL Final    RBC 12/30/2022 4.38  4.00 - 5.40 M/uL Final    Hemoglobin 12/30/2022 13.8  12.0 - 16.0 g/dL Final    Hematocrit 12/30/2022 42.3  37.0 - 48.5 % Final    MCV 12/30/2022 97  82 - 98 fL Final    MCH 12/30/2022 31.5 (H)  27.0 - 31.0 pg Final    MCHC 12/30/2022 32.6  32.0 - 36.0 g/dL Final    RDW 12/30/2022 14.3  11.5 - 14.5 % Final    Platelets 12/30/2022 258  150 - 450 K/uL Final    MPV 12/30/2022 10.5  9.2 - 12.9 fL Final    Immature Granulocytes 12/30/2022 0.5  0.0 - 0.5 % Final    Gran # (ANC) 12/30/2022 4.7  1.8 - 7.7 K/uL Final    Immature Grans (Abs) 12/30/2022 0.04  0.00 - 0.04 K/uL Final    Lymph # 12/30/2022 2.7  1.0 - 4.8 K/uL Final    Mono # 12/30/2022 0.5  0.3 - 1.0 K/uL Final    Eos #  12/30/2022 0.0  0.0 - 0.5 K/uL Final    Baso # 12/30/2022 0.04  0.00 - 0.20 K/uL Final    nRBC 12/30/2022 0  0 /100 WBC Final    Gran % 12/30/2022 58.5  38.0 - 73.0 % Final    Lymph % 12/30/2022 33.5  18.0 - 48.0 % Final    Mono % 12/30/2022 6.6  4.0 - 15.0 % Final    Eosinophil % 12/30/2022 0.4  0.0 - 8.0 % Final    Basophil % 12/30/2022 0.5  0.0 - 1.9 % Final    Differential Method 12/30/2022 Automated   Final    Cholesterol 12/30/2022 172  120 - 199 mg/dL Final    Triglycerides 12/30/2022 88  30 - 150 mg/dL Final    HDL 12/30/2022 70  40 - 75 mg/dL Final    LDL Cholesterol 12/30/2022 84.4  63.0 - 159.0 mg/dL Final    HDL/Cholesterol Ratio 12/30/2022 40.7  20.0 - 50.0 % Final    Total Cholesterol/HDL Ratio 12/30/2022 2.5  2.0 - 5.0 Final    Non-HDL Cholesterol 12/30/2022 102  mg/dL Final   Lab Visit on 11/28/2022   Component Date Value Ref Range Status    Sodium 11/28/2022 133 (L)  136 - 145 mmol/L Final    Potassium 11/28/2022 3.8  3.5 - 5.1 mmol/L Final    Chloride 11/28/2022 99  95 - 110 mmol/L Final    CO2 11/28/2022 27  23 - 29 mmol/L Final    Glucose 11/28/2022 117 (H)  70 - 110 mg/dL Final    BUN 11/28/2022 11  6 - 20 mg/dL Final    Creatinine 11/28/2022 0.6  0.5 - 1.4 mg/dL Final    Calcium 11/28/2022 8.9  8.7 - 10.5 mg/dL Final    Total Protein 11/28/2022 8.1  6.0 - 8.4 g/dL Final    Albumin 11/28/2022 4.0  3.5 - 5.2 g/dL Final    Total Bilirubin 11/28/2022 0.5  0.1 - 1.0 mg/dL Final    Alkaline Phosphatase 11/28/2022 101  55 - 135 U/L Final    AST 11/28/2022 21  10 - 40 U/L Final    ALT 11/28/2022 23  10 - 44 U/L Final    Anion Gap 11/28/2022 7 (L)  8 - 16 mmol/L Final    eGFR 11/28/2022 >60.0  >60 mL/min/1.73 m^2 Final    Hemoglobin A1C 11/28/2022 6.4 (H)  4.5 - 6.2 % Final    Estimated Avg Glucose 11/28/2022 137 (H)  68 - 131 mg/dL Final   Lab Visit on 07/18/2022   Component Date Value Ref Range Status    Hemoglobin A1C 07/18/2022 5.6  4.5 - 6.2 % Final    Estimated Avg Glucose 07/18/2022 114  68 - 131  mg/dL Final   Lab Visit on 06/23/2022   Component Date Value Ref Range Status    WBC 06/23/2022 16.06 (H)  3.90 - 12.70 K/uL Final    RBC 06/23/2022 4.75  4.00 - 5.40 M/uL Final    Hemoglobin 06/23/2022 14.8  12.0 - 16.0 g/dL Final    Hematocrit 06/23/2022 45.8  37.0 - 48.5 % Final    MCV 06/23/2022 96  82 - 98 fL Final    MCH 06/23/2022 31.2 (H)  27.0 - 31.0 pg Final    MCHC 06/23/2022 32.3  32.0 - 36.0 g/dL Final    RDW 06/23/2022 14.1  11.5 - 14.5 % Final    Platelets 06/23/2022 290  150 - 450 K/uL Final    MPV 06/23/2022 10.7  9.2 - 12.9 fL Final    Immature Granulocytes 06/23/2022 0.6 (H)  0.0 - 0.5 % Final    Gran # (ANC) 06/23/2022 11.7 (H)  1.8 - 7.7 K/uL Final    Immature Grans (Abs) 06/23/2022 0.10 (H)  0.00 - 0.04 K/uL Final    Lymph # 06/23/2022 3.3  1.0 - 4.8 K/uL Final    Mono # 06/23/2022 0.8  0.3 - 1.0 K/uL Final    Eos # 06/23/2022 0.1  0.0 - 0.5 K/uL Final    Baso # 06/23/2022 0.06  0.00 - 0.20 K/uL Final    nRBC 06/23/2022 0  0 /100 WBC Final    Gran % 06/23/2022 72.9  38.0 - 73.0 % Final    Lymph % 06/23/2022 20.2  18.0 - 48.0 % Final    Mono % 06/23/2022 5.2  4.0 - 15.0 % Final    Eosinophil % 06/23/2022 0.7  0.0 - 8.0 % Final    Basophil % 06/23/2022 0.4  0.0 - 1.9 % Final    Differential Method 06/23/2022 Automated   Final    Sodium 06/23/2022 137  136 - 145 mmol/L Final    Potassium 06/23/2022 4.0  3.5 - 5.1 mmol/L Final    Chloride 06/23/2022 101  95 - 110 mmol/L Final    CO2 06/23/2022 27  23 - 29 mmol/L Final    Glucose 06/23/2022 127 (H)  70 - 110 mg/dL Final    BUN 06/23/2022 16  6 - 20 mg/dL Final    Creatinine 06/23/2022 0.6  0.5 - 1.4 mg/dL Final    Calcium 06/23/2022 9.1  8.7 - 10.5 mg/dL Final    Total Protein 06/23/2022 7.8  6.0 - 8.4 g/dL Final    Albumin 06/23/2022 4.2  3.5 - 5.2 g/dL Final    Total Bilirubin 06/23/2022 0.3  0.1 - 1.0 mg/dL Final    Alkaline Phosphatase 06/23/2022 108  55 - 135 U/L Final    AST 06/23/2022 24  10 - 40 U/L Final    ALT 06/23/2022 26  10 - 44 U/L  Final    Anion Gap 2022 9  8 - 16 mmol/L Final    eGFR if African American 2022 >60.0  >60 mL/min/1.73 m^2 Final    eGFR if non African American 2022 >60.0  >60 mL/min/1.73 m^2 Final    Cholesterol 2022 211 (H)  120 - 199 mg/dL Final    Triglycerides 2022 91  30 - 150 mg/dL Final    HDL 2022 71  40 - 75 mg/dL Final    LDL Cholesterol 2022 121.8  63.0 - 159.0 mg/dL Final    HDL/Cholesterol Ratio 2022 33.6  20.0 - 50.0 % Final    Total Cholesterol/HDL Ratio 2022 3.0  2.0 - 5.0 Final    Non-HDL Cholesterol 2022 140  mg/dL Final       Past Medical History:   Diagnosis Date    Allergic rhinitis     Arthritis     Chronic anxiety 10/23/2018    Connective tissue disease     joints b/c lupus    Diabetes mellitus     Fibromyalgia     GERD (gastroesophageal reflux disease)     Grade II hemorrhoids 2019    Hyperlipemia     Interstitial lung disease     Lupus     Lupus     Sjogren's syndrome 2019     Past Surgical History:   Procedure Laterality Date    AUGMENTATION OF BREAST      BREAST SURGERY      BRONCHOSCOPY WITH FLUOROSCOPY N/A 2019    Procedure: BRONCHOSCOPY, WITH FLUOROSCOPY;  Surgeon: Nate Tarango MD;  Location: [facility] ENDO;  Service: Pulmonary;  Laterality: N/A;    CATHETERIZATION OF BOTH LEFT AND RIGHT HEART Left 2021    Procedure: CATHETERIZATION, HEART, BOTH LEFT AND RIGHT;  Surgeon: Luca Wilks MD;  Location: [facility] CATH/EP LAB;  Service: Cardiology;  Laterality: Left;     SECTION  ,    COLONOSCOPY      ESOPHAGOGASTRODUODENOSCOPY      gastric sleeve  2010    HYSTERECTOMY  2010    TONSILLECTOMY  1996    TUMOR REMOVAL      benign fatty     Family History   Problem Relation Age of Onset    Early death Father     Heart disease Father     Stroke Father     Kidney disease Father     Diabetes Paternal Grandmother     Cancer Paternal Grandmother     Raynaud syndrome Mother     Uterine cancer Mother     Breast cancer  Mother     Raynaud syndrome Sister     Polycystic ovary syndrome Daughter     Diabetes Maternal Grandmother     Heart disease Maternal Grandmother     Kidney disease Maternal Grandmother     Breast cancer Maternal Grandmother     Heart disease Maternal Grandfather     Cancer Paternal Grandfather     No Known Problems Daughter        Marital Status: Single  Alcohol History:  reports that she does not currently use alcohol.  Tobacco History:  reports that she has been smoking cigarettes. She started smoking about 25 years ago. She has a 25.00 pack-year smoking history. She has never used smokeless tobacco.  Drug History:  reports no history of drug use.    Review of patient's allergies indicates:   Allergen Reactions    Ativan [lorazepam] Other (See Comments)     depression    Hay fever and allergy relief     Nitroglycerin      Pt says it could stop her heart       Current Outpatient Medications:     ACCU-CHEK SOFTCLIX LANCETS Misc, USE ONCE DAILY AS NEEDED, Disp: 100 each, Rfl: 5    albuterol (PROVENTIL) 2.5 mg /3 mL (0.083 %) nebulizer solution, Take 3 mLs (2.5 mg total) by nebulization every 4 (four) hours., Disp: 300 mL, Rfl: 3    albuterol (PROVENTIL/VENTOLIN HFA) 90 mcg/actuation inhaler, INHALE 1 PUFF INTO THE LUNGS ONCE DAILY, Disp: 18 g, Rfl: 3    albuterol-ipratropium (DUO-NEB) 2.5 mg-0.5 mg/3 mL nebulizer solution, USE 1 VIAL VIA NEBULIZER EVERY 6 HOURS AS NEEDED FOR WHEEZING OR SHORTNESS OF BREATH, Disp: 180 mL, Rfl: 0    azelastine (ASTELIN) 137 mcg (0.1 %) nasal spray, SPRAY ONCE IN EACH NOSTRIL TWICE DAILY, Disp: 30 mL, Rfl: 3    blood sugar diagnostic (ONETOUCH ULTRA BLUE TEST STRIP) Strp, Use strips to take blood sugar bid, Disp: 200 strip, Rfl: 2    budesonide (PULMICORT) 0.5 mg/2 mL nebulizer solution, ADD 1 VIAL TO SINUS RINSE AND USE TWICE DAILY AS DIRECTED, Disp: 120 mL, Rfl: 3    CETAPHIL MOISTURIZING cream, Apply 1 application topically as needed., Disp: 90 g, Rfl: 1    cyclobenzaprine  (FLEXERIL) 10 MG tablet, Take 10 mg by mouth every evening., Disp: , Rfl:     diazePAM (VALIUM) 5 MG tablet, Take 1 to 2 tabs by mouth qhs, Disp: 60 tablet, Rfl: 2    dicyclomine (BENTYL) 20 mg tablet, TAKE 1 TABLET(20 MG) BY MOUTH TWICE DAILY, Disp: 60 tablet, Rfl: 0    doxepin (SINEQUAN) 150 MG Cap, Take 1 capsule (150 mg total) by mouth every evening., Disp: 90 capsule, Rfl: 1    ergocalciferol (ERGOCALCIFEROL) 50,000 unit Cap, TAKE 1 CAPSULE BY MOUTH EVERY 7 DAYS, Disp: 4 capsule, Rfl: 3    esomeprazole (NEXIUM) 40 MG capsule, Take 1 capsule (40 mg total) by mouth before breakfast., Disp: 90 capsule, Rfl: 2    ezetimibe (ZETIA) 10 mg tablet, TAKE 1 TABLET(10 MG) BY MOUTH EVERY DAY, Disp: 90 tablet, Rfl: 1    famotidine (PEPCID) 20 MG tablet, TAKE 1 TABLET(20 MG) BY MOUTH TWICE DAILY, Disp: 60 tablet, Rfl: 5    FEROSUL 325 mg (65 mg iron) Tab tablet, TAKE 1 TABLET BY MOUTH ONCE DAILY, Disp: 90 tablet, Rfl: 0    fluticasone propionate (FLONASE) 50 mcg/actuation nasal spray, SHAKE LIQUID AND USE 1 SPRAY(50 MCG) IN EACH NOSTRIL EVERY DAY, Disp: 16 g, Rfl: 0    gabapentin (NEURONTIN) 300 MG capsule, Take one hs, Disp: 90 capsule, Rfl: 1    ibuprofen (ADVIL,MOTRIN) 600 MG tablet, TAKE 1 TABLET(600 MG) BY MOUTH TWICE DAILY AS NEEDED FOR PAIN, Disp: 60 tablet, Rfl: 1    immun glob G,IgG,-gly-IgA ov50 (CUVITRU) 10 gram/50 mL (20 %) Soln, Inject 12 g into the skin once a week., Disp: 200 mL, Rfl: 12    levocetirizine (XYZAL) 5 MG tablet, TAKE 1 TABLET(5 MG) BY MOUTH EVERY EVENING, Disp: 90 tablet, Rfl: 1    metFORMIN (GLUCOPHAGE) 500 MG tablet, Take 1 tablet (500 mg total) by mouth 2 (two) times daily with meals., Disp: 60 tablet, Rfl: 1    midodrine (PROAMATINE) 2.5 MG Tab, Take 2.5 mg by mouth 3 (three) times daily., Disp: , Rfl:     ondansetron (ZOFRAN-ODT) 4 MG TbDL, DISSOLVE 2 TABLETS UNDER THE TONGUE EVERY 8 HOURS AS NEEDED, Disp: 30 tablet, Rfl: 5    OXYGEN-AIR DELIVERY SYSTEMS MISC, by Misc.(Non-Drug; Combo Route)  route., Disp: , Rfl:     pravastatin (PRAVACHOL) 80 MG tablet, TAKE 1 TABLET BY MOUTH ONCE DAILY, Disp: 90 tablet, Rfl: 3    semaglutide (OZEMPIC) 0.25 mg or 0.5 mg(2 mg/1.5 mL) pen injector, Inject 0.25 mg into the skin every 7 days. (Patient not taking: Reported on 3/1/2023), Disp: 1 pen, Rfl: 5    Review of Systems   Constitutional:  Positive for fatigue. Negative for appetite change, chills, diaphoresis, fever, unexpected weight change and weight loss.   HENT:  Negative for congestion, ear pain, hearing loss, nosebleeds, postnasal drip, sinus pressure, sinus pain, sneezing, sore throat, tinnitus, trouble swallowing and voice change.    Eyes:  Positive for blurred vision. Negative for photophobia, pain, itching and visual disturbance.   Respiratory:  Negative for apnea, cough, chest tightness, shortness of breath, wheezing and stridor.    Cardiovascular:  Positive for chest pain. Negative for palpitations and leg swelling.   Gastrointestinal:  Negative for abdominal distention, abdominal pain, blood in stool, constipation, diarrhea, nausea and vomiting.   Endocrine: Positive for polydipsia and polyphagia. Negative for cold intolerance, heat intolerance and polyuria.   Genitourinary:  Negative for difficulty urinating, dyspareunia, dysuria, flank pain, frequency, hematuria, menstrual problem, pelvic pain, urgency, vaginal discharge and vaginal pain.   Musculoskeletal:  Negative for arthralgias, back pain, joint swelling, myalgias, neck pain and neck stiffness.   Skin:  Negative for pallor.   Allergic/Immunologic: Negative for environmental allergies and food allergies.   Neurological:  Positive for dizziness, tremors, weakness and headaches. Negative for seizures, speech difficulty, light-headedness and numbness.   Hematological:  Does not bruise/bleed easily.   Psychiatric/Behavioral:  Negative for agitation, confusion, decreased concentration, sleep disturbance and suicidal ideas. The patient is nervous/anxious.          Objective:      Vitals    Vitals - 1 value per visit 12/1/2022 12/1/2022 2/28/2023 3/1/2023 3/1/2023   SYSTOLIC - 126 126 - 138   DIASTOLIC - 78 82 - 84   Pulse - 94 89 - 84   Temp - 99 98.8 - 99   Resp - 16 - - 18   SPO2 - 97 - - (No Data)   Weight (lb) - 199 - - 200   Weight (kg) - 90.266 - - 90.719   Height - 62 - - 62   BMI (Calculated) - 36.4 - - 36.6   VISIT REPORT - - - - -   Pain Score  0 - - 0 -   Some recent data might be hidden       Physical Exam  Vitals and nursing note reviewed.   Constitutional:       General: She is not in acute distress.     Appearance: She is obese. She is not ill-appearing.      Comments: O2 in progress   HENT:      Head: Normocephalic and atraumatic.   Eyes:      Extraocular Movements: Extraocular movements intact.      Conjunctiva/sclera: Conjunctivae normal.      Pupils: Pupils are equal, round, and reactive to light.   Neck:      Vascular: No carotid bruit.   Cardiovascular:      Rate and Rhythm: Normal rate and regular rhythm.      Pulses: Normal pulses.      Heart sounds: Normal heart sounds.   Pulmonary:      Effort: Pulmonary effort is normal.      Comments: Decreased BS  Abdominal:      General: Bowel sounds are normal.      Palpations: Abdomen is soft.   Musculoskeletal:      Right lower leg: No edema.      Left lower leg: No edema.   Lymphadenopathy:      Cervical: No cervical adenopathy.   Skin:     General: Skin is warm and dry.      Capillary Refill: Capillary refill takes less than 2 seconds.   Neurological:      General: No focal deficit present.      Mental Status: She is alert and oriented to person, place, and time.   Psychiatric:         Mood and Affect: Mood normal.         Behavior: Behavior normal.         Assessment:       1. Encounter for screening mammogram for breast cancer    2. Other cardiac arrhythmia    3. Chronic anxiety    4. Panlobular emphysema    5. Mixed connective tissue disease    6. Benign hypertension    7. Primary insomnia          Plan:       Encounter for screening mammogram for breast cancer  -     Mammo Digital Screening Bilat; Future; Expected date: 06/25/2023    Other cardiac arrhythmia  -     Ambulatory referral/consult to Cardiology; Future; Expected date: 03/08/2023    Chronic anxiety    Panlobular emphysema    Mixed connective tissue disease    Benign hypertension    Primary insomnia      No follow-ups on file.        3/1/2023 Mine Palmer M.D.

## 2023-02-28 ENCOUNTER — OFFICE VISIT (OUTPATIENT)
Dept: ALLERGY | Facility: CLINIC | Age: 48
End: 2023-02-28
Payer: MEDICAID

## 2023-02-28 VITALS — SYSTOLIC BLOOD PRESSURE: 126 MMHG | TEMPERATURE: 99 F | DIASTOLIC BLOOD PRESSURE: 82 MMHG | HEART RATE: 89 BPM

## 2023-02-28 DIAGNOSIS — A49.9 RECURRENT BACTERIAL INFECTION: ICD-10-CM

## 2023-02-28 DIAGNOSIS — R06.00 DYSPNEA AND RESPIRATORY ABNORMALITIES: ICD-10-CM

## 2023-02-28 DIAGNOSIS — R06.89 DYSPNEA AND RESPIRATORY ABNORMALITIES: ICD-10-CM

## 2023-02-28 DIAGNOSIS — D80.6 DEFICIENCY OF ANTI-PNEUMOCOCCAL POLYSACCHARIDE ANTIBODY: Primary | ICD-10-CM

## 2023-02-28 PROCEDURE — 99213 PR OFFICE/OUTPT VISIT, EST, LEVL III, 20-29 MIN: ICD-10-PCS | Mod: S$GLB,,, | Performed by: ALLERGY & IMMUNOLOGY

## 2023-02-28 PROCEDURE — 99213 OFFICE O/P EST LOW 20 MIN: CPT | Mod: S$GLB,,, | Performed by: ALLERGY & IMMUNOLOGY

## 2023-02-28 PROCEDURE — 3079F DIAST BP 80-89 MM HG: CPT | Mod: CPTII,S$GLB,, | Performed by: ALLERGY & IMMUNOLOGY

## 2023-02-28 PROCEDURE — 1160F RVW MEDS BY RX/DR IN RCRD: CPT | Mod: CPTII,S$GLB,, | Performed by: ALLERGY & IMMUNOLOGY

## 2023-02-28 PROCEDURE — 3074F SYST BP LT 130 MM HG: CPT | Mod: CPTII,S$GLB,, | Performed by: ALLERGY & IMMUNOLOGY

## 2023-02-28 PROCEDURE — 1159F PR MEDICATION LIST DOCUMENTED IN MEDICAL RECORD: ICD-10-PCS | Mod: CPTII,S$GLB,, | Performed by: ALLERGY & IMMUNOLOGY

## 2023-02-28 PROCEDURE — 3079F PR MOST RECENT DIASTOLIC BLOOD PRESSURE 80-89 MM HG: ICD-10-PCS | Mod: CPTII,S$GLB,, | Performed by: ALLERGY & IMMUNOLOGY

## 2023-02-28 PROCEDURE — 3074F PR MOST RECENT SYSTOLIC BLOOD PRESSURE < 130 MM HG: ICD-10-PCS | Mod: CPTII,S$GLB,, | Performed by: ALLERGY & IMMUNOLOGY

## 2023-02-28 PROCEDURE — 1160F PR REVIEW ALL MEDS BY PRESCRIBER/CLIN PHARMACIST DOCUMENTED: ICD-10-PCS | Mod: CPTII,S$GLB,, | Performed by: ALLERGY & IMMUNOLOGY

## 2023-02-28 PROCEDURE — 1159F MED LIST DOCD IN RCRD: CPT | Mod: CPTII,S$GLB,, | Performed by: ALLERGY & IMMUNOLOGY

## 2023-02-28 RX ORDER — CYCLOBENZAPRINE HCL 10 MG
1 TABLET ORAL NIGHTLY
COMMUNITY
Start: 2023-02-02 | End: 2023-03-01 | Stop reason: SDUPTHER

## 2023-02-28 RX ORDER — PRAVASTATIN SODIUM 40 MG/1
TABLET ORAL
COMMUNITY
Start: 2022-09-13 | End: 2023-03-01 | Stop reason: SDUPTHER

## 2023-02-28 RX ORDER — IMMUNE GLOBULIN SUBCUTANEOUS (HUMAN) 200 MG/ML
INJECTION, SOLUTION SUBCUTANEOUS
COMMUNITY
Start: 2022-09-13 | End: 2023-03-01 | Stop reason: SDUPTHER

## 2023-02-28 NOTE — PROGRESS NOTES
"Subjective:       Patient ID: Promise Medrano is a 47 y.o. female.    Chief Complaint: SPAD (Patient is doing good, patient is still on cuvitru, SOB)      HPI     Pt presents for allergic rhinitis and recurrent bronchitis.     Her strep pneumo titers were not responsive to her pneumovax 23 she obtained in November 2019 indicating SAD.    Last visit: 11/22  -  prednisone 10 mg po qday x 5 days, increased to 12 grams weekly subq from 11 grams weekly.     Since her last visit,     She has felt her increase in dose has helped her remain infection free.     She is seeing rheumatology- [facility].     No bacterial infections on subq igg therapy. Current dose is 12 grams weekly subcutaneous.     Still using tobacco. Room smells of tobacco today.     Allergic Rhinitis- dust mite only   Condition: stable      Onset: childhood  Sx: congestion, dryness, feels fluid in the ears.   Season: perennial   Trigger: uncertain   Tx:   saline, azelastine- 1 sen qday or more prn, fluticasone, montelukast - stopped herself.   ait in the past: yes  Recent testing: serum IgE dust mite only.   ct sinus: 6/2019  Essentially clear paranasal sinuses without significant mucoperiosteal sinus  disease.    SAD  11/22:  Increased to 12 grams weekly.     6/2022:  Started cuvitru 11 grams weekly     doxcycline was last used in 4/2020  [facility] - I tried to order it , but it is not orderable in epic.     Recurrent bronchitis felt to be due to SAD dx 12/2019  Started on doxy proph MWF 12/2019  No other abx needed when on proph.     Condition:   Having increased dyspnea. 1-2 weeks.     Infections:  Type infections:   No bacterial infections since her cuvitru.     None since her last visit. But feeling"funk" 1-2 weeks on and off.     Onset: 2018  Bronchitis 3-4 episodes last winter  Has a pmh of lupus autoimmunity, complement levels normal.   April 2019 was d/c from [facility] for pna and or bronchitis  She has a history of current smoker   Does " have associated tobacco abuse as well.     Chest ct 8/2018 shows bronchioloitis and mediastinal lymph nodes and axillary lymph nodes presumed reactive.   Pft: large airway no obstruction and partial response to bronchodilator, small airway reversed by 31%.     Immune evaluation: 6/20/2019    Complement, Total (CH50) 56 ( >41 U/mL)  Complement C2                 3.3  C4 Complement 14 - 44 mg/dL 16      IgA, Serum                    281                         IgM 26 - 217 mg/dL 52      IgG, Total Serum             1259                     700-1600  mg/dL       IgG Subclass 1                710                      248-810  mg/dL       IgG Subclass 2                189                      130-555  mg/dL       IgG Subclass 3                 70                         mg/dL       IgG Subclass 4                 61                         2-96  mg/dL                                           Strep Pneumoniae Type 1  0.5 L >1.3 ug/mL  Strep Pneumoniae Type 3 0.6 L >1.3 ug/mL  Strep Pneumoniae Type 4 0.1 L >1.3 ug/mL  Strep Pneumoniae Type 8 1.8  >1.3 ug/mL  Strep Pneumoniae Type 9 0.5 L >1.3 ug/mL  Strep Pneumoniae Type 12 <0.1 L >1.3 ug/mL  Strep Pneumoniae Type 14 1.2 L >1.3 ug/mL  Strep Pneumoniae Type 19 0.7 L >1.3 ug/mL  Strep Pneumoniae Type 23 0.3 L >1.3 ug/mL  Strep Pneumoniae Type 26 4.3  >1.3 ug/mL  Strep Pneumoniae Type 51 0.3 L >1.3 ug/mL  Strep Pneumoniae Type 56 0.5 L >1.3 ug/mL  Strep Pneumoniae Type 57 1.5  >1.3 ug/mL  Strep Pneumoniae Type 68 <0.1 L >1.3 ug/mL    3/14 = 21%    Hematocrit                   46.0                    34.0-46.6  %           WBC                           5.7                     3.4-10.8  x10E3/uL    RBC                          4.83                    3.77-5.28  x10E6/uL    Hemoglobin                   14.7                    11.1-15.9  g/dL        Basos                           0                   Not Estab.  %           Neurtrophils                   60                    Not Estab.  %           Neurtrophils (Absolute) 3.4  1.4-7.0 x10E3/uL  Lymphs (Absolute)             1.9                      0.7-3.1  x10E3/uL    MCV                            95                        79-97  fL          MCHC                         32.0                    31.5-35.7  g/dL        MCH                          30.4                    26.6-33.0  pg          Lymphs                         34                   Not Estab.  %           Immature Granulocytes            0                   Not Estab.  %           Eos                             0                   Not Estab.  %           Monocytes                       6                   Not Estab.  %           Platelets                     185                      150-450  x10E3/uL    Eos (Absolute)                0.0                      0.0-0.4  x10E3/uL    BASO (Absolute)               0.0                      0.0-0.2  x10E3/uL    Monocytes (Absolute)          0.4                      0.1-0.9  x10E3/uL    % CD19+ Lymphs                9.3                     3.3-25.4  %           Abs. CD19+ Lymphs             177                         /uL         Absolute CD4 [place]           716                     359-1519  /uL         Absolute CD3                 1634                     622-2402  /uL         % CD 4 Pos. Lymph            37.7                    30.8-58.5  %           CD4/CD8 Ratio                0.78 L                  0.92-3.72              % CD3 Pos. Lymph             86.0                    57.5-86.2  %           Abs. CD8 Suppressor           923 H                    109-897  /uL         % CD8 Pos. Lymph             48.6 H                  12.0-35.5  %           RDW                          14.5                    12.3-15.4  %           % NK (CD56/16)                4.1                     1.4-19.4  %           Ab NK (CD56/16)                78       Lpr:   Ranitidine rx at last visit.   Condition: stable , not better.   She didn't start  "it.   "horrible acid reflux"  "gut issues" not diagnosed.   "why I got off plaquenil" "tore up my gut."       Atopic Hx    Rhinitis see above   Oral allergy: denies  Food allergy: none    Asthma see above for bronchitis   Latex tolerates   Eczema: denies, but may have a psoriasis.    Urticaria denies chronic, has doxepin qhs     Infection History    Pneumonia # in the past 12 months: bronchitis as above   Sinus infection # in the past 12 months: reports feels like sinus infections.   Otitis infection # in the past 12 months:  Denies chronic infection, but notes chronic fluid in the ears.     Dust mite only    IgE Cladosporium herbarum <0.10  Class 0 kU/L  IgE Dog Dander              <0.10                      Class 0  kU/L        IgE Cat Epithelium          <0.10                      Class 0  kU/L        IgE Ragweed, Short          <0.10                      Class 0  kU/L        IgE D. pteronyssinus         0.13 AB                 Class 0/I  kU/L        IgE A. fumigatus            <0.10                      Class 0  kU/L        IgE Alteraria alternata <0.10  Class 0 kU/L  IgE Elm, American           <0.10                      Class 0  kU/L        IgE Berm[place] Grass           <0.10                      Class 0  kU/L        IgE [place], White              <0.10                      Class 0  kU/L        IgE Pigweed, Common         <0.10                      Class 0  kU/L        IgE Cockroach, E[place]orean        <0.10                      Class 0  kU/L        IgE Thistle [place]         <0.10                      Class 0  kU/L        IgE D. farinae              <0.10                      Class 0  kU/L        IgE Cockroach American <0.10  Class 0 kU/L   This test was developed and its performance      characteristics determined by [employer].  It      has not been cleared or approved by the U.S.      Food and Drug Administration.      The FDA has determined that such clearance      or approval is not necessary. This test is      used " for clinical purposes.  It should not      be regarded as investigational or for                             research.                                           IgE Maple/[place]         <0.10                      Class 0  kU/L        IgE Penicillium chrysogen <0.10  Class 0 kU/L  IgE [place]              <0.10                      Class 0  kU/L        IgE Gabe Grass           <0.10                      Class 0  kU/L        IgE Arik Grass           <0.10                      Class 0  kU/L        IgE Bahia Grass             <0.10                      Class 0  kU/L        IgE Sheep Estelle           <0.10                      Class 0  kU/L        IgE Plantain, English        <0.10                      Class 0  kU/L        IgE Mugwort                 <0.10                      Class 0  kU/L        IgE Pecan [place]           <0.10                      Class 0  kU/L        IgE Candida albicans        <0.10                      Class 0  kU/L        IgE Setomelanomma rostrat <0.10  Class 0 kU/L  IgE [place]          <0.10                      Class 0  kU/L        IgE Epicoccum purpur        <0.10                      Class 0  kU/L        IgE Fusarium proliferatum <0.10  Class 0 kU/L  IgE Trichophyton rubrum <0.10  Class 0 kU/L  IgE Rough [place]        <0.10                      Class 0  kU/L        IgE Mouse Urine             <0.10                      Class 0  kU/L        IgE Silver Birch            <0.10                      Class 0  kU/L        IgE Maple [place] <0.10  Class 0 kU/L  IgE Bipolaris               <0.10                      Class 0  kU/L         This test was developed and its performance      characteristics determined by [employer] [facility].  It      has not been cleared or approved by the U.S.      Food and Drug Administration.      The FDA has determined that such clearance      or approval is not necessary. This test is      used for clinical purposes.  It should not      be regarded  as investigational or for                             research.                                           IgE Nishant, White              <0.10                      Class 0  kU/L        IgE Privet, Common          <0.10                      Class 0  kU/L        IgE Ragweed, Giant          <0.10                      Class 0  kU/L        IgE Nettle                  <0.10                      Class 0  kU/L        IgE Cocklebur               <0.10                      Class 0  kU/L        IgE Dockweed, Yellow        <0.10                      Class 0  kU/L         This test was developed and its performance      characteristics determined by [facility].  It      has not been cleared or approved by the U.S.      Food and Drug Administration.      The FDA has determined that such clearance      or approval is not necessary. This test is      used for clinical purposes.  It should not      be regarded as investigational or for                             research.                                           IgE [place]               <0.10                      Class 0  kU/L         This test was developed and its performance      characteristics determined by [facility].  It      has not been cleared or approved by the U.S.      Food and Drug Administration.      The FDA has determined that such clearance      or approval is not necessary. This test is      used for clinical purposes.  It should not      be regarded as investigational or for                             research.                                           IgE Sweet Gum               <0.10                      Class 0  kU/L         This test was developed and its performance      characteristics determined by [facility].  It      has not been cleared or approved by the U.S.      Food and Drug Administration.      The FDA has determined that such clearance      or approval is not necessary. This test is      used for clinical purposes.  It should not      be regarded as  investigational or for                             research.                                           IgE [place]                <0.10                      Class 0  kU/L        IgE Fennel, Dog             <0.10                      Class 0  kU/L         This test was developed and its performance      characteristics determined by [facility].  It      has not been cleared or approved by the U.S.      Food and Drug Administration.      The FDA has determined that such clearance      or approval is not necessary. This test is      used for clinical purposes.  It should not      be regarded as investigational or for                             research.                                                                                                                        Performed at: , [address], [id number]      Jc Mckeon MD, Phone:  [phone]     IgE Mouse Epithelium        <0.10                      Class 0  kU/L        IgE [place]            <0.10               General: neg unexpected weight changes, fevers, chills, night sweats, malaise  HEENT: see hpi, Neg eye pain, vision changes, ear drainage, nose bleeds, throat tightness, sores in the mouth  CV: Neg chest pain, palpitations, swelling  Resp: see hpi, neg shortness of breath, hemoptysis  GI: see hpi, neg dysphagia, night abdominal pain, reflux, chronic diarrhea, chronic constipation  Derm: See Hpi, neg new rash, neg flushing  Mu/sk: Neg joint pain, joint swelling   Psych: Neg anxiety  neuro: neg chronic headaches, muscle weakness  Endo: neg heat/cold intolerance, chronic fatigue    Objective:     Vitals:    02/28/23 0820   BP: 126/82   Pulse: 89   Temp: 98.8 °F (37.1 °C)          Physical Exam      General: no acute distress, well developed well nourished   HEENT:   Head:normocephalic atraumatic  Eyes: Neg injection  Ears: normal canal  Nose: nares patent with oxygen   OP: mucus membranes moist, -  cobblestoning, - PND, neg erythema or lesions  Neck: supple, Full range of motion, neg lymphadenopathy  Chest: full respiratory excursion no abnormal chest abnormality  Resp: cta b/l, neg, w/r/r   CV: RRR, neg MRG, brisk capillary refill  Ext:  Neg clubbing, cyanosis, pitting edema  Skin: Neg rashes or lesions      Assessment:       1. Deficiency of anti-pneumococcal polysaccharide antibody    2. Dyspnea and respiratory abnormalities    3. Recurrent bacterial infection            Plan:       Deficiency of anti-pneumococcal polysaccharide antibody    Dyspnea and respiratory abnormalities    Recurrent bacterial infection          Chronic allergic rhinitis: dust mite only   2/23:  Stable  Continue budesonide flares  Continue azelastine   Levocetirizine prn     11/22:   Somewhat flared.     6/22:  Continue budesonide rinses   Continue azelastine   levocetirizine prn     2/22  Serum IgE- not stable enough for skin prick testing. Dust mite only- mild sensitivity.   saline and azelastine    disContinue fluticasone   Discontinue montelukast 1 pill po qday - pt self d/c   levocetirizine prn   Start budesonide in saline rinse     Recurrent bronchitis and sinusitis/SAD  2/23:  Continue 12 grams weekly cuvitru     11/22:  Increased to 12 grams weekly to account for recent wt gain 500mg/kg / monthly dose     6/2022:  Start cuvitru weekly 11 grams weekly (550 mg/kg/month - based upon 81 kg)- having thrush and undesirable side effects.   Discontinue doxy  mg MWF only for proph once on infusions x 3 months.      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     Tobacco abuse and oxygen dependent   Smoking cessation recommended.      Flu vaccine yearly recommended.   covid vaccination obtained     F/u 6 months, sooner if needed.              Katiana Cline M.D.  Allergy/Immunology  [facility]   [phone] phone  [phone] fax

## 2023-02-28 NOTE — PATIENT INSTRUCTIONS
Continue cuvitru 12 grams weekly     Watch youtube  Acaai breathing exercises.       Follow up in 6 months, sooner if needed.

## 2023-02-28 NOTE — LETTER
February 28, 2023        Mine Palmer MD  [address] [address]             [facility] - Allergy  [address]  [address]  [place] [address]  Phone: [phone]  Fax: [phone]   Patient: Promise Medrano   MR Number: [id number]   YOB: 1975   Date of Visit: 2/28/2023       Dear Dr. Palmer:    Thank you for referring Promise Medrano to me for evaluation. Below are the relevant portions of my assessment and plan of care.    No diagnosis found.      There are no diagnoses linked to this encounter.      Chronic allergic rhinitis: dust mite only   11/22:   Somewhat flared.     6/22:  Continue budesonide rinses   Continue azelastine   levocetirizine prn     2/22  Serum IgE- not stable enough for skin prick testing. Dust mite only- mild sensitivity.   saline and azelastine    disContinue fluticasone   Discontinue montelukast 1 pill po qday - pt self d/c   levocetirizine prn   Start budesonide in saline rinse     Recurrent bronchitis and sinusitis/SAD  11/22:  Increased to 12 grams weekly to account for recent wt gain 500mg/kg / monthly dose     6/2022:  Start cuvitru weekly 11 grams weekly (550 mg/kg/month - based upon 81 kg)- having thrush and undesirable side effects.   Discontinue doxy  mg MWF only for proph once on infusions x 3 months.      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     Tobacco abuse and oxygen dependent   Smoking cessation recommended.      Flu vaccine yearly recommended.   covid vaccination obtained     F/u 3 months, sooner if needed.              Katiana Cline M.D.  Allergy/Immunology  [facility]   [phone] phone  [phone] fax                  If you have questions, please do not hesitate to call me. I look forward to following Promise along with you.    Sincerely,      Katiana Cline MD           CC  No Recipients

## 2023-03-01 ENCOUNTER — PATIENT MESSAGE (OUTPATIENT)
Dept: FAMILY MEDICINE | Facility: CLINIC | Age: 48
End: 2023-03-01

## 2023-03-01 ENCOUNTER — OFFICE VISIT (OUTPATIENT)
Dept: FAMILY MEDICINE | Facility: CLINIC | Age: 48
End: 2023-03-01
Payer: MEDICAID

## 2023-03-01 VITALS
HEIGHT: 62 IN | BODY MASS INDEX: 36.8 KG/M2 | TEMPERATURE: 99 F | SYSTOLIC BLOOD PRESSURE: 138 MMHG | HEART RATE: 84 BPM | WEIGHT: 200 LBS | DIASTOLIC BLOOD PRESSURE: 84 MMHG | RESPIRATION RATE: 18 BRPM

## 2023-03-01 DIAGNOSIS — F51.01 PRIMARY INSOMNIA: ICD-10-CM

## 2023-03-01 DIAGNOSIS — Z12.31 ENCOUNTER FOR SCREENING MAMMOGRAM FOR BREAST CANCER: ICD-10-CM

## 2023-03-01 DIAGNOSIS — I49.8 OTHER CARDIAC ARRHYTHMIA: ICD-10-CM

## 2023-03-01 DIAGNOSIS — F41.9 CHRONIC ANXIETY: ICD-10-CM

## 2023-03-01 DIAGNOSIS — M35.1 MIXED CONNECTIVE TISSUE DISEASE: ICD-10-CM

## 2023-03-01 DIAGNOSIS — I10 BENIGN HYPERTENSION: ICD-10-CM

## 2023-03-01 DIAGNOSIS — E11.9 TYPE 2 DIABETES MELLITUS WITHOUT COMPLICATION, WITHOUT LONG-TERM CURRENT USE OF INSULIN: Primary | ICD-10-CM

## 2023-03-01 DIAGNOSIS — J43.1 PANLOBULAR EMPHYSEMA: ICD-10-CM

## 2023-03-01 PROCEDURE — 99214 PR OFFICE/OUTPT VISIT, EST, LEVL IV, 30-39 MIN: ICD-10-PCS | Mod: S$PBB,,, | Performed by: INTERNAL MEDICINE

## 2023-03-01 PROCEDURE — 1160F PR REVIEW ALL MEDS BY PRESCRIBER/CLIN PHARMACIST DOCUMENTED: ICD-10-PCS | Mod: CPTII,,, | Performed by: INTERNAL MEDICINE

## 2023-03-01 PROCEDURE — 3079F PR MOST RECENT DIASTOLIC BLOOD PRESSURE 80-89 MM HG: ICD-10-PCS | Mod: CPTII,,, | Performed by: INTERNAL MEDICINE

## 2023-03-01 PROCEDURE — 99214 OFFICE O/P EST MOD 30 MIN: CPT | Mod: S$PBB,,, | Performed by: INTERNAL MEDICINE

## 2023-03-01 PROCEDURE — 3008F BODY MASS INDEX DOCD: CPT | Mod: CPTII,,, | Performed by: INTERNAL MEDICINE

## 2023-03-01 PROCEDURE — 3075F PR MOST RECENT SYSTOLIC BLOOD PRESS GE 130-139MM HG: ICD-10-PCS | Mod: CPTII,,, | Performed by: INTERNAL MEDICINE

## 2023-03-01 PROCEDURE — 3008F PR BODY MASS INDEX (BMI) DOCUMENTED: ICD-10-PCS | Mod: CPTII,,, | Performed by: INTERNAL MEDICINE

## 2023-03-01 PROCEDURE — 1160F RVW MEDS BY RX/DR IN RCRD: CPT | Mod: CPTII,,, | Performed by: INTERNAL MEDICINE

## 2023-03-01 PROCEDURE — 3079F DIAST BP 80-89 MM HG: CPT | Mod: CPTII,,, | Performed by: INTERNAL MEDICINE

## 2023-03-01 PROCEDURE — 3075F SYST BP GE 130 - 139MM HG: CPT | Mod: CPTII,,, | Performed by: INTERNAL MEDICINE

## 2023-03-01 PROCEDURE — 99214 OFFICE O/P EST MOD 30 MIN: CPT | Performed by: INTERNAL MEDICINE

## 2023-03-01 PROCEDURE — 1159F MED LIST DOCD IN RCRD: CPT | Mod: CPTII,,, | Performed by: INTERNAL MEDICINE

## 2023-03-01 PROCEDURE — 1159F PR MEDICATION LIST DOCUMENTED IN MEDICAL RECORD: ICD-10-PCS | Mod: CPTII,,, | Performed by: INTERNAL MEDICINE

## 2023-03-29 DIAGNOSIS — F51.04 CHRONIC INSOMNIA: ICD-10-CM

## 2023-03-29 RX ORDER — DIAZEPAM 5 MG/1
TABLET ORAL
Qty: 60 TABLET | Refills: 2 | Status: SHIPPED | OUTPATIENT
Start: 2023-03-29 | End: 2023-07-06

## 2023-04-03 ENCOUNTER — PATIENT MESSAGE (OUTPATIENT)
Dept: FAMILY MEDICINE | Facility: CLINIC | Age: 48
End: 2023-04-03

## 2023-04-04 DIAGNOSIS — R73.09 ELEVATED GLUCOSE: ICD-10-CM

## 2023-04-04 RX ORDER — SEMAGLUTIDE 1.34 MG/ML
0.5 INJECTION, SOLUTION SUBCUTANEOUS
Qty: 1 EACH | Refills: 5 | Status: SHIPPED | OUTPATIENT
Start: 2023-04-04 | End: 2023-05-16

## 2023-05-03 ENCOUNTER — TELEPHONE (OUTPATIENT)
Dept: ORTHOPEDICS | Facility: CLINIC | Age: 48
End: 2023-05-03
Payer: MEDICAID

## 2023-05-03 ENCOUNTER — HOSPITAL ENCOUNTER (EMERGENCY)
Facility: HOSPITAL | Age: 48
Discharge: HOME | End: 2023-05-03
Attending: EMERGENCY MEDICINE
Payer: MEDICAID

## 2023-05-03 VITALS
SYSTOLIC BLOOD PRESSURE: 155 MMHG | HEART RATE: 88 BPM | OXYGEN SATURATION: 98 % | WEIGHT: 200 LBS | DIASTOLIC BLOOD PRESSURE: 92 MMHG | TEMPERATURE: 99 F | BODY MASS INDEX: 36.58 KG/M2 | RESPIRATION RATE: 18 BRPM

## 2023-05-03 DIAGNOSIS — S80.01XA CONTUSION OF RIGHT KNEE, INITIAL ENCOUNTER: Primary | ICD-10-CM

## 2023-05-03 DIAGNOSIS — T14.90XA TRAUMA: ICD-10-CM

## 2023-05-03 DIAGNOSIS — I95.1 ORTHOSTATIC SYNCOPE: ICD-10-CM

## 2023-05-03 PROCEDURE — 99283 EMERGENCY DEPT VISIT LOW MDM: CPT | Mod: 25

## 2023-05-03 PROCEDURE — 25000003 PHARM REV CODE 250: Performed by: EMERGENCY MEDICINE

## 2023-05-03 RX ORDER — HYDROCODONE BITARTRATE AND ACETAMINOPHEN 5; 325 MG/1; MG/1
1 TABLET ORAL
Status: COMPLETED | OUTPATIENT
Start: 2023-05-03 | End: 2023-05-03

## 2023-05-03 RX ADMIN — HYDROCODONE BITARTRATE AND ACETAMINOPHEN 1 TABLET: 5; 325 TABLET ORAL at 09:05

## 2023-05-03 NOTE — DISCHARGE INSTRUCTIONS
I suspect she passed out because you are using the restroom stood up and her blood pressure dropped.  Thankfully when you hit your knee it does not appear that she broke any bones according to the x-ray.  However the could be ligament injury.  We have given you crutches and wear an Ace wrap for comfort.  You need to follow up with the orthopedist.  If your symptoms do not improve you may need an MRI as an outpatient.

## 2023-05-03 NOTE — ED PROVIDER NOTES
Encounter Date: 5/3/2023    SCRIBE #1 NOTE: I, Diaz Alvarez, am scribing for, and in the presence of,  Nadeem Pathak MD.     History     Chief Complaint   Patient presents with    Knee Pain     Pt advised that she had chest pain last night, got up and passed out hurting her right knee.       Time seen by provider: 8:39 AM on 05/03/2023    Promise Medrano is a 47 y.o. female who presents to the ED with knee pain, chest pain, and syncope. The patient reports experiencing chest pain last night. She states that she got up to use the bathroom then had a syncopal episode after she finished and was walking back to her bed. The patient notes that she injured her right knee during the fall. She also checked her BP at the time which was 90/45. The patient is on midodrine and takes it as needed. When asked, the patient notes that she is normally on 3 liters of oxygen all the time for interstitial lung disease. Per the patient, she is unable to walk or apply pressure to her right knee. The patient also endorses some right hip pain. She is not on any blood thinners and has a hx of smoking. The patient denies blood in stool or any other symptoms at this time. PMHx of HLD, lupus, DM, interstitial lung disease, arthritis, and Sjogren's syndrome. PSHx of hysterectomy.    The history is provided by the patient.   Review of patient's allergies indicates:   Allergen Reactions    Ativan [lorazepam] Other (See Comments)     depression    Hay fever and allergy relief     Nitroglycerin      Pt says it could stop her heart     Past Medical History:   Diagnosis Date    Allergic rhinitis     Arthritis     Chronic anxiety 10/23/2018    Connective tissue disease     joints b/c lupus    Diabetes mellitus     Fibromyalgia     GERD (gastroesophageal reflux disease)     Grade II hemorrhoids 1/23/2019    Hyperlipemia     Interstitial lung disease 2019    Lupus     Lupus     Sjogren's syndrome 1/23/2019     Past Surgical History:   Procedure  Laterality Date    AUGMENTATION OF BREAST      BREAST SURGERY      BRONCHOSCOPY WITH FLUOROSCOPY N/A 2019    Procedure: BRONCHOSCOPY, WITH FLUOROSCOPY;  Surgeon: Nate Tarango MD;  Location: [facility] ENDO;  Service: Pulmonary;  Laterality: N/A;    CATHETERIZATION OF BOTH LEFT AND RIGHT HEART Left 2021    Procedure: CATHETERIZATION, HEART, BOTH LEFT AND RIGHT;  Surgeon: Luca Wilks MD;  Location: [facility] CATH/EP LAB;  Service: Cardiology;  Laterality: Left;     SECTION  ,    COLONOSCOPY      ESOPHAGOGASTRODUODENOSCOPY      gastric sleeve  2010    HYSTERECTOMY  2010    TONSILLECTOMY  1996    TUMOR REMOVAL      benign fatty     Family History   Problem Relation Age of Onset    Early death Father     Heart disease Father     Stroke Father     Kidney disease Father     Diabetes Paternal Grandmother     Cancer Paternal Grandmother     Raynaud syndrome Mother     Uterine cancer Mother     Breast cancer Mother     Raynaud syndrome Sister     Polycystic ovary syndrome Daughter     Diabetes Maternal Grandmother     Heart disease Maternal Grandmother     Kidney disease Maternal Grandmother     Breast cancer Maternal Grandmother     Heart disease Maternal Grandfather     Cancer Paternal Grandfather     No Known Problems Daughter      Social History     Tobacco Use    Smoking status: Every Day     Packs/day: 1.00     Years: 25.00     Pack years: 25.00     Types: Cigarettes     Start date: 1998    Smokeless tobacco: Never   Substance Use Topics    Alcohol use: Not Currently    Drug use: No     Review of Systems   Constitutional:  Negative for fever.   HENT:  Negative for sore throat.    Respiratory:  Negative for shortness of breath.    Cardiovascular:  Positive for chest pain.   Gastrointestinal:  Negative for nausea.   Genitourinary:  Negative for dysuria.   Musculoskeletal:  Positive for arthralgias. Negative for back pain.   Skin:  Negative for rash.   Neurological:  Positive for syncope.  Negative for weakness.   Hematological:  Does not bruise/bleed easily.     Physical Exam     Initial Vitals [05/03/23 0822]   BP Pulse Resp Temp SpO2   (!) 140/84 86 17 98.3 °F (36.8 °C) 98 %      MAP       --         Physical Exam    Nursing note and vitals reviewed.  Constitutional: She appears well-developed.  Non-toxic appearance.   HENT:   Head: Normocephalic and atraumatic.   Eyes: EOM are normal. Pupils are equal, round, and reactive to light.   Neck: Neck supple.   Cardiovascular:  Normal rate, regular rhythm, normal heart sounds and intact distal pulses.     Exam reveals no gallop and no friction rub.       No murmur heard.  Pulmonary/Chest: Breath sounds normal. No respiratory distress. She has no decreased breath sounds. She has no wheezes. She has no rhonchi. She has no rales.   Abdominal: Abdomen is soft. Bowel sounds are normal. She exhibits no distension. There is no abdominal tenderness.   Musculoskeletal:         General: Normal range of motion.      Cervical back: Neck supple.      Comments: Tenderness noted over right distal anterior patella. Swelling noted mostly in inferior portion of the knee at the prepatellar bursa and possibly lower knee joint.     Neurological: She is alert and oriented to person, place, and time.   Skin: Skin is warm and dry.   Psychiatric: She has a normal mood and affect.       ED Course   Procedures  Labs Reviewed - No data to display       Imaging Results              X-Ray Knee 3 View Right (Final result)  Result time 05/03/23 09:16:41      Final result by Mike Long MD (05/03/23 09:16:41)                   Narrative:    EXAMINATION:  XR KNEE 3 VIEW RIGHT    CLINICAL HISTORY:  Injury, unspecified, initial encounter    TECHNIQUE:  AP, lateral, and Merchant views of the right knee were performed.    COMPARISON:  None    FINDINGS:  No displaced fracture.  Borderline patella baja.  Relative preservation of joint spaces.  Small joint effusion.    Focal soft tissue  swelling along the patellar tendon which could relate to tendinosis or other pathology.  Elective MRI could add further characterization if warranted.      Electronically signed by: Mike Long  Date:    05/03/2023  Time:    09:16                                     Medications - No data to display  Medical Decision Making:   History:   Old Medical Records: I decided to obtain old medical records.  Clinical Tests:   Radiological Study: Ordered and Reviewed  47-year-old female presents the emergency room for evaluation of right knee pain after she fell.  Patient states she has a history of orthostasis and takes midodrine.  She stood up from using the restroom last night fell onto the floor.  She believes she hit her knee.  She has swelling over the inferior patella.  Thankfully x-ray shows no signs of acute fracture dislocation or subluxation.  Vital signs are stable here.  She has no chest pain here.  She told the nurse had chest discomfort but that was that her heart was beating fast when she stood up.  She is not tachycardic here.  She is had extensive workups in the past before for syncope and takes midodrine.  I do not think she needs that worked up emergently at this time.        Scribe Attestation:   Scribe #1: I performed the above scribed service and the documentation accurately describes the services I performed. I attest to the accuracy of the note.      ED Course as of 05/03/23 0924   Wed May 03, 2023   0915 X-ray of the knee independently interpreted by me shows no signs of acute fracture dislocation or subluxation. [JS]      ED Course User Index  [JS] Nadeem Pathak MD               I, Dr. Nadeem Pathak personally performed the services described in this documentation. All medical record entries made by the scribe were at my direction and in my presence.  I have reviewed the chart and agree that the record reflects my personal performance and is accurate and complete. Nadeem Pathak MD.  9:22  AM 05/03/2023    DISCLAIMER: This note was prepared with Dragon NaturallySpeaking voice recognition transcription software. Garbled syntax, mangled pronouns, and other bizarre constructions may be attributed to that software system   Clinical Impression:   Final diagnoses:  [T14.90XA] Trauma  [S80.01XA] Contusion of right knee, initial encounter (Primary)  [I95.1] Orthostatic syncope        ED Disposition Condition    Discharge Stable          ED Prescriptions    None       Follow-up Information       Follow up With Specialties Details Why Contact Info    Bari Delcid MD Orthopedic Surgery Schedule an appointment as soon as possible for a visit   [address] [place], [place] [id number]  [phone]               Nadeem Pathak MD  05/03/23 8597

## 2023-05-16 ENCOUNTER — OFFICE VISIT (OUTPATIENT)
Dept: FAMILY MEDICINE | Facility: CLINIC | Age: 48
End: 2023-05-16
Payer: MEDICAID

## 2023-05-16 VITALS
DIASTOLIC BLOOD PRESSURE: 84 MMHG | HEART RATE: 90 BPM | OXYGEN SATURATION: 98 % | SYSTOLIC BLOOD PRESSURE: 134 MMHG | BODY MASS INDEX: 35.83 KG/M2 | HEIGHT: 62 IN | WEIGHT: 194.69 LBS

## 2023-05-16 DIAGNOSIS — R55 SYNCOPE AND COLLAPSE: ICD-10-CM

## 2023-05-16 DIAGNOSIS — M25.561 ACUTE PAIN OF RIGHT KNEE: Primary | ICD-10-CM

## 2023-05-16 PROCEDURE — 3008F BODY MASS INDEX DOCD: CPT | Mod: CPTII,,, | Performed by: NURSE PRACTITIONER

## 2023-05-16 PROCEDURE — 3075F SYST BP GE 130 - 139MM HG: CPT | Mod: CPTII,,, | Performed by: NURSE PRACTITIONER

## 2023-05-16 PROCEDURE — 1159F MED LIST DOCD IN RCRD: CPT | Mod: CPTII,,, | Performed by: NURSE PRACTITIONER

## 2023-05-16 PROCEDURE — 3079F PR MOST RECENT DIASTOLIC BLOOD PRESSURE 80-89 MM HG: ICD-10-PCS | Mod: CPTII,,, | Performed by: NURSE PRACTITIONER

## 2023-05-16 PROCEDURE — 99215 OFFICE O/P EST HI 40 MIN: CPT | Performed by: NURSE PRACTITIONER

## 2023-05-16 PROCEDURE — 3075F PR MOST RECENT SYSTOLIC BLOOD PRESS GE 130-139MM HG: ICD-10-PCS | Mod: CPTII,,, | Performed by: NURSE PRACTITIONER

## 2023-05-16 PROCEDURE — 3079F DIAST BP 80-89 MM HG: CPT | Mod: CPTII,,, | Performed by: NURSE PRACTITIONER

## 2023-05-16 PROCEDURE — 3008F PR BODY MASS INDEX (BMI) DOCUMENTED: ICD-10-PCS | Mod: CPTII,,, | Performed by: NURSE PRACTITIONER

## 2023-05-16 PROCEDURE — 99213 PR OFFICE/OUTPT VISIT, EST, LEVL III, 20-29 MIN: ICD-10-PCS | Mod: S$PBB,,, | Performed by: NURSE PRACTITIONER

## 2023-05-16 PROCEDURE — 1160F RVW MEDS BY RX/DR IN RCRD: CPT | Mod: CPTII,,, | Performed by: NURSE PRACTITIONER

## 2023-05-16 PROCEDURE — 1159F PR MEDICATION LIST DOCUMENTED IN MEDICAL RECORD: ICD-10-PCS | Mod: CPTII,,, | Performed by: NURSE PRACTITIONER

## 2023-05-16 PROCEDURE — 99213 OFFICE O/P EST LOW 20 MIN: CPT | Mod: S$PBB,,, | Performed by: NURSE PRACTITIONER

## 2023-05-16 PROCEDURE — 1160F PR REVIEW ALL MEDS BY PRESCRIBER/CLIN PHARMACIST DOCUMENTED: ICD-10-PCS | Mod: CPTII,,, | Performed by: NURSE PRACTITIONER

## 2023-05-16 RX ORDER — SEMAGLUTIDE 0.68 MG/ML
0.5 INJECTION, SOLUTION SUBCUTANEOUS
COMMUNITY
Start: 2023-04-25 | End: 2023-08-01

## 2023-05-16 RX ORDER — TRAMADOL HYDROCHLORIDE 50 MG/1
50 TABLET ORAL EVERY 8 HOURS PRN
Qty: 28 TABLET | Refills: 0 | Status: SHIPPED | OUTPATIENT
Start: 2023-05-16 | End: 2023-07-19

## 2023-05-17 NOTE — PROGRESS NOTES
SUBJECTIVE:      Patient ID: Promise Medrano is a 47 y.o. female.    Chief Complaint: Follow-up (Passed out 2 weeks ago - had fallen down to ground hurting knee  - would like a referral to Dr. Zhao)    Pt of Dr. Palmer, known to this provider, presents for problem visit    Knee Pain   The incident occurred more than 1 week ago. The incident occurred at home. The injury mechanism was a fall. The pain is present in the right knee. The pain is at a severity of 7/10. The pain is severe. The pain has been Constant since onset. Associated symptoms include an inability to bear weight. Pertinent negatives include no numbness. She reports no foreign bodies present. The symptoms are aggravated by weight bearing and movement. She has tried elevation, rest, non-weight bearing and NSAIDs for the symptoms. The treatment provided no relief.     Past Surgical History:   Procedure Laterality Date    AUGMENTATION OF BREAST      BREAST SURGERY      BRONCHOSCOPY WITH FLUOROSCOPY N/A 2019    Procedure: BRONCHOSCOPY, WITH FLUOROSCOPY;  Surgeon: Nate Tarango MD;  Location: [facility] ENDO;  Service: Pulmonary;  Laterality: N/A;    CATHETERIZATION OF BOTH LEFT AND RIGHT HEART Left 2021    Procedure: CATHETERIZATION, HEART, BOTH LEFT AND RIGHT;  Surgeon: Luca Wilks MD;  Location: [facility] CATH/EP LAB;  Service: Cardiology;  Laterality: Left;     SECTION  ,    COLONOSCOPY      ESOPHAGOGASTRODUODENOSCOPY      gastric sleeve  2010    HYSTERECTOMY      TONSILLECTOMY  1996    TUMOR REMOVAL      benign fatty     Family History   Problem Relation Age of Onset    Early death Father     Heart disease Father     Stroke Father     Kidney disease Father     Diabetes Paternal Grandmother     Cancer Paternal Grandmother     Raynaud syndrome Mother     Uterine cancer Mother     Breast cancer Mother     Raynaud syndrome Sister     Polycystic ovary syndrome Daughter     Diabetes Maternal Grandmother     Heart disease  Maternal Grandmother     Kidney disease Maternal Grandmother     Breast cancer Maternal Grandmother     Heart disease Maternal Grandfather     Cancer Paternal Grandfather     No Known Problems Daughter       Social History     Socioeconomic History    Marital status: Single   Tobacco Use    Smoking status: Every Day     Packs/day: 1.00     Years: 25.00     Pack years: 25.00     Types: Cigarettes     Start date: 2/9/1998    Smokeless tobacco: Never   Substance and Sexual Activity    Alcohol use: Not Currently    Drug use: No    Sexual activity: Yes     Partners: Male     Birth control/protection: None     Social Determinants of Health     Financial Resource Strain: High Risk    Difficulty of Paying Living Expenses: Hard   Food Insecurity: Unknown    Worried About Running Out of Food in the Last Year: Patient refused    Ran Out of Food in the Last Year: Patient refused   Transportation Needs: Unmet Transportation Needs    Lack of Transportation (Medical): Yes    Lack of Transportation (Non-Medical): Yes   Physical Activity: Inactive    Days of Exercise per Week: 0 days    Minutes of Exercise per Session: 0 min   Stress: Stress Concern Present    Feeling of Stress : Rather much   Social Connections: Unknown    Frequency of Communication with Friends and Family: More than three times a week    Frequency of Social Gatherings with Friends and Family: Patient refused    Active Member of Clubs or Organizations: No    Attends Club or Organization Meetings: Patient refused    Marital Status: Patient refused   Housing Stability: High Risk    Unable to Pay for Housing in the Last Year: Yes    Number of Places Lived in the Last Year: 1    Unstable Housing in the Last Year: No     Current Outpatient Medications   Medication Sig Dispense Refill    ACCU-CHEK SOFTCLIX LANCETS Misc USE ONCE DAILY AS NEEDED 100 each 5    albuterol (PROVENTIL) 2.5 mg /3 mL (0.083 %) nebulizer solution Take 3 mLs (2.5 mg total) by nebulization every 4  (four) hours. 300 mL 3    albuterol (PROVENTIL/VENTOLIN HFA) 90 mcg/actuation inhaler INHALE 1 PUFF INTO THE LUNGS ONCE DAILY 18 g 3    albuterol-ipratropium (DUO-NEB) 2.5 mg-0.5 mg/3 mL nebulizer solution USE 1 VIAL VIA NEBULIZER EVERY 6 HOURS AS NEEDED FOR WHEEZING OR SHORTNESS OF BREATH 180 mL 0    azelastine (ASTELIN) 137 mcg (0.1 %) nasal spray SPRAY ONCE IN EACH NOSTRIL TWICE DAILY 30 mL 3    blood sugar diagnostic (ONETOUCH ULTRA BLUE TEST STRIP) Strp Use strips to take blood sugar bid 200 strip 2    budesonide (PULMICORT) 0.5 mg/2 mL nebulizer solution ADD 1 VIAL TO SINUS RINSE AND USE TWICE DAILY AS DIRECTED 120 mL 3    cyclobenzaprine (FLEXERIL) 10 MG tablet Take 10 mg by mouth every evening.      diazePAM (VALIUM) 5 MG tablet Take 1 to 2 tabs by mouth qhs 60 tablet 2    dicyclomine (BENTYL) 20 mg tablet TAKE 1 TABLET(20 MG) BY MOUTH TWICE DAILY 60 tablet 0    doxepin (SINEQUAN) 150 MG Cap Take 1 capsule (150 mg total) by mouth every evening. 90 capsule 1    ergocalciferol (ERGOCALCIFEROL) 50,000 unit Cap TAKE 1 CAPSULE BY MOUTH EVERY 7 DAYS 4 capsule 3    esomeprazole (NEXIUM) 40 MG capsule Take 1 capsule (40 mg total) by mouth before breakfast. 90 capsule 2    ezetimibe (ZETIA) 10 mg tablet Take 1 tablet (10 mg total) by mouth once daily. 90 tablet 1    famotidine (PEPCID) 20 MG tablet TAKE 1 TABLET(20 MG) BY MOUTH TWICE DAILY 60 tablet 5    ferrous sulfate (FEROSUL) 325 mg (65 mg iron) Tab tablet Take 1 tablet (325 mg total) by mouth once daily. 90 tablet 1    fluticasone propionate (FLONASE) 50 mcg/actuation nasal spray SHAKE LIQUID AND USE 1 SPRAY(50 MCG) IN EACH NOSTRIL EVERY DAY 48 g 1    gabapentin (NEURONTIN) 300 MG capsule TAKE 1 CAPSULE BY MOUTH AT BEDTIME 90 capsule 1    ibuprofen (ADVIL,MOTRIN) 600 MG tablet TAKE 1 TABLET(600 MG) BY MOUTH TWICE DAILY AS NEEDED FOR PAIN 60 tablet 1    immun glob G,IgG,-gly-IgA ov50 (CUVITRU) 10 gram/50 mL (20 %) Soln Inject 12 g into the skin once a week. 200  mL 12    levocetirizine (XYZAL) 5 MG tablet TAKE 1 TABLET(5 MG) BY MOUTH EVERY EVENING 90 tablet 1    metFORMIN (GLUCOPHAGE) 500 MG tablet Take 1 tablet (500 mg total) by mouth 2 (two) times daily with meals. 60 tablet 1    midodrine (PROAMATINE) 2.5 MG Tab Take 2.5 mg by mouth 3 (three) times daily.      ondansetron (ZOFRAN-ODT) 4 MG TbDL DISSOLVE 2 TABLETS UNDER THE TONGUE EVERY 8 HOURS AS NEEDED 30 tablet 5    OXYGEN-AIR DELIVERY SYSTEMS MISC by Misc.(Non-Drug; Combo Route) route.      OZEMPIC 0.25 mg or 0.5 mg (2 mg/3 mL) pen injector Inject 0.5 mg into the skin every 7 days.      pravastatin (PRAVACHOL) 80 MG tablet TAKE 1 TABLET BY MOUTH ONCE DAILY 90 tablet 3    traMADoL (ULTRAM) 50 mg tablet Take 1 tablet (50 mg total) by mouth every 8 (eight) hours as needed for Pain. 28 tablet 0     No current facility-administered medications for this visit.     Review of patient's allergies indicates:   Allergen Reactions    Ativan [lorazepam] Other (See Comments)     depression    Hay fever and allergy relief     Nitroglycerin      Pt says it could stop her heart      Past Medical History:   Diagnosis Date    Allergic rhinitis     Arthritis     Chronic anxiety 10/23/2018    Connective tissue disease     joints b/c lupus    Diabetes mellitus     Fibromyalgia     GERD (gastroesophageal reflux disease)     Grade II hemorrhoids 1/23/2019    Hyperlipemia     Interstitial lung disease 2019    Lupus     Lupus     Sjogren's syndrome 1/23/2019     Past Surgical History:   Procedure Laterality Date    AUGMENTATION OF BREAST      BREAST SURGERY  2004    BRONCHOSCOPY WITH FLUOROSCOPY N/A 12/17/2019    Procedure: BRONCHOSCOPY, WITH FLUOROSCOPY;  Surgeon: Nate Tarango MD;  Location: [facility] ENDO;  Service: Pulmonary;  Laterality: N/A;    CATHETERIZATION OF BOTH LEFT AND RIGHT HEART Left 08/23/2021    Procedure: CATHETERIZATION, HEART, BOTH LEFT AND RIGHT;  Surgeon: Luca Wilks MD;  Location: [facility] CATH/EP LAB;  Service: Cardiology;   "Laterality: Left;     SECTION  ,    COLONOSCOPY      ESOPHAGOGASTRODUODENOSCOPY      gastric sleeve  2010    HYSTERECTOMY  2010    TONSILLECTOMY  1996    TUMOR REMOVAL      benign fatty       Review of Systems   Constitutional:  Positive for activity change. Negative for appetite change, fatigue and unexpected weight change.   HENT:  Negative for congestion, ear pain, hearing loss, postnasal drip, rhinorrhea, sinus pressure, sinus pain, sneezing, sore throat and trouble swallowing.    Eyes:  Negative for photophobia, pain, discharge and visual disturbance.   Respiratory:  Negative for cough, chest tightness, shortness of breath and wheezing.    Cardiovascular:  Negative for chest pain, palpitations and leg swelling.   Gastrointestinal:  Positive for constipation. Negative for abdominal distention, abdominal pain, blood in stool, diarrhea, nausea and vomiting.   Endocrine: Negative for cold intolerance, heat intolerance, polydipsia and polyuria.   Genitourinary:  Negative for difficulty urinating, dysuria, flank pain, frequency, hematuria, menstrual problem, pelvic pain and urgency.   Musculoskeletal:  Positive for arthralgias, joint swelling and neck pain. Negative for back pain and myalgias.   Skin:  Negative for pallor.   Allergic/Immunologic: Negative for environmental allergies and food allergies.   Neurological:  Positive for syncope, weakness and headaches. Negative for dizziness, light-headedness and numbness.   Hematological:  Does not bruise/bleed easily.   Psychiatric/Behavioral:  Negative for agitation, confusion, decreased concentration, dysphoric mood and sleep disturbance. The patient is not nervous/anxious.     OBJECTIVE:      Vitals:    23 1112   BP: 134/84   BP Location: Right arm   Patient Position: Sitting   BP Method: Large (Manual)   Pulse: 90   SpO2: 98%   Weight: 88.3 kg (194 lb 11.2 oz)   Height: 5' 2" (1.575 m)     Physical Exam  Vitals and nursing note reviewed. "   Constitutional:       General: She is not in acute distress.     Appearance: Normal appearance. She is well-developed. She is obese.   HENT:      Head: Normocephalic and atraumatic.      Right Ear: Hearing normal.      Left Ear: Hearing normal.      Nose: Nose normal. No rhinorrhea.   Eyes:      General: Lids are normal.         Right eye: No discharge.         Left eye: No discharge.      Conjunctiva/sclera: Conjunctivae normal.      Right eye: Right conjunctiva is not injected.      Left eye: Left conjunctiva is not injected.      Pupils: Pupils are equal, round, and reactive to light. Pupils are equal.      Right eye: Pupil is round and reactive.      Left eye: Pupil is round and reactive.   Neck:      Thyroid: No thyromegaly.      Vascular: No JVD.      Trachea: Trachea normal. No tracheal deviation.   Cardiovascular:      Rate and Rhythm: Normal rate and regular rhythm.      Pulses:           Radial pulses are 2+ on the right side and 2+ on the left side.        Posterior tibial pulses are 2+ on the right side and 2+ on the left side.      Heart sounds: Normal heart sounds. No murmur heard.    No friction rub. No gallop.   Pulmonary:      Effort: Pulmonary effort is normal. No respiratory distress.      Breath sounds: Normal breath sounds. No stridor. No decreased breath sounds, wheezing, rhonchi or rales.      Comments: O2 via NC  Abdominal:      General: Bowel sounds are normal. There is no distension.      Palpations: Abdomen is soft. Abdomen is not rigid.      Tenderness: There is no abdominal tenderness. There is no guarding.   Musculoskeletal:         General: Normal range of motion.      Cervical back: Normal range of motion and neck supple.      Right knee: Bony tenderness present. Tenderness present over the medial joint line.      Comments: crutches   Lymphadenopathy:      Cervical: No cervical adenopathy.   Skin:     General: Skin is warm and dry.      Capillary Refill: Capillary refill takes less  than 2 seconds.      Coloration: Skin is not pale.      Findings: No lesion or rash.   Neurological:      Mental Status: She is alert and oriented to person, place, and time.      GCS: GCS eye subscore is 4. GCS verbal subscore is 5. GCS motor subscore is 6.      Motor: Motor function is intact. No atrophy.      Coordination: Coordination is intact. Coordination normal.      Gait: Gait abnormal.   Psychiatric:         Attention and Perception: Attention normal. She is attentive.         Mood and Affect: Mood normal.         Speech: Speech normal.         Behavior: Behavior normal.         Thought Content: Thought content normal.         Cognition and Memory: Cognition normal.         Judgment: Judgment normal.      Assessment:       1. Acute pain of right knee    2. Syncope and collapse        Plan:       Acute pain of right knee  -     Ambulatory referral/consult to Orthopedics; Future; Expected date: 05/23/2023  -     traMADoL (ULTRAM) 50 mg tablet; Take 1 tablet (50 mg total) by mouth every 8 (eight) hours as needed for Pain.  Dispense: 28 tablet; Refill: 0    Syncope and collapse  -     traMADoL (ULTRAM) 50 mg tablet; Take 1 tablet (50 mg total) by mouth every 8 (eight) hours as needed for Pain.  Dispense: 28 tablet; Refill: 0        Follow up if symptoms worsen or fail to improve.      5/17/2023 AURORA Owens, FNP-C

## 2023-05-23 ENCOUNTER — OFFICE VISIT (OUTPATIENT)
Dept: ORTHOPEDICS | Facility: CLINIC | Age: 48
End: 2023-05-23
Payer: MEDICAID

## 2023-05-23 VITALS
WEIGHT: 194 LBS | HEIGHT: 62 IN | DIASTOLIC BLOOD PRESSURE: 82 MMHG | SYSTOLIC BLOOD PRESSURE: 110 MMHG | BODY MASS INDEX: 35.7 KG/M2
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
## 2023-05-23 DIAGNOSIS — S83.241A ACUTE MEDIAL MENISCUS TEAR OF RIGHT KNEE, INITIAL ENCOUNTER: ICD-10-CM

## 2023-05-23 PROCEDURE — 20610 DRAIN/INJ JOINT/BURSA W/O US: CPT | Mod: RT,S$GLB,, | Performed by: ORTHOPAEDIC SURGERY

## 2023-05-23 PROCEDURE — 3008F PR BODY MASS INDEX (BMI) DOCUMENTED: ICD-10-PCS | Mod: CPTII,S$GLB,, | Performed by: ORTHOPAEDIC SURGERY

## 2023-05-23 PROCEDURE — 99203 PR OFFICE/OUTPT VISIT, NEW, LEVL III, 30-44 MIN: ICD-10-PCS | Mod: 25,S$GLB,, | Performed by: ORTHOPAEDIC SURGERY

## 2023-05-23 PROCEDURE — 3074F SYST BP LT 130 MM HG: CPT | Mod: CPTII,S$GLB,, | Performed by: ORTHOPAEDIC SURGERY

## 2023-05-23 PROCEDURE — 3074F PR MOST RECENT SYSTOLIC BLOOD PRESSURE < 130 MM HG: ICD-10-PCS | Mod: CPTII,S$GLB,, | Performed by: ORTHOPAEDIC SURGERY

## 2023-05-23 PROCEDURE — 3008F BODY MASS INDEX DOCD: CPT | Mod: CPTII,S$GLB,, | Performed by: ORTHOPAEDIC SURGERY

## 2023-05-23 PROCEDURE — 3079F DIAST BP 80-89 MM HG: CPT | Mod: CPTII,S$GLB,, | Performed by: ORTHOPAEDIC SURGERY

## 2023-05-23 PROCEDURE — 1160F RVW MEDS BY RX/DR IN RCRD: CPT | Mod: CPTII,S$GLB,, | Performed by: ORTHOPAEDIC SURGERY

## 2023-05-23 PROCEDURE — 99203 OFFICE O/P NEW LOW 30 MIN: CPT | Mod: 25,S$GLB,, | Performed by: ORTHOPAEDIC SURGERY

## 2023-05-23 PROCEDURE — 1159F MED LIST DOCD IN RCRD: CPT | Mod: CPTII,S$GLB,, | Performed by: ORTHOPAEDIC SURGERY

## 2023-05-23 PROCEDURE — 1159F PR MEDICATION LIST DOCUMENTED IN MEDICAL RECORD: ICD-10-PCS | Mod: CPTII,S$GLB,, | Performed by: ORTHOPAEDIC SURGERY

## 2023-05-23 PROCEDURE — 20610 LARGE JOINT ASPIRATION/INJECTION: R KNEE: ICD-10-PCS | Mod: RT,S$GLB,, | Performed by: ORTHOPAEDIC SURGERY

## 2023-05-23 PROCEDURE — 3079F PR MOST RECENT DIASTOLIC BLOOD PRESSURE 80-89 MM HG: ICD-10-PCS | Mod: CPTII,S$GLB,, | Performed by: ORTHOPAEDIC SURGERY

## 2023-05-23 PROCEDURE — 1160F PR REVIEW ALL MEDS BY PRESCRIBER/CLIN PHARMACIST DOCUMENTED: ICD-10-PCS | Mod: CPTII,S$GLB,, | Performed by: ORTHOPAEDIC SURGERY

## 2023-05-23 RX ORDER — MELOXICAM 15 MG/1
15 TABLET ORAL DAILY
Qty: 30 TABLET | Refills: 2 | Status: SHIPPED | OUTPATIENT
Start: 2023-05-23 | End: 2023-07-19

## 2023-05-23 RX ORDER — DICLOFENAC SODIUM 10 MG/G
2 GEL TOPICAL 3 TIMES DAILY
Qty: 100 G | Refills: 1 | Status: SHIPPED | OUTPATIENT
Start: 2023-05-23 | End: 2023-10-06 | Stop reason: SDUPTHER

## 2023-05-23 RX ORDER — TRIAMCINOLONE ACETONIDE 40 MG/ML
40 INJECTION, SUSPENSION INTRA-ARTICULAR; INTRAMUSCULAR
Status: DISCONTINUED | OUTPATIENT
Start: 2023-05-23 | End: 2023-05-23 | Stop reason: HOSPADM

## 2023-05-23 RX ADMIN — TRIAMCINOLONE ACETONIDE 40 MG: 40 INJECTION, SUSPENSION INTRA-ARTICULAR; INTRAMUSCULAR at 09:05

## 2023-05-23 NOTE — PROGRESS NOTES
[facility]    Subjective:     Chief Complaint:   Chief Complaint   Patient presents with    Right Knee - Pain     Patient is here with complaints of right knee pain, fell 3 weeks ago on her knee. No issues with the knee prior to the fall, pops, feels like its going to give away has a stabbing pain under knee cap       Past Medical History:   Diagnosis Date    Allergic rhinitis     Arthritis     Chronic anxiety 10/23/2018    Connective tissue disease     joints b/c lupus    Diabetes mellitus     Fibromyalgia     GERD (gastroesophageal reflux disease)     Grade II hemorrhoids 2019    Hyperlipemia     Interstitial lung disease     Lupus     Lupus     Sjogren's syndrome 2019       Past Surgical History:   Procedure Laterality Date    AUGMENTATION OF BREAST      BREAST SURGERY      BRONCHOSCOPY WITH FLUOROSCOPY N/A 2019    Procedure: BRONCHOSCOPY, WITH FLUOROSCOPY;  Surgeon: Nate Tarango MD;  Location: [facility] ENDO;  Service: Pulmonary;  Laterality: N/A;    CATHETERIZATION OF BOTH LEFT AND RIGHT HEART Left 2021    Procedure: CATHETERIZATION, HEART, BOTH LEFT AND RIGHT;  Surgeon: Luca Wilks MD;  Location: [facility] CATH/EP LAB;  Service: Cardiology;  Laterality: Left;     SECTION  ,    COLONOSCOPY      ESOPHAGOGASTRODUODENOSCOPY      gastric sleeve  2010    HYSTERECTOMY  2010    TONSILLECTOMY  1996    TUMOR REMOVAL      benign fatty       Current Outpatient Medications   Medication Sig    ACCU-CHEK SOFTCLIX LANCETS Misc USE ONCE DAILY AS NEEDED    albuterol (PROVENTIL) 2.5 mg /3 mL (0.083 %) nebulizer solution Take 3 mLs (2.5 mg total) by nebulization every 4 (four) hours.    albuterol (PROVENTIL/VENTOLIN HFA) 90 mcg/actuation inhaler INHALE 1 PUFF INTO THE LUNGS ONCE DAILY    albuterol-ipratropium (DUO-NEB) 2.5 mg-0.5 mg/3 mL nebulizer solution USE 1 VIAL VIA NEBULIZER EVERY 6 HOURS AS NEEDED FOR WHEEZING OR SHORTNESS OF BREATH    azelastine (ASTELIN) 137 mcg (0.1 %)  nasal spray SPRAY ONCE IN EACH NOSTRIL TWICE DAILY    blood sugar diagnostic (ONETOUCH ULTRA BLUE TEST STRIP) Strp Use strips to take blood sugar bid    budesonide (PULMICORT) 0.5 mg/2 mL nebulizer solution ADD 1 VIAL TO SINUS RINSE AND USE TWICE DAILY AS DIRECTED    cyclobenzaprine (FLEXERIL) 10 MG tablet Take 10 mg by mouth every evening.    diazePAM (VALIUM) 5 MG tablet Take 1 to 2 tabs by mouth qhs    dicyclomine (BENTYL) 20 mg tablet TAKE 1 TABLET(20 MG) BY MOUTH TWICE DAILY    doxepin (SINEQUAN) 150 MG Cap Take 1 capsule (150 mg total) by mouth every evening.    ergocalciferol (ERGOCALCIFEROL) 50,000 unit Cap TAKE 1 CAPSULE BY MOUTH EVERY 7 DAYS    esomeprazole (NEXIUM) 40 MG capsule Take 1 capsule (40 mg total) by mouth before breakfast.    ezetimibe (ZETIA) 10 mg tablet Take 1 tablet (10 mg total) by mouth once daily.    famotidine (PEPCID) 20 MG tablet TAKE 1 TABLET(20 MG) BY MOUTH TWICE DAILY    ferrous sulfate (FEROSUL) 325 mg (65 mg iron) Tab tablet Take 1 tablet (325 mg total) by mouth once daily.    fluticasone propionate (FLONASE) 50 mcg/actuation nasal spray SHAKE LIQUID AND USE 1 SPRAY(50 MCG) IN EACH NOSTRIL EVERY DAY    gabapentin (NEURONTIN) 300 MG capsule TAKE 1 CAPSULE BY MOUTH AT BEDTIME    ibuprofen (ADVIL,MOTRIN) 600 MG tablet TAKE 1 TABLET(600 MG) BY MOUTH TWICE DAILY AS NEEDED FOR PAIN    immun glob G,IgG,-gly-IgA ov50 (CUVITRU) 10 gram/50 mL (20 %) Soln Inject 12 g into the skin once a week.    levocetirizine (XYZAL) 5 MG tablet TAKE 1 TABLET(5 MG) BY MOUTH EVERY EVENING    metFORMIN (GLUCOPHAGE) 500 MG tablet Take 1 tablet (500 mg total) by mouth 2 (two) times daily with meals.    midodrine (PROAMATINE) 2.5 MG Tab Take 2.5 mg by mouth 3 (three) times daily.    ondansetron (ZOFRAN-ODT) 4 MG TbDL DISSOLVE 2 TABLETS UNDER THE TONGUE EVERY 8 HOURS AS NEEDED    OXYGEN-AIR DELIVERY SYSTEMS MISC by Misc.(Non-Drug; Combo Route) route.    OZEMPIC 0.25 mg or 0.5 mg (2 mg/3 mL) pen injector Inject  0.5 mg into the skin every 7 days.    pravastatin (PRAVACHOL) 80 MG tablet TAKE 1 TABLET BY MOUTH ONCE DAILY    traMADoL (ULTRAM) 50 mg tablet Take 1 tablet (50 mg total) by mouth every 8 (eight) hours as needed for Pain.     No current facility-administered medications for this visit.       Review of patient's allergies indicates:   Allergen Reactions    Ativan [lorazepam] Other (See Comments)     depression    Hay fever and allergy relief     Nitroglycerin      Pt says it could stop her heart       Family History   Problem Relation Age of Onset    Early death Father     Heart disease Father     Stroke Father     Kidney disease Father     Diabetes Paternal Grandmother     Cancer Paternal Grandmother     Raynaud syndrome Mother     Uterine cancer Mother     Breast cancer Mother     Raynaud syndrome Sister     Polycystic ovary syndrome Daughter     Diabetes Maternal Grandmother     Heart disease Maternal Grandmother     Kidney disease Maternal Grandmother     Breast cancer Maternal Grandmother     Heart disease Maternal Grandfather     Cancer Paternal Grandfather     No Known Problems Daughter        Social History     Socioeconomic History    Marital status: Single   Tobacco Use    Smoking status: Every Day     Packs/day: 1.00     Years: 25.00     Pack years: 25.00     Types: Cigarettes     Start date: 2/9/1998    Smokeless tobacco: Never   Substance and Sexual Activity    Alcohol use: Not Currently    Drug use: No    Sexual activity: Yes     Partners: Male     Birth control/protection: None     Social Determinants of Health     Financial Resource Strain: High Risk    Difficulty of Paying Living Expenses: Hard   Food Insecurity: Unknown    Worried About Running Out of Food in the Last Year: Patient refused    Ran Out of Food in the Last Year: Patient refused   Transportation Needs: Unmet Transportation Needs    Lack of Transportation (Medical): Yes    Lack of Transportation (Non-Medical): Yes   Physical Activity:  Inactive    Days of Exercise per Week: 0 days    Minutes of Exercise per Session: 0 min   Stress: Stress Concern Present    Feeling of Stress : Rather much   Social Connections: Unknown    Frequency of Communication with Friends and Family: More than three times a week    Frequency of Social Gatherings with Friends and Family: Patient refused    Active Member of Clubs or Organizations: No    Attends Club or Organization Meetings: Patient refused    Marital Status: Patient refused   Housing Stability: High Risk    Unable to Pay for Housing in the Last Year: Yes    Number of Places Lived in the Last Year: 1    Unstable Housing in the Last Year: No       History of present illness:  47-year-old female, presents to clinic today as a new patient for evaluation of complaints of right knee pain.  Follow-up from emergency department visit on May 3rd.  Pain with weight-bearing, ambulating with crutches.  It is getting better, feels unstable like it wants to give out.  Reports some locking and catching.      Review of Systems:    Constitution: Negative for chills, fever, and sweats.  Negative for unexplained weight loss.    HENT:  Negative for headaches and blurry vision.    Cardiovascular:Negative for chest pain or irregular heart beat. Negative for hypertension.    Respiratory:  Negative for cough and shortness of breath.    Gastrointestinal: Negative for abdominal pain, heartburn, melena, nausea, and vomitting.    Genitourinary:  Negative bladder incontinence and dysuria.    Musculoskeletal:  See HPI for details.     Neurological: Negative for numbness.    Psychiatric/Behavioral: Negative for depression.  The patient is not nervous/anxious.      Endocrine: Negative for polyuria    Hematologic/Lymphatic: Negative for bleeding problem.  Does not bruise/bleed easily.    Skin: Negative for poor would healing and rash    Objective:      Physical Examination:    Vital Signs:    Vitals:    05/23/23 0843   BP: 110/82       Body  "mass index is 35.48 kg/m².    This a well-developed, well nourished patient in no acute distress.  They are alert and oriented and cooperative to examination.        Examination of the right knee, no effusion, skin is dry and intact, no erythema ecchymosis, no signs symptoms of action.  Patient with range of motion 0-110 degrees.  Stable anterior-posterior varus and valgus stress.  No pain with palpation over the medial or lateral joint lines but she take it pain with Damaris's testing.  Calf soft nontender, straight leg raise negative    Pertinent New Results:    XRAY Report / Interpretation:   Standing AP view taken today in the office without significant osteoarthritic changes.    Assessment/Plan:      Fall, right knee pain, medial meniscal tear.  We injected the right knee today, anterior lateral approach sterile technique lidocaine and triamcinolone patient tolerated well.  Physical therapy for 6 weeks.  We will check her back at that that time still symptomatic, we will get MRI of her right knee.    Alfredito Alan, Physician Assistant, served in the capacity as a "scribe" for this patient encounter.  A "face-to-face" encounter occurred with Dr. Gustabo Zhao on this date.  The treatment plan and medical decision-making is outlined above. Patient was seen and examined with a chaperone.       This note was created using Dragon voice recognition software that occasionally misinterpreted phrases or words.          "

## 2023-05-23 NOTE — PROCEDURES
Large Joint Aspiration/Injection: R knee    Date/Time: 5/23/2023 9:15 AM  Performed by: Gustabo Zhao MD  Authorized by: Gustabo Zhao MD     Consent Done?:  Yes (Verbal)  Indications:  Pain  Site marked: the procedure site was marked    Timeout: prior to procedure the correct patient, procedure, and site was verified    Prep: patient was prepped and draped in usual sterile fashion      Local anesthesia used?: Yes    Local anesthetic:  Lidocaine 1% without epinephrine  Ultrasonic Guidance for needle placement?: No    Location:  Knee  Site:  R knee  Medications:  40 mg triamcinolone acetonide 40 mg/mL  Patient tolerance:  Patient tolerated the procedure well with no immediate complications

## 2023-06-26 NOTE — PROGRESS NOTES
[facility]            PROGRESS NOTE      Subjective:       Patient ID:   NAME: Promise Medrano : 1975     42 y.o. female    Referring Doc: No ref. provider found  Other Physicians:    Chief Complaint:  Osteoarthritis (Been having flare up since before [place]. Needs refill on Plaquenil. Needs referral sent to Dr Adamson for Climara/Estradiol .1mg Patch twice weekly. Pain on right side in back and stomach)      History of Present Illness:     Patient returns today for a regularly scheduled follow-up visit for UCTD,FM      The patient is experiencing dry mouth and dry eyes.No skin rashes.No mucosal ulcerations.No CP,no cough except occ in AM frompost nasal drip.  Some DE LA ROSA occ.Hair loss            ROS:   GEN:  No  fever, night sweats . weight is stable   N+atigue  SKIN: no rashes, no bruising, no ulcerations, no Raynaud's  HEENT: no HA's, No visual changes, no mucosal ulcers, + sicca symptoms,  CV:   no CP, SOB, PND, DE LA ROSA, no orthopnea, no palpitations  PULM: normal with no SOB, cough, hemoptysis, sputum or pleuritic pain  GI:  no abdominal pain, nausea, vomiting, constipation, diarrhea, melanotic stools, BRBPR, hematemesis, no dysphagia  :   no dysuria  NEURO: no paresthesias, headaches, visual disturbances, muscle weakness  MUSCULOSKELETAL:no joint swelling, prolonged AM stiffness, no back pain, + muscle pain  Allergies:  Review of patient's allergies indicates:  No Known Allergies    Medications:    Current Outpatient Prescriptions:     atorvastatin (LIPITOR) 80 MG tablet, , Disp: , Rfl:     [employer] 18 mg iron-400 mcg-25 mcg-75mg Tab, Take 1 tablet by mouth once daily. , Disp: , Rfl:     CETAPHIL MOISTURIZING cream, Apply topically as needed. , Disp: , Rfl:     cetirizine (ZYRTEC) 10 MG tablet, Take 1 tablet (10 mg total) by mouth once daily., Disp: 30 tablet, Rfl: 3    diclofenac (VOLTAREN) 75 MG EC tablet, TAKE 1 TABLET(75 MG) BY MOUTH TWICE DAILY AS NEEDED FOR JOINT  PAIN, Disp: 60 tablet, Rfl: 3    dicyclomine (BENTYL) 20 mg tablet, TK 1 T PO BID PRN. CONTINUE CURRENT DOSAGE, Disp: , Rfl: 2    doxepin (SINEQUAN) 50 MG capsule, Take 1 capsule (50 mg total) by mouth nightly., Disp: 30 capsule, Rfl: 5    ergocalciferol (ERGOCALCIFEROL) 50,000 unit Cap, Take 1 capsule (50,000 Units total) by mouth every 7 days., Disp: 4 capsule, Rfl: 5    ferrous sulfate 325 mg (65 mg iron) Tab tablet, Take 1 tablet (325 mg total) by mouth once daily., Disp: 30 tablet, Rfl: 5    fluticasone (FLONASE) 50 mcg/actuation nasal spray, SHAKE LIQUID AND USE 1 SPRAY IN EACH NOSTRIL EVERY DAY, Disp: 9.9 g, Rfl: 5    gabapentin (NEURONTIN) 400 MG capsule, Take 1 capsule (400 mg total) by mouth 2 (two) times daily., Disp: 60 capsule, Rfl: 11    hydrocortisone 1 % cream, Apply to affected area 2 times daily., Disp: , Rfl:     hydroxychloroquine (PLAQUENIL) 200 mg tablet, Take 400 mg by mouth once daily. , Disp: , Rfl:     ibuprofen (ADVIL,MOTRIN) 600 MG tablet, , Disp: , Rfl:     ketoconazole (NIZORAL) 2 % cream, , Disp: , Rfl:     midodrine (PROAMATINE) 2.5 MG Tab, Take 1 tablet by mouth 2 (two) times daily., Disp: , Rfl:     mupirocin (BACTROBAN) 2 % ointment, , Disp: , Rfl:     omeprazole (PRILOSEC) 40 MG capsule, Take 1 capsule (40 mg total) by mouth every morning., Disp: 30 capsule, Rfl: 5    polyethylene glycol (GLYCOLAX) 17 gram/dose powder, Mix 17g (1 capful) with 6 oz liquid and take by mouth daily., Disp: 510 g, Rfl: 5    urea 40 % Lotn lotion, Apply topically as needed. , Disp: , Rfl:     PMHx/PSHx Updates:        Last Eye Exam Due now      Objective:     Vitals:  Blood pressure (!) 148/92, weight 87.3 kg (192 lb 8 oz).    Physical Examination:   GEN: no apparent distress, comfortable; AAOx3  SKIN: no rashes,no ulceration, no Raynaud's, no petechiae, no SQ nodules,  HEAD: normal  EYES: no pallor, no icterus, PERRLA  ENT:  ,+ mucosal dryness   NECK: no masses, thyroid normal, trachea  midline, no LAD/LN's, supple  CV: RRR with no murmur; l S1 and S2 reg. ,no gallop no rubs,   CHEST: Normal respiratory effort; CTAB; normal breath sounds; no wheeze or crackles  ABDOM: nontender and nondistended; soft;   MUSC/Skeletal: ROM normal; no crepitus; joints without synovitis,  no deformities  No joint swelling or tenderness of PIP, MCP, wrist, elbow, shoulder, or knee joints  EXTREM: no clubbing, cyanosis, no edema,normal  pulses   NEURO: grossly intact; motor WNL; AAOx3;  PSYCH: normal mood, affect and behavior  LYMPH: normal cervical, supraclavicular          Labs:   Lab Results   Component Value Date    WBC 4.0 (L) 11/20/2017    HGB 12.8 11/20/2017    HCT 38.9 11/20/2017    MCV 90.5 11/20/2017     11/20/2017    CMP  @[facility](NA,K,CL,CO2,GLU,BUN,Creatinine,Calcium,PROT,Albumin,Bilitot,Alkphos,AST,ALT,CRP,ESR,RF,CCP,PRASHANTH,SSA,CPK,uric acid) )@  I have reviewed all available lab results and radiology reports.    Radiology/Diagnostic Studies:        Assessment/Plan:   (1) 42 y.o. female with diagnosis of UCTD,Has sicca sxs and Raynaud's.  PLAN: Cont Plaquenil  2)Fibromyalgia, More symptomatic, can increase gabapentin to 5 [place] 600 3 times a day  3) I have no objection in she resuming estrogen replacement tx ,at lowest dose possible    CBC CMP urinalysis        Discussion:     I have explained all of the above in detail and the patient understands all of the current recommendation(s). I have answered all questions to the best of my ability and to their complete satisfaction.       The patient is to continue with the current management plan         RTC in   3 months      Electronically signed by Jonnathan James MD         Vaccine status unknown

## 2023-07-05 DIAGNOSIS — E55.9 VITAMIN D DEFICIENCY: ICD-10-CM

## 2023-07-05 RX ORDER — ERGOCALCIFEROL 1.25 MG/1
CAPSULE ORAL
Qty: 4 CAPSULE | Refills: 3 | OUTPATIENT
Start: 2023-07-05

## 2023-07-06 ENCOUNTER — PATIENT MESSAGE (OUTPATIENT)
Dept: FAMILY MEDICINE | Facility: CLINIC | Age: 48
End: 2023-07-06

## 2023-07-06 DIAGNOSIS — F51.04 CHRONIC INSOMNIA: ICD-10-CM

## 2023-07-06 RX ORDER — DIAZEPAM 5 MG/1
TABLET ORAL
Qty: 60 TABLET | Refills: 0 | Status: SHIPPED | OUTPATIENT
Start: 2023-07-06 | End: 2024-01-05 | Stop reason: SDUPTHER

## 2023-07-10 DIAGNOSIS — K21.9 GERD WITHOUT ESOPHAGITIS: ICD-10-CM

## 2023-07-11 RX ORDER — ONDANSETRON 4 MG/1
TABLET, ORALLY DISINTEGRATING ORAL
Qty: 30 TABLET | Refills: 5 | Status: SHIPPED | OUTPATIENT
Start: 2023-07-11 | End: 2024-01-05 | Stop reason: SDUPTHER

## 2023-07-18 DIAGNOSIS — K21.9 GERD WITHOUT ESOPHAGITIS: ICD-10-CM

## 2023-07-18 RX ORDER — ESOMEPRAZOLE MAGNESIUM 40 MG/1
CAPSULE, DELAYED RELEASE ORAL
Qty: 90 CAPSULE | Refills: 2 | Status: SHIPPED | OUTPATIENT
Start: 2023-07-18 | End: 2023-07-26

## 2023-07-19 ENCOUNTER — OFFICE VISIT (OUTPATIENT)
Dept: FAMILY MEDICINE | Facility: CLINIC | Age: 48
End: 2023-07-19
Payer: MEDICAID

## 2023-07-19 VITALS
BODY MASS INDEX: 34.23 KG/M2 | HEART RATE: 84 BPM | RESPIRATION RATE: 16 BRPM | OXYGEN SATURATION: 96 % | HEIGHT: 62 IN | DIASTOLIC BLOOD PRESSURE: 82 MMHG | WEIGHT: 186 LBS | TEMPERATURE: 99 F | SYSTOLIC BLOOD PRESSURE: 134 MMHG

## 2023-07-19 DIAGNOSIS — R55 VASOVAGAL SYNCOPE: ICD-10-CM

## 2023-07-19 DIAGNOSIS — E11.9 TYPE 2 DIABETES MELLITUS WITHOUT COMPLICATION, WITHOUT LONG-TERM CURRENT USE OF INSULIN: Primary | ICD-10-CM

## 2023-07-19 DIAGNOSIS — E11.9 TYPE 2 DIABETES MELLITUS WITHOUT COMPLICATION, WITHOUT LONG-TERM CURRENT USE OF INSULIN: ICD-10-CM

## 2023-07-19 DIAGNOSIS — M25.361 KNEE INSTABILITY, RIGHT: Primary | ICD-10-CM

## 2023-07-19 PROCEDURE — 3079F PR MOST RECENT DIASTOLIC BLOOD PRESSURE 80-89 MM HG: ICD-10-PCS | Mod: CPTII,,, | Performed by: INTERNAL MEDICINE

## 2023-07-19 PROCEDURE — 99214 PR OFFICE/OUTPT VISIT, EST, LEVL IV, 30-39 MIN: ICD-10-PCS | Mod: S$PBB,,, | Performed by: INTERNAL MEDICINE

## 2023-07-19 PROCEDURE — 1159F MED LIST DOCD IN RCRD: CPT | Mod: CPTII,,, | Performed by: INTERNAL MEDICINE

## 2023-07-19 PROCEDURE — 1159F PR MEDICATION LIST DOCUMENTED IN MEDICAL RECORD: ICD-10-PCS | Mod: CPTII,,, | Performed by: INTERNAL MEDICINE

## 2023-07-19 PROCEDURE — 99214 OFFICE O/P EST MOD 30 MIN: CPT | Mod: S$PBB,,, | Performed by: INTERNAL MEDICINE

## 2023-07-19 PROCEDURE — 3075F SYST BP GE 130 - 139MM HG: CPT | Mod: CPTII,,, | Performed by: INTERNAL MEDICINE

## 2023-07-19 PROCEDURE — 3008F PR BODY MASS INDEX (BMI) DOCUMENTED: ICD-10-PCS | Mod: CPTII,,, | Performed by: INTERNAL MEDICINE

## 2023-07-19 PROCEDURE — 99215 OFFICE O/P EST HI 40 MIN: CPT | Performed by: INTERNAL MEDICINE

## 2023-07-19 PROCEDURE — 3008F BODY MASS INDEX DOCD: CPT | Mod: CPTII,,, | Performed by: INTERNAL MEDICINE

## 2023-07-19 PROCEDURE — 3075F PR MOST RECENT SYSTOLIC BLOOD PRESS GE 130-139MM HG: ICD-10-PCS | Mod: CPTII,,, | Performed by: INTERNAL MEDICINE

## 2023-07-19 PROCEDURE — 3079F DIAST BP 80-89 MM HG: CPT | Mod: CPTII,,, | Performed by: INTERNAL MEDICINE

## 2023-07-19 RX ORDER — METFORMIN HYDROCHLORIDE 500 MG/1
500 TABLET ORAL 2 TIMES DAILY WITH MEALS
Qty: 60 TABLET | Refills: 5 | Status: SHIPPED | OUTPATIENT
Start: 2023-07-19 | End: 2024-01-14 | Stop reason: SDUPTHER

## 2023-07-25 ENCOUNTER — PATIENT MESSAGE (OUTPATIENT)
Dept: ORTHOPEDICS | Facility: CLINIC | Age: 48
End: 2023-07-25

## 2023-07-25 ENCOUNTER — PATIENT MESSAGE (OUTPATIENT)
Dept: FAMILY MEDICINE | Facility: CLINIC | Age: 48
End: 2023-07-25

## 2023-07-26 RX ORDER — OMEPRAZOLE 40 MG/1
40 CAPSULE, DELAYED RELEASE ORAL DAILY
Qty: 90 CAPSULE | Refills: 1 | Status: SHIPPED | OUTPATIENT
Start: 2023-07-26 | End: 2023-09-14

## 2023-07-27 ENCOUNTER — PATIENT MESSAGE (OUTPATIENT)
Dept: FAMILY MEDICINE | Facility: CLINIC | Age: 48
End: 2023-07-27

## 2023-07-28 ENCOUNTER — HOSPITAL ENCOUNTER (EMERGENCY)
Facility: HOSPITAL | Age: 48
Discharge: HOME | End: 2023-07-28
Attending: EMERGENCY MEDICINE
Payer: MEDICAID

## 2023-07-28 VITALS
WEIGHT: 185 LBS | OXYGEN SATURATION: 98 % | BODY MASS INDEX: 34.04 KG/M2 | RESPIRATION RATE: 18 BRPM | DIASTOLIC BLOOD PRESSURE: 78 MMHG | HEART RATE: 75 BPM | SYSTOLIC BLOOD PRESSURE: 143 MMHG | TEMPERATURE: 99 F | HEIGHT: 62 IN

## 2023-07-28 DIAGNOSIS — R52 PAIN: ICD-10-CM

## 2023-07-28 DIAGNOSIS — R07.89 CHEST WALL PAIN: Primary | ICD-10-CM

## 2023-07-28 LAB
ALBUMIN SERPL BCP-MCNC: 4.1 G/DL (ref 3.5–5.2)
ALP SERPL-CCNC: 91 U/L (ref 55–135)
ALT SERPL W/O P-5'-P-CCNC: 19 U/L (ref 10–44)
ANION GAP SERPL CALC-SCNC: 8 MMOL/L (ref 8–16)
AST SERPL-CCNC: 21 U/L (ref 10–40)
BASOPHILS # BLD AUTO: 0.03 K/UL (ref 0–0.2)
BASOPHILS NFR BLD: 0.3 % (ref 0–1.9)
BILIRUB SERPL-MCNC: 0.4 MG/DL (ref 0.1–1)
BNP SERPL-MCNC: 5 PG/ML (ref 0–99)
BUN SERPL-MCNC: 10 MG/DL (ref 6–20)
CALCIUM SERPL-MCNC: 9 MG/DL (ref 8.7–10.5)
CHLORIDE SERPL-SCNC: 102 MMOL/L (ref 95–110)
CO2 SERPL-SCNC: 26 MMOL/L (ref 23–29)
CREAT SERPL-MCNC: 0.6 MG/DL (ref 0.5–1.4)
CREAT SERPL-MCNC: 0.7 MG/DL (ref 0.5–1.4)
DIFFERENTIAL METHOD: ABNORMAL
EOSINOPHIL # BLD AUTO: 0 K/UL (ref 0–0.5)
EOSINOPHIL NFR BLD: 0.2 % (ref 0–8)
ERYTHROCYTE [DISTWIDTH] IN BLOOD BY AUTOMATED COUNT: 14.6 % (ref 11.5–14.5)
EST. GFR  (NO RACE VARIABLE): >60 ML/MIN/1.73 M^2
GLUCOSE SERPL-MCNC: 104 MG/DL (ref 70–110)
HCT VFR BLD AUTO: 44 % (ref 37–48.5)
HGB BLD-MCNC: 14.7 G/DL (ref 12–16)
IMM GRANULOCYTES # BLD AUTO: 0.05 K/UL (ref 0–0.04)
IMM GRANULOCYTES NFR BLD AUTO: 0.5 % (ref 0–0.5)
LIPASE SERPL-CCNC: 33 U/L (ref 4–60)
LYMPHOCYTES # BLD AUTO: 2.7 K/UL (ref 1–4.8)
LYMPHOCYTES NFR BLD: 26.4 % (ref 18–48)
MAGNESIUM SERPL-MCNC: 1.8 MG/DL (ref 1.6–2.6)
MCH RBC QN AUTO: 30.5 PG (ref 27–31)
MCHC RBC AUTO-ENTMCNC: 33.4 G/DL (ref 32–36)
MCV RBC AUTO: 91 FL (ref 82–98)
MONOCYTES # BLD AUTO: 0.5 K/UL (ref 0.3–1)
MONOCYTES NFR BLD: 4.9 % (ref 4–15)
NEUTROPHILS # BLD AUTO: 6.9 K/UL (ref 1.8–7.7)
NEUTROPHILS NFR BLD: 67.7 % (ref 38–73)
NRBC BLD-RTO: 0 /100 WBC
PLATELET # BLD AUTO: 268 K/UL (ref 150–450)
PMV BLD AUTO: 10.2 FL (ref 9.2–12.9)
POTASSIUM SERPL-SCNC: 3.6 MMOL/L (ref 3.5–5.1)
PROT SERPL-MCNC: 8.1 G/DL (ref 6–8.4)
RBC # BLD AUTO: 4.82 M/UL (ref 4–5.4)
SAMPLE: NORMAL
SODIUM SERPL-SCNC: 136 MMOL/L (ref 136–145)
TROPONIN I SERPL HS-MCNC: 3.2 PG/ML (ref 0–14.9)
TROPONIN I SERPL HS-MCNC: 3.3 PG/ML (ref 0–14.9)
WBC # BLD AUTO: 10.24 K/UL (ref 3.9–12.7)

## 2023-07-28 PROCEDURE — 93010 EKG 12-LEAD: ICD-10-PCS | Mod: ,,, | Performed by: SPECIALIST

## 2023-07-28 PROCEDURE — 82565 ASSAY OF CREATININE: CPT | Mod: 59

## 2023-07-28 PROCEDURE — 25000003 PHARM REV CODE 250: Performed by: STUDENT IN AN ORGANIZED HEALTH CARE EDUCATION/TRAINING PROGRAM

## 2023-07-28 PROCEDURE — 25500020 PHARM REV CODE 255: Performed by: STUDENT IN AN ORGANIZED HEALTH CARE EDUCATION/TRAINING PROGRAM

## 2023-07-28 PROCEDURE — 84484 ASSAY OF TROPONIN QUANT: CPT | Mod: 91 | Performed by: STUDENT IN AN ORGANIZED HEALTH CARE EDUCATION/TRAINING PROGRAM

## 2023-07-28 PROCEDURE — 80053 COMPREHEN METABOLIC PANEL: CPT | Performed by: STUDENT IN AN ORGANIZED HEALTH CARE EDUCATION/TRAINING PROGRAM

## 2023-07-28 PROCEDURE — 85025 COMPLETE CBC W/AUTO DIFF WBC: CPT | Performed by: STUDENT IN AN ORGANIZED HEALTH CARE EDUCATION/TRAINING PROGRAM

## 2023-07-28 PROCEDURE — 93005 ELECTROCARDIOGRAM TRACING: CPT | Performed by: SPECIALIST

## 2023-07-28 PROCEDURE — 99285 EMERGENCY DEPT VISIT HI MDM: CPT | Mod: 25

## 2023-07-28 PROCEDURE — 83880 ASSAY OF NATRIURETIC PEPTIDE: CPT | Performed by: STUDENT IN AN ORGANIZED HEALTH CARE EDUCATION/TRAINING PROGRAM

## 2023-07-28 PROCEDURE — 83690 ASSAY OF LIPASE: CPT | Performed by: STUDENT IN AN ORGANIZED HEALTH CARE EDUCATION/TRAINING PROGRAM

## 2023-07-28 PROCEDURE — 93010 ELECTROCARDIOGRAM REPORT: CPT | Mod: ,,, | Performed by: SPECIALIST

## 2023-07-28 PROCEDURE — 83735 ASSAY OF MAGNESIUM: CPT | Performed by: STUDENT IN AN ORGANIZED HEALTH CARE EDUCATION/TRAINING PROGRAM

## 2023-07-28 RX ORDER — ACETAMINOPHEN 325 MG/1
650 TABLET ORAL
Status: COMPLETED | OUTPATIENT
Start: 2023-07-28 | End: 2023-07-28

## 2023-07-28 RX ADMIN — IOHEXOL 100 ML: 350 INJECTION, SOLUTION INTRAVENOUS at 06:07

## 2023-07-28 RX ADMIN — ACETAMINOPHEN 650 MG: 325 TABLET ORAL at 05:07

## 2023-07-28 NOTE — ED PROVIDER NOTES
Encounter Date: 7/28/2023       History     Chief Complaint   Patient presents with    Breast Pain     Pt states right sided rib pain starting Monday, today began shoulder blade pain to right side, painful to take a deep breath     48-year-old female past medical history significant for diabetes, GERD, hyperlipidemia, fibromyalgia, interstitial lung disease, lupus, Sjogren's presents emergency room today for evaluation of 5 days worsening right-sided chest wall pain.  Pain is worsened with inspiration, limits ability to take a deep breath.  She tells me today it started migrating to the right scapula.  No fevers or chills.  It is associated with some nausea secondary to pain but no vomiting.  No other abdominal pain.  Otherwise nonradiating.  No provocative or palliative features otherwise.  Denies prior history of DVT or pulmonary embolism.  No recent surgeries, no recent travel.    Review of patient's allergies indicates:   Allergen Reactions    Ativan [lorazepam] Other (See Comments)     depression    Hay fever and allergy relief     Mobic [meloxicam] Other (See Comments)     Spikes blood pressure    Nitroglycerin      Pt says it could stop her heart     Past Medical History:   Diagnosis Date    Allergic rhinitis     Arthritis     Chronic anxiety 10/23/2018    Connective tissue disease     joints b/c lupus    Diabetes mellitus     Fibromyalgia     GERD (gastroesophageal reflux disease)     Grade II hemorrhoids 1/23/2019    Hyperlipemia     Interstitial lung disease 2019    Lupus     Lupus     Sjogren's syndrome 1/23/2019     Past Surgical History:   Procedure Laterality Date    AUGMENTATION OF BREAST      BREAST SURGERY  2004    BRONCHOSCOPY WITH FLUOROSCOPY N/A 12/17/2019    Procedure: BRONCHOSCOPY, WITH FLUOROSCOPY;  Surgeon: Nate Tarango MD;  Location: [facility];  Service: Pulmonary;  Laterality: N/A;    CATHETERIZATION OF BOTH LEFT AND RIGHT HEART Left 08/23/2021    Procedure: CATHETERIZATION, HEART, BOTH  LEFT AND RIGHT;  Surgeon: Luca Wilks MD;  Location: [facility] CATH/EP LAB;  Service: Cardiology;  Laterality: Left;     SECTION  ,    COLONOSCOPY      ESOPHAGOGASTRODUODENOSCOPY      gastric sleeve  2010    HYSTERECTOMY  2010    TONSILLECTOMY  1996    TUMOR REMOVAL      benign fatty     Family History   Problem Relation Age of Onset    Early death Father     Heart disease Father     Stroke Father     Kidney disease Father     Diabetes Paternal Grandmother     Cancer Paternal Grandmother     Raynaud syndrome Mother     Uterine cancer Mother     Breast cancer Mother     Raynaud syndrome Sister     Polycystic ovary syndrome Daughter     Diabetes Maternal Grandmother     Heart disease Maternal Grandmother     Kidney disease Maternal Grandmother     Breast cancer Maternal Grandmother     Heart disease Maternal Grandfather     Cancer Paternal Grandfather     No Known Problems Daughter      Social History     Tobacco Use    Smoking status: Every Day     Packs/day: 1.00     Years: 25.00     Pack years: 25.00     Types: Cigarettes     Start date: 1998    Smokeless tobacco: Never   Substance Use Topics    Alcohol use: Not Currently    Drug use: No     Review of Systems   Constitutional:  Negative for chills, fatigue and fever.   HENT:  Negative for congestion, hearing loss, sore throat and trouble swallowing.    Eyes:  Negative for visual disturbance.   Respiratory:  Negative for cough, chest tightness and shortness of breath.    Cardiovascular:  Positive for chest pain.   Gastrointestinal:  Negative for abdominal pain and nausea.   Endocrine: Negative for polyuria.   Genitourinary:  Negative for difficulty urinating.   Musculoskeletal:  Negative for arthralgias and myalgias.   Skin:  Negative for rash.   Neurological:  Negative for dizziness and headaches.   Psychiatric/Behavioral:  The patient is not nervous/anxious.      Physical Exam     Initial Vitals   BP Pulse Resp Temp SpO2   23 1453 23  1443 07/28/23 1443 07/28/23 1443 07/28/23 1443   (!) 144/98 94 18 98.7 °F (37.1 °C) (!) 92 %      MAP       --                Physical Exam    Nursing note and vitals reviewed.  Constitutional: She appears well-developed and well-nourished.   HENT:   Head: Normocephalic and atraumatic.   Eyes: Conjunctivae are normal. Pupils are equal, round, and reactive to light.   Neck: Neck supple.   Normal range of motion.  Cardiovascular:  Normal rate, regular rhythm, normal heart sounds and intact distal pulses.           Some tenderness noted to the right chest wall.    Pulmonary/Chest: Breath sounds normal. No respiratory distress.   Abdominal: Abdomen is soft. She exhibits no distension. There is no abdominal tenderness.   No abdominal tenderness.   Musculoskeletal:         General: Normal range of motion.      Cervical back: Normal range of motion and neck supple.     Neurological: She is alert and oriented to person, place, and time. GCS score is 15. GCS eye subscore is 4. GCS verbal subscore is 5. GCS motor subscore is 6.   Skin: Skin is warm and dry. Capillary refill takes less than 2 seconds.   Psychiatric: She has a normal mood and affect. Her behavior is normal. Judgment and thought content normal.       ED Course   Procedures  Labs Reviewed   CBC W/ AUTO DIFFERENTIAL - Abnormal; Notable for the following components:       Result Value    RDW 14.6 (*)     Immature Grans (Abs) 0.05 (*)     All other components within normal limits   MAGNESIUM   COMPREHENSIVE METABOLIC PANEL   TROPONIN I HIGH SENSITIVITY   B-TYPE NATRIURETIC PEPTIDE   LIPASE   TROPONIN I HIGH SENSITIVITY   ISTAT CREATININE   POCT CREATININE        ECG Results              EKG 12-lead (In process)  Result time 07/28/23 17:00:10      In process by Interface, Lab In [facility] (07/28/23 17:00:10)                   Narrative:    Test Reason : R07.9,    Vent. Rate : 086 BPM     Atrial Rate : 086 BPM     P-R Int : 154 ms          QRS Dur : 102 ms      QT Int :  356 ms       P-R-T Axes : 052 090 063 degrees     QTc Int : 426 ms    Normal sinus rhythm  Rightward axis  Borderline Abnormal ECG  When compared with ECG of 20-AUG-2021 10:00,  No significant change was found    Referred By: [email or web address]   SELF           Confirmed By:                                   Imaging Results              CTA Chest Non-Coronary (PE Studies) (Final result)  Result time 07/28/23 18:54:54      Final result by Jasvir Isaacs MD (07/28/23 18:54:54)                   Narrative:    EXAM DESCRIPTION: CTA CHEST NON CORONARY (PE STUDIES)    CLINICAL HISTORY: 48 years Female, Pulmonary embolism (PE) suspected, unknown D-dimer    COMPARISON: April 12, 2021.    TECHNIQUE: Images of the chest were obtained after the administration of 100 mL of Omnipaque 350. 3-D acquisitions were not performed. All CT scans at this facility utilized dose modulation, iterative reconstruction, and/or weightbase dosing when appropriate to reduce the radiation dose to his low as reasonably achievable. .80.    FINDINGS: The pulmonary arteries are opacified, and no filling defects are identified. The aorta is normal in size. Some groundglass type densities are noted in both lower lobes. Linear densities are also noted in the left lower lobe. No pleural fluid is seen. No enlarged mediastinal lymph nodes are identified. The thyroid is not well demonstrated. There are bilateral breast implants. No osseous lesions are seen. What can be seen of the upper abdomen is unremarkable.    IMPRESSION:  No evidence of a pulmonary embolus is seen.    Electronically signed by:  Jasvir Isaacs MD  7/28/2023 6:54 PM CDT Workstation: [id number]                                     US Abdomen Limited (Final result)  Result time 07/28/23 16:46:58   Procedure changed from US Abdomen Complete     Final result by Ephraim Goss MD (07/28/23 16:46:58)                   Narrative:    Reason: abd pain    COMPARISON: 2/3/2020    FINDINGS:  Diffuse  increased echogenicity throughout hepatic parenchyma is consistent with hepatic steatosis. Although detection of focal hepatic lesions in setting of steatosis is limited, no focal hepatic mass or intrahepatic bile duct dilation is identified.  Hepatopedal flow present in main portal vein.    Gallbladder is normal. Common duct diameter of 5 mm is normal.    Visualized pancreas is unremarkable with bowel gas obscuring portions of pancreas. Right kidney is free of hydronephrosis.    Juxtahepatic IVC is unremarkable. Visualized abdominal aorta is nonaneurysmal.    IMPRESSION:  New diffuse increased echogenicity throughout hepatic parenchyma suggesting hepatic steatosis.    Electronically signed by:  Ephraim Goss MD  7/28/2023 4:46 PM CDT Workstation: [id number]                                     X-Ray Chest PA And Lateral (Final result)  Result time 07/28/23 15:20:07   Procedure changed from X-Ray Chest AP Portable     Final result by Bam Park MD (07/28/23 15:20:07)                   Narrative:    CLINICAL HISTORY:  48 years (1975) Female Chest Pain Chest pain    TECHNIQUE:  PA and lateral radiograph of the chest. Two views.    COMPARISON:  Radiograph from June 9, 2021    FINDINGS:  The lungs are clear. Costophrenic angles are seen without effusion. No pneumothorax is identified. The heart is normal in size. The mediastinum is within normal limits. Osseous structures appear within normal limits. The visualized upper abdomen is unremarkable.    IMPRESSION:  No acute cardiac or pulmonary process.                  .            Electronically signed by:  Bam Park MD  7/28/2023 3:20 PM CDT Workstation: [id number]                                     Medications   acetaminophen tablet 650 mg (650 mg Oral Given 7/28/23 1711)   iohexoL (OMNIPAQUE 350) injection 100 mL (100 mLs Intravenous Given 7/28/23 1801)     Medical Decision Making:   Initial Assessment:   Nontoxic, well-appearing and in no acute  distress.  Differential Diagnosis:   Costochondritis versus ischemia versus pulmonary embolism versus gallbladder disease  Independently Interpreted Test(s):   I have ordered and independently interpreted X-rays - see summary below.       <> Summary of X-Ray Reading(s): This represents my own interpretation of the CXR.     The film quality is acceptable. There is no significant rotation, there is good inspiratory effort and the patient is positioned well. Exposure is acceptable.   -The trachea is midline.   -No notable fractures or dislocations to visualized bony processes.  -Cardiac borders are clear the cardiothoracic ratio is < ½ . The aorta is not widened (<8cm) and there are no perihilar infiltrates.   -The diaphragm is not flattened. There is no blunting of the costophrenic angle. There is no subdiaphragmatic air. Gastric Bubble is visualized.   -There is no parenchymal abnormality of the lungs bilaterally. No focal consolidation, masses, densities or infiltrates. No cephalization of vascular markings. Soft tissues are normal, no evidence of subcutaneous air or foreign body. No fluid collection in the fissures. No visualization of hemo/pneumothorax.    I have ordered and independently interpreted EKG Reading(s) - see summary below       <> Summary of EKG Reading(s): This represents my own interpretation of the EKG. EKG was reviewed by an attending physician prior to my interpretation. Previous EKGs were reviewed as available and indicated for comparison.    -normal sinus rhythm at a rate of 86 beats per minute.  -right axis.  -Normal intervals. The QRS is not widened. The calculated QTc is not prolonged.  -No hypertrophy, no bundle branch block.  -No ST or T wave changes from baseline.     Clinical Tests:   Lab Tests: Reviewed and Ordered  The following lab test(s) were unremarkable: CBC, CMP, Lipase and Troponin       <> Summary of Lab: Labs largely unremarkable.  No evidence of infection or anemia.  Lipase  normal, troponin normal.  Radiological Study: Ordered and Reviewed  Medical Tests: Ordered and Reviewed  ED Management:  Patient was given Tylenol for pain, which improved her pain.  I personally reviewed the CT images as well as the radiologist interpretation.  No evidence of pulmonary embolism.  EKG nonischemic.  Chest x-ray without acute cardiopulmonary abnormality.  Labs unremarkable.  I personally reviewed the abdominal ultrasound images.  No evidence of occlusive gallbladder disease.  There is evidence of hepatic steatosis which could be the source of the patient's pain.  However, no acute/emergent or surgical pathology necessitating admission or consultation from the emergency department.    Disposition:  Improved, discharged    I discussed the findings and plan of care with this patient.  All questions were answered to the patient's satisfaction.  Disposition plan as above.      This note was written using the assistance of a dictation program and may contain grammatical errors.   Other:   I have discussed this case with another health care provider.       <> Summary of the Discussion: The patient's emergency visit today was discussed with the attesting physician, who guided plan of care. More than 50% of patient's care was provided directly by me, and consisted of obtaining a history, physical, ordering and interpreting tests and studies, consultation with other physicians and determining plan of care.    Additional MDM:   PERC Rule:   Age is greater than or equal to 50 = 0.0  Heart Rate is greater than or equal to 100 = 0.0  SaO2 on room air < 95% = 1.0  Unilateral leg swelling = 0.0  Hemoptysis = 0.0  Recent surgery or trauma = 0.0  Prior PE or DVT =  0.0  Hormone use = 0.00  PERC Score = 1                     Clinical Impression:   Final diagnoses:  [R52] Pain - RUQ ABDOMINAL PAIN  [R07.89] Chest wall pain (Primary)        ED Disposition Condition    Discharge Stable          ED Prescriptions    None        Follow-up Information       Follow up With Specialties Details Why Contact Info Additional Information    Mine Palmer MD Internal Medicine Schedule an appointment as soon as possible for a visit in 1 week  [address] [facility] [address]  [phone]       [facility] - Emergency Dept Emergency Medicine Go to  As needed, If symptoms worsen [address]  [facility] [id number]  [phone] 1st floor             Aj Lu PA-C  07/28/23 1939

## 2023-07-30 PROBLEM — E11.9 TYPE 2 DIABETES MELLITUS WITHOUT COMPLICATION: Status: ACTIVE | Noted: 2023-07-30

## 2023-07-30 NOTE — PROGRESS NOTES
SUBJECTIVE:    Patient ID: Promise Medrano is a 48 y.o. female.    Chief Complaint: chest wall pain, Follow-up, and Cough    Abdominal Pain  This is a new problem. The current episode started in the past 7 days. The onset quality is gradual. The problem occurs constantly. The problem has been unchanged. The pain is located in the RUQ. The pain is at a severity of 8/10. The pain is severe. The quality of the pain is aching, dull, a sensation of fullness and sharp. Associated symptoms include arthralgias, constipation, diarrhea, a fever, flatus, myalgias and nausea. Pertinent negatives include no anorexia, belching, dysuria, frequency, headaches, hematochezia, hematuria, melena, vomiting or weight loss. The pain is aggravated by bowel movement, certain positions, coughing, deep breathing and movement. The pain is relieved by Being still, certain positions and sitting up. She has tried acetaminophen, antacids and proton pump inhibitors for the symptoms. The treatment provided mild relief. Prior diagnostic workup includes CT scan. Her past medical history is significant for abdominal surgery, GERD and irritable bowel syndrome. There is no history of colon cancer, Crohn's disease, gallstones, pancreatitis, PUD or ulcerative colitis. Patient's medical history includes kidney stones. Patient's medical history does not include UTI.       48-year-old female past medical history significant for diabetes, GERD, hyperlipidemia, fibromyalgia, interstitial lung disease, lupus, Sjogren's presented to the ER emergency room 7-26  for evaluation of 5 days worsening right-sided chest wall pain.  Pain weds worsened with inspiration, with limited ability to take a deep breath.  She stated it radiated  to the right scapula.  There were no  fevers or chills.  It was associated with some nausea secondary to pain but no vomiting.  There was no other abdominal pain. There was no provocative or palliative features .  She denied prior  history of DVT or pulmonary embolism.  No recent surgeries, no recent travel.    Work-up was negative. Including CTA chest, US abdomen, and chest xray.    Pertinent PMH is Diabetes in this case.    She describes the pain from under the right breast and down the lateral abdomen and then under the shoulder blade in the back-she has some nausea and takes zofran for same-she has had diarrhea this time    She says she has had shallow breathing due to the pain which has made her cough and have painful respirations associated with wheezing       Admission on 07/28/2023, Discharged on 07/28/2023   Component Date Value Ref Range Status    Magnesium 07/28/2023 1.8  1.6 - 2.6 mg/dL Final    WBC 07/28/2023 10.24  3.90 - 12.70 K/uL Final    RBC 07/28/2023 4.82  4.00 - 5.40 M/uL Final    Hemoglobin 07/28/2023 14.7  12.0 - 16.0 g/dL Final    Hematocrit 07/28/2023 44.0  37.0 - 48.5 % Final    MCV 07/28/2023 91  82 - 98 fL Final    MCH 07/28/2023 30.5  27.0 - 31.0 pg Final    MCHC 07/28/2023 33.4  32.0 - 36.0 g/dL Final    RDW 07/28/2023 14.6 (H)  11.5 - 14.5 % Final    Platelets 07/28/2023 268  150 - 450 K/uL Final    MPV 07/28/2023 10.2  9.2 - 12.9 fL Final    Immature Granulocytes 07/28/2023 0.5  0.0 - 0.5 % Final    Gran # (ANC) 07/28/2023 6.9  1.8 - 7.7 K/uL Final    Immature Grans (Abs) 07/28/2023 0.05 (H)  0.00 - 0.04 K/uL Final    Lymph # 07/28/2023 2.7  1.0 - 4.8 K/uL Final    Mono # 07/28/2023 0.5  0.3 - 1.0 K/uL Final    Eos # 07/28/2023 0.0  0.0 - 0.5 K/uL Final    Baso # 07/28/2023 0.03  0.00 - 0.20 K/uL Final    nRBC 07/28/2023 0  0 /100 WBC Final    Gran % 07/28/2023 67.7  38.0 - 73.0 % Final    Lymph % 07/28/2023 26.4  18.0 - 48.0 % Final    Mono % 07/28/2023 4.9  4.0 - 15.0 % Final    Eosinophil % 07/28/2023 0.2  0.0 - 8.0 % Final    Basophil % 07/28/2023 0.3  0.0 - 1.9 % Final    Differential Method 07/28/2023 Automated   Final    Sodium 07/28/2023 136  136 - 145 mmol/L Final    Potassium 07/28/2023 3.6  3.5 - 5.1  mmol/L Final    Chloride 07/28/2023 102  95 - 110 mmol/L Final    CO2 07/28/2023 26  23 - 29 mmol/L Final    Glucose 07/28/2023 104  70 - 110 mg/dL Final    BUN 07/28/2023 10  6 - 20 mg/dL Final    Creatinine 07/28/2023 0.7  0.5 - 1.4 mg/dL Final    Calcium 07/28/2023 9.0  8.7 - 10.5 mg/dL Final    Total Protein 07/28/2023 8.1  6.0 - 8.4 g/dL Final    Albumin 07/28/2023 4.1  3.5 - 5.2 g/dL Final    Total Bilirubin 07/28/2023 0.4  0.1 - 1.0 mg/dL Final    Alkaline Phosphatase 07/28/2023 91  55 - 135 U/L Final    AST 07/28/2023 21  10 - 40 U/L Final    ALT 07/28/2023 19  10 - 44 U/L Final    eGFR 07/28/2023 >60.0  >60 mL/min/1.73 m^2 Final    Anion Gap 07/28/2023 8  8 - 16 mmol/L Final    Troponin I High Sensitivity 07/28/2023 3.3  0.0 - 14.9 pg/mL Final    BNP 07/28/2023 5  0 - 99 pg/mL Final    Lipase 07/28/2023 33  4 - 60 U/L Final    POC Creatinine 07/28/2023 0.6  0.5 - 1.4 mg/dL Final    Sample 07/28/2023 VENOUS   Final    Troponin I High Sensitivity 07/28/2023 3.2  0.0 - 14.9 pg/mL Final   Lab Visit on 12/30/2022   Component Date Value Ref Range Status    Hemoglobin A1C 12/30/2022 6.5 (H)  4.5 - 6.2 % Final    Estimated Avg Glucose 12/30/2022 140 (H)  68 - 131 mg/dL Final    Sodium 12/30/2022 134 (L)  136 - 145 mmol/L Final    Potassium 12/30/2022 4.0  3.5 - 5.1 mmol/L Final    Chloride 12/30/2022 99  95 - 110 mmol/L Final    CO2 12/30/2022 27  23 - 29 mmol/L Final    Glucose 12/30/2022 111 (H)  70 - 110 mg/dL Final    BUN 12/30/2022 15  6 - 20 mg/dL Final    Creatinine 12/30/2022 0.6  0.5 - 1.4 mg/dL Final    Calcium 12/30/2022 9.5  8.7 - 10.5 mg/dL Final    Total Protein 12/30/2022 8.2  6.0 - 8.4 g/dL Final    Albumin 12/30/2022 4.0  3.5 - 5.2 g/dL Final    Total Bilirubin 12/30/2022 0.5  0.1 - 1.0 mg/dL Final    Alkaline Phosphatase 12/30/2022 97  55 - 135 U/L Final    AST 12/30/2022 25  10 - 40 U/L Final    ALT 12/30/2022 28  10 - 44 U/L Final    Anion Gap 12/30/2022 8  8 - 16 mmol/L Final    eGFR 12/30/2022  >60.0  >60 mL/min/1.73 m^2 Final    WBC 12/30/2022 7.98  3.90 - 12.70 K/uL Final    RBC 12/30/2022 4.38  4.00 - 5.40 M/uL Final    Hemoglobin 12/30/2022 13.8  12.0 - 16.0 g/dL Final    Hematocrit 12/30/2022 42.3  37.0 - 48.5 % Final    MCV 12/30/2022 97  82 - 98 fL Final    MCH 12/30/2022 31.5 (H)  27.0 - 31.0 pg Final    MCHC 12/30/2022 32.6  32.0 - 36.0 g/dL Final    RDW 12/30/2022 14.3  11.5 - 14.5 % Final    Platelets 12/30/2022 258  150 - 450 K/uL Final    MPV 12/30/2022 10.5  9.2 - 12.9 fL Final    Immature Granulocytes 12/30/2022 0.5  0.0 - 0.5 % Final    Gran # (ANC) 12/30/2022 4.7  1.8 - 7.7 K/uL Final    Immature Grans (Abs) 12/30/2022 0.04  0.00 - 0.04 K/uL Final    Lymph # 12/30/2022 2.7  1.0 - 4.8 K/uL Final    Mono # 12/30/2022 0.5  0.3 - 1.0 K/uL Final    Eos # 12/30/2022 0.0  0.0 - 0.5 K/uL Final    Baso # 12/30/2022 0.04  0.00 - 0.20 K/uL Final    nRBC 12/30/2022 0  0 /100 WBC Final    Gran % 12/30/2022 58.5  38.0 - 73.0 % Final    Lymph % 12/30/2022 33.5  18.0 - 48.0 % Final    Mono % 12/30/2022 6.6  4.0 - 15.0 % Final    Eosinophil % 12/30/2022 0.4  0.0 - 8.0 % Final    Basophil % 12/30/2022 0.5  0.0 - 1.9 % Final    Differential Method 12/30/2022 Automated   Final    Cholesterol 12/30/2022 172  120 - 199 mg/dL Final    Triglycerides 12/30/2022 88  30 - 150 mg/dL Final    HDL 12/30/2022 70  40 - 75 mg/dL Final    LDL Cholesterol 12/30/2022 84.4  63.0 - 159.0 mg/dL Final    HDL/Cholesterol Ratio 12/30/2022 40.7  20.0 - 50.0 % Final    Total Cholesterol/HDL Ratio 12/30/2022 2.5  2.0 - 5.0 Final    Non-HDL Cholesterol 12/30/2022 102  mg/dL Final   Lab Visit on 11/28/2022   Component Date Value Ref Range Status    Sodium 11/28/2022 133 (L)  136 - 145 mmol/L Final    Potassium 11/28/2022 3.8  3.5 - 5.1 mmol/L Final    Chloride 11/28/2022 99  95 - 110 mmol/L Final    CO2 11/28/2022 27  23 - 29 mmol/L Final    Glucose 11/28/2022 117 (H)  70 - 110 mg/dL Final    BUN 11/28/2022 11  6 - 20 mg/dL Final     Creatinine 2022 0.6  0.5 - 1.4 mg/dL Final    Calcium 2022 8.9  8.7 - 10.5 mg/dL Final    Total Protein 2022 8.1  6.0 - 8.4 g/dL Final    Albumin 2022 4.0  3.5 - 5.2 g/dL Final    Total Bilirubin 2022 0.5  0.1 - 1.0 mg/dL Final    Alkaline Phosphatase 2022 101  55 - 135 U/L Final    AST 2022 21  10 - 40 U/L Final    ALT 2022 23  10 - 44 U/L Final    Anion Gap 2022 7 (L)  8 - 16 mmol/L Final    eGFR 2022 >60.0  >60 mL/min/1.73 m^2 Final    Hemoglobin A1C 2022 6.4 (H)  4.5 - 6.2 % Final    Estimated Avg Glucose 2022 137 (H)  68 - 131 mg/dL Final       Past Medical History:   Diagnosis Date    Allergic rhinitis     Arthritis     Chronic anxiety 10/23/2018    Connective tissue disease     joints b/c lupus    Diabetes mellitus     Fibromyalgia     GERD (gastroesophageal reflux disease)     Grade II hemorrhoids 2019    Hyperlipemia     Interstitial lung disease     Lupus     Lupus     Sjogren's syndrome 2019     Past Surgical History:   Procedure Laterality Date    AUGMENTATION OF BREAST      BREAST SURGERY      BRONCHOSCOPY WITH FLUOROSCOPY N/A 2019    Procedure: BRONCHOSCOPY, WITH FLUOROSCOPY;  Surgeon: Nate Tarango MD;  Location: [facility] ENDO;  Service: Pulmonary;  Laterality: N/A;    CATHETERIZATION OF BOTH LEFT AND RIGHT HEART Left 2021    Procedure: CATHETERIZATION, HEART, BOTH LEFT AND RIGHT;  Surgeon: Luca Wilks MD;  Location: [facility] CATH/EP LAB;  Service: Cardiology;  Laterality: Left;     SECTION  ,    COLONOSCOPY      ESOPHAGOGASTRODUODENOSCOPY      gastric sleeve  2010    HYSTERECTOMY  2010    TONSILLECTOMY  1996    TUMOR REMOVAL      benign fatty     Family History   Problem Relation Age of Onset    Early death Father     Heart disease Father     Stroke Father     Kidney disease Father     Diabetes Paternal Grandmother     Cancer Paternal Grandmother     Raynaud syndrome Mother     Uterine  cancer Mother     Breast cancer Mother     Raynaud syndrome Sister     Polycystic ovary syndrome Daughter     Diabetes Maternal Grandmother     Heart disease Maternal Grandmother     Kidney disease Maternal Grandmother     Breast cancer Maternal Grandmother     Heart disease Maternal Grandfather     Cancer Paternal Grandfather     No Known Problems Daughter        Marital Status: Single  Alcohol History:  reports that she does not currently use alcohol.  Tobacco History:  reports that she has been smoking cigarettes. She started smoking about 25 years ago. She has a 25.5 pack-year smoking history. She has never used smokeless tobacco.  Drug History:  reports no history of drug use.    Review of patient's allergies indicates:   Allergen Reactions    Ativan [lorazepam] Other (See Comments)     depression    Hay fever and allergy relief     Mobic [meloxicam] Other (See Comments)     Spikes blood pressure    Nitroglycerin      Pt says it could stop her heart       Current Outpatient Medications:     ACCU-CHEK SOFTCLIX LANCETS Misc, USE ONCE DAILY AS NEEDED, Disp: 100 each, Rfl: 5    albuterol (PROVENTIL) 2.5 mg /3 mL (0.083 %) nebulizer solution, Take 3 mLs (2.5 mg total) by nebulization every 4 (four) hours., Disp: 300 mL, Rfl: 3    albuterol (PROVENTIL/VENTOLIN HFA) 90 mcg/actuation inhaler, INHALE 1 PUFF INTO THE LUNGS ONCE DAILY, Disp: 18 g, Rfl: 3    albuterol-ipratropium (DUO-NEB) 2.5 mg-0.5 mg/3 mL nebulizer solution, USE 1 VIAL VIA NEBULIZER EVERY 6 HOURS AS NEEDED FOR WHEEZING OR SHORTNESS OF BREATH, Disp: 180 mL, Rfl: 0    azelastine (ASTELIN) 137 mcg (0.1 %) nasal spray, SPRAY ONCE IN EACH NOSTRIL TWICE DAILY, Disp: 30 mL, Rfl: 3    blood sugar diagnostic (ONETOUCH ULTRA BLUE TEST STRIP) Strp, Use strips to take blood sugar bid, Disp: 200 strip, Rfl: 2    budesonide (PULMICORT) 0.5 mg/2 mL nebulizer solution, ADD 1 VIAL TO SINUS RINSE AND USE TWICE DAILY AS DIRECTED, Disp: 120 mL, Rfl: 3    cyclobenzaprine  (FLEXERIL) 10 MG tablet, Take 10 mg by mouth every evening., Disp: , Rfl:     diazePAM (VALIUM) 5 MG tablet, TAKE 1 TO 2 TABLETS BY MOUTH EVERY NIGHT AT BEDTIME, Disp: 60 tablet, Rfl: 0    diclofenac sodium (VOLTAREN) 1 % Gel, Apply 2 g topically 3 (three) times daily., Disp: 100 g, Rfl: 1    dicyclomine (BENTYL) 20 mg tablet, TAKE 1 TABLET(20 MG) BY MOUTH TWICE DAILY, Disp: 60 tablet, Rfl: 0    doxepin (SINEQUAN) 150 MG Cap, Take 1 capsule (150 mg total) by mouth every evening., Disp: 90 capsule, Rfl: 1    ergocalciferol (ERGOCALCIFEROL) 50,000 unit Cap, TAKE 1 CAPSULE BY MOUTH EVERY 7 DAYS, Disp: 4 capsule, Rfl: 3    ezetimibe (ZETIA) 10 mg tablet, Take 1 tablet (10 mg total) by mouth once daily., Disp: 90 tablet, Rfl: 1    ferrous sulfate (FEROSUL) 325 mg (65 mg iron) Tab tablet, Take 1 tablet (325 mg total) by mouth once daily., Disp: 90 tablet, Rfl: 1    fluticasone propionate (FLONASE) 50 mcg/actuation nasal spray, SHAKE LIQUID AND USE 1 SPRAY(50 MCG) IN EACH NOSTRIL EVERY DAY, Disp: 48 g, Rfl: 1    gabapentin (NEURONTIN) 300 MG capsule, TAKE 1 CAPSULE BY MOUTH AT BEDTIME, Disp: 90 capsule, Rfl: 1    ibuprofen (ADVIL,MOTRIN) 600 MG tablet, TAKE 1 TABLET(600 MG) BY MOUTH TWICE DAILY AS NEEDED FOR PAIN, Disp: 60 tablet, Rfl: 1    immun glob G,IgG,-gly-IgA ov50 (CUVITRU) 10 gram/50 mL (20 %) Soln, Inject 12 g into the skin once a week., Disp: 200 mL, Rfl: 12    levocetirizine (XYZAL) 5 MG tablet, TAKE 1 TABLET(5 MG) BY MOUTH EVERY EVENING, Disp: 90 tablet, Rfl: 1    metFORMIN (GLUCOPHAGE) 500 MG tablet, Take 1 tablet (500 mg total) by mouth 2 (two) times daily with meals., Disp: 60 tablet, Rfl: 5    omeprazole (PRILOSEC) 40 MG capsule, Take 1 capsule (40 mg total) by mouth once daily., Disp: 90 capsule, Rfl: 1    ondansetron (ZOFRAN-ODT) 4 MG TbDL, DISSOLVE 2 TABLETS UNDER THE TONGUE EVERY 8 HOURS AS NEEDED, Disp: 30 tablet, Rfl: 5    OXYGEN-AIR DELIVERY SYSTEMS MISC, by Misc.(Non-Drug; Combo Route) route., Disp:  , Rfl:     OZEMPIC 0.25 mg or 0.5 mg (2 mg/3 mL) pen injector, Inject 0.5 mg into the skin every 7 days., Disp: , Rfl:     pravastatin (PRAVACHOL) 80 MG tablet, TAKE 1 TABLET BY MOUTH ONCE DAILY, Disp: 90 tablet, Rfl: 3    promethazine-dextromethorphan (PROMETHAZINE-DM) 6.25-15 mg/5 mL Syrp, Take 5 mLs by mouth every 4 (four) hours as needed., Disp: 118 mL, Rfl: 0    Review of Systems   Constitutional:  Positive for fever. Negative for appetite change, chills, diaphoresis, fatigue, unexpected weight change and weight loss.   HENT:  Negative for congestion, ear pain, hearing loss, nosebleeds, postnasal drip, sinus pressure, sinus pain, sneezing, sore throat, tinnitus, trouble swallowing and voice change.    Eyes:  Negative for photophobia, pain, itching and visual disturbance.   Respiratory:  Negative for apnea, cough, chest tightness, shortness of breath, wheezing and stridor.    Cardiovascular:  Negative for chest pain, palpitations and leg swelling.   Gastrointestinal:  Positive for abdominal pain, constipation, diarrhea, flatus and nausea. Negative for abdominal distention, anorexia, blood in stool, hematochezia, melena and vomiting.   Endocrine: Negative for cold intolerance, heat intolerance, polydipsia and polyuria.   Genitourinary:  Negative for difficulty urinating, dyspareunia, dysuria, flank pain, frequency, hematuria, menstrual problem, pelvic pain, urgency, vaginal discharge and vaginal pain.   Musculoskeletal:  Positive for arthralgias and myalgias. Negative for back pain, joint swelling, neck pain and neck stiffness.   Skin:  Negative for pallor.   Allergic/Immunologic: Negative for environmental allergies and food allergies.   Neurological:  Negative for dizziness, tremors, speech difficulty, weakness, light-headedness, numbness and headaches.   Hematological:  Does not bruise/bleed easily.   Psychiatric/Behavioral:  Negative for agitation, confusion, decreased concentration, sleep disturbance and  suicidal ideas. The patient is not nervous/anxious.           Objective:      Vitals - 1 value per visit 7/19/2023 7/28/2023 7/28/2023 7/28/2023 7/31/2023 7/31/2023   SYSTOLIC 134 - 144 143 - 138   DIASTOLIC 82 - 98 78 - 88   Pulse 84 94 - 75 - 90   Temp 98.7 98.7 - - - 98.4   Resp 16 18 - 18 - 16   SPO2 96 92 - 98 - 98   Weight (lb) 186 185 - - - 189   Weight (kg) 84.369 83.915 - - - 85.73   Height 62 62 - - - 62   BMI (Calculated) 34 33.8 - - - 34.6   VISIT REPORT [id number]  [id number]; - - - 17NONCRENCREPNotFromCR  [id number]; 17NONCRENCREPNotFromCR  [id number];   Pain Score  - - - - 6 -   Some recent data might be hidden   (    Physical Exam  Vitals and nursing note reviewed.   Constitutional:       Appearance: She is obese.   HENT:      Head: Normocephalic and atraumatic.   Eyes:      Extraocular Movements: Extraocular movements intact.      Pupils: Pupils are equal, round, and reactive to light.   Neck:      Vascular: No carotid bruit.   Cardiovascular:      Rate and Rhythm: Normal rate and regular rhythm.      Pulses: Normal pulses.      Heart sounds: Normal heart sounds.   Pulmonary:      Comments: Decreased breath sounds  Abdominal:      General: Bowel sounds are normal.      Palpations: Abdomen is soft.   Musculoskeletal:      Right lower leg: No edema.      Left lower leg: No edema.   Lymphadenopathy:      Cervical: No cervical adenopathy.   Skin:     General: Skin is warm and dry.   Neurological:      General: No focal deficit present.      Mental Status: She is alert and oriented to person, place, and time.   Psychiatric:         Mood and Affect: Mood normal.         Thought Content: Thought content normal.           Assessment:       1. RUQ pain    2. Type 2 diabetes mellitus without complication, without long-term current use of insulin    3. Cough, unspecified type         Plan:       RUQ pain  -     Microalbumin/Creatinine Ratio, Urine; Future; Expected date:  07/31/2023    Type 2 diabetes mellitus without complication, without long-term current use of insulin  -     Hemoglobin A1C; Future; Expected date: 07/31/2023  -     NM Hepatobiliary Imaging HIDA; Future; Expected date: 08/01/2023  -     Diabetes Digital Medicine (DDMP) Enrollment Order  -     Diabetes Digital Medicine (DDMP): Assign Onboarding Questionnaires    Cough, unspecified type  -     promethazine-dextromethorphan (PROMETHAZINE-DM) 6.25-15 mg/5 mL Syrp; Take 5 mLs by mouth every 4 (four) hours as needed.  Dispense: 118 mL; Refill: 0      Follow up in about 2 weeks (around 8/14/2023).        7/31/2023 Mine Palmer M.D.

## 2023-07-31 ENCOUNTER — OFFICE VISIT (OUTPATIENT)
Dept: FAMILY MEDICINE | Facility: CLINIC | Age: 48
End: 2023-07-31
Payer: MEDICAID

## 2023-07-31 VITALS
HEIGHT: 62 IN | OXYGEN SATURATION: 98 % | TEMPERATURE: 98 F | HEART RATE: 90 BPM | WEIGHT: 189 LBS | RESPIRATION RATE: 16 BRPM | DIASTOLIC BLOOD PRESSURE: 88 MMHG | SYSTOLIC BLOOD PRESSURE: 138 MMHG | BODY MASS INDEX: 34.78 KG/M2

## 2023-07-31 DIAGNOSIS — E11.9 TYPE 2 DIABETES MELLITUS WITHOUT COMPLICATION, WITHOUT LONG-TERM CURRENT USE OF INSULIN: ICD-10-CM

## 2023-07-31 DIAGNOSIS — R10.11 RUQ PAIN: Primary | ICD-10-CM

## 2023-07-31 DIAGNOSIS — R05.9 COUGH, UNSPECIFIED TYPE: ICD-10-CM

## 2023-07-31 PROCEDURE — 3075F SYST BP GE 130 - 139MM HG: CPT | Mod: CPTII,,, | Performed by: INTERNAL MEDICINE

## 2023-07-31 PROCEDURE — 99215 OFFICE O/P EST HI 40 MIN: CPT | Performed by: INTERNAL MEDICINE

## 2023-07-31 PROCEDURE — 3079F PR MOST RECENT DIASTOLIC BLOOD PRESSURE 80-89 MM HG: ICD-10-PCS | Mod: CPTII,,, | Performed by: INTERNAL MEDICINE

## 2023-07-31 PROCEDURE — 3008F PR BODY MASS INDEX (BMI) DOCUMENTED: ICD-10-PCS | Mod: CPTII,,, | Performed by: INTERNAL MEDICINE

## 2023-07-31 PROCEDURE — 99214 OFFICE O/P EST MOD 30 MIN: CPT | Mod: S$PBB,,, | Performed by: INTERNAL MEDICINE

## 2023-07-31 PROCEDURE — 1159F MED LIST DOCD IN RCRD: CPT | Mod: CPTII,,, | Performed by: INTERNAL MEDICINE

## 2023-07-31 PROCEDURE — 99214 PR OFFICE/OUTPT VISIT, EST, LEVL IV, 30-39 MIN: ICD-10-PCS | Mod: S$PBB,,, | Performed by: INTERNAL MEDICINE

## 2023-07-31 PROCEDURE — 3075F PR MOST RECENT SYSTOLIC BLOOD PRESS GE 130-139MM HG: ICD-10-PCS | Mod: CPTII,,, | Performed by: INTERNAL MEDICINE

## 2023-07-31 PROCEDURE — 1159F PR MEDICATION LIST DOCUMENTED IN MEDICAL RECORD: ICD-10-PCS | Mod: CPTII,,, | Performed by: INTERNAL MEDICINE

## 2023-07-31 PROCEDURE — 3079F DIAST BP 80-89 MM HG: CPT | Mod: CPTII,,, | Performed by: INTERNAL MEDICINE

## 2023-07-31 PROCEDURE — 3008F BODY MASS INDEX DOCD: CPT | Mod: CPTII,,, | Performed by: INTERNAL MEDICINE

## 2023-07-31 RX ORDER — PROMETHAZINE HYDROCHLORIDE AND DEXTROMETHORPHAN HYDROBROMIDE 6.25; 15 MG/5ML; MG/5ML
5 SYRUP ORAL EVERY 4 HOURS PRN
Qty: 118 ML | Refills: 0 | Status: SHIPPED | OUTPATIENT
Start: 2023-07-31 | End: 2023-08-10

## 2023-08-01 DIAGNOSIS — E11.9 TYPE 2 DIABETES MELLITUS WITHOUT COMPLICATION, WITHOUT LONG-TERM CURRENT USE OF INSULIN: Primary | ICD-10-CM

## 2023-08-01 RX ORDER — SEMAGLUTIDE 1.34 MG/ML
1 INJECTION, SOLUTION SUBCUTANEOUS
Qty: 3 ML | Refills: 2 | Status: SHIPPED | OUTPATIENT
Start: 2023-08-01 | End: 2024-02-22

## 2023-08-02 ENCOUNTER — PATIENT MESSAGE (OUTPATIENT)
Dept: ADMINISTRATIVE | Facility: OTHER | Age: 48
End: 2023-08-02
Payer: MEDICAID

## 2023-08-07 RX ORDER — PRAVASTATIN SODIUM 80 MG/1
80 TABLET ORAL DAILY
Qty: 90 TABLET | Refills: 0 | Status: SHIPPED | OUTPATIENT
Start: 2023-08-07 | End: 2023-11-19 | Stop reason: SDUPTHER

## 2023-08-11 ENCOUNTER — HOSPITAL ENCOUNTER (OUTPATIENT)
Dept: RADIOLOGY | Facility: HOSPITAL | Age: 48
Discharge: HOME | End: 2023-08-11
Attending: INTERNAL MEDICINE
Payer: MEDICAID

## 2023-08-11 DIAGNOSIS — E11.9 TYPE 2 DIABETES MELLITUS WITHOUT COMPLICATION, WITHOUT LONG-TERM CURRENT USE OF INSULIN: ICD-10-CM

## 2023-08-11 PROCEDURE — 78226 HEPATOBILIARY SYSTEM IMAGING: CPT | Mod: TC

## 2023-08-17 ENCOUNTER — PATIENT MESSAGE (OUTPATIENT)
Dept: FAMILY MEDICINE | Facility: CLINIC | Age: 48
End: 2023-08-17

## 2023-08-17 DIAGNOSIS — E11.9 TYPE 2 DIABETES MELLITUS WITHOUT COMPLICATION, WITHOUT LONG-TERM CURRENT USE OF INSULIN: ICD-10-CM

## 2023-08-17 DIAGNOSIS — R10.11 RUQ PAIN: Primary | ICD-10-CM

## 2023-08-23 ENCOUNTER — HOSPITAL ENCOUNTER (OUTPATIENT)
Dept: RADIOLOGY | Facility: HOSPITAL | Age: 48
Discharge: HOME | End: 2023-08-23
Attending: INTERNAL MEDICINE
Payer: MEDICAID

## 2023-08-23 VITALS — HEIGHT: 62 IN | BODY MASS INDEX: 34.77 KG/M2 | WEIGHT: 188.94 LBS

## 2023-08-23 DIAGNOSIS — Z12.31 ENCOUNTER FOR SCREENING MAMMOGRAM FOR BREAST CANCER: ICD-10-CM

## 2023-08-23 PROCEDURE — 77067 SCR MAMMO BI INCL CAD: CPT | Mod: TC,PO

## 2023-08-30 ENCOUNTER — OFFICE VISIT (OUTPATIENT)
Dept: ALLERGY | Facility: CLINIC | Age: 48
End: 2023-08-30
Payer: MEDICAID

## 2023-08-30 VITALS
DIASTOLIC BLOOD PRESSURE: 104 MMHG | WEIGHT: 187 LBS | OXYGEN SATURATION: 98 % | HEART RATE: 90 BPM | SYSTOLIC BLOOD PRESSURE: 153 MMHG | BODY MASS INDEX: 34.2 KG/M2

## 2023-08-30 DIAGNOSIS — R06.89 DYSPNEA AND RESPIRATORY ABNORMALITIES: ICD-10-CM

## 2023-08-30 DIAGNOSIS — D80.6 DEFICIENCY OF ANTI-PNEUMOCOCCAL POLYSACCHARIDE ANTIBODY: Primary | ICD-10-CM

## 2023-08-30 DIAGNOSIS — R06.00 DYSPNEA AND RESPIRATORY ABNORMALITIES: ICD-10-CM

## 2023-08-30 DIAGNOSIS — J84.9 INTERSTITIAL LUNG DISEASE: ICD-10-CM

## 2023-08-30 DIAGNOSIS — Z72.0 TOBACCO ABUSE: ICD-10-CM

## 2023-08-30 PROCEDURE — 99213 OFFICE O/P EST LOW 20 MIN: CPT | Mod: S$GLB,,, | Performed by: ALLERGY & IMMUNOLOGY

## 2023-08-30 PROCEDURE — 3066F NEPHROPATHY DOC TX: CPT | Mod: CPTII,S$GLB,, | Performed by: ALLERGY & IMMUNOLOGY

## 2023-08-30 PROCEDURE — 3066F PR DOCUMENTATION OF TREATMENT FOR NEPHROPATHY: ICD-10-PCS | Mod: CPTII,S$GLB,, | Performed by: ALLERGY & IMMUNOLOGY

## 2023-08-30 PROCEDURE — 3044F PR MOST RECENT HEMOGLOBIN A1C LEVEL <7.0%: ICD-10-PCS | Mod: CPTII,S$GLB,, | Performed by: ALLERGY & IMMUNOLOGY

## 2023-08-30 PROCEDURE — 3080F PR MOST RECENT DIASTOLIC BLOOD PRESSURE >= 90 MM HG: ICD-10-PCS | Mod: CPTII,S$GLB,, | Performed by: ALLERGY & IMMUNOLOGY

## 2023-08-30 PROCEDURE — 3008F PR BODY MASS INDEX (BMI) DOCUMENTED: ICD-10-PCS | Mod: CPTII,S$GLB,, | Performed by: ALLERGY & IMMUNOLOGY

## 2023-08-30 PROCEDURE — 1160F RVW MEDS BY RX/DR IN RCRD: CPT | Mod: CPTII,S$GLB,, | Performed by: ALLERGY & IMMUNOLOGY

## 2023-08-30 PROCEDURE — 3061F NEG MICROALBUMINURIA REV: CPT | Mod: CPTII,S$GLB,, | Performed by: ALLERGY & IMMUNOLOGY

## 2023-08-30 PROCEDURE — 3077F PR MOST RECENT SYSTOLIC BLOOD PRESSURE >= 140 MM HG: ICD-10-PCS | Mod: CPTII,S$GLB,, | Performed by: ALLERGY & IMMUNOLOGY

## 2023-08-30 PROCEDURE — 1159F PR MEDICATION LIST DOCUMENTED IN MEDICAL RECORD: ICD-10-PCS | Mod: CPTII,S$GLB,, | Performed by: ALLERGY & IMMUNOLOGY

## 2023-08-30 PROCEDURE — 3008F BODY MASS INDEX DOCD: CPT | Mod: CPTII,S$GLB,, | Performed by: ALLERGY & IMMUNOLOGY

## 2023-08-30 PROCEDURE — 3080F DIAST BP >= 90 MM HG: CPT | Mod: CPTII,S$GLB,, | Performed by: ALLERGY & IMMUNOLOGY

## 2023-08-30 PROCEDURE — 3061F PR NEG MICROALBUMINURIA RESULT DOCUMENTED/REVIEW: ICD-10-PCS | Mod: CPTII,S$GLB,, | Performed by: ALLERGY & IMMUNOLOGY

## 2023-08-30 PROCEDURE — 3077F SYST BP >= 140 MM HG: CPT | Mod: CPTII,S$GLB,, | Performed by: ALLERGY & IMMUNOLOGY

## 2023-08-30 PROCEDURE — 1159F MED LIST DOCD IN RCRD: CPT | Mod: CPTII,S$GLB,, | Performed by: ALLERGY & IMMUNOLOGY

## 2023-08-30 PROCEDURE — 99213 PR OFFICE/OUTPT VISIT, EST, LEVL III, 20-29 MIN: ICD-10-PCS | Mod: S$GLB,,, | Performed by: ALLERGY & IMMUNOLOGY

## 2023-08-30 PROCEDURE — 3044F HG A1C LEVEL LT 7.0%: CPT | Mod: CPTII,S$GLB,, | Performed by: ALLERGY & IMMUNOLOGY

## 2023-08-30 PROCEDURE — 1160F PR REVIEW ALL MEDS BY PRESCRIBER/CLIN PHARMACIST DOCUMENTED: ICD-10-PCS | Mod: CPTII,S$GLB,, | Performed by: ALLERGY & IMMUNOLOGY

## 2023-08-30 RX ORDER — LIFITEGRAST 50 MG/ML
SOLUTION/ DROPS OPHTHALMIC
COMMUNITY
Start: 2023-08-22 | End: 2024-02-22

## 2023-08-30 RX ORDER — MIDODRINE HYDROCHLORIDE 2.5 MG/1
TABLET ORAL
COMMUNITY
Start: 2023-08-23

## 2023-08-30 NOTE — LETTER
August 30, 2023        Mine Palmer MD  [address]  [place] [address]             [facility] - Allergy  [address]  [address]  [place] [address]  Phone: [phone]  Fax: [phone]   Patient: Promise Medrano   MR Number: [id number]   YOB: 1975   Date of Visit: 8/30/2023       Dear Dr. Palmer:    Thank you for referring Promise Medrano to me for evaluation. Below are the relevant portions of my assessment and plan of care.            If you have questions, please do not hesitate to call me. I look forward to following Promise along with you.    Sincerely,      Katiana Cline MD           CC  No Recipients

## 2023-08-30 NOTE — PATIENT INSTRUCTIONS
Continue cuvitru 12 grams weekly       Continue nasal regimen and pulmonary regimen       Follow up in 12 months, sooner if needed

## 2023-08-30 NOTE — PROGRESS NOTES
"Subjective:       Patient ID: Promise Medrano is a 48 y.o. female.    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     Her strep pneumo titers were not responsive to her pneumovax 23 she obtained in November 2019 indicating SAD.    Last visit: 2/2023  - 12 grams weekly cuvitru , nasal sprays      Since her last visit,     She has felt her increase in dose has helped her remain infection free.     She is [facility] [place]     No bacterial infections on subq igg therapy. Current dose is 12 grams weekly subcutaneous.     Still using tobacco. Room smells of tobacco today.     Allergic Rhinitis- dust mite only   Condition: stable      Onset: childhood  Sx: congestion, dryness, feels fluid in the ears.   Season: perennial   Trigger: uncertain   Tx:   saline, azelastine- 1 sen qday or more prn, fluticasone, montelukast - stopped herself.   ait in the past: yes  Recent testing: serum IgE dust mite only.   ct sinus: 6/2019  Essentially clear paranasal sinuses without significant mucoperiosteal sinus  disease.    SAD  8/23:  Continue 12 grams weekly     11/22:  Increased to 12 grams weekly.     6/2022:  Started cuvitru 11 grams weekly     doxcycline was last used in 4/2020  She is interested in [facility]. - I tried to order it , but it is not orderable in epic.     Recurrent bronchitis felt to be due to SAD dx 12/2019  Started on doxy proph MWF 12/2019  No other abx needed when on proph.     Condition:   Having increased dyspnea. 1-2 weeks.     Infections:  Type infections:   No bacterial infections since her cuvitru.     None since her last visit. But feeling"funk" 1-2 weeks on and off.     Onset: 2018  Bronchitis 3-4 episodes last winter  Has a pmh of lupus autoimmunity, complement levels normal.   April 2019 was d/c from [facility] for pna and or bronchitis  She has a " Acute Care - Physical Therapy Treatment Note  [facility]     Patient Name: Aj Card Jr.  : 1943  MRN: [id number]  Today's Date: 2023      Visit Dx:     ICD-10-CM ICD-9-CM   1. OPAL (acute kidney injury)  N17.9 584.9   2. Urinary retention  R33.9 788.20   3. Atrial fibrillation with RVR  I48.91 427.31   4. Dehydration  E86.0 276.51   5. Nausea and vomiting, unspecified vomiting type  R11.2 787.01   6. Osteoarthritis of ankle and foot, right  M19.071 715.97   7. Impaired functional mobility, balance, gait, and endurance  Z74.09 V49.89   8. Impaired mobility and ADLs  Z74.09 V49.89    Z78.9    9. Acute urinary retention  R33.8 788.29     Patient Active Problem List   Diagnosis   • Type 2 diabetes mellitus without complication, without long-term current use of insulin ([facility])   • Essential hypertension   • Mixed hyperlipidemia   • Generalized anxiety disorder   • History of colon polyps   • Diverticulosis of large intestine without hemorrhage   • Coronary artery disease with angina pectoris   • Gastroesophageal reflux disease with esophagitis   • Vitamin D deficiency   • Overweight   • Encounter for screening for endocrine disorder   • Atopic dermatitis   • High serum vitamin B12   • Acute pain of right knee   • Tear of medial meniscus of right knee, current   • Atrial fibrillation with RVR   • GERD (gastroesophageal reflux disease)   • PAF (paroxysmal atrial fibrillation) ([facility])   • Uncontrolled type 2 diabetes mellitus with hyperglycemia   • Gastroesophageal reflux disease   • Chronic constipation   • Stage 2 chronic kidney disease   • Osteoarthritis of ankle and foot, right   • Closed trimalleolar fracture of right ankle   • Status post bunionectomy   • OPAL (acute kidney injury)   • Acute urinary retention     Past Medical History:   Diagnosis Date   • Acute posthemorrhagic anemia    • Afib    • Allergic rhinitis    • Anxiety state    • Chest pain    • CKD (chronic kidney disease), stage II     • Coronary arteriosclerosis     3 stents, followed by Dr. Jeffers   • Diabetes mellitus     type 2   • Diverticular disease of colon    • Dysphagia    • Elevated cholesterol    • Epigastric pain    • GERD (gastroesophageal reflux disease)     esophagitis on Dexilant. Pt feels Nexium working better      • History of echocardiogram     Small left atrial enlargement with mild CLVH.Ef of 55-60%.Mitral valve mildly thickened.Av thickened.Left ventricle mildly dilated.Tricuspid intact.Mild aortic insufficiency. 12/07/2015       • History of echocardiogram     Mild diastolic dysfunction and mild left atrial enlargement, otherwise normal echo. EF> 55% 01/03/2012       • Hypercholesterolemia    • Hypertension    • Insomnia    • Irritable bowel syndrome    • Osteoarthritis of multiple joints    • Pain of right foot    • PONV (postoperative nausea and vomiting)      Past Surgical History:   Procedure Laterality Date   • APPENDECTOMY       [facility] 1995       • CARDIAC CATHETERIZATION      Successful PTCA of the OMB#2.Unsuccessful PTCA of the 1st OMB, secondary to difficulty in advancing the balloon. 12/08/2015       • CHOLECYSTECTOMY      [facility] 1993       • CHOLECYSTECTOMY     • COLONOSCOPY  10/01/2012   • COLONOSCOPY N/A 12/11/2017    Procedure: COLONOSCOPY;  Surgeon: Bladimir Michael MD;  Location: [facility] ENDOSCOPY;  Service:    • COLONOSCOPY N/A 11/01/2022    Procedure: COLONOSCOPY;  Surgeon: Bladimir Michael MD;  Location: [facility] ENDOSCOPY;  Service: Gastroenterology;  Laterality: N/A;   • CORONARY ANGIOPLASTY      Successful PTCA & stenting LAD was done w/ deployment of a 2.5mm x23 Xience stent w/ reduction of stenosis from 90% to less than 0% stenosis with good LILLIAM 3 flow 02/17/2012       • ENDOSCOPY      with biopsy.  Mildly severe esophagitis. Gastritis. Normal examined duodenum.Several biopsies obtained in the lower third of the esophagus.Several biopsies obtained in the gastric  history of current smoker   Does have associated tobacco abuse as well.     Chest ct 8/2018 shows bronchioloitis and mediastinal lymph nodes and axillary lymph nodes presumed reactive.   Pft: large airway no obstruction and partial response to bronchodilator, small airway reversed by 31%.     Immune evaluation: 6/20/2019    Complement, Total (CH50) 56 ( >41 U/mL)  Complement C2                 3.3  C4 Complement 14 - 44 mg/dL 16      IgA, Serum                    281                         IgM 26 - 217 mg/dL 52      IgG, Total Serum             1259                     700-1600  mg/dL       IgG Subclass 1                710                      248-810  mg/dL       IgG Subclass 2                189                      130-555  mg/dL       IgG Subclass 3                 70                         mg/dL       IgG Subclass 4                 61                         2-96  mg/dL                                           Strep Pneumoniae Type 1  0.5 L >1.3 ug/mL  Strep Pneumoniae Type 3 0.6 L >1.3 ug/mL  Strep Pneumoniae Type 4 0.1 L >1.3 ug/mL  Strep Pneumoniae Type 8 1.8  >1.3 ug/mL  Strep Pneumoniae Type 9 0.5 L >1.3 ug/mL  Strep Pneumoniae Type 12 <0.1 L >1.3 ug/mL  Strep Pneumoniae Type 14 1.2 L >1.3 ug/mL  Strep Pneumoniae Type 19 0.7 L >1.3 ug/mL  Strep Pneumoniae Type 23 0.3 L >1.3 ug/mL  Strep Pneumoniae Type 26 4.3  >1.3 ug/mL  Strep Pneumoniae Type 51 0.3 L >1.3 ug/mL  Strep Pneumoniae Type 56 0.5 L >1.3 ug/mL  Strep Pneumoniae Type 57 1.5  >1.3 ug/mL  Strep Pneumoniae Type 68 <0.1 L >1.3 ug/mL    3/14 = 21%    Hematocrit                   46.0                    34.0-46.6  %           WBC                           5.7                     3.4-10.8  x10E3/uL    RBC                          4.83                    3.77-5.28  x10E6/uL    Hemoglobin                   14.7                    11.1-15.9  g/dL        Basos                           0                   Not Estab.  %           Neurtrophils                    60                   Not Estab.  %           Neurtrophils (Absolute) 3.4  1.4-7.0 x10E3/uL  Lymphs (Absolute)             1.9                      0.7-3.1  x10E3/uL    MCV                            95                        79-97  fL          MCHC                         32.0                    31.5-35.7  g/dL        MCH                          30.4                    26.6-33.0  pg          Lymphs                         34                   Not Estab.  %           Immature Granulocytes            0                   Not Estab.  %           Eos                             0                   Not Estab.  %           Monocytes                       6                   Not Estab.  %           Platelets                     185                      150-450  x10E3/uL    Eos (Absolute)                0.0                      0.0-0.4  x10E3/uL    BASO (Absolute)               0.0                      0.0-0.2  x10E3/uL    Monocytes (Absolute)          0.4                      0.1-0.9  x10E3/uL    % CD19+ Lymphs                9.3                     3.3-25.4  %           Abs. CD19+ Lymphs             177                         /uL         Absolute CD4 [place]           716                     359-1519  /uL         Absolute CD3                 1634                     622-2402  /uL         % CD 4 Pos. Lymph            37.7                    30.8-58.5  %           CD4/CD8 Ratio                0.78 L                  0.92-3.72              % CD3 Pos. Lymph             86.0                    57.5-86.2  %           Abs. CD8 Suppressor           923 H                    109-897  /uL         % CD8 Pos. Lymph             48.6 H                  12.0-35.5  %           RDW                          14.5                    12.3-15.4  %           % NK (CD56/16)                4.1                     1.4-19.4  %           Ab NK (CD56/16)                78       Lpr:   Ranitidine rx at last visit.   Condition: stable  antrum. 05/06/2016       • ENDOSCOPY      w tube. Normal esophagus. Gastritis in stomach. Biopsy taken. Gastric polyp found. Biopsy taken. Normal duodenum. 10/01/2012       • ENDOSCOPY AND COLONOSCOPY      Diverticulum in sigmoid colon & descending colon. Internal & external hemorrhoids found. 10/01/2012       • EYE SURGERY Bilateral     catarract   • HAND SURGERY Left      Corrective osteotomy of ring finger metacarpal. Malunited fracture of left ring finger 05/21/1985       • HERNIA REPAIR      Laparoscopic hernia repair. prepertitoneal bilateral inguinal hernia repair with mesh. 10/15/2009      • OTHER SURGICAL HISTORY      CORONARY ARTERY STENT    • SKIN TAG REMOVAL      Excision, viral skin tag,right upper eyelid 05/08/1995    • TOE FUSION Right 3/13/2023    Procedure: RIGHT FIRST TARSOMETATARSAL JOINT ARTHRODESIS, FIRST METATARSOPHALANGEAL JOINT ARTHRODESIS, CALCANEAL BONE GRAFT AND ALL OTHER INDICATED PROCEDURES                ([facility]ERGY);  Surgeon: Mason Salazar DPM;  Location: [facility];  Service: Podiatry;  Laterality: Right;     PT Assessment (last 12 hours)     PT Evaluation and Treatment     Row Name 04/04/23 1130          Physical Therapy Time and Intention    Subjective Information complains of;pain  -LN     Document Type therapy note (daily note)  -LN     Mode of Treatment physical therapy  -LN     Comment pt's lunch present and agreeable to up in chair  -LN     Row Name 04/04/23 1130          General Information    Patient Profile Reviewed yes  -LN     Existing Precautions/Restrictions non-weight bearing;right  -LN     Row Name 04/04/23 1130          Home Use of Assistive/Adaptive Equipment    Equipment Currently Used at Home wheelchair  -LN     Row Name 04/04/23 1130          Pain    Pretreatment Pain Rating 0/10 - no pain  -LN     Posttreatment Pain Rating 1/10  -LN     Pain Location - Side/Orientation Right  -LN     Pain Location - ankle  -LN     Pain Intervention(s) Repositioned;Declines   ", not better.   She didn't start it.   "horrible acid reflux"  "gut issues" not diagnosed.   "why I got off plaquenil" "tore up my gut."       Atopic Hx    Rhinitis see above   Oral allergy: denies  Food allergy: none    Asthma see above for bronchitis   Latex tolerates   Eczema: denies, but may have a psoriasis.    Urticaria denies chronic, has doxepin qhs     Infection History    Pneumonia # in the past 12 months: bronchitis as above   Sinus infection # in the past 12 months: reports feels like sinus infections.   Otitis infection # in the past 12 months:  Denies chronic infection, but notes chronic fluid in the ears.     Dust mite only    IgE Cladosporium herbarum <0.10  Class 0 kU/L  IgE Dog Dander              <0.10                      Class 0  kU/L        IgE Cat Epithelium          <0.10                      Class 0  kU/L        IgE Ragweed, Short          <0.10                      Class 0  kU/L        IgE D. pteronyssinus         0.13 AB                 Class 0/I  kU/L        IgE A. fumigatus            <0.10                      Class 0  kU/L        IgE Alteraria alternata <0.10  Class 0 kU/L  IgE Elm, American           <0.10                      Class 0  kU/L        IgE Berm[place] Grass           <0.10                      Class 0  kU/L        IgE [place], White              <0.10                      Class 0  kU/L        IgE Pigweed, Common         <0.10                      Class 0  kU/L        IgE Cockroach, Indonesian        <0.10                      Class 0  kU/L        IgE Thistle Russian         <0.10                      Class 0  kU/L        IgE D. farinae              <0.10                      Class 0  kU/L        IgE Cockroach American <0.10  Class 0 kU/L   This test was developed and its performance      characteristics determined by [employer].  It      has not been cleared or approved by the U.S.      Food and Drug Administration.      The FDA has determined that such clearance      or approval is not " -LN     Row Name 04/04/23 1130          Cognition    Orientation Status (Cognition) oriented x 4  -LN     Row Name 04/04/23 1130          Range of Motion Comprehensive    General Range of Motion bilateral lower extremity ROM WFL  -LN     Row Name 04/04/23 1130          Strength Comprehensive (MMT)    General Manual Muscle Testing (MMT) Assessment other (see comments)  -LN     Row Name 04/04/23 1130          Mobility    Extremity Weight-bearing Status right lower extremity  -LN     Right Lower Extremity (Weight-bearing Status) non weight-bearing (NWB)  -LN     Row Name 04/04/23 1130          Bed Mobility    Bed Mobility supine-sit;sit-supine  -LN     Supine-Sit [place] (Bed Mobility) independent  -LN     Sit-Supine [place] (Bed Mobility) not tested  -LN     Assistive Device (Bed Mobility) bed rails;head of bed elevated  -LN     Row Name 04/04/23 1130          Transfers    Transfers sit-stand transfer;stand-sit transfer;bed-chair transfer  -LN     Row Name 04/04/23 1130          Bed-Chair Transfer    Bed-Chair [place] (Transfers) contact guard;1 person assist  -LN     Assistive Device (Bed-Chair Transfers) walker, front-wheeled  -LN     Row Name 04/04/23 1130          Sit-Stand Transfer    Sit-Stand [place] (Transfers) contact guard  -LN     Assistive Device (Sit-Stand Transfers) walker, front-wheeled  -LN     Row Name 04/04/23 1130          Stand-Sit Transfer    Stand-Sit [place] (Transfers) contact guard  to reach back  -LN     Assistive Device (Stand-Sit Transfers) walker, front-wheeled  -LN     Row Name 04/04/23 1130          Stand Pivot/Stand Step Transfer    Stand Pivot/Stand Step [place] (Transfers) contact guard;verbal cues  -LN     Assistive Device (Stand Pivot Stand Step Transfer) walker, front-wheeled  -LN     Comment, (Stand Pivot Transfer) bed-chair  -LN     Row Name 04/04/23 1130          Gait/Stairs (Locomotion)    [place] Level (Gait) contact guard  -LN     Assistive  Device (Gait) walker, front-wheeled  -LN     Row Name 04/04/23 1130          Safety Issues, Functional Mobility    Impairments Affecting Function (Mobility) balance;endurance/activity tolerance;pain;strength  -LN     Row Name 04/04/23 1130          Respiratory WDL    Respiratory WDL WDL  -LN     Row Name 04/04/23 1130          Breath Sounds    Breath Sounds All Fields  -LN     All Lung Fields Breath Sounds Anterior:;diminished  -LN     Row Name 04/04/23 1130          Skin WDL    Skin WDL X  -LN     Skin Color/Characteristics redness blanchable  coccyx  -LN     Skin Temperature warm  -LN     Skin Moisture dry  -LN     Skin Elasticity quick return to original state  -LN     Skin Integrity bruised (ecchymotic)  -LN     Row Name             Wound 03/13/23 1152 Right anterior foot Incision    Wound - Properties Group Placement Date: 03/13/23  -BP Placement Time: 1152  -BP Present on Hospital Admission: N  -BP Side: Right  -BP Orientation: anterior  -BP Location: foot  -BP Primary Wound Type: Incision  -BP    Retired Wound - Properties Group Placement Date: 03/13/23  -BP Placement Time: 1152  -BP Present on Hospital Admission: N  -BP Side: Right  -BP Orientation: anterior  -BP Location: foot  -BP Primary Wound Type: Incision  -BP    Retired Wound - Properties Group Date first assessed: 03/13/23  -BP Time first assessed: 1152  -BP Present on Hospital Admission: N  -BP Side: Right  -BP Location: foot  -BP Primary Wound Type: Incision  -BP    Row Name             Wound 03/23/23 1033 Right lateral ankle Incision    Wound - Properties Group Placement Date: 03/23/23  -EW Placement Time: 1033  -EW Side: Right  -EW Orientation: lateral  -EW Location: ankle  -EW Primary Wound Type: Incision  -EW    Retired Wound - Properties Group Placement Date: 03/23/23  -EW Placement Time: 1033  -EW Side: Right  -EW Orientation: lateral  -EW Location: ankle  -EW Primary Wound Type: Incision  -EW    Retired Wound - Properties Group Date first  necessary. This test is      used for clinical purposes.  It should not      be regarded as investigational or for                             research.                                           IgE Maple/Box Elder         <0.10                      Class 0  kU/L        IgE Penicillium chrysogen <0.10  Class 0 kU/L  IgE [place]              <0.10                      Class 0  kU/L        IgE Gabe Grass           <0.10                      Class 0  kU/L        IgE Arik Grass           <0.10                      Class 0  kU/L        IgE Bahia Grass             <0.10                      Class 0  kU/L        IgE Sheep Estelle           <0.10                      Class 0  kU/L        IgE Plantain, English        <0.10                      Class 0  kU/L        IgE Mugwort                 <0.10                      Class 0  kU/L        IgE Pecan [place]           <0.10                      Class 0  kU/L        IgE Candida albicans        <0.10                      Class 0  kU/L        IgE Setomelanomma rostrat <0.10  Class 0 kU/L  IgE White [place]          <0.10                      Class 0  kU/L        IgE Epicoccum purpur        <0.10                      Class 0  kU/L        IgE Fusarium proliferatum <0.10  Class 0 kU/L  IgE Trichophyton rubrum <0.10  Class 0 kU/L  IgE Rough Marshelder        <0.10                      Class 0  kU/L        IgE Mouse Urine             <0.10                      Class 0  kU/L        IgE Silver Birch            <0.10                      Class 0  kU/L        IgE Maple [place] <0.10  Class 0 kU/L  IgE Bipolaris               <0.10                      Class 0  kU/L         This test was developed and its performance      characteristics determined by [employer].  It      has not been cleared or approved by the U.S.      Food and Drug Administration.      The FDA has determined that such clearance      or approval is not necessary. This test is      used for clinical purposes.   It should not      be regarded as investigational or for                             research.                                           IgE Nishant, White              <0.10                      Class 0  kU/L        IgE Privet, Common          <0.10                      Class 0  kU/L        IgE Ragweed, Giant          <0.10                      Class 0  kU/L        IgE Nettle                  <0.10                      Class 0  kU/L        IgE Cocklebur               <0.10                      Class 0  kU/L        IgE Dockweed, Yellow        <0.10                      Class 0  kU/L         This test was developed and its performance      characteristics determined by "[employer].  It      has not been cleared or approved by the U.S.      Food and Drug Administration.      The FDA has determined that such clearance      or approval is not necessary. This test is      used for clinical purposes.  It should not      be regarded as investigational or for                             research.                                           IgE Hackberry               <0.10                      Class 0  kU/L         This test was developed and its performance      characteristics determined by "[employer].  It      has not been cleared or approved by the U.S.      Food and Drug Administration.      The FDA has determined that such clearance      or approval is not necessary. This test is      used for clinical purposes.  It should not      be regarded as investigational or for                             research.                                           IgE Sweet Gum               <0.10                      Class 0  kU/L         This test was developed and its performance      characteristics determined by "[employer].  It      has not been cleared or approved by the U.S.      Food and Drug Administration.      The FDA has determined that such clearance      or approval is not necessary. This test is      used for clinical purposes.  It  assessed: 03/23/23 -EW Time first assessed: 1033  -EW Side: Right  -EW Location: ankle  -EW Primary Wound Type: Incision  -EW    Row Name             Wound 03/23/23 1037 Right great toe Incision    Wound - Properties Group Placement Date: 03/23/23 -EW Placement Time: 1037  -EW Side: Right  -EW Location: great toe  -EW Primary Wound Type: Incision  -EW    Retired Wound - Properties Group Placement Date: 03/23/23 -EW Placement Time: 1037  -EW Side: Right  -EW Location: great toe  -EW Primary Wound Type: Incision  -EW    Retired Wound - Properties Group Date first assessed: 03/23/23 -EW Time first assessed: 1037  -EW Side: Right  -EW Location: great toe  -EW Primary Wound Type: Incision  -EW    Row Name 04/04/23 1130          Coping    Observed Emotional State cooperative;anxious  -LN     Verbalized Emotional State anxiety  -LN     Family/Support Persons family  -LN     Involvement in Care not present at bedside  -LN     Row Name 04/04/23 1130          Plan of Care Review    Plan of Care Reviewed With patient  -LN     Outcome Evaluation sup-sit ind,sit-stand-sit cga of 1;std pivot bed-chair with rw and cga of 1-leg elevated  -LN     Row Name 04/04/23 1130          Vital Signs    Pre Systolic BP Rehab 136  -LN     Pre Treatment Diastolic BP 63  -LN     Pretreatment Heart Rate (beats/min) 86  -LN     Pre Patient Position Supine  -LN     Intra Patient Position Standing  -LN     Post Patient Position Sitting  -LN     Row Name 04/04/23 1130          Positioning and Restraints    In Chair reclined;call light within reach;encouraged to call for assist;exit alarm on;legs elevated;RLE elevated  -LN     Row Name 04/04/23 1130          Therapy Assessment/Plan (PT)    Rehab Potential (PT) good, to achieve stated therapy goals  -LN     Criteria for Skilled Interventions Met (PT) yes  -LN     Therapy Frequency (PT) other (see comments)  5-7 times/wk  -LN     Row Name 04/04/23 1130          Transfer Goal 1 (PT)     Activity/Assistive Device (Transfer Goal 1, PT) sit-to-stand/stand-to-sit;walker, rolling  -LN     [place] Level/Cues Needed (Transfer Goal 1, PT) standby assist  -LN     Time Frame (Transfer Goal 1, PT) long term goal (LTG);by discharge  -LN     Strategies/Barriers (Transfers Goal 1, PT) Maintain weightbearing status  -LN     Progress/Outcome (Transfer Goal 1, PT) goal not met  -LN     Row Name 04/04/23 1130          Gait Training Goal 1 (PT)    Activity/Assistive Device (Gait Training Goal 1, PT) gait (walking locomotion);assistive device use;maintain weight-bearing status  -LN     [place] Level (Gait Training Goal 1, PT) contact guard required  -LN     Distance (Gait Training Goal 1, PT) 10' to and from bathroom  -LN     Time Frame (Gait Training Goal 1, PT) long term goal (LTG);by discharge  -LN     Progress/Outcome (Gait Training Goal 1, PT) goal not met  -LN           User Key  (r) = Recorded By, (t) = Taken By, (c) = Cosigned By    Initials Name Provider Type    LN Miroslava Ken, PTA Physical Therapist Assistant    Skylar Reed, RN Registered Nurse    Gretchen Rose, RN Registered Nurse                Physical Therapy Education     Title: PT OT SLP Therapies (In Progress)     Topic: Physical Therapy (Done)     Point: Mobility training (Done)     Learning Progress Summary           Patient Acceptance, E,D, VU by ND at 4/1/2023 1332    Comment: Weight bearing status, mobility, POC                   Point: Home exercise program (Done)     Learning Progress Summary           Patient Acceptance, E,D, VU by ND at 4/1/2023 1332    Comment: Weight bearing status, mobility, POC                   Point: Body mechanics (Done)     Learning Progress Summary           Patient Acceptance, E,D, VU by ND at 4/1/2023 1332    Comment: Weight bearing status, mobility, POC                   Point: Precautions (Done)     Learning Progress Summary           Patient Acceptance, E,D, VU by ND at 4/1/2023 1332     should not      be regarded as investigational or for                             research.                                           IgE [place]                <0.10                      Class 0  kU/L        IgE [place], Dog             <0.10                      Class 0  kU/L         This test was developed and its performance      characteristics determined by [employer].  It      has not been cleared or approved by the U.[place].      Food and Drug Administration.      The FDA has determined that such clearance      or approval is not necessary. This test is      used for clinical purposes.  It should not      be regarded as investigational or for                             research.                                                                                                                        Performed at: , [address], [id number]      Jc Mckeon MD, Phone:  [phone]     IgE Mouse Epithelium        <0.10                      Class 0  kU/L        IgE [place]            <0.10               General: neg unexpected weight changes, fevers, chills, night sweats, malaise  HEENT: see hpi, Neg eye pain, vision changes, ear drainage, nose bleeds, throat tightness, sores in the mouth  CV: Neg chest pain, palpitations, swelling  Resp: see hpi, neg shortness of breath, hemoptysis  GI: see hpi, neg dysphagia, night abdominal pain, reflux, chronic diarrhea, chronic constipation  Derm: See Hpi, neg new rash, neg flushing  Mu/sk: Neg joint pain, joint swelling   Psych: Neg anxiety  neuro: neg chronic headaches, muscle weakness  Endo: neg heat/cold intolerance, chronic fatigue    Objective:     Vitals:    08/30/23 0809   BP: (!) 153/104   Pulse: 90   SpO2: 98%   Weight: 84.8 kg (187 lb)          Physical Exam      General: no acute distress, well developed well nourished       Assessment:       1. Deficiency of anti-pneumococcal polysaccharide antibody    2. Tobacco abuse     3. Dyspnea and respiratory abnormalities    4. Interstitial lung disease              Plan:       Deficiency of anti-pneumococcal polysaccharide antibody    Tobacco abuse    Dyspnea and respiratory abnormalities    Interstitial lung disease            Chronic allergic rhinitis: dust mite only  8/23:  Continue budesonide in her rinses  Continue azelasitne   Continue levocetirizine prn      2/23:  Stable  Continue budesonide flares  Continue azelastine   Levocetirizine prn     11/22:   Somewhat flared.     6/22:  Continue budesonide rinses   Continue azelastine   levocetirizine prn     2/22  Serum IgE- not stable enough for skin prick testing. Dust mite only- mild sensitivity.   saline and azelastine    disContinue fluticasone   Discontinue montelukast 1 pill po qday - pt self d/c   levocetirizine prn   Start budesonide in saline rinse     Recurrent bronchitis and sinusitis/SAD  8/23:  12 grams weekly continue cuvitru     2/23:  Continue 12 grams weekly cuvitru     11/22:  Increased to 12 grams weekly to account for recent wt gain 500mg/kg / monthly dose     6/2022:  Start cuvitru weekly 11 grams weekly (550 mg/kg/month - based upon 81 kg)- having thrush and undesirable side effects.   Discontinue doxy  mg MWF only for proph once on infusions x 3 months.      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     Tobacco abuse and oxygen dependent   Smoking cessation recommended.      Flu vaccine yearly recommended.   covid vaccination obtained     F/u 6-12 months, sooner if needed.              Katiana Cline M.D.  Allergy/Immunology  [facility]   [phone] phone  [phone] fax                           Comment: Weight bearing status, mobility, POC                               User Key     Initials Effective Dates Name Provider Type Discipline    ND 01/30/23 -  Karthik Ralph, PT Physical Therapist PT              PT Recommendation and Plan  Anticipated Discharge Disposition (PT): home with 24/7 care, home with home health  Therapy Frequency (PT): other (see comments) (5-7 times/wk)  Plan of Care Reviewed With: patient  Outcome Evaluation: sup-sit ind,sit-stand-sit cga of 1;std pivot bed-chair with rw and cga of 1-leg elevated   Outcome Measures     Row Name 04/04/23 1130 04/03/23 1449 04/03/23 1439       How much help from another person do you currently need...    Turning from your back to your side while in flat bed without using bedrails? 4  -LN -- 4  -LN    Moving from lying on back to sitting on the side of a flat bed without bedrails? 4  -LN -- 4  -LN    Moving to and from a bed to a chair (including a wheelchair)? 3  -LN -- 3  -LN    Standing up from a chair using your arms (e.g., wheelchair, bedside chair)? 3  -LN -- 3  -LN    Climbing 3-5 steps with a railing? 2  -LN -- 2  -LN    To walk in hospital room? 3  -LN -- 3  -LN    AM-PAC 6 Clicks Score (PT) 19  -LN -- 19  -LN       Functional Assessment    Outcome Measure Options AM-PAC 6 Clicks Basic Mobility (PT)  -LN AM-PAC 6 Clicks Basic Mobility (PT)  -LN AM-PAC 6 Clicks Basic Mobility (PT)  -LN          User Key  (r) = Recorded By, (t) = Taken By, (c) = Cosigned By    Initials Name Provider Type    LN Miroslava Ken PTA Physical Therapist Assistant                 Time Calculation:    PT Charges     Row Name 04/04/23 1244             Time Calculation    Start Time 1130  -LN      Stop Time 1145  -LN      Time Calculation (min) 15 min  -LN      PT Received On 04/04/23  -LN         Time Calculation- PT    Total Timed Code Minutes- PT 15 minute(s)  -LN            User Key  (r) = Recorded By, (t) = Taken By, (c) = Cosigned By    Initials Name Provider Type     Miroslava Polanco PTA Physical Therapist Assistant              Therapy Charges for Today     Code Description Service Date Service Provider Modifiers Qty    84889474919 HC PT THERAPEUTIC ACT EA 15 MIN 4/3/2023 Miroslava Ken, PTA GP 1    45643368609 HC PT THER PROC EA 15 MIN 4/3/2023 Miroslava Ken, PTA GP 1    55278673433 HC PT THERAPEUTIC ACT EA 15 MIN 4/4/2023 Miroslava Ken, AURA GP 1          PT G-Codes  Outcome Measure Options: AM-PAC 6 Clicks Basic Mobility (PT)  AM-PAC 6 Clicks Score (PT): 19  AM-PAC 6 Clicks Score (OT): 20    Miroslava S AURA Ken  4/4/2023

## 2023-09-08 ENCOUNTER — HOSPITAL ENCOUNTER (EMERGENCY)
Facility: HOSPITAL | Age: 48
Discharge: HOME | End: 2023-09-08
Attending: STUDENT IN AN ORGANIZED HEALTH CARE EDUCATION/TRAINING PROGRAM
Payer: MEDICAID

## 2023-09-08 VITALS
TEMPERATURE: 98 F | OXYGEN SATURATION: 98 % | RESPIRATION RATE: 18 BRPM | DIASTOLIC BLOOD PRESSURE: 90 MMHG | SYSTOLIC BLOOD PRESSURE: 130 MMHG | HEART RATE: 93 BPM

## 2023-09-08 DIAGNOSIS — N20.0 NEPHROLITHIASIS: Primary | ICD-10-CM

## 2023-09-08 LAB
ALBUMIN SERPL BCP-MCNC: 4.4 G/DL (ref 3.5–5.2)
ALP SERPL-CCNC: 84 U/L (ref 55–135)
ALT SERPL W/O P-5'-P-CCNC: 13 U/L (ref 10–44)
ANION GAP SERPL CALC-SCNC: 8 MMOL/L (ref 8–16)
AST SERPL-CCNC: 18 U/L (ref 10–40)
BACTERIA #/AREA URNS HPF: NEGATIVE /HPF
BASOPHILS # BLD AUTO: 0.03 K/UL (ref 0–0.2)
BASOPHILS NFR BLD: 0.3 % (ref 0–1.9)
BILIRUB SERPL-MCNC: 0.5 MG/DL (ref 0.1–1)
BILIRUB UR QL STRIP: ABNORMAL
BUN SERPL-MCNC: 17 MG/DL (ref 6–20)
CALCIUM SERPL-MCNC: 9.3 MG/DL (ref 8.7–10.5)
CAOX CRY URNS QL MICRO: ABNORMAL
CHLORIDE SERPL-SCNC: 103 MMOL/L (ref 95–110)
CLARITY UR: CLEAR
CO2 SERPL-SCNC: 25 MMOL/L (ref 23–29)
COLOR UR: YELLOW
CREAT SERPL-MCNC: 0.7 MG/DL (ref 0.5–1.4)
DIFFERENTIAL METHOD: ABNORMAL
EOSINOPHIL # BLD AUTO: 0 K/UL (ref 0–0.5)
EOSINOPHIL NFR BLD: 0 % (ref 0–8)
ERYTHROCYTE [DISTWIDTH] IN BLOOD BY AUTOMATED COUNT: 14.1 % (ref 11.5–14.5)
EST. GFR  (NO RACE VARIABLE): >60 ML/MIN/1.73 M^2
GLUCOSE SERPL-MCNC: 166 MG/DL (ref 70–110)
GLUCOSE UR QL STRIP: ABNORMAL
HCT VFR BLD AUTO: 43.3 % (ref 37–48.5)
HGB BLD-MCNC: 14.5 G/DL (ref 12–16)
HGB UR QL STRIP: ABNORMAL
HYALINE CASTS #/AREA URNS LPF: 12 /LPF
IMM GRANULOCYTES # BLD AUTO: 0.03 K/UL (ref 0–0.04)
IMM GRANULOCYTES NFR BLD AUTO: 0.3 % (ref 0–0.5)
KETONES UR QL STRIP: ABNORMAL
LEUKOCYTE ESTERASE UR QL STRIP: NEGATIVE
LYMPHOCYTES # BLD AUTO: 1.2 K/UL (ref 1–4.8)
LYMPHOCYTES NFR BLD: 12 % (ref 18–48)
MCH RBC QN AUTO: 30.7 PG (ref 27–31)
MCHC RBC AUTO-ENTMCNC: 33.5 G/DL (ref 32–36)
MCV RBC AUTO: 92 FL (ref 82–98)
MICROSCOPIC COMMENT: ABNORMAL
MONOCYTES # BLD AUTO: 0.3 K/UL (ref 0.3–1)
MONOCYTES NFR BLD: 3.3 % (ref 4–15)
NEUTROPHILS # BLD AUTO: 8.5 K/UL (ref 1.8–7.7)
NEUTROPHILS NFR BLD: 84.1 % (ref 38–73)
NITRITE UR QL STRIP: NEGATIVE
NRBC BLD-RTO: 0 /100 WBC
PH UR STRIP: 7 [PH] (ref 5–8)
PLATELET # BLD AUTO: 264 K/UL (ref 150–450)
PMV BLD AUTO: 10.1 FL (ref 9.2–12.9)
POTASSIUM SERPL-SCNC: 4.1 MMOL/L (ref 3.5–5.1)
PROT SERPL-MCNC: 8.5 G/DL (ref 6–8.4)
PROT UR QL STRIP: ABNORMAL
RBC # BLD AUTO: 4.73 M/UL (ref 4–5.4)
RBC #/AREA URNS HPF: 11 /HPF (ref 0–4)
SODIUM SERPL-SCNC: 136 MMOL/L (ref 136–145)
SP GR UR STRIP: >1.03 (ref 1–1.03)
SQUAMOUS #/AREA URNS HPF: 5 /HPF
URN SPEC COLLECT METH UR: ABNORMAL
UROBILINOGEN UR STRIP-ACNC: ABNORMAL EU/DL
WBC # BLD AUTO: 10.15 K/UL (ref 3.9–12.7)
WBC #/AREA URNS HPF: 3 /HPF (ref 0–5)

## 2023-09-08 PROCEDURE — 85025 COMPLETE CBC W/AUTO DIFF WBC: CPT | Performed by: STUDENT IN AN ORGANIZED HEALTH CARE EDUCATION/TRAINING PROGRAM

## 2023-09-08 PROCEDURE — 80053 COMPREHEN METABOLIC PANEL: CPT | Performed by: STUDENT IN AN ORGANIZED HEALTH CARE EDUCATION/TRAINING PROGRAM

## 2023-09-08 PROCEDURE — 96375 TX/PRO/DX INJ NEW DRUG ADDON: CPT

## 2023-09-08 PROCEDURE — 99285 EMERGENCY DEPT VISIT HI MDM: CPT | Mod: 25

## 2023-09-08 PROCEDURE — 63600175 PHARM REV CODE 636 W HCPCS: Performed by: STUDENT IN AN ORGANIZED HEALTH CARE EDUCATION/TRAINING PROGRAM

## 2023-09-08 PROCEDURE — 96374 THER/PROPH/DIAG INJ IV PUSH: CPT

## 2023-09-08 PROCEDURE — 25000003 PHARM REV CODE 250: Performed by: STUDENT IN AN ORGANIZED HEALTH CARE EDUCATION/TRAINING PROGRAM

## 2023-09-08 PROCEDURE — 96361 HYDRATE IV INFUSION ADD-ON: CPT

## 2023-09-08 PROCEDURE — 81001 URINALYSIS AUTO W/SCOPE: CPT | Performed by: STUDENT IN AN ORGANIZED HEALTH CARE EDUCATION/TRAINING PROGRAM

## 2023-09-08 RX ORDER — KETOROLAC TROMETHAMINE 30 MG/ML
15 INJECTION, SOLUTION INTRAMUSCULAR; INTRAVENOUS
Status: COMPLETED | OUTPATIENT
Start: 2023-09-08 | End: 2023-09-08

## 2023-09-08 RX ORDER — ONDANSETRON 4 MG/1
4 TABLET, FILM COATED ORAL EVERY 6 HOURS
Qty: 12 TABLET | Refills: 0 | Status: SHIPPED | OUTPATIENT
Start: 2023-09-08 | End: 2024-02-22 | Stop reason: SDUPTHER

## 2023-09-08 RX ORDER — TAMSULOSIN HYDROCHLORIDE 0.4 MG/1
0.4 CAPSULE ORAL DAILY
Qty: 10 CAPSULE | Refills: 0 | Status: SHIPPED | OUTPATIENT
Start: 2023-09-08 | End: 2023-09-21

## 2023-09-08 RX ORDER — ONDANSETRON 2 MG/ML
4 INJECTION INTRAMUSCULAR; INTRAVENOUS
Status: COMPLETED | OUTPATIENT
Start: 2023-09-08 | End: 2023-09-08

## 2023-09-08 RX ORDER — KETOROLAC TROMETHAMINE 10 MG/1
10 TABLET, FILM COATED ORAL EVERY 6 HOURS PRN
Qty: 20 TABLET | Refills: 0 | Status: SHIPPED | OUTPATIENT
Start: 2023-09-08 | End: 2023-09-14

## 2023-09-08 RX ADMIN — KETOROLAC TROMETHAMINE 15 MG: 30 INJECTION, SOLUTION INTRAMUSCULAR; INTRAVENOUS at 04:09

## 2023-09-08 RX ADMIN — ONDANSETRON 4 MG: 2 INJECTION INTRAMUSCULAR; INTRAVENOUS at 04:09

## 2023-09-08 RX ADMIN — SODIUM CHLORIDE 1000 ML: 0.9 INJECTION, SOLUTION INTRAVENOUS at 04:09

## 2023-09-08 NOTE — DISCHARGE INSTRUCTIONS

## 2023-09-08 NOTE — ED PROVIDER NOTES
Encounter Date: 2023       History     Chief Complaint   Patient presents with    Back Pain     Patient c/o left sided back pain with radiation into left hip and around left side and  pain with urination      HPI  Promise Medrano is a 48 y.o. w/ PMHx ILD (on home 3L NC), Lupus, Sjogren's Syndrome, DM presenting for 5 hours of L flank pain awakening her from sleep. [place] at baseline prior to sleep. Multiple episodes of non-bloody emesis since awakening. Endorses ongoing nausea. Tolerating small sips of liquids.  Dysuria since awakening.  Denies urgency, frequency. Denies chest pain, SOB, abdominal pain, fever, chills, hematuria, diarrhea, constipation.     Review of patient's allergies indicates:   Allergen Reactions    Ativan [lorazepam] Other (See Comments)     depression    Hay fever and allergy relief     Mobic [meloxicam] Other (See Comments)     Spikes blood pressure    Nitroglycerin      Pt says it could stop her heart     Past Medical History:   Diagnosis Date    Allergic rhinitis     Arthritis     Chronic anxiety 10/23/2018    Connective tissue disease     joints b/c lupus    Diabetes mellitus     Fibromyalgia     GERD (gastroesophageal reflux disease)     Grade II hemorrhoids 2019    Hyperlipemia     Interstitial lung disease     Lupus     Lupus     Sjogren's syndrome 2019     Past Surgical History:   Procedure Laterality Date    AUGMENTATION OF BREAST      BREAST SURGERY      BRONCHOSCOPY WITH FLUOROSCOPY N/A 2019    Procedure: BRONCHOSCOPY, WITH FLUOROSCOPY;  Surgeon: Nate Tarango MD;  Location: [facility] ENDO;  Service: Pulmonary;  Laterality: N/A;    CATHETERIZATION OF BOTH LEFT AND RIGHT HEART Left 2021    Procedure: CATHETERIZATION, HEART, BOTH LEFT AND RIGHT;  Surgeon: Luca Wilks MD;  Location: [facility] CATH/EP LAB;  Service: Cardiology;  Laterality: Left;     SECTION  ,    COLONOSCOPY      ESOPHAGOGASTRODUODENOSCOPY      gastric sleeve       HYSTERECTOMY  2010    TONSILLECTOMY  1996    TUMOR REMOVAL      benign fatty     Family History   Problem Relation Age of Onset    Early death Father     Heart disease Father     Stroke Father     Kidney disease Father     Diabetes Paternal Grandmother     Cancer Paternal Grandmother     Raynaud syndrome Mother     Uterine cancer Mother     Breast cancer Mother     Raynaud syndrome Sister     Polycystic ovary syndrome Daughter     Diabetes Maternal Grandmother     Heart disease Maternal Grandmother     Kidney disease Maternal Grandmother     Breast cancer Maternal Grandmother     Heart disease Maternal Grandfather     Cancer Paternal Grandfather     No Known Problems Daughter      Social History     Tobacco Use    Smoking status: Every Day     Current packs/day: 1.00     Average packs/day: 1 pack/day for 25.6 years (25.6 ttl pk-yrs)     Types: Cigarettes     Start date: 2/9/1998    Smokeless tobacco: Never   Substance Use Topics    Alcohol use: Not Currently    Drug use: No     Review of Systems   Constitutional:  Negative for chills and fever.   HENT:  Negative for congestion and rhinorrhea.    Respiratory:  Negative for cough and shortness of breath.    Cardiovascular:  Negative for chest pain and palpitations.   Gastrointestinal:  Positive for nausea and vomiting. Negative for abdominal pain, constipation and diarrhea.   Genitourinary:  Positive for dysuria and flank pain. Negative for frequency, hematuria, vaginal discharge and vaginal pain.   Neurological:  Negative for syncope and weakness.   Psychiatric/Behavioral:  Negative for agitation and confusion.        Physical Exam     Initial Vitals [09/08/23 0338]   BP Pulse Resp Temp SpO2   (!) 186/97 86 18 97.6 °F (36.4 °C) 99 %      MAP       --         Physical Exam    Constitutional: She appears well-developed and well-nourished.   HENT:   Head: Normocephalic and atraumatic.   Right Ear: External ear normal.   Left Ear: External ear normal.   Dry mucous  membranes   Eyes: Conjunctivae are normal. Right eye exhibits no discharge. Left eye exhibits no discharge.   Neck: Neck supple.   Normal range of motion.  Cardiovascular:  Normal rate, regular rhythm, normal heart sounds and intact distal pulses.           Pulmonary/Chest: Breath sounds normal. No respiratory distress. She has no wheezes.   Abdominal: Abdomen is soft. She exhibits no distension. There is no abdominal tenderness.   Left CVA tenderness   Musculoskeletal:         General: No edema. Normal range of motion.      Cervical back: Normal range of motion and neck supple.     Neurological: She is alert and oriented to person, place, and time.   Skin: Skin is warm and dry.         ED Course   Procedures  Labs Reviewed   CBC W/ AUTO DIFFERENTIAL - Abnormal; Notable for the following components:       Result Value    Gran # (ANC) 8.5 (*)     Gran % 84.1 (*)     Lymph % 12.0 (*)     Mono % 3.3 (*)     All other components within normal limits   COMPREHENSIVE METABOLIC PANEL - Abnormal; Notable for the following components:    Glucose 166 (*)     Total Protein 8.5 (*)     All other components within normal limits   URINALYSIS, REFLEX TO URINE CULTURE - Abnormal; Notable for the following components:    Specific Gravity, UA >1.030 (*)     Protein, UA 2+ (*)     Glucose, UA Trace (*)     Ketones, UA 1+ (*)     Bilirubin (UA) 1+ (*)     Occult Blood UA 3+ (*)     Urobilinogen, UA 4.0-6.0 (*)     All other components within normal limits    Narrative:     Specimen Source->Urine   URINALYSIS MICROSCOPIC - Abnormal; Notable for the following components:    RBC, UA 11 (*)     Hyaline Casts, UA 12 (*)     All other components within normal limits    Narrative:     Specimen Source->Urine          Imaging Results              CT Abdomen Pelvis  Without Contrast (Final result)  Result time 09/08/23 04:43:55      Final result by Willie Silva DO (09/08/23 04:43:55)                   Narrative:    EXAM:  CT Abdomen and  Pelvis Without Intravenous Contrast    FACILITY:  [facility]    REFERRING:  AAAREFERRAL SELF    CLINICAL HISTORY:  48 years, Female; Flank pain, kidney stone suspected    TECHNIQUE:  Axial computed tomography images of the abdomen and pelvis without intravenous contrast.  This CT exam was performed using one or more of the following dose reduction techniques:  automated exposure control, adjustment of the mA and/or kV according to patient size, and/or use of iterative reconstruction technique.    COMPARISON:  No relevant prior studies available.    FINDINGS:  Lung bases:  The lower thorax there is partial visualization of the inferior portion of bilateral breast implants. There are chronic interstitial changes. There is minimal inferior left lower lobe atelectasis.  Heart:  There is a minimal inferior pericardial effusion.    ABDOMEN:  Liver:  Unremarkable.  Gallbladder and bile ducts:  Unremarkable.  No calcified stones.  No ductal dilation.  Pancreas:  Unremarkable.  No ductal dilation.  Spleen:  Unremarkable.  No splenomegaly.  Adrenals:  Unremarkable.  No mass.  Kidneys and ureters:  There is a mild degree of left hydronephrosis secondary to a tiny punctate 1-2 mm obstructing calculus in the distal left ureter at the left ureterovesical junction. There is a nonobstructing 1-2 mm left upper pole intrarenal calculus.  Stomach and bowel:  The visualized portions of the colon and small bowel demonstrate no inflammatory change.  Gastric sleeve postoperative changes are evident.    PELVIS:  Appendix:  No findings to suggest acute appendicitis.  Bladder:  Unremarkable.  No stones.  Reproductive:  The uterus is surgically absent.    ABDOMEN and PELVIS:  Intraperitoneal space:  Unremarkable.  No free air.  No significant fluid collection.  Bones/joints:  There is a subacute age fracture of the anterior sixth right rib near the costochondral junction.  No dislocation.  Soft tissues:  There is mild  infiltration within the subcutaneous fat of the anterior-left anterior soft tissues most consistent with mild edema and inflammation which could be related to cellulitis. There is a small focus of subcutaneous air in the left anterior abdominal fat which could be related to an injection site.  Vasculature:  Unremarkable.  No abdominal aortic aneurysm.  Lymph nodes:  Unremarkable.  No enlarged lymph nodes.    IMPRESSION:    Mild degree of left hydronephrosis secondary to a tiny punctate 1-2 mm obstructing calculus in the distal left ureter at the left ureterovesical junction.    There is mild infiltration within the subcutaneous fat of the anterior-left anterior soft tissues most consistent with mild edema and inflammation which could be related to cellulitis.    There is a small focus of subcutaneous air in the left anterior abdominal fat which could be related to an injection site.    Subacute fracture of the anterior sixth right rib near the costochondral junction.    Hysterectomy.    Gastric sleeve postoperative changes.    Electronically signed by:  Willie Silva DO  9/8/2023 4:43 AM CDT Workstation: [id number]                                     Medications   ketorolac injection 15 mg (15 mg Intravenous Given 9/8/23 0437)   ondansetron injection 4 mg (4 mg Intravenous Given 9/8/23 0437)   sodium chloride 0.9% bolus 1,000 mL 1,000 mL (1,000 mLs Intravenous New Bag 9/8/23 0437)     Medical Decision Making  Amount and/or Complexity of Data Reviewed  Labs: ordered. Decision-making details documented in ED Course.  Radiology: ordered. Decision-making details documented in ED Course.    Risk  Prescription drug management.      48 y.o. w/ PMHx ILD (on home 3L NC), Lupus, Sjogren's Syndrome, DM presenting for 5 hours acute onset L flank pain awakening her from sleep. With associated nausea, vomiting, dysuria. Afebrile on arrival, no tachycardia. Initially hypertensive, improved after pain control. Exam notable for L  CVA tenderness, dry mucus membranes. Differential: nephrolithiasis, pyelonephritis, UTI, BROOKE, etc.     Toradol, zofran with improvement in symptoms. CBC without leukocytosis. CMP without BROOKE or acute electrolyte abnormalities. CT scan with 1-2mm calculus in distal left ureter at left ureterovesical junction, mild degree of left hydronephrosis.  Mild infiltration subcutaneous fat, clinical exam without evidence of cellulitis. UA w/o evidence of UTI. Stable for discharge with recommendation to follow up with Urology.             Attending Attestation:   Physician Attestation Statement for Resident:  As the supervising MD   I agree with the above history.  -:   As the supervising MD I agree with the above PE.     As the supervising MD I agree with the above treatment, course, plan, and disposition.                    ED Course as of 09/08/23 0630   Fri Sep 08, 2023   0628 Comp. Metabolic Panel(!) [BS]   0628 Urinalysis Microscopic(!) [BS]   0628 Urinalysis, Reflex to Urine Culture Urine, Clean Catch(!) [BS]   0628 CBC W/ AUTO DIFFERENTIAL(!) [BS]   0628 CT Abdomen Pelvis  Without Contrast [BS]      ED Course User Index  [BS] Greg Murguia MD                    Clinical Impression:   Final diagnoses:  [N20.0] Nephrolithiasis (Primary)        ED Disposition Condition    Discharge Stable          ED Prescriptions       Medication Sig Dispense Start Date End Date Auth. Provider    tamsulosin (FLOMAX) 0.4 mg Cap Take 1 capsule (0.4 mg total) by mouth once daily. 10 capsule 9/8/2023 9/7/2024 Greg Murguia MD    ketorolac (TORADOL) 10 mg tablet Take 1 tablet (10 mg total) by mouth every 6 (six) hours as needed for Pain (pain). 20 tablet 9/8/2023 9/13/2023 Greg Murguia MD    ondansetron (ZOFRAN) 4 MG tablet Take 1 tablet (4 mg total) by mouth every 6 (six) hours. 12 tablet 9/8/2023 -- Greg Murguia MD          Follow-up Information       Follow up With Specialties Details Why Contact Info     Dionicio Braun MD Urology Call today To set up a follow-up appointment, To recheck today's symptoms [address] [facility]  [address]  [facility] [address]  [phone]               Nate Albarado MD  Resident  09/08/23 0551       Greg Murguia MD  09/08/23 0639

## 2023-09-13 ENCOUNTER — HOSPITAL ENCOUNTER (OUTPATIENT)
Dept: RADIOLOGY | Facility: HOSPITAL | Age: 48
Discharge: HOME | End: 2023-09-13
Attending: INTERNAL MEDICINE
Payer: MEDICAID

## 2023-09-13 DIAGNOSIS — R10.11 RUQ PAIN: ICD-10-CM

## 2023-09-13 DIAGNOSIS — E11.9 TYPE 2 DIABETES MELLITUS WITHOUT COMPLICATION, WITHOUT LONG-TERM CURRENT USE OF INSULIN: ICD-10-CM

## 2023-09-13 PROCEDURE — 78227 HEPATOBIL SYST IMAGE W/DRUG: CPT | Mod: TC

## 2023-09-13 PROCEDURE — 63600175 PHARM REV CODE 636 W HCPCS: Performed by: INTERNAL MEDICINE

## 2023-09-13 RX ORDER — SINCALIDE 5 UG/5ML
1.7 INJECTION, POWDER, LYOPHILIZED, FOR SOLUTION INTRAVENOUS ONCE
Status: COMPLETED | OUTPATIENT
Start: 2023-09-13 | End: 2023-09-13

## 2023-09-13 RX ADMIN — SINCALIDE 1.7 MCG: 5 INJECTION, POWDER, LYOPHILIZED, FOR SOLUTION INTRAVENOUS at 12:09

## 2023-09-13 NOTE — PROGRESS NOTES
SUBJECTIVE:    Patient ID: Promise Medrano is a 48 y.o. female.    Chief Complaint: Results and Follow-up    Abdominal Pain  Associated symptoms include arthralgias and nausea. Pertinent negatives include no constipation, diarrhea, dysuria, fever, frequency, hematuria, myalgias or vomiting.       Patient in for follow-up on RUQ pain-EF for the GB was on 19 %-she continues with a full feeling in the RUQ, less os than prior-incidentally she was seen in ER with kidney stones this past weekend    Admission on 09/08/2023, Discharged on 09/08/2023   Component Date Value Ref Range Status    WBC 09/08/2023 10.15  3.90 - 12.70 K/uL Final    RBC 09/08/2023 4.73  4.00 - 5.40 M/uL Final    Hemoglobin 09/08/2023 14.5  12.0 - 16.0 g/dL Final    Hematocrit 09/08/2023 43.3  37.0 - 48.5 % Final    MCV 09/08/2023 92  82 - 98 fL Final    MCH 09/08/2023 30.7  27.0 - 31.0 pg Final    MCHC 09/08/2023 33.5  32.0 - 36.0 g/dL Final    RDW 09/08/2023 14.1  11.5 - 14.5 % Final    Platelets 09/08/2023 264  150 - 450 K/uL Final    MPV 09/08/2023 10.1  9.2 - 12.9 fL Final    Immature Granulocytes 09/08/2023 0.3  0.0 - 0.5 % Final    Gran # (ANC) 09/08/2023 8.5 (H)  1.8 - 7.7 K/uL Final    Immature Grans (Abs) 09/08/2023 0.03  0.00 - 0.04 K/uL Final    Lymph # 09/08/2023 1.2  1.0 - 4.8 K/uL Final    Mono # 09/08/2023 0.3  0.3 - 1.0 K/uL Final    Eos # 09/08/2023 0.0  0.0 - 0.5 K/uL Final    Baso # 09/08/2023 0.03  0.00 - 0.20 K/uL Final    nRBC 09/08/2023 0  0 /100 WBC Final    Gran % 09/08/2023 84.1 (H)  38.0 - 73.0 % Final    Lymph % 09/08/2023 12.0 (L)  18.0 - 48.0 % Final    Mono % 09/08/2023 3.3 (L)  4.0 - 15.0 % Final    Eosinophil % 09/08/2023 0.0  0.0 - 8.0 % Final    Basophil % 09/08/2023 0.3  0.0 - 1.9 % Final    Differential Method 09/08/2023 Automated   Final    Sodium 09/08/2023 136  136 - 145 mmol/L Final    Potassium 09/08/2023 4.1  3.5 - 5.1 mmol/L Final    Chloride 09/08/2023 103  95 - 110 mmol/L Final    CO2 09/08/2023 25  23  - 29 mmol/L Final    Glucose 09/08/2023 166 (H)  70 - 110 mg/dL Final    BUN 09/08/2023 17  6 - 20 mg/dL Final    Creatinine 09/08/2023 0.7  0.5 - 1.4 mg/dL Final    Calcium 09/08/2023 9.3  8.7 - 10.5 mg/dL Final    Total Protein 09/08/2023 8.5 (H)  6.0 - 8.4 g/dL Final    Albumin 09/08/2023 4.4  3.5 - 5.2 g/dL Final    Total Bilirubin 09/08/2023 0.5  0.1 - 1.0 mg/dL Final    Alkaline Phosphatase 09/08/2023 84  55 - 135 U/L Final    AST 09/08/2023 18  10 - 40 U/L Final    ALT 09/08/2023 13  10 - 44 U/L Final    eGFR 09/08/2023 >60.0  >60 mL/min/1.73 m^2 Final    Anion Gap 09/08/2023 8  8 - 16 mmol/L Final    Specimen UA 09/08/2023 Urine, Clean Catch   Final    Color, UA 09/08/2023 Yellow  Yellow, Straw, Violet Final    Appearance, UA 09/08/2023 Clear  Clear Final    pH, UA 09/08/2023 7.0  5.0 - 8.0 Final    Specific Gravity, UA 09/08/2023 >1.030 (A)  1.005 - 1.030 Final    Protein, UA 09/08/2023 2+ (A)  Negative Final    Glucose, UA 09/08/2023 Trace (A)  Negative Final    Ketones, UA 09/08/2023 1+ (A)  Negative Final    Bilirubin (UA) 09/08/2023 1+ (A)  Negative Final    Occult Blood UA 09/08/2023 3+ (A)  Negative Final    Nitrite, UA 09/08/2023 Negative  Negative Final    Urobilinogen, UA 09/08/2023 4.0-6.0 (A)  Negative EU/dL Final    Leukocytes, UA 09/08/2023 Negative  Negative Final    RBC, UA 09/08/2023 11 (H)  0 - 4 /hpf Final    WBC, UA 09/08/2023 3  0 - 5 /hpf Final    Bacteria 09/08/2023 Negative  None-Occ /hpf Final    Squam Epithel, UA 09/08/2023 5  /hpf Final    Hyaline Casts, UA 09/08/2023 12 (A)  0-1/lpf /lpf Final    Ca Oxalate Caron, UA 09/08/2023 Few  None-Moderate Final    Microscopic Comment 09/08/2023 SEE COMMENT   Final   Lab Visit on 08/11/2023   Component Date Value Ref Range Status    Sodium 08/11/2023 139  136 - 145 mmol/L Final    Potassium 08/11/2023 4.6  3.5 - 5.1 mmol/L Final    Chloride 08/11/2023 105  95 - 110 mmol/L Final    CO2 08/11/2023 26  23 - 29 mmol/L Final    Glucose 08/11/2023  100  70 - 110 mg/dL Final    BUN 08/11/2023 13  6 - 20 mg/dL Final    Creatinine 08/11/2023 0.6  0.5 - 1.4 mg/dL Final    Calcium 08/11/2023 9.3  8.7 - 10.5 mg/dL Final    Anion Gap 08/11/2023 8  8 - 16 mmol/L Final    eGFR 08/11/2023 >60.0  >60 mL/min/1.73 m^2 Final    Microalbumin, Urine 08/11/2023 8.8  <19.9 ug/mL Final    Creatinine, Urine 08/11/2023 150.0  15.0 - 325.0 mg/dL Final    Microalb/Creat Ratio 08/11/2023 5.9  0.0 - 30.0 ug/mg Final    Hemoglobin A1C 08/11/2023 5.6  4.5 - 6.2 % Final    Estimated Avg Glucose 08/11/2023 114  68 - 131 mg/dL Final   Admission on 07/28/2023, Discharged on 07/28/2023   Component Date Value Ref Range Status    Magnesium 07/28/2023 1.8  1.6 - 2.6 mg/dL Final    WBC 07/28/2023 10.24  3.90 - 12.70 K/uL Final    RBC 07/28/2023 4.82  4.00 - 5.40 M/uL Final    Hemoglobin 07/28/2023 14.7  12.0 - 16.0 g/dL Final    Hematocrit 07/28/2023 44.0  37.0 - 48.5 % Final    MCV 07/28/2023 91  82 - 98 fL Final    MCH 07/28/2023 30.5  27.0 - 31.0 pg Final    MCHC 07/28/2023 33.4  32.0 - 36.0 g/dL Final    RDW 07/28/2023 14.6 (H)  11.5 - 14.5 % Final    Platelets 07/28/2023 268  150 - 450 K/uL Final    MPV 07/28/2023 10.2  9.2 - 12.9 fL Final    Immature Granulocytes 07/28/2023 0.5  0.0 - 0.5 % Final    Gran # (ANC) 07/28/2023 6.9  1.8 - 7.7 K/uL Final    Immature Grans (Abs) 07/28/2023 0.05 (H)  0.00 - 0.04 K/uL Final    Lymph # 07/28/2023 2.7  1.0 - 4.8 K/uL Final    Mono # 07/28/2023 0.5  0.3 - 1.0 K/uL Final    Eos # 07/28/2023 0.0  0.0 - 0.5 K/uL Final    Baso # 07/28/2023 0.03  0.00 - 0.20 K/uL Final    nRBC 07/28/2023 0  0 /100 WBC Final    Gran % 07/28/2023 67.7  38.0 - 73.0 % Final    Lymph % 07/28/2023 26.4  18.0 - 48.0 % Final    Mono % 07/28/2023 4.9  4.0 - 15.0 % Final    Eosinophil % 07/28/2023 0.2  0.0 - 8.0 % Final    Basophil % 07/28/2023 0.3  0.0 - 1.9 % Final    Differential Method 07/28/2023 Automated   Final    Sodium 07/28/2023 136  136 - 145 mmol/L Final    Potassium  07/28/2023 3.6  3.5 - 5.1 mmol/L Final    Chloride 07/28/2023 102  95 - 110 mmol/L Final    CO2 07/28/2023 26  23 - 29 mmol/L Final    Glucose 07/28/2023 104  70 - 110 mg/dL Final    BUN 07/28/2023 10  6 - 20 mg/dL Final    Creatinine 07/28/2023 0.7  0.5 - 1.4 mg/dL Final    Calcium 07/28/2023 9.0  8.7 - 10.5 mg/dL Final    Total Protein 07/28/2023 8.1  6.0 - 8.4 g/dL Final    Albumin 07/28/2023 4.1  3.5 - 5.2 g/dL Final    Total Bilirubin 07/28/2023 0.4  0.1 - 1.0 mg/dL Final    Alkaline Phosphatase 07/28/2023 91  55 - 135 U/L Final    AST 07/28/2023 21  10 - 40 U/L Final    ALT 07/28/2023 19  10 - 44 U/L Final    eGFR 07/28/2023 >60.0  >60 mL/min/1.73 m^2 Final    Anion Gap 07/28/2023 8  8 - 16 mmol/L Final    Troponin I High Sensitivity 07/28/2023 3.3  0.0 - 14.9 pg/mL Final    BNP 07/28/2023 5  0 - 99 pg/mL Final    Lipase 07/28/2023 33  4 - 60 U/L Final    POC Creatinine 07/28/2023 0.6  0.5 - 1.4 mg/dL Final    Sample 07/28/2023 VENOUS   Final    Troponin I High Sensitivity 07/28/2023 3.2  0.0 - 14.9 pg/mL Final   Lab Visit on 12/30/2022   Component Date Value Ref Range Status    Hemoglobin A1C 12/30/2022 6.5 (H)  4.5 - 6.2 % Final    Estimated Avg Glucose 12/30/2022 140 (H)  68 - 131 mg/dL Final    Sodium 12/30/2022 134 (L)  136 - 145 mmol/L Final    Potassium 12/30/2022 4.0  3.5 - 5.1 mmol/L Final    Chloride 12/30/2022 99  95 - 110 mmol/L Final    CO2 12/30/2022 27  23 - 29 mmol/L Final    Glucose 12/30/2022 111 (H)  70 - 110 mg/dL Final    BUN 12/30/2022 15  6 - 20 mg/dL Final    Creatinine 12/30/2022 0.6  0.5 - 1.4 mg/dL Final    Calcium 12/30/2022 9.5  8.7 - 10.5 mg/dL Final    Total Protein 12/30/2022 8.2  6.0 - 8.4 g/dL Final    Albumin 12/30/2022 4.0  3.5 - 5.2 g/dL Final    Total Bilirubin 12/30/2022 0.5  0.1 - 1.0 mg/dL Final    Alkaline Phosphatase 12/30/2022 97  55 - 135 U/L Final    AST 12/30/2022 25  10 - 40 U/L Final    ALT 12/30/2022 28  10 - 44 U/L Final    Anion Gap 12/30/2022 8  8 - 16  mmol/L Final    eGFR 12/30/2022 >60.0  >60 mL/min/1.73 m^2 Final    WBC 12/30/2022 7.98  3.90 - 12.70 K/uL Final    RBC 12/30/2022 4.38  4.00 - 5.40 M/uL Final    Hemoglobin 12/30/2022 13.8  12.0 - 16.0 g/dL Final    Hematocrit 12/30/2022 42.3  37.0 - 48.5 % Final    MCV 12/30/2022 97  82 - 98 fL Final    MCH 12/30/2022 31.5 (H)  27.0 - 31.0 pg Final    MCHC 12/30/2022 32.6  32.0 - 36.0 g/dL Final    RDW 12/30/2022 14.3  11.5 - 14.5 % Final    Platelets 12/30/2022 258  150 - 450 K/uL Final    MPV 12/30/2022 10.5  9.2 - 12.9 fL Final    Immature Granulocytes 12/30/2022 0.5  0.0 - 0.5 % Final    Gran # (ANC) 12/30/2022 4.7  1.8 - 7.7 K/uL Final    Immature Grans (Abs) 12/30/2022 0.04  0.00 - 0.04 K/uL Final    Lymph # 12/30/2022 2.7  1.0 - 4.8 K/uL Final    Mono # 12/30/2022 0.5  0.3 - 1.0 K/uL Final    Eos # 12/30/2022 0.0  0.0 - 0.5 K/uL Final    Baso # 12/30/2022 0.04  0.00 - 0.20 K/uL Final    nRBC 12/30/2022 0  0 /100 WBC Final    Gran % 12/30/2022 58.5  38.0 - 73.0 % Final    Lymph % 12/30/2022 33.5  18.0 - 48.0 % Final    Mono % 12/30/2022 6.6  4.0 - 15.0 % Final    Eosinophil % 12/30/2022 0.4  0.0 - 8.0 % Final    Basophil % 12/30/2022 0.5  0.0 - 1.9 % Final    Differential Method 12/30/2022 Automated   Final    Cholesterol 12/30/2022 172  120 - 199 mg/dL Final    Triglycerides 12/30/2022 88  30 - 150 mg/dL Final    HDL 12/30/2022 70  40 - 75 mg/dL Final    LDL Cholesterol 12/30/2022 84.4  63.0 - 159.0 mg/dL Final    HDL/Cholesterol Ratio 12/30/2022 40.7  20.0 - 50.0 % Final    Total Cholesterol/HDL Ratio 12/30/2022 2.5  2.0 - 5.0 Final    Non-HDL Cholesterol 12/30/2022 102  mg/dL Final   Lab Visit on 11/28/2022   Component Date Value Ref Range Status    Sodium 11/28/2022 133 (L)  136 - 145 mmol/L Final    Potassium 11/28/2022 3.8  3.5 - 5.1 mmol/L Final    Chloride 11/28/2022 99  95 - 110 mmol/L Final    CO2 11/28/2022 27  23 - 29 mmol/L Final    Glucose 11/28/2022 117 (H)  70 - 110 mg/dL Final    BUN  2022 11  6 - 20 mg/dL Final    Creatinine 2022 0.6  0.5 - 1.4 mg/dL Final    Calcium 2022 8.9  8.7 - 10.5 mg/dL Final    Total Protein 2022 8.1  6.0 - 8.4 g/dL Final    Albumin 2022 4.0  3.5 - 5.2 g/dL Final    Total Bilirubin 2022 0.5  0.1 - 1.0 mg/dL Final    Alkaline Phosphatase 2022 101  55 - 135 U/L Final    AST 2022 21  10 - 40 U/L Final    ALT 2022 23  10 - 44 U/L Final    Anion Gap 2022 7 (L)  8 - 16 mmol/L Final    eGFR 2022 >60.0  >60 mL/min/1.73 m^2 Final    Hemoglobin A1C 2022 6.4 (H)  4.5 - 6.2 % Final    Estimated Avg Glucose 2022 137 (H)  68 - 131 mg/dL Final       Past Medical History:   Diagnosis Date    Allergic rhinitis     Arthritis     Chronic anxiety 10/23/2018    Connective tissue disease     joints b/c lupus    Diabetes mellitus     Fibromyalgia     GERD (gastroesophageal reflux disease)     Grade II hemorrhoids 2019    Hyperlipemia     Interstitial lung disease     Lupus     Lupus     Sjogren's syndrome 2019     Past Surgical History:   Procedure Laterality Date    AUGMENTATION OF BREAST      BREAST SURGERY      BRONCHOSCOPY WITH FLUOROSCOPY N/A 2019    Procedure: BRONCHOSCOPY, WITH FLUOROSCOPY;  Surgeon: Nate Tarango MD;  Location: [facility] ENDO;  Service: Pulmonary;  Laterality: N/A;    CATHETERIZATION OF BOTH LEFT AND RIGHT HEART Left 2021    Procedure: CATHETERIZATION, HEART, BOTH LEFT AND RIGHT;  Surgeon: Luca Wilks MD;  Location: [facility] CATH/EP LAB;  Service: Cardiology;  Laterality: Left;     SECTION  ,    COLONOSCOPY      ESOPHAGOGASTRODUODENOSCOPY      gastric sleeve  2010    HYSTERECTOMY  2010    TONSILLECTOMY  1996    TUMOR REMOVAL      benign fatty     Family History   Problem Relation Age of Onset    Early death Father     Heart disease Father     Stroke Father     Kidney disease Father     Diabetes Paternal Grandmother     Cancer Paternal Grandmother      Raynaud syndrome Mother     Uterine cancer Mother     Breast cancer Mother     Raynaud syndrome Sister     Polycystic ovary syndrome Daughter     Diabetes Maternal Grandmother     Heart disease Maternal Grandmother     Kidney disease Maternal Grandmother     Breast cancer Maternal Grandmother     Heart disease Maternal Grandfather     Cancer Paternal Grandfather     No Known Problems Daughter        Marital Status: Single  Alcohol History:  reports that she does not currently use alcohol.  Tobacco History:  reports that she has been smoking cigarettes. She started smoking about 25 years ago. She has a 25.6 pack-year smoking history. She has never used smokeless tobacco.  Drug History:  reports no history of drug use.    Review of patient's allergies indicates:   Allergen Reactions    Ativan [lorazepam] Other (See Comments)     depression    Hay fever and allergy relief     Mobic [meloxicam] Other (See Comments)     Spikes blood pressure    Nitroglycerin      Pt says it could stop her heart       Current Outpatient Medications:     ACCU-CHEK SOFTCLIX LANCETS Misc, USE ONCE DAILY AS NEEDED, Disp: 100 each, Rfl: 5    albuterol (PROVENTIL) 2.5 mg /3 mL (0.083 %) nebulizer solution, Take 3 mLs (2.5 mg total) by nebulization every 4 (four) hours., Disp: 300 mL, Rfl: 3    albuterol (PROVENTIL/VENTOLIN HFA) 90 mcg/actuation inhaler, INHALE 1 PUFF INTO THE LUNGS ONCE DAILY, Disp: 18 g, Rfl: 3    albuterol-ipratropium (DUO-NEB) 2.5 mg-0.5 mg/3 mL nebulizer solution, USE 1 VIAL VIA NEBULIZER EVERY 6 HOURS AS NEEDED FOR WHEEZING OR SHORTNESS OF BREATH, Disp: 180 mL, Rfl: 0    azelastine (ASTELIN) 137 mcg (0.1 %) nasal spray, SPRAY ONCE IN EACH NOSTRIL TWICE DAILY, Disp: 30 mL, Rfl: 3    blood sugar diagnostic (ONETOUCH ULTRA BLUE TEST STRIP) Strp, Use strips to take blood sugar bid, Disp: 200 strip, Rfl: 2    budesonide (PULMICORT) 0.5 mg/2 mL nebulizer solution, ADD 1 VIAL TO SINUS RINSE AND USE TWICE DAILY AS DIRECTED, Disp:  120 mL, Rfl: 3    cyclobenzaprine (FLEXERIL) 10 MG tablet, Take 10 mg by mouth every evening., Disp: , Rfl:     diazePAM (VALIUM) 5 MG tablet, TAKE 1 TO 2 TABLETS BY MOUTH EVERY NIGHT AT BEDTIME, Disp: 60 tablet, Rfl: 0    diclofenac sodium (VOLTAREN) 1 % Gel, Apply 2 g topically 3 (three) times daily., Disp: 100 g, Rfl: 1    dicyclomine (BENTYL) 20 mg tablet, TAKE 1 TABLET(20 MG) BY MOUTH TWICE DAILY, Disp: 60 tablet, Rfl: 0    doxepin (SINEQUAN) 150 MG Cap, Take 1 capsule (150 mg total) by mouth every evening., Disp: 90 capsule, Rfl: 1    ergocalciferol (ERGOCALCIFEROL) 50,000 unit Cap, TAKE 1 CAPSULE BY MOUTH EVERY 7 DAYS, Disp: 4 capsule, Rfl: 3    ezetimibe (ZETIA) 10 mg tablet, Take 1 tablet (10 mg total) by mouth once daily., Disp: 90 tablet, Rfl: 1    ferrous sulfate (FEROSUL) 325 mg (65 mg iron) Tab tablet, Take 1 tablet (325 mg total) by mouth once daily., Disp: 90 tablet, Rfl: 1    fluticasone propionate (FLONASE) 50 mcg/actuation nasal spray, SHAKE LIQUID AND USE 1 SPRAY(50 MCG) IN EACH NOSTRIL EVERY DAY, Disp: 48 g, Rfl: 1    gabapentin (NEURONTIN) 300 MG capsule, TAKE 1 CAPSULE BY MOUTH AT BEDTIME, Disp: 90 capsule, Rfl: 1    ibuprofen (ADVIL,MOTRIN) 600 MG tablet, TAKE 1 TABLET(600 MG) BY MOUTH TWICE DAILY AS NEEDED FOR PAIN, Disp: 60 tablet, Rfl: 1    immun glob G,IgG,-gly-IgA ov50 (CUVITRU) 10 gram/50 mL (20 %) Soln, Inject 12 g into the skin once a week., Disp: 200 mL, Rfl: 12    levocetirizine (XYZAL) 5 MG tablet, TAKE 1 TABLET(5 MG) BY MOUTH EVERY EVENING, Disp: 90 tablet, Rfl: 1    metFORMIN (GLUCOPHAGE) 500 MG tablet, Take 1 tablet (500 mg total) by mouth 2 (two) times daily with meals., Disp: 60 tablet, Rfl: 5    midodrine (PROAMATINE) 2.5 MG Tab, , Disp: , Rfl:     ondansetron (ZOFRAN) 4 MG tablet, Take 1 tablet (4 mg total) by mouth every 6 (six) hours., Disp: 12 tablet, Rfl: 0    ondansetron (ZOFRAN-ODT) 4 MG TbDL, DISSOLVE 2 TABLETS UNDER THE TONGUE EVERY 8 HOURS AS NEEDED, Disp: 30  tablet, Rfl: 5    OXYGEN-AIR DELIVERY SYSTEMS [facility] [place], by Misc.(Non-Drug; Combo Route) route., Disp: , Rfl:     pravastatin (PRAVACHOL) 80 MG tablet, Take 1 tablet (80 mg total) by mouth once daily., Disp: 90 tablet, Rfl: 0    semaglutide (OZEMPIC) 1 mg/dose (4 mg/3 mL), Inject 1 mg into the skin every 7 days., Disp: 3 mL, Rfl: 2    XIIDRA 5 % Dpet, , Disp: , Rfl:     cyanocobalamin 1,000 mcg/mL injection, Inject 1 mL (1,000 mcg total) into the muscle every 28 days., Disp: 10 mL, Rfl: 0    esomeprazole (NEXIUM) 40 MG capsule, Take 1 capsule (40 mg total) by mouth before breakfast., Disp: 30 capsule, Rfl: 11    tamsulosin (FLOMAX) 0.4 mg Cap, Take 1 capsule (0.4 mg total) by mouth once daily. (Patient not taking: Reported on 9/14/2023), Disp: 10 capsule, Rfl: 0    Review of Systems   Constitutional:  Negative for appetite change, chills, diaphoresis, fatigue, fever and unexpected weight change.   HENT:  Negative for congestion, ear pain, hearing loss, nosebleeds, postnasal drip, sinus pressure, sinus pain, sneezing, sore throat, tinnitus, trouble swallowing and voice change.    Eyes:  Negative for photophobia, pain, itching and visual disturbance.   Respiratory:  Positive for shortness of breath. Negative for apnea, cough, chest tightness, wheezing and stridor.    Cardiovascular:  Negative for chest pain, palpitations and leg swelling.   Gastrointestinal:  Positive for abdominal pain and nausea. Negative for abdominal distention, blood in stool, constipation, diarrhea and vomiting.   Endocrine: Negative for cold intolerance, heat intolerance, polydipsia and polyuria.   Genitourinary:  Negative for difficulty urinating, dyspareunia, dysuria, flank pain, frequency, hematuria, menstrual problem, pelvic pain, urgency, vaginal discharge and vaginal pain.        HPI-kidney stones   Musculoskeletal:  Positive for arthralgias. Negative for back pain, joint swelling, myalgias, neck pain and neck stiffness.   Skin:  Negative for  "pallor.   Allergic/Immunologic: Negative for environmental allergies and food allergies.   Neurological:  Negative for dizziness, tremors, speech difficulty, weakness, light-headedness and numbness.   Hematological:  Does not bruise/bleed easily.   Psychiatric/Behavioral:  Negative for agitation, confusion, decreased concentration, sleep disturbance and suicidal ideas. The patient is not nervous/anxious.           Objective:          7/31/2023     3:17 PM 8/23/2023     8:58 AM 8/30/2023     8:09 AM 9/8/2023     3:38 AM 9/14/2023     1:04 PM 9/14/2023     3:19 PM   Vitals - 1 value per visit   SYSTOLIC 138  153 186 128 126   DIASTOLIC 88  104 97 82 84   Pulse 90  90 86  86   Temp 98.4 °F (36.9 °C)   97.6 °F (36.4 °C)  99 °F (37.2 °C)   Resp 16   18  16   SPO2 98 %  98 % 99 %  96 %   Weight (lb) 189 188.93 187  187 183   Weight (kg) 85.73 85.7 84.823  84.823 83.008   Height 5' 2" (1.575 m) 5' 2" (1.575 m)   5' 2" (1.575 m) 5' 2" (1.575 m)   BMI (Calculated) 34.6 34.5   34.2 33.5   Pain Score Six  Zero  Four Zero   (    Physical Exam  Vitals and nursing note reviewed.   Constitutional:       General: She is not in acute distress.     Appearance: She is obese. She is not ill-appearing.   HENT:      Head: Normocephalic and atraumatic.   Eyes:      Extraocular Movements: Extraocular movements intact.      Pupils: Pupils are equal, round, and reactive to light.   Neck:      Vascular: No carotid bruit.   Cardiovascular:      Rate and Rhythm: Normal rate and regular rhythm.      Pulses: Normal pulses.      Heart sounds: Normal heart sounds. No murmur heard.  Pulmonary:      Effort: Pulmonary effort is normal. No respiratory distress.      Breath sounds: Normal breath sounds. No wheezing.   Abdominal:      General: There is no distension.      Palpations: Abdomen is soft.      Tenderness: There is abdominal tenderness (RUQ).      Hernia: No hernia is present.   Musculoskeletal:      Cervical back: No rigidity or tenderness. "      Right lower leg: No edema.      Left lower leg: No edema.   Lymphadenopathy:      Cervical: No cervical adenopathy.   Skin:     General: Skin is warm and dry.      Capillary Refill: Capillary refill takes less than 2 seconds.      Findings: No lesion or rash.   Neurological:      General: No focal deficit present.      Mental Status: She is alert and oriented to person, place, and time.   Psychiatric:         Mood and Affect: Mood normal.         Thought Content: Thought content normal.         Judgment: Judgment normal.           Assessment:       1. Gallbladder disease    2. Kidney stones    3. Fatigue, unspecified type    4. GERD without esophagitis    5. Flu vaccine need         Plan:       Gallbladder disease  -     Ambulatory referral/consult to General Surgery; Future; Expected date: 09/21/2023    Kidney stones  -     Ambulatory referral/consult to Urology; Future; Expected date: 09/21/2023    Fatigue, unspecified type  -     cyanocobalamin 1,000 mcg/mL injection; Inject 1 mL (1,000 mcg total) into the muscle every 28 days.  Dispense: 10 mL; Refill: 0    GERD without esophagitis  -     esomeprazole (NEXIUM) 40 MG capsule; Take 1 capsule (40 mg total) by mouth before breakfast.  Dispense: 30 capsule; Refill: 11    Flu vaccine need  -     Cancel: Influenza - Quadrivalent - High Dose (65+) (PF) (IM)  -     Influenza - Quadrivalent *Preferred* (6 months+) (PF)      Follow up in about 3 months (around 12/14/2023) for FOLLOW UP LABS, FOLLOW-UP STATUS, FOLLOW UP MEDICATIONS.        9/14/2023 Mine Palmer M.D.

## 2023-09-14 ENCOUNTER — OFFICE VISIT (OUTPATIENT)
Dept: ORTHOPEDICS | Facility: CLINIC | Age: 48
End: 2023-09-14
Payer: MEDICAID

## 2023-09-14 ENCOUNTER — OFFICE VISIT (OUTPATIENT)
Dept: FAMILY MEDICINE | Facility: CLINIC | Age: 48
End: 2023-09-14
Payer: MEDICAID

## 2023-09-14 VITALS
HEART RATE: 86 BPM | RESPIRATION RATE: 16 BRPM | SYSTOLIC BLOOD PRESSURE: 126 MMHG | TEMPERATURE: 99 F | DIASTOLIC BLOOD PRESSURE: 84 MMHG | BODY MASS INDEX: 33.68 KG/M2 | HEIGHT: 62 IN | OXYGEN SATURATION: 96 % | WEIGHT: 183 LBS

## 2023-09-14 VITALS
DIASTOLIC BLOOD PRESSURE: 82 MMHG | SYSTOLIC BLOOD PRESSURE: 128 MMHG | BODY MASS INDEX: 34.41 KG/M2 | WEIGHT: 187 LBS | HEIGHT: 62 IN

## 2023-09-14 DIAGNOSIS — Z23 FLU VACCINE NEED: ICD-10-CM

## 2023-09-14 DIAGNOSIS — S83.241A TEAR OF MEDIAL MENISCUS OF RIGHT KNEE, UNSPECIFIED TEAR TYPE, UNSPECIFIED WHETHER OLD OR CURRENT TEAR, INITIAL ENCOUNTER: Primary | ICD-10-CM

## 2023-09-14 DIAGNOSIS — K21.9 GERD WITHOUT ESOPHAGITIS: ICD-10-CM

## 2023-09-14 DIAGNOSIS — K82.9 GALLBLADDER DISEASE: Primary | ICD-10-CM

## 2023-09-14 DIAGNOSIS — R53.83 FATIGUE, UNSPECIFIED TYPE: ICD-10-CM

## 2023-09-14 DIAGNOSIS — N20.0 KIDNEY STONES: ICD-10-CM

## 2023-09-14 PROCEDURE — 3066F PR DOCUMENTATION OF TREATMENT FOR NEPHROPATHY: ICD-10-PCS | Mod: CPTII,,, | Performed by: INTERNAL MEDICINE

## 2023-09-14 PROCEDURE — 3044F HG A1C LEVEL LT 7.0%: CPT | Mod: CPTII,S$GLB,, | Performed by: ORTHOPAEDIC SURGERY

## 2023-09-14 PROCEDURE — 3061F NEG MICROALBUMINURIA REV: CPT | Mod: CPTII,S$GLB,, | Performed by: ORTHOPAEDIC SURGERY

## 2023-09-14 PROCEDURE — 99214 PR OFFICE/OUTPT VISIT, EST, LEVL IV, 30-39 MIN: ICD-10-PCS | Mod: 25,S$PBB,, | Performed by: INTERNAL MEDICINE

## 2023-09-14 PROCEDURE — 3079F PR MOST RECENT DIASTOLIC BLOOD PRESSURE 80-89 MM HG: ICD-10-PCS | Mod: CPTII,S$GLB,, | Performed by: ORTHOPAEDIC SURGERY

## 2023-09-14 PROCEDURE — 1159F PR MEDICATION LIST DOCUMENTED IN MEDICAL RECORD: ICD-10-PCS | Mod: CPTII,,, | Performed by: INTERNAL MEDICINE

## 2023-09-14 PROCEDURE — 99213 OFFICE O/P EST LOW 20 MIN: CPT | Mod: S$GLB,,, | Performed by: ORTHOPAEDIC SURGERY

## 2023-09-14 PROCEDURE — 3066F NEPHROPATHY DOC TX: CPT | Mod: CPTII,S$GLB,, | Performed by: ORTHOPAEDIC SURGERY

## 2023-09-14 PROCEDURE — 3079F DIAST BP 80-89 MM HG: CPT | Mod: CPTII,,, | Performed by: INTERNAL MEDICINE

## 2023-09-14 PROCEDURE — 99213 PR OFFICE/OUTPT VISIT, EST, LEVL III, 20-29 MIN: ICD-10-PCS | Mod: S$GLB,,, | Performed by: ORTHOPAEDIC SURGERY

## 2023-09-14 PROCEDURE — 3074F SYST BP LT 130 MM HG: CPT | Mod: CPTII,,, | Performed by: INTERNAL MEDICINE

## 2023-09-14 PROCEDURE — 99999PBSHW FLU VACCINE (QUAD) GREATER THAN OR EQUAL TO 3YO PRESERVATIVE FREE IM: ICD-10-PCS | Mod: PBBFAC,,,

## 2023-09-14 PROCEDURE — 3008F BODY MASS INDEX DOCD: CPT | Mod: CPTII,,, | Performed by: INTERNAL MEDICINE

## 2023-09-14 PROCEDURE — 3079F DIAST BP 80-89 MM HG: CPT | Mod: CPTII,S$GLB,, | Performed by: ORTHOPAEDIC SURGERY

## 2023-09-14 PROCEDURE — 3066F NEPHROPATHY DOC TX: CPT | Mod: CPTII,,, | Performed by: INTERNAL MEDICINE

## 2023-09-14 PROCEDURE — 3044F HG A1C LEVEL LT 7.0%: CPT | Mod: CPTII,,, | Performed by: INTERNAL MEDICINE

## 2023-09-14 PROCEDURE — 99214 OFFICE O/P EST MOD 30 MIN: CPT | Mod: 25,S$PBB,, | Performed by: INTERNAL MEDICINE

## 2023-09-14 PROCEDURE — 3008F BODY MASS INDEX DOCD: CPT | Mod: CPTII,S$GLB,, | Performed by: ORTHOPAEDIC SURGERY

## 2023-09-14 PROCEDURE — 3008F PR BODY MASS INDEX (BMI) DOCUMENTED: ICD-10-PCS | Mod: CPTII,S$GLB,, | Performed by: ORTHOPAEDIC SURGERY

## 2023-09-14 PROCEDURE — 3044F PR MOST RECENT HEMOGLOBIN A1C LEVEL <7.0%: ICD-10-PCS | Mod: CPTII,,, | Performed by: INTERNAL MEDICINE

## 2023-09-14 PROCEDURE — 3074F SYST BP LT 130 MM HG: CPT | Mod: CPTII,S$GLB,, | Performed by: ORTHOPAEDIC SURGERY

## 2023-09-14 PROCEDURE — 99999PBSHW FLU VACCINE (QUAD) GREATER THAN OR EQUAL TO 3YO PRESERVATIVE FREE IM: Mod: PBBFAC,,,

## 2023-09-14 PROCEDURE — 90471 IMMUNIZATION ADMIN: CPT | Mod: PBBFAC | Performed by: INTERNAL MEDICINE

## 2023-09-14 PROCEDURE — 1159F MED LIST DOCD IN RCRD: CPT | Mod: CPTII,,, | Performed by: INTERNAL MEDICINE

## 2023-09-14 PROCEDURE — 3061F PR NEG MICROALBUMINURIA RESULT DOCUMENTED/REVIEW: ICD-10-PCS | Mod: CPTII,S$GLB,, | Performed by: ORTHOPAEDIC SURGERY

## 2023-09-14 PROCEDURE — 3074F PR MOST RECENT SYSTOLIC BLOOD PRESSURE < 130 MM HG: ICD-10-PCS | Mod: CPTII,,, | Performed by: INTERNAL MEDICINE

## 2023-09-14 PROCEDURE — 3044F PR MOST RECENT HEMOGLOBIN A1C LEVEL <7.0%: ICD-10-PCS | Mod: CPTII,S$GLB,, | Performed by: ORTHOPAEDIC SURGERY

## 2023-09-14 PROCEDURE — 3074F PR MOST RECENT SYSTOLIC BLOOD PRESSURE < 130 MM HG: ICD-10-PCS | Mod: CPTII,S$GLB,, | Performed by: ORTHOPAEDIC SURGERY

## 2023-09-14 PROCEDURE — 3079F PR MOST RECENT DIASTOLIC BLOOD PRESSURE 80-89 MM HG: ICD-10-PCS | Mod: CPTII,,, | Performed by: INTERNAL MEDICINE

## 2023-09-14 PROCEDURE — 3066F PR DOCUMENTATION OF TREATMENT FOR NEPHROPATHY: ICD-10-PCS | Mod: CPTII,S$GLB,, | Performed by: ORTHOPAEDIC SURGERY

## 2023-09-14 PROCEDURE — 3061F PR NEG MICROALBUMINURIA RESULT DOCUMENTED/REVIEW: ICD-10-PCS | Mod: CPTII,,, | Performed by: INTERNAL MEDICINE

## 2023-09-14 PROCEDURE — 3008F PR BODY MASS INDEX (BMI) DOCUMENTED: ICD-10-PCS | Mod: CPTII,,, | Performed by: INTERNAL MEDICINE

## 2023-09-14 PROCEDURE — 99214 OFFICE O/P EST MOD 30 MIN: CPT | Performed by: INTERNAL MEDICINE

## 2023-09-14 PROCEDURE — 3061F NEG MICROALBUMINURIA REV: CPT | Mod: CPTII,,, | Performed by: INTERNAL MEDICINE

## 2023-09-14 RX ORDER — ESOMEPRAZOLE MAGNESIUM 40 MG/1
40 CAPSULE, DELAYED RELEASE ORAL
COMMUNITY
End: 2023-09-14 | Stop reason: SDUPTHER

## 2023-09-14 RX ORDER — CYANOCOBALAMIN 1000 UG/ML
1000 INJECTION, SOLUTION INTRAMUSCULAR; SUBCUTANEOUS
Qty: 10 ML | Refills: 0 | Status: SHIPPED | OUTPATIENT
Start: 2023-09-14 | End: 2023-10-16

## 2023-09-14 RX ORDER — ESOMEPRAZOLE MAGNESIUM 40 MG/1
40 CAPSULE, DELAYED RELEASE ORAL
Qty: 30 CAPSULE | Refills: 11 | Status: SHIPPED | OUTPATIENT
Start: 2023-09-14 | End: 2024-01-14 | Stop reason: SDUPTHER

## 2023-09-14 NOTE — PROGRESS NOTES
[facility]    Subjective:     Chief Complaint:   Chief Complaint   Patient presents with    Right Knee - Pain     RT knee follow up, injection given 23 didn't really give relief. She has pain with walking, standing, rising from sitting       Past Medical History:   Diagnosis Date    Allergic rhinitis     Arthritis     Chronic anxiety 10/23/2018    Connective tissue disease     joints b/c lupus    Diabetes mellitus     Fibromyalgia     GERD (gastroesophageal reflux disease)     Grade II hemorrhoids 2019    Hyperlipemia     Interstitial lung disease     Lupus     Lupus     Sjogren's syndrome 2019       Past Surgical History:   Procedure Laterality Date    AUGMENTATION OF BREAST      BREAST SURGERY      BRONCHOSCOPY WITH FLUOROSCOPY N/A 2019    Procedure: BRONCHOSCOPY, WITH FLUOROSCOPY;  Surgeon: Nate Tarango MD;  Location: [facility] ENDO;  Service: Pulmonary;  Laterality: N/A;    CATHETERIZATION OF BOTH LEFT AND RIGHT HEART Left 2021    Procedure: CATHETERIZATION, HEART, BOTH LEFT AND RIGHT;  Surgeon: Luca Wilks MD;  Location: [facility] CATH/EP LAB;  Service: Cardiology;  Laterality: Left;     SECTION  ,    COLONOSCOPY      ESOPHAGOGASTRODUODENOSCOPY      gastric sleeve  2010    HYSTERECTOMY  2010    TONSILLECTOMY  1996    TUMOR REMOVAL      benign fatty       Current Outpatient Medications   Medication Sig    ACCU-CHEK SOFTCLIX LANCETS Misc USE ONCE DAILY AS NEEDED    albuterol (PROVENTIL) 2.5 mg /3 mL (0.083 %) nebulizer solution Take 3 mLs (2.5 mg total) by nebulization every 4 (four) hours.    albuterol (PROVENTIL/VENTOLIN HFA) 90 mcg/actuation inhaler INHALE 1 PUFF INTO THE LUNGS ONCE DAILY    albuterol-ipratropium (DUO-NEB) 2.5 mg-0.5 mg/3 mL nebulizer solution USE 1 VIAL VIA NEBULIZER EVERY 6 HOURS AS NEEDED FOR WHEEZING OR SHORTNESS OF BREATH    azelastine (ASTELIN) 137 mcg (0.1 %) nasal spray SPRAY ONCE IN EACH NOSTRIL TWICE DAILY    blood sugar  diagnostic (Altai TechnologiesTOUCH ULTRA BLUE TEST STRIP) Strp Use strips to take blood sugar bid    budesonide (PULMICORT) 0.5 mg/2 mL nebulizer solution ADD 1 VIAL TO SINUS RINSE AND USE TWICE DAILY AS DIRECTED    cyclobenzaprine (FLEXERIL) 10 MG tablet Take 10 mg by mouth every evening.    diazePAM (VALIUM) 5 MG tablet TAKE 1 TO 2 TABLETS BY MOUTH EVERY NIGHT AT BEDTIME    diclofenac sodium (VOLTAREN) 1 % Gel Apply 2 g topically 3 (three) times daily.    dicyclomine (BENTYL) 20 mg tablet TAKE 1 TABLET(20 MG) BY MOUTH TWICE DAILY    doxepin (SINEQUAN) 150 MG Cap Take 1 capsule (150 mg total) by mouth every evening.    ergocalciferol (ERGOCALCIFEROL) 50,000 unit Cap TAKE 1 CAPSULE BY MOUTH EVERY 7 DAYS    ezetimibe (ZETIA) 10 mg tablet Take 1 tablet (10 mg total) by mouth once daily.    ferrous sulfate (FEROSUL) 325 mg (65 mg iron) Tab tablet Take 1 tablet (325 mg total) by mouth once daily.    fluticasone propionate (FLONASE) 50 mcg/actuation nasal spray SHAKE LIQUID AND USE 1 SPRAY(50 MCG) IN EACH NOSTRIL EVERY DAY    gabapentin (NEURONTIN) 300 MG capsule TAKE 1 CAPSULE BY MOUTH AT BEDTIME    ibuprofen (ADVIL,MOTRIN) 600 MG tablet TAKE 1 TABLET(600 MG) BY MOUTH TWICE DAILY AS NEEDED FOR PAIN    immun glob G,IgG,-gly-IgA ov50 (CUVITRU) 10 gram/50 mL (20 %) Soln Inject 12 g into the skin once a week.    levocetirizine (XYZAL) 5 MG tablet TAKE 1 TABLET(5 MG) BY MOUTH EVERY EVENING    metFORMIN (GLUCOPHAGE) 500 MG tablet Take 1 tablet (500 mg total) by mouth 2 (two) times daily with meals.    midodrine (PROAMATINE) 2.5 MG Tab     omeprazole (PRILOSEC) 40 MG capsule Take 1 capsule (40 mg total) by mouth once daily.    ondansetron (ZOFRAN) 4 MG tablet Take 1 tablet (4 mg total) by mouth every 6 (six) hours.    ondansetron (ZOFRAN-ODT) 4 MG TbDL DISSOLVE 2 TABLETS UNDER THE TONGUE EVERY 8 HOURS AS NEEDED    OXYGEN-AIR DELIVERY SYSTEMS MISC by Misc.(Non-Drug; Combo Route) route.    pravastatin (PRAVACHOL) 80 MG tablet Take 1 tablet  (80 mg total) by mouth once daily.    semaglutide (OZEMPIC) 1 mg/dose (4 mg/3 mL) Inject 1 mg into the skin every 7 days.    tamsulosin (FLOMAX) 0.4 mg Cap Take 1 capsule (0.4 mg total) by mouth once daily.    XIIDRA 5 % Dpet      No current facility-administered medications for this visit.       Review of patient's allergies indicates:   Allergen Reactions    Ativan [lorazepam] Other (See Comments)     depression    Hay fever and allergy relief     Mobic [meloxicam] Other (See Comments)     Spikes blood pressure    Nitroglycerin      Pt says it could stop her heart       Family History   Problem Relation Age of Onset    Early death Father     Heart disease Father     Stroke Father     Kidney disease Father     Diabetes Paternal Grandmother     Cancer Paternal Grandmother     Raynaud syndrome Mother     Uterine cancer Mother     Breast cancer Mother     Raynaud syndrome Sister     Polycystic ovary syndrome Daughter     Diabetes Maternal Grandmother     Heart disease Maternal Grandmother     Kidney disease Maternal Grandmother     Breast cancer Maternal Grandmother     Heart disease Maternal Grandfather     Cancer Paternal Grandfather     No Known Problems Daughter        Social History     Socioeconomic History    Marital status: Single   Tobacco Use    Smoking status: Every Day     Current packs/day: 1.00     Average packs/day: 1 pack/day for 25.6 years (25.6 ttl pk-yrs)     Types: Cigarettes     Start date: 2/9/1998    Smokeless tobacco: Never   Substance and Sexual Activity    Alcohol use: Not Currently    Drug use: No    Sexual activity: Yes     Partners: Male     Birth control/protection: None     Social Determinants of Health     Financial Resource Strain: Unknown (9/14/2023)    Overall Financial Resource Strain (CARDIA)     Difficulty of Paying Living Expenses: Patient refused   Recent Concern: Financial Resource Strain - High Risk (7/31/2023)    Overall Financial Resource Strain (CARDIA)     Difficulty of  Paying Living Expenses: Very hard   Food Insecurity: Unknown (9/14/2023)    Hunger Vital Sign     Worried About Running Out of Food in the Last Year: Patient refused     Ran Out of Food in the Last Year: Patient refused   Recent Concern: Food Insecurity - Food Insecurity Present (7/18/2023)    Hunger Vital Sign     Worried About Running Out of Food in the Last Year: Patient refused     Ran Out of Food in the Last Year: Often true   Transportation Needs: Unknown (9/14/2023)    PRAPARE - Transportation     Lack of Transportation (Medical): Patient refused     Lack of Transportation (Non-Medical): Patient refused   Recent Concern: Transportation Needs - Unmet Transportation Needs (7/31/2023)    PRAPARE - Transportation     Lack of Transportation (Medical): Yes     Lack of Transportation (Non-Medical): Yes   Physical Activity: Inactive (9/14/2023)    Exercise Vital Sign     Days of Exercise per Week: 0 days     Minutes of Exercise per Session: 10 min   Stress: Stress Concern Present (9/14/2023)    [employer] - Occupational Stress Questionnaire     Feeling of Stress : To some extent   Social Connections: Unknown (9/14/2023)    Social Connection and Isolation Panel [NHANES]     Frequency of Communication with Friends and Family: Patient refused     Frequency of Social Gatherings with Friends and Family: Patient refused     Active Member of Clubs or Organizations: No     Attends Club or Organization Meetings: Patient refused     Marital Status: Patient refused   Housing Stability: Unknown (9/14/2023)    Housing Stability Vital Sign     Unable to Pay for Housing in the Last Year: Patient refused     Number of Places Lived in the Last Year: 1     Unstable Housing in the Last Year: Patient refused   Recent Concern: Housing Stability - High Risk (7/18/2023)    Housing Stability Vital Sign     Unable to Pay for Housing in the Last Year: Yes     Number of Places Lived in the Last Year: 1      Unstable Housing in the Last Year: Patient refused       History of present illness:  Promise comes in today for follow-up for her right knee.  Unfortunately, she did not have much relief with her intra-articular injection.  She is tried various NSAIDs as well without relief.  Activity modification has not been beneficial either.  She continues to complain of pain, feelings of instability and buckling and is if the knee wants to give way.  She has been unable to attend physical therapy due to her multiple medical problems.  These were listed in her chart.    Review of Systems:    Constitution: Negative for chills, fever, and sweats.  Negative for unexplained weight loss.    HENT:  Negative for headaches and blurry vision.    Cardiovascular:Negative for chest pain or irregular heart beat. Negative for hypertension.    Respiratory:  Negative for cough and shortness of breath.    Gastrointestinal: Negative for abdominal pain, heartburn, melena, nausea, and vomitting.    Genitourinary:  Negative bladder incontinence and dysuria.    Musculoskeletal:  See HPI for details.     Neurological: Negative for numbness.    Psychiatric/Behavioral: Negative for depression.  The patient is not nervous/anxious.      Endocrine: Negative for polyuria    Hematologic/Lymphatic: Negative for bleeding problem.  Does not bruise/bleed easily.    Skin: Negative for poor would healing and rash    Objective:      Physical Examination:    Vital Signs:    Vitals:    09/14/23 1304   BP: 128/82       Body mass index is 34.2 kg/m².    This a well-developed, well nourished patient in no acute distress.  They are alert and oriented and cooperative to examination.        Right knee exam: Skin to the right knee is clean dry and intact.  No erythema or ecchymosis.  No signs or symptoms of infection.  She is neurovascularly intact throughout the right lower extremity.  Right calf is soft and nontender.  Right knee range of motion 0-120 degrees.  Right  "knee is stable to varus and valgus stresses.  She does have a positive Damaris's for reproduction of pain along the medial aspect of the right knee but no palpable pop or click.  She does have medial joint line tenderness.  No real lateral joint line tenderness.  She is also tender to palpation about the mediolateral patellar facets.  She can weightbear as tolerated on her right lower extremity.  She has a mildly antalgic gait.      Pertinent New Results:    XRAY Report / Interpretation:   No new radiographs were taken on today's clinic visit.    Assessment/Plan:      1. Right knee medial meniscus tear.    2. Right knee chondromalacia patella.      Patient is not improved with multiple conservative treatment measures.  I would like to proceed with an MRI of the right knee to evaluate for any internal derangement.  Will have her follow-up with that data and make further orthopedic treatment recommendations from there.    Crow Chavis, Physician Assistant, served in the capacity as a "scribe" for this patient encounter.  A "face-to-face" encounter occurred with Dr. Gustabo Zhao on this date.  The treatment plan and medical decision-making is outlined above. Patient was seen and examined with a chaperone.       This note was created using Dragon voice recognition software that occasionally misinterpreted phrases or words.          "

## 2023-09-18 ENCOUNTER — PATIENT MESSAGE (OUTPATIENT)
Dept: ADMINISTRATIVE | Facility: OTHER | Age: 48
End: 2023-09-18
Payer: MEDICAID

## 2023-09-19 ENCOUNTER — PATIENT MESSAGE (OUTPATIENT)
Dept: FAMILY MEDICINE | Facility: CLINIC | Age: 48
End: 2023-09-19

## 2023-09-19 RX ORDER — SEMAGLUTIDE 2.68 MG/ML
2 INJECTION, SOLUTION SUBCUTANEOUS
Qty: 9 ML | Refills: 1 | Status: SHIPPED | OUTPATIENT
Start: 2023-09-19 | End: 2023-11-19 | Stop reason: SDUPTHER

## 2023-09-20 ENCOUNTER — PATIENT MESSAGE (OUTPATIENT)
Dept: FAMILY MEDICINE | Facility: CLINIC | Age: 48
End: 2023-09-20

## 2023-09-21 ENCOUNTER — OFFICE VISIT (OUTPATIENT)
Dept: SURGERY | Facility: CLINIC | Age: 48
End: 2023-09-21
Payer: MEDICAID

## 2023-09-21 VITALS
BODY MASS INDEX: 33.49 KG/M2 | SYSTOLIC BLOOD PRESSURE: 135 MMHG | TEMPERATURE: 98 F | HEART RATE: 85 BPM | HEIGHT: 62 IN | WEIGHT: 182 LBS | DIASTOLIC BLOOD PRESSURE: 92 MMHG

## 2023-09-21 DIAGNOSIS — K82.8 BILIARY DYSKINESIA: Primary | ICD-10-CM

## 2023-09-21 PROCEDURE — 3066F NEPHROPATHY DOC TX: CPT | Mod: CPTII,S$GLB,, | Performed by: SURGERY

## 2023-09-21 PROCEDURE — 3008F PR BODY MASS INDEX (BMI) DOCUMENTED: ICD-10-PCS | Mod: CPTII,S$GLB,, | Performed by: SURGERY

## 2023-09-21 PROCEDURE — 3008F BODY MASS INDEX DOCD: CPT | Mod: CPTII,S$GLB,, | Performed by: SURGERY

## 2023-09-21 PROCEDURE — 3080F DIAST BP >= 90 MM HG: CPT | Mod: CPTII,S$GLB,, | Performed by: SURGERY

## 2023-09-21 PROCEDURE — 3066F PR DOCUMENTATION OF TREATMENT FOR NEPHROPATHY: ICD-10-PCS | Mod: CPTII,S$GLB,, | Performed by: SURGERY

## 2023-09-21 PROCEDURE — 3044F PR MOST RECENT HEMOGLOBIN A1C LEVEL <7.0%: ICD-10-PCS | Mod: CPTII,S$GLB,, | Performed by: SURGERY

## 2023-09-21 PROCEDURE — 3061F PR NEG MICROALBUMINURIA RESULT DOCUMENTED/REVIEW: ICD-10-PCS | Mod: CPTII,S$GLB,, | Performed by: SURGERY

## 2023-09-21 PROCEDURE — 1160F RVW MEDS BY RX/DR IN RCRD: CPT | Mod: CPTII,S$GLB,, | Performed by: SURGERY

## 2023-09-21 PROCEDURE — 3044F HG A1C LEVEL LT 7.0%: CPT | Mod: CPTII,S$GLB,, | Performed by: SURGERY

## 2023-09-21 PROCEDURE — 3080F PR MOST RECENT DIASTOLIC BLOOD PRESSURE >= 90 MM HG: ICD-10-PCS | Mod: CPTII,S$GLB,, | Performed by: SURGERY

## 2023-09-21 PROCEDURE — 3075F PR MOST RECENT SYSTOLIC BLOOD PRESS GE 130-139MM HG: ICD-10-PCS | Mod: CPTII,S$GLB,, | Performed by: SURGERY

## 2023-09-21 PROCEDURE — 3061F NEG MICROALBUMINURIA REV: CPT | Mod: CPTII,S$GLB,, | Performed by: SURGERY

## 2023-09-21 PROCEDURE — 3075F SYST BP GE 130 - 139MM HG: CPT | Mod: CPTII,S$GLB,, | Performed by: SURGERY

## 2023-09-21 PROCEDURE — 1159F PR MEDICATION LIST DOCUMENTED IN MEDICAL RECORD: ICD-10-PCS | Mod: CPTII,S$GLB,, | Performed by: SURGERY

## 2023-09-21 PROCEDURE — 1159F MED LIST DOCD IN RCRD: CPT | Mod: CPTII,S$GLB,, | Performed by: SURGERY

## 2023-09-21 PROCEDURE — 99204 PR OFFICE/OUTPT VISIT, NEW, LEVL IV, 45-59 MIN: ICD-10-PCS | Mod: S$GLB,,, | Performed by: SURGERY

## 2023-09-21 PROCEDURE — 99204 OFFICE O/P NEW MOD 45 MIN: CPT | Mod: S$GLB,,, | Performed by: SURGERY

## 2023-09-21 PROCEDURE — 1160F PR REVIEW ALL MEDS BY PRESCRIBER/CLIN PHARMACIST DOCUMENTED: ICD-10-PCS | Mod: CPTII,S$GLB,, | Performed by: SURGERY

## 2023-09-21 NOTE — PROGRESS NOTES
Subjective:       Patient ID: Promise Medrano is a 48 y.o. female.    Chief Complaint: Consult      HPI:  48 year old female history of multiple medical issues including interstitial lung diseae on home O2, DM, Lupus. She has few months of RUQ pain worse with meals. US and CT ok. CHRISTINA has EF at 19 percent. She has past abominal surgial history of lap gatric sleeve, hysterctomy.     Past Medical History:   Diagnosis Date    Allergic rhinitis     Arthritis     Chronic anxiety 10/23/2018    Connective tissue disease     joints b/c lupus    Diabetes mellitus     Fibromyalgia     GERD (gastroesophageal reflux disease)     Grade II hemorrhoids 2019    Hyperlipemia     Interstitial lung disease     Lupus     Lupus     Sjogren's syndrome 2019     Past Surgical History:   Procedure Laterality Date    AUGMENTATION OF BREAST      BREAST SURGERY      BRONCHOSCOPY WITH FLUOROSCOPY N/A 2019    Procedure: BRONCHOSCOPY, WITH FLUOROSCOPY;  Surgeon: Nate Tarango MD;  Location: [facility] ENDO;  Service: Pulmonary;  Laterality: N/A;    CATHETERIZATION OF BOTH LEFT AND RIGHT HEART Left 2021    Procedure: CATHETERIZATION, HEART, BOTH LEFT AND RIGHT;  Surgeon: Luca Wilks MD;  Location: [facility] CATH/EP LAB;  Service: Cardiology;  Laterality: Left;     SECTION  ,    COLONOSCOPY      ESOPHAGOGASTRODUODENOSCOPY      gastric sleeve  2010    HYSTERECTOMY  2010    TONSILLECTOMY  1996    TUMOR REMOVAL      benign fatty     Review of patient's allergies indicates:   Allergen Reactions    Ativan [lorazepam] Other (See Comments)     depression    Hay fever and allergy relief     Mobic [meloxicam] Other (See Comments)     Spikes blood pressure    Nitroglycerin      Pt says it could stop her heart     Medication List with Changes/Refills   Current Medications    ACCU-CHEK SOFTCLIX LANCETS MISC    USE ONCE DAILY AS NEEDED    ALBUTEROL (PROVENTIL) 2.5 MG /3 ML (0.083 %) NEBULIZER SOLUTION    Take 3 mLs (2.5 mg  total) by nebulization every 4 (four) hours.    ALBUTEROL (PROVENTIL/VENTOLIN HFA) 90 MCG/ACTUATION INHALER    INHALE 1 PUFF INTO THE LUNGS ONCE DAILY    ALBUTEROL-IPRATROPIUM (DUO-NEB) 2.5 MG-0.5 MG/3 ML NEBULIZER SOLUTION    USE 1 VIAL VIA NEBULIZER EVERY 6 HOURS AS NEEDED FOR WHEEZING OR SHORTNESS OF BREATH    AZELASTINE (ASTELIN) 137 MCG (0.1 %) NASAL SPRAY    SPRAY ONCE IN EACH NOSTRIL TWICE DAILY    BLOOD SUGAR DIAGNOSTIC (ONETOUCH ULTRA BLUE TEST STRIP) STRP    Use strips to take blood sugar bid    BUDESONIDE (PULMICORT) 0.5 MG/2 ML NEBULIZER SOLUTION    ADD 1 VIAL TO SINUS RINSE AND USE TWICE DAILY AS DIRECTED    CYANOCOBALAMIN 1,000 MCG/ML INJECTION    Inject 1 mL (1,000 mcg total) into the muscle every 28 days.    CYCLOBENZAPRINE (FLEXERIL) 10 MG TABLET    Take 10 mg by mouth every evening.    DIAZEPAM (VALIUM) 5 MG TABLET    TAKE 1 TO 2 TABLETS BY MOUTH EVERY NIGHT AT BEDTIME    DICLOFENAC SODIUM (VOLTAREN) 1 % GEL    Apply 2 g topically 3 (three) times daily.    DICYCLOMINE (BENTYL) 20 MG TABLET    TAKE 1 TABLET(20 MG) BY MOUTH TWICE DAILY    DOXEPIN (SINEQUAN) 150 MG CAP    Take 1 capsule (150 mg total) by mouth every evening.    ERGOCALCIFEROL (ERGOCALCIFEROL) 50,000 UNIT CAP    TAKE 1 CAPSULE BY MOUTH EVERY 7 DAYS    ESOMEPRAZOLE (NEXIUM) 40 MG CAPSULE    Take 1 capsule (40 mg total) by mouth before breakfast.    EZETIMIBE (ZETIA) 10 MG TABLET    Take 1 tablet (10 mg total) by mouth once daily.    FERROUS SULFATE (FEROSUL) 325 MG (65 MG IRON) TAB TABLET    Take 1 tablet (325 mg total) by mouth once daily.    FLUTICASONE PROPIONATE (FLONASE) 50 MCG/ACTUATION NASAL SPRAY    SHAKE LIQUID AND USE 1 SPRAY(50 MCG) IN EACH NOSTRIL EVERY DAY    GABAPENTIN (NEURONTIN) 300 MG CAPSULE    TAKE 1 CAPSULE BY MOUTH AT BEDTIME    IBUPROFEN (ADVIL,MOTRIN) 600 MG TABLET    TAKE 1 TABLET(600 MG) BY MOUTH TWICE DAILY AS NEEDED FOR PAIN    IMMUN GLOB G,IGG,-GLY-IGA OV50 (CUVITRU) 10 GRAM/50 ML (20 %) SOLN    Inject 12 g  into the skin once a week.    LEVOCETIRIZINE (XYZAL) 5 MG TABLET    TAKE 1 TABLET(5 MG) BY MOUTH EVERY EVENING    METFORMIN (GLUCOPHAGE) 500 MG TABLET    Take 1 tablet (500 mg total) by mouth 2 (two) times daily with meals.    MIDODRINE (PROAMATINE) 2.5 MG TAB        ONDANSETRON (ZOFRAN) 4 MG TABLET    Take 1 tablet (4 mg total) by mouth every 6 (six) hours.    ONDANSETRON (ZOFRAN-ODT) 4 MG TBDL    DISSOLVE 2 TABLETS UNDER THE TONGUE EVERY 8 HOURS AS NEEDED    OXYGEN-AIR DELIVERY SYSTEMS MISC    by Misc.(Non-Drug; Combo Route) route.    PRAVASTATIN (PRAVACHOL) 80 MG TABLET    Take 1 tablet (80 mg total) by mouth once daily.    SEMAGLUTIDE (OZEMPIC) 1 MG/DOSE (4 MG/3 ML)    Inject 1 mg into the skin every 7 days.    SEMAGLUTIDE (OZEMPIC) 2 MG/DOSE (8 MG/3 ML) PNIJ    Inject 2 mg into the skin every 7 days.    XIIDRA 5 % DPET       Discontinued Medications    TAMSULOSIN (FLOMAX) 0.4 MG CAP    Take 1 capsule (0.4 mg total) by mouth once daily.     Family History   Problem Relation Age of Onset    Early death Father     Heart disease Father     Stroke Father     Kidney disease Father     Diabetes Paternal Grandmother     Cancer Paternal Grandmother     Raynaud syndrome Mother     Uterine cancer Mother     Breast cancer Mother     Raynaud syndrome Sister     Polycystic ovary syndrome Daughter     Diabetes Maternal Grandmother     Heart disease Maternal Grandmother     Kidney disease Maternal Grandmother     Breast cancer Maternal Grandmother     Heart disease Maternal Grandfather     Cancer Paternal Grandfather     No Known Problems Daughter      Social History     Socioeconomic History    Marital status: Single   Tobacco Use    Smoking status: Every Day     Current packs/day: 1.00     Average packs/day: 1 pack/day for 25.6 years (25.6 ttl pk-yrs)     Types: Cigarettes     Start date: 2/9/1998    Smokeless tobacco: Never   Substance and Sexual Activity    Alcohol use: Not Currently    Drug use: No    Sexual activity: Yes      Partners: Male     Birth control/protection: None     Social Determinants of Health     Financial Resource Strain: Unknown (9/14/2023)    Overall Financial Resource Strain (CARDIA)     Difficulty of Paying Living Expenses: Patient refused   Recent Concern: Financial Resource Strain - High Risk (7/31/2023)    Overall Financial Resource Strain (CARDIA)     Difficulty of Paying Living Expenses: Very hard   Food Insecurity: Unknown (9/14/2023)    Hunger Vital Sign     Worried About Running Out of Food in the Last Year: Patient refused     Ran Out of Food in the Last Year: Patient refused   Recent Concern: Food Insecurity - Food Insecurity Present (7/18/2023)    Hunger Vital Sign     Worried About Running Out of Food in the Last Year: Patient refused     Ran Out of Food in the Last Year: Often true   Transportation Needs: Unknown (9/14/2023)    PRAPARE - Transportation     Lack of Transportation (Medical): Patient refused     Lack of Transportation (Non-Medical): Patient refused   Recent Concern: Transportation Needs - Unmet Transportation Needs (7/31/2023)    PRAPARE - Transportation     Lack of Transportation (Medical): Yes     Lack of Transportation (Non-Medical): Yes   Physical Activity: Inactive (9/14/2023)    Exercise Vital Sign     Days of Exercise per Week: 0 days     Minutes of Exercise per Session: 10 min   Stress: Stress Concern Present (9/14/2023)    [employer] - Occupational Stress Questionnaire     Feeling of Stress : To some extent   Social Connections: Unknown (9/14/2023)    Social Connection and Isolation Panel [NHANES]     Frequency of Communication with Friends and Family: Patient refused     Frequency of Social Gatherings with Friends and Family: Patient refused     Active Member of Clubs or Organizations: No     Attends Club or Organization Meetings: Patient refused     Marital Status: Patient refused   Housing Stability: Unknown (9/14/2023)    Housing Stability  Vital Sign     Unable to Pay for Housing in the Last Year: Patient refused     Number of Places Lived in the Last Year: 1     Unstable Housing in the Last Year: Patient refused   Recent Concern: Housing Stability - High Risk (7/18/2023)    Housing Stability Vital Sign     Unable to Pay for Housing in the Last Year: Yes     Number of Places Lived in the Last Year: 1     Unstable Housing in the Last Year: Patient refused         Review of Systems   Constitutional:  Negative for appetite change, chills, fever and unexpected weight change.   HENT:  Negative for hearing loss, rhinorrhea, sore throat and voice change.    Eyes:  Negative for photophobia and visual disturbance.   Respiratory:  Negative for cough, choking and shortness of breath.    Cardiovascular:  Negative for chest pain, palpitations and leg swelling.   Gastrointestinal:  Positive for abdominal pain and nausea. Negative for blood in stool, constipation, diarrhea and vomiting.   Endocrine: Negative for cold intolerance, heat intolerance, polydipsia and polyuria.   Musculoskeletal:  Negative for arthralgias, back pain, joint swelling and neck stiffness.   Skin:  Negative for color change, pallor and rash.   Neurological:  Negative for dizziness, seizures, syncope and headaches.   Hematological:  Negative for adenopathy. Does not bruise/bleed easily.   Psychiatric/Behavioral:  Negative for agitation, behavioral problems and confusion.        Objective:      Physical Exam  Constitutional:       General: She is awake. She is not in acute distress.     Appearance: She is obese. She is not toxic-appearing.   HENT:      Head: Normocephalic and atraumatic.   Pulmonary:      Effort: No tachypnea, bradypnea, accessory muscle usage or respiratory distress.      Comments: Home O2 at all times.   Abdominal:      General: There is no distension.      Palpations: Abdomen is soft.      Tenderness: There is no abdominal tenderness.   Musculoskeletal:      Cervical back:  Neck supple.   Skin:     Coloration: Skin is not jaundiced.   Neurological:      Mental Status: She is alert and oriented to person, place, and time.   Psychiatric:         Behavior: Behavior is cooperative.         Assessment/Plan:     Promise was seen today for consult.    Diagnoses and all orders for this visit:    Biliary dyskinesia  -     Ambulatory referral/consult to Urology  -     Case Request Operating Room: CHOLECYSTECTOMY, LAPAROSCOPIC  -     Comprehensive metabolic panel; Future  -     CBC auto differential; Future  -     EKG 12-lead; Future  -     X-Ray Chest PA And Lateral; Future    Other orders  -     Full code; Standing  -     Vital signs; Standing  -     Insert peripheral IV; Standing  -     Diet NPO; Standing  -     IP VTE LOW RISK PATIENT; Standing  -     Place sequential compression device; Standing  -     Pulse Oximetry Q4H; Standing  -     Place in Outpatient; Standing  -     ceFOXItin (MEFOXIN) 2 g in dextrose 5 % (D5W) 100 mL IVPB  -     Full code  -     Insert peripheral IV      Plan for lap ryan in the upcoming weeks.         I discussed the proposed procedures with the patient including risks, benefits, indications, alternatives and special concerns.  The patient appears to understand and agrees to go ahead with surgery.  I have made no promises, warranties or verbal agreements beyond what was discussed above.    No follow-ups on file.

## 2023-09-21 NOTE — H&P (VIEW-ONLY)
Subjective:       Patient ID: Promise Medrano is a 48 y.o. female.    Chief Complaint: Consult      HPI:  48 year old female history of multiple medical issues including interstitial lung diseae on home O2, DM, Lupus. She has few months of RUQ pain worse with meals. US and CT ok. CHRISTINA has EF at 19 percent. She has past abominal surgial history of lap gatric sleeve, hysterctomy.     Past Medical History:   Diagnosis Date    Allergic rhinitis     Arthritis     Chronic anxiety 10/23/2018    Connective tissue disease     joints b/c lupus    Diabetes mellitus     Fibromyalgia     GERD (gastroesophageal reflux disease)     Grade II hemorrhoids 2019    Hyperlipemia     Interstitial lung disease     Lupus     Lupus     Sjogren's syndrome 2019     Past Surgical History:   Procedure Laterality Date    AUGMENTATION OF BREAST      BREAST SURGERY      BRONCHOSCOPY WITH FLUOROSCOPY N/A 2019    Procedure: BRONCHOSCOPY, WITH FLUOROSCOPY;  Surgeon: Nate Tarango MD;  Location: [facility] ENDO;  Service: Pulmonary;  Laterality: N/A;    CATHETERIZATION OF BOTH LEFT AND RIGHT HEART Left 2021    Procedure: CATHETERIZATION, HEART, BOTH LEFT AND RIGHT;  Surgeon: Luca Wilks MD;  Location: [facility] CATH/EP LAB;  Service: Cardiology;  Laterality: Left;     SECTION  ,    COLONOSCOPY      ESOPHAGOGASTRODUODENOSCOPY      gastric sleeve  2010    HYSTERECTOMY  2010    TONSILLECTOMY  1996    TUMOR REMOVAL      benign fatty     Review of patient's allergies indicates:   Allergen Reactions    Ativan [lorazepam] Other (See Comments)     depression    Hay fever and allergy relief     Mobic [meloxicam] Other (See Comments)     Spikes blood pressure    Nitroglycerin      Pt says it could stop her heart     Medication List with Changes/Refills   Current Medications    ACCU-CHEK SOFTCLIX LANCETS MISC    USE ONCE DAILY AS NEEDED    ALBUTEROL (PROVENTIL) 2.5 MG /3 ML (0.083 %) NEBULIZER SOLUTION    Take 3 mLs (2.5 mg  total) by nebulization every 4 (four) hours.    ALBUTEROL (PROVENTIL/VENTOLIN HFA) 90 MCG/ACTUATION INHALER    INHALE 1 PUFF INTO THE LUNGS ONCE DAILY    ALBUTEROL-IPRATROPIUM (DUO-NEB) 2.5 MG-0.5 MG/3 ML NEBULIZER SOLUTION    USE 1 VIAL VIA NEBULIZER EVERY 6 HOURS AS NEEDED FOR WHEEZING OR SHORTNESS OF BREATH    AZELASTINE (ASTELIN) 137 MCG (0.1 %) NASAL SPRAY    SPRAY ONCE IN EACH NOSTRIL TWICE DAILY    BLOOD SUGAR DIAGNOSTIC (ONETOUCH ULTRA BLUE TEST STRIP) STRP    Use strips to take blood sugar bid    BUDESONIDE (PULMICORT) 0.5 MG/2 ML NEBULIZER SOLUTION    ADD 1 VIAL TO SINUS RINSE AND USE TWICE DAILY AS DIRECTED    CYANOCOBALAMIN 1,000 MCG/ML INJECTION    Inject 1 mL (1,000 mcg total) into the muscle every 28 days.    CYCLOBENZAPRINE (FLEXERIL) 10 MG TABLET    Take 10 mg by mouth every evening.    DIAZEPAM (VALIUM) 5 MG TABLET    TAKE 1 TO 2 TABLETS BY MOUTH EVERY NIGHT AT BEDTIME    DICLOFENAC SODIUM (VOLTAREN) 1 % GEL    Apply 2 g topically 3 (three) times daily.    DICYCLOMINE (BENTYL) 20 MG TABLET    TAKE 1 TABLET(20 MG) BY MOUTH TWICE DAILY    DOXEPIN (SINEQUAN) 150 MG CAP    Take 1 capsule (150 mg total) by mouth every evening.    ERGOCALCIFEROL (ERGOCALCIFEROL) 50,000 UNIT CAP    TAKE 1 CAPSULE BY MOUTH EVERY 7 DAYS    ESOMEPRAZOLE (NEXIUM) 40 MG CAPSULE    Take 1 capsule (40 mg total) by mouth before breakfast.    EZETIMIBE (ZETIA) 10 MG TABLET    Take 1 tablet (10 mg total) by mouth once daily.    FERROUS SULFATE (FEROSUL) 325 MG (65 MG IRON) TAB TABLET    Take 1 tablet (325 mg total) by mouth once daily.    FLUTICASONE PROPIONATE (FLONASE) 50 MCG/ACTUATION NASAL SPRAY    SHAKE LIQUID AND USE 1 SPRAY(50 MCG) IN EACH NOSTRIL EVERY DAY    GABAPENTIN (NEURONTIN) 300 MG CAPSULE    TAKE 1 CAPSULE BY MOUTH AT BEDTIME    IBUPROFEN (ADVIL,MOTRIN) 600 MG TABLET    TAKE 1 TABLET(600 MG) BY MOUTH TWICE DAILY AS NEEDED FOR PAIN    IMMUN GLOB G,IGG,-GLY-IGA OV50 (CUVITRU) 10 GRAM/50 ML (20 %) SOLN    Inject 12 g  into the skin once a week.    LEVOCETIRIZINE (XYZAL) 5 MG TABLET    TAKE 1 TABLET(5 MG) BY MOUTH EVERY EVENING    METFORMIN (GLUCOPHAGE) 500 MG TABLET    Take 1 tablet (500 mg total) by mouth 2 (two) times daily with meals.    MIDODRINE (PROAMATINE) 2.5 MG TAB        ONDANSETRON (ZOFRAN) 4 MG TABLET    Take 1 tablet (4 mg total) by mouth every 6 (six) hours.    ONDANSETRON (ZOFRAN-ODT) 4 MG TBDL    DISSOLVE 2 TABLETS UNDER THE TONGUE EVERY 8 HOURS AS NEEDED    OXYGEN-AIR DELIVERY SYSTEMS MISC    by Misc.(Non-Drug; Combo Route) route.    PRAVASTATIN (PRAVACHOL) 80 MG TABLET    Take 1 tablet (80 mg total) by mouth once daily.    SEMAGLUTIDE (OZEMPIC) 1 MG/DOSE (4 MG/3 ML)    Inject 1 mg into the skin every 7 days.    SEMAGLUTIDE (OZEMPIC) 2 MG/DOSE (8 MG/3 ML) PNIJ    Inject 2 mg into the skin every 7 days.    XIIDRA 5 % DPET       Discontinued Medications    TAMSULOSIN (FLOMAX) 0.4 MG CAP    Take 1 capsule (0.4 mg total) by mouth once daily.     Family History   Problem Relation Age of Onset    Early death Father     Heart disease Father     Stroke Father     Kidney disease Father     Diabetes Paternal Grandmother     Cancer Paternal Grandmother     Raynaud syndrome Mother     Uterine cancer Mother     Breast cancer Mother     Raynaud syndrome Sister     Polycystic ovary syndrome Daughter     Diabetes Maternal Grandmother     Heart disease Maternal Grandmother     Kidney disease Maternal Grandmother     Breast cancer Maternal Grandmother     Heart disease Maternal Grandfather     Cancer Paternal Grandfather     No Known Problems Daughter      Social History     Socioeconomic History    Marital status: Single   Tobacco Use    Smoking status: Every Day     Current packs/day: 1.00     Average packs/day: 1 pack/day for 25.6 years (25.6 ttl pk-yrs)     Types: Cigarettes     Start date: 2/9/1998    Smokeless tobacco: Never   Substance and Sexual Activity    Alcohol use: Not Currently    Drug use: No    Sexual activity: Yes      Partners: Male     Birth control/protection: None     Social Determinants of Health     Financial Resource Strain: Unknown (9/14/2023)    Overall Financial Resource Strain (CARDIA)     Difficulty of Paying Living Expenses: Patient refused   Recent Concern: Financial Resource Strain - High Risk (7/31/2023)    Overall Financial Resource Strain (CARDIA)     Difficulty of Paying Living Expenses: Very hard   Food Insecurity: Unknown (9/14/2023)    Hunger Vital Sign     Worried About Running Out of Food in the Last Year: Patient refused     Ran Out of Food in the Last Year: Patient refused   Recent Concern: Food Insecurity - Food Insecurity Present (7/18/2023)    Hunger Vital Sign     Worried About Running Out of Food in the Last Year: Patient refused     Ran Out of Food in the Last Year: Often true   Transportation Needs: Unknown (9/14/2023)    PRAPARE - Transportation     Lack of Transportation (Medical): Patient refused     Lack of Transportation (Non-Medical): Patient refused   Recent Concern: Transportation Needs - Unmet Transportation Needs (7/31/2023)    PRAPARE - Transportation     Lack of Transportation (Medical): Yes     Lack of Transportation (Non-Medical): Yes   Physical Activity: Inactive (9/14/2023)    Exercise Vital Sign     Days of Exercise per Week: 0 days     Minutes of Exercise per Session: 10 min   Stress: Stress Concern Present (9/14/2023)    [employer] [place] [employer] - Occupational Stress Questionnaire     Feeling of Stress : To some extent   Social Connections: Unknown (9/14/2023)    Social Connection and Isolation Panel [NHANES]     Frequency of Communication with Friends and Family: Patient refused     Frequency of Social Gatherings with Friends and Family: Patient refused     Active Member of Clubs or Organizations: No     Attends Club or Organization Meetings: Patient refused     Marital Status: Patient refused   Housing Stability: Unknown (9/14/2023)    Housing Stability  Vital Sign     Unable to Pay for Housing in the Last Year: Patient refused     Number of Places Lived in the Last Year: 1     Unstable Housing in the Last Year: Patient refused   Recent Concern: Housing Stability - High Risk (7/18/2023)    Housing Stability Vital Sign     Unable to Pay for Housing in the Last Year: Yes     Number of Places Lived in the Last Year: 1     Unstable Housing in the Last Year: Patient refused         Review of Systems   Constitutional:  Negative for appetite change, chills, fever and unexpected weight change.   HENT:  Negative for hearing loss, rhinorrhea, sore throat and voice change.    Eyes:  Negative for photophobia and visual disturbance.   Respiratory:  Negative for cough, choking and shortness of breath.    Cardiovascular:  Negative for chest pain, palpitations and leg swelling.   Gastrointestinal:  Positive for abdominal pain and nausea. Negative for blood in stool, constipation, diarrhea and vomiting.   Endocrine: Negative for cold intolerance, heat intolerance, polydipsia and polyuria.   Musculoskeletal:  Negative for arthralgias, back pain, joint swelling and neck stiffness.   Skin:  Negative for color change, pallor and rash.   Neurological:  Negative for dizziness, seizures, syncope and headaches.   Hematological:  Negative for adenopathy. Does not bruise/bleed easily.   Psychiatric/Behavioral:  Negative for agitation, behavioral problems and confusion.        Objective:      Physical Exam  Constitutional:       General: She is awake. She is not in acute distress.     Appearance: She is obese. She is not toxic-appearing.   HENT:      Head: Normocephalic and atraumatic.   Pulmonary:      Effort: No tachypnea, bradypnea, accessory muscle usage or respiratory distress.      Comments: Home O2 at all times.   Abdominal:      General: There is no distension.      Palpations: Abdomen is soft.      Tenderness: There is no abdominal tenderness.   Musculoskeletal:      Cervical back:  Neck supple.   Skin:     Coloration: Skin is not jaundiced.   Neurological:      Mental Status: She is alert and oriented to person, place, and time.   Psychiatric:         Behavior: Behavior is cooperative.         Assessment/Plan:     Promise was seen today for consult.    Diagnoses and all orders for this visit:    Biliary dyskinesia  -     Ambulatory referral/consult to Urology  -     Case Request Operating Room: CHOLECYSTECTOMY, LAPAROSCOPIC  -     Comprehensive metabolic panel; Future  -     CBC auto differential; Future  -     EKG 12-lead; Future  -     X-Ray Chest PA And Lateral; Future    Other orders  -     Full code; Standing  -     Vital signs; Standing  -     Insert peripheral IV; Standing  -     Diet NPO; Standing  -     IP VTE LOW RISK PATIENT; Standing  -     Place sequential compression device; Standing  -     Pulse Oximetry Q4H; Standing  -     Place in Outpatient; Standing  -     ceFOXItin (MEFOXIN) 2 g in dextrose 5 % (D5W) 100 mL IVPB  -     Full code  -     Insert peripheral IV      Plan for lap ryan in the upcoming weeks.         I discussed the proposed procedures with the patient including risks, benefits, indications, alternatives and special concerns.  The patient appears to understand and agrees to go ahead with surgery.  I have made no promises, warranties or verbal agreements beyond what was discussed above.    No follow-ups on file.

## 2023-09-26 ENCOUNTER — HOSPITAL ENCOUNTER (OUTPATIENT)
Dept: PREADMISSION TESTING | Facility: HOSPITAL | Age: 48
Discharge: HOME | End: 2023-09-26
Attending: SURGERY
Payer: MEDICAID

## 2023-09-26 ENCOUNTER — HOSPITAL ENCOUNTER (OUTPATIENT)
Dept: RADIOLOGY | Facility: HOSPITAL | Age: 48
Discharge: HOME | End: 2023-09-26
Attending: SURGERY
Payer: MEDICAID

## 2023-09-26 ENCOUNTER — HOSPITAL ENCOUNTER (OUTPATIENT)
Dept: RADIOLOGY | Facility: HOSPITAL | Age: 48
Discharge: HOME | End: 2023-09-26
Attending: ORTHOPAEDIC SURGERY
Payer: MEDICAID

## 2023-09-26 VITALS — WEIGHT: 184 LBS | BODY MASS INDEX: 33.86 KG/M2 | HEIGHT: 62 IN

## 2023-09-26 DIAGNOSIS — K82.8 BILIARY DYSKINESIA: ICD-10-CM

## 2023-09-26 DIAGNOSIS — S83.241A TEAR OF MEDIAL MENISCUS OF RIGHT KNEE, UNSPECIFIED TEAR TYPE, UNSPECIFIED WHETHER OLD OR CURRENT TEAR, INITIAL ENCOUNTER: ICD-10-CM

## 2023-09-26 PROCEDURE — 71046 X-RAY EXAM CHEST 2 VIEWS: CPT | Mod: TC,PO

## 2023-09-26 PROCEDURE — 73721 MRI JNT OF LWR EXTRE W/O DYE: CPT | Mod: TC,PO,RT

## 2023-09-26 NOTE — PLAN OF CARE
Spoke with patient also informed to hold ozempic for 7 days prior to surgery, verbalized understanding.

## 2023-09-26 NOTE — CARE UPDATE
Spoke with patient verified medications informed to hold diabetic medications may take nexium if needed, gabapentin for pain if needed, her inhalers, to hold any aspirin, ibuprofen, advil, aleve products, may take tylenol. Nothing to eat or drink after midnight, leave valuables at home, must have a ride. Patient verbalized understanding.
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
## 2023-10-06 DIAGNOSIS — J84.10 PULMONARY FIBROSIS: ICD-10-CM

## 2023-10-06 DIAGNOSIS — S83.241A ACUTE MEDIAL MENISCUS TEAR OF RIGHT KNEE, INITIAL ENCOUNTER: ICD-10-CM

## 2023-10-06 RX ORDER — DICLOFENAC SODIUM 10 MG/G
2 GEL TOPICAL 3 TIMES DAILY
Qty: 100 G | Refills: 1 | Status: SHIPPED | OUTPATIENT
Start: 2023-10-06 | End: 2023-11-13

## 2023-10-07 RX ORDER — LEVOCETIRIZINE DIHYDROCHLORIDE 5 MG/1
5 TABLET, FILM COATED ORAL NIGHTLY
Qty: 90 TABLET | Refills: 1 | Status: SHIPPED | OUTPATIENT
Start: 2023-10-07

## 2023-10-09 ENCOUNTER — HOSPITAL ENCOUNTER (OUTPATIENT)
Facility: HOSPITAL | Age: 48
Discharge: HOME | End: 2023-10-09
Attending: SURGERY | Admitting: SURGERY
Payer: MEDICAID

## 2023-10-09 ENCOUNTER — ANESTHESIA (OUTPATIENT)
Dept: SURGERY | Facility: HOSPITAL | Age: 48
End: 2023-10-09
Payer: MEDICAID

## 2023-10-09 ENCOUNTER — ANESTHESIA EVENT (OUTPATIENT)
Dept: SURGERY | Facility: HOSPITAL | Age: 48
End: 2023-10-09
Payer: MEDICAID

## 2023-10-09 VITALS
OXYGEN SATURATION: 99 % | DIASTOLIC BLOOD PRESSURE: 87 MMHG | TEMPERATURE: 98 F | HEART RATE: 70 BPM | SYSTOLIC BLOOD PRESSURE: 137 MMHG | RESPIRATION RATE: 16 BRPM

## 2023-10-09 DIAGNOSIS — K82.8 BILIARY DYSKINESIA: Primary | ICD-10-CM

## 2023-10-09 LAB
GLUCOSE SERPL-MCNC: 114 MG/DL (ref 70–110)
GLUCOSE SERPL-MCNC: 85 MG/DL (ref 70–110)

## 2023-10-09 PROCEDURE — C9290 INJ, BUPIVACAINE LIPOSOME: HCPCS | Performed by: SURGERY

## 2023-10-09 PROCEDURE — 27201423 OPTIME MED/SURG SUP & DEVICES STERILE SUPPLY: Performed by: SURGERY

## 2023-10-09 PROCEDURE — 63600175 PHARM REV CODE 636 W HCPCS: Performed by: SURGERY

## 2023-10-09 PROCEDURE — 27000673 HC TUBING BLOOD Y: Performed by: ANESTHESIOLOGY

## 2023-10-09 PROCEDURE — 25000003 PHARM REV CODE 250: Performed by: NURSE ANESTHETIST, CERTIFIED REGISTERED

## 2023-10-09 PROCEDURE — D9220A PRA ANESTHESIA: ICD-10-PCS | Mod: CRNA,,, | Performed by: NURSE ANESTHETIST, CERTIFIED REGISTERED

## 2023-10-09 PROCEDURE — 27000080 OPTIME MED/SURG SUP & DEVICES GENERAL CLASSIFICATION: Performed by: SURGERY

## 2023-10-09 PROCEDURE — 36000709 HC OR TIME LEV III EA ADD 15 MIN: Performed by: SURGERY

## 2023-10-09 PROCEDURE — 71000039 HC RECOVERY, EACH ADD'L HOUR: Performed by: SURGERY

## 2023-10-09 PROCEDURE — 27201107 HC STYLET, STANDARD: Performed by: ANESTHESIOLOGY

## 2023-10-09 PROCEDURE — 25000003 PHARM REV CODE 250: Performed by: SURGERY

## 2023-10-09 PROCEDURE — D9220A PRA ANESTHESIA: Mod: CRNA,,, | Performed by: NURSE ANESTHETIST, CERTIFIED REGISTERED

## 2023-10-09 PROCEDURE — 63600175 PHARM REV CODE 636 W HCPCS: Performed by: NURSE ANESTHETIST, CERTIFIED REGISTERED

## 2023-10-09 PROCEDURE — 37000009 HC ANESTHESIA EA ADD 15 MINS: Performed by: SURGERY

## 2023-10-09 PROCEDURE — 27202105 HC BIS BILATERAL SENSOR: Performed by: ANESTHESIOLOGY

## 2023-10-09 PROCEDURE — 82962 GLUCOSE BLOOD TEST: CPT | Performed by: SURGERY

## 2023-10-09 PROCEDURE — 36000708 HC OR TIME LEV III 1ST 15 MIN: Performed by: SURGERY

## 2023-10-09 PROCEDURE — D9220A PRA ANESTHESIA: Mod: ANES,,, | Performed by: ANESTHESIOLOGY

## 2023-10-09 PROCEDURE — 71000033 HC RECOVERY, INTIAL HOUR: Performed by: SURGERY

## 2023-10-09 PROCEDURE — 63600175 PHARM REV CODE 636 W HCPCS: Performed by: ANESTHESIOLOGY

## 2023-10-09 PROCEDURE — 37000008 HC ANESTHESIA 1ST 15 MINUTES: Performed by: SURGERY

## 2023-10-09 PROCEDURE — 27000653 HC CATH, IV CATHLN: Performed by: ANESTHESIOLOGY

## 2023-10-09 PROCEDURE — D9220A PRA ANESTHESIA: ICD-10-PCS | Mod: ANES,,, | Performed by: ANESTHESIOLOGY

## 2023-10-09 PROCEDURE — 27000671 HC TUBING MICROBORE EXT: Performed by: ANESTHESIOLOGY

## 2023-10-09 PROCEDURE — 71000015 HC POSTOP RECOV 1ST HR: Performed by: SURGERY

## 2023-10-09 PROCEDURE — 25000003 PHARM REV CODE 250: Performed by: ANESTHESIOLOGY

## 2023-10-09 PROCEDURE — 47562 PR LAP,CHOLECYSTECTOMY: ICD-10-PCS | Mod: ,,, | Performed by: SURGERY

## 2023-10-09 PROCEDURE — 47562 LAPAROSCOPIC CHOLECYSTECTOMY: CPT | Mod: ,,, | Performed by: SURGERY

## 2023-10-09 RX ORDER — SCOLOPAMINE TRANSDERMAL SYSTEM 1 MG/1
1 PATCH, EXTENDED RELEASE TRANSDERMAL ONCE
Status: DISCONTINUED | OUTPATIENT
Start: 2023-10-09 | End: 2023-10-09 | Stop reason: HOSPADM

## 2023-10-09 RX ORDER — BUPIVACAINE HYDROCHLORIDE AND EPINEPHRINE 5; 5 MG/ML; UG/ML
INJECTION, SOLUTION EPIDURAL; INTRACAUDAL; PERINEURAL
Status: DISCONTINUED | OUTPATIENT
Start: 2023-10-09 | End: 2023-10-09 | Stop reason: HOSPADM

## 2023-10-09 RX ORDER — FAMOTIDINE 10 MG/ML
INJECTION INTRAVENOUS
Status: DISCONTINUED | OUTPATIENT
Start: 2023-10-09 | End: 2023-10-09

## 2023-10-09 RX ORDER — HYDROMORPHONE HYDROCHLORIDE 1 MG/ML
0.2 INJECTION, SOLUTION INTRAMUSCULAR; INTRAVENOUS; SUBCUTANEOUS EVERY 5 MIN PRN
Status: COMPLETED | OUTPATIENT
Start: 2023-10-09 | End: 2023-10-09

## 2023-10-09 RX ORDER — SUCCINYLCHOLINE CHLORIDE 20 MG/ML
INJECTION INTRAMUSCULAR; INTRAVENOUS
Status: DISCONTINUED | OUTPATIENT
Start: 2023-10-09 | End: 2023-10-09

## 2023-10-09 RX ORDER — ROCURONIUM BROMIDE 10 MG/ML
INJECTION, SOLUTION INTRAVENOUS
Status: DISCONTINUED | OUTPATIENT
Start: 2023-10-09 | End: 2023-10-09

## 2023-10-09 RX ORDER — LIDOCAINE HYDROCHLORIDE 10 MG/ML
INJECTION, SOLUTION EPIDURAL; INFILTRATION; INTRACAUDAL; PERINEURAL
Status: DISCONTINUED | OUTPATIENT
Start: 2023-10-09 | End: 2023-10-09

## 2023-10-09 RX ORDER — ONDANSETRON 2 MG/ML
INJECTION INTRAMUSCULAR; INTRAVENOUS
Status: DISCONTINUED | OUTPATIENT
Start: 2023-10-09 | End: 2023-10-09

## 2023-10-09 RX ORDER — OXYCODONE HYDROCHLORIDE 5 MG/1
5 TABLET ORAL
Status: DISCONTINUED | OUTPATIENT
Start: 2023-10-09 | End: 2023-10-09 | Stop reason: HOSPADM

## 2023-10-09 RX ORDER — ONDANSETRON 4 MG/1
4 TABLET, ORALLY DISINTEGRATING ORAL ONCE
Status: DISCONTINUED | OUTPATIENT
Start: 2023-10-09 | End: 2023-10-09 | Stop reason: HOSPADM

## 2023-10-09 RX ORDER — OXYCODONE HYDROCHLORIDE 5 MG/1
5 TABLET ORAL EVERY 4 HOURS PRN
Status: DISCONTINUED | OUTPATIENT
Start: 2023-10-09 | End: 2023-10-09 | Stop reason: HOSPADM

## 2023-10-09 RX ORDER — SODIUM CHLORIDE, SODIUM LACTATE, POTASSIUM CHLORIDE, CALCIUM CHLORIDE 600; 310; 30; 20 MG/100ML; MG/100ML; MG/100ML; MG/100ML
INJECTION, SOLUTION INTRAVENOUS CONTINUOUS PRN
Status: DISCONTINUED | OUTPATIENT
Start: 2023-10-09 | End: 2023-10-09

## 2023-10-09 RX ORDER — ONDANSETRON 2 MG/ML
4 INJECTION INTRAMUSCULAR; INTRAVENOUS DAILY PRN
Status: DISCONTINUED | OUTPATIENT
Start: 2023-10-09 | End: 2023-10-09 | Stop reason: HOSPADM

## 2023-10-09 RX ORDER — PROPOFOL 10 MG/ML
VIAL (ML) INTRAVENOUS
Status: DISCONTINUED | OUTPATIENT
Start: 2023-10-09 | End: 2023-10-09

## 2023-10-09 RX ORDER — KETAMINE HYDROCHLORIDE 50 MG/ML
INJECTION, SOLUTION INTRAMUSCULAR; INTRAVENOUS
Status: DISCONTINUED | OUTPATIENT
Start: 2023-10-09 | End: 2023-10-09

## 2023-10-09 RX ORDER — FENTANYL CITRATE 50 UG/ML
INJECTION, SOLUTION INTRAMUSCULAR; INTRAVENOUS
Status: DISCONTINUED | OUTPATIENT
Start: 2023-10-09 | End: 2023-10-09

## 2023-10-09 RX ORDER — ACETAMINOPHEN 10 MG/ML
INJECTION, SOLUTION INTRAVENOUS
Status: DISCONTINUED | OUTPATIENT
Start: 2023-10-09 | End: 2023-10-09

## 2023-10-09 RX ORDER — DIPHENHYDRAMINE HYDROCHLORIDE 50 MG/ML
12.5 INJECTION INTRAMUSCULAR; INTRAVENOUS
Status: DISCONTINUED | OUTPATIENT
Start: 2023-10-09 | End: 2023-10-09 | Stop reason: HOSPADM

## 2023-10-09 RX ORDER — HYDROCODONE BITARTRATE AND ACETAMINOPHEN 5; 325 MG/1; MG/1
1 TABLET ORAL EVERY 6 HOURS PRN
Qty: 20 TABLET | Refills: 0 | Status: SHIPPED | OUTPATIENT
Start: 2023-10-09 | End: 2024-02-22

## 2023-10-09 RX ADMIN — ROCURONIUM BROMIDE 5 MG: 10 INJECTION, SOLUTION INTRAVENOUS at 07:10

## 2023-10-09 RX ADMIN — FENTANYL CITRATE 50 MCG: 50 INJECTION, SOLUTION INTRAMUSCULAR; INTRAVENOUS at 07:10

## 2023-10-09 RX ADMIN — FAMOTIDINE 20 MG: 10 INJECTION, SOLUTION INTRAVENOUS at 07:10

## 2023-10-09 RX ADMIN — HYDROMORPHONE HYDROCHLORIDE 0.2 MG: 1 INJECTION, SOLUTION INTRAMUSCULAR; INTRAVENOUS; SUBCUTANEOUS at 09:10

## 2023-10-09 RX ADMIN — ACETAMINOPHEN 1000 MG: 10 INJECTION, SOLUTION INTRAVENOUS at 07:10

## 2023-10-09 RX ADMIN — KETAMINE HYDROCHLORIDE 20 MG: 50 INJECTION INTRAMUSCULAR; INTRAVENOUS at 07:10

## 2023-10-09 RX ADMIN — HYDROMORPHONE HYDROCHLORIDE 0.2 MG: 1 INJECTION, SOLUTION INTRAMUSCULAR; INTRAVENOUS; SUBCUTANEOUS at 08:10

## 2023-10-09 RX ADMIN — Medication 160 MG: at 07:10

## 2023-10-09 RX ADMIN — ONDANSETRON 4 MG: 2 INJECTION INTRAMUSCULAR; INTRAVENOUS at 07:10

## 2023-10-09 RX ADMIN — SCOPALAMINE 1 PATCH: 1 PATCH, EXTENDED RELEASE TRANSDERMAL at 07:10

## 2023-10-09 RX ADMIN — SODIUM CHLORIDE, SODIUM LACTATE, POTASSIUM CHLORIDE, AND CALCIUM CHLORIDE: .6; .31; .03; .02 INJECTION, SOLUTION INTRAVENOUS at 07:10

## 2023-10-09 RX ADMIN — SODIUM CHLORIDE, SODIUM LACTATE, POTASSIUM CHLORIDE, AND CALCIUM CHLORIDE: .6; .31; .03; .02 INJECTION, SOLUTION INTRAVENOUS at 08:10

## 2023-10-09 RX ADMIN — ROCURONIUM BROMIDE 35 MG: 10 INJECTION, SOLUTION INTRAVENOUS at 07:10

## 2023-10-09 RX ADMIN — OXYCODONE HYDROCHLORIDE 5 MG: 5 TABLET ORAL at 08:10

## 2023-10-09 RX ADMIN — CEFOXITIN 2 G: 2 INJECTION, POWDER, FOR SOLUTION INTRAVENOUS at 07:10

## 2023-10-09 RX ADMIN — PROPOFOL 150 MG: 10 INJECTION, EMULSION INTRAVENOUS at 07:10

## 2023-10-09 RX ADMIN — SUGAMMADEX 200 MG: 100 INJECTION, SOLUTION INTRAVENOUS at 08:10

## 2023-10-09 RX ADMIN — LIDOCAINE HYDROCHLORIDE 50 MG: 10 INJECTION, SOLUTION EPIDURAL; INFILTRATION; INTRACAUDAL; PERINEURAL at 07:10

## 2023-10-09 RX ADMIN — DIPHENHYDRAMINE HYDROCHLORIDE 12.5 MG: 50 INJECTION, SOLUTION INTRAMUSCULAR; INTRAVENOUS at 07:10

## 2023-10-09 NOTE — OP NOTE
Surgery Date: 10/9/2023     Surgeon(s) and Role:     * Mendez Zavala III, MD - Primary    Assisting Surgeon: None    Pre-op Diagnosis:  Biliary dyskinesia [K82.8]    Post-op Diagnosis:  Post-Op Diagnosis Codes:     * Biliary dyskinesia [K82.8]    Procedure(s) (LRB):  CHOLECYSTECTOMY, LAPAROSCOPIC (N/A)    Anesthesia: General    Implants:  * No implants in log *    Operative Findings: The patient was brought to the operating room and transferred to the operating room table in the supine position.  Patient was given general anesthesia and intubated.  The abdomen was prepped and draped.  A transverse infraumbilical incision was made.  Dissection was carried down through the skin and subcutaneous tissue.  The abdomen was insufflated with the Veress needle. The abdomen was entered with the 5 mm visiport. Under direct visualization, three ports were placed.  One 5 mm was placed in the subxiphoid position, one 5 mm in the right anterior axillary line and the other 5 mm in the mid clavicular line.  The gallbladder body was retracted superiorly and the infundibulum was retracted laterally.  The peritoneum overlying the cystic duct and artery was carefully taken down.  The cystic duct and cystic artery were individually isolated and dissected free 360 degrees.  They were individually clipped proximally and distally.  They were transected using endo lico.  Electrocautery was used to take the gallbladder off the liver bed.  The 5 mm Endo-Catch bag was used to remove the gallbladder from the abdomen.  We irrigated out the right upper quadrant with irrigation.  We checked again and there was no evidence of any bile leak or bleeding from our clips or from the liver bed.  The patient tolerated the procedure well.  We removed all of our ports. We repaired the skin with 4-0 Monocryl.  After that was done, the patient was extubated and taken to the recovery room in stable condition.  All lap and instrument counts were correct.      Estimated Blood Loss: 5 mL  Estimated Blood Loss has been documented.       Complications none   Specimens:   Specimen (24h ago, onward)       Start     Ordered    10/09/23 0804  Specimen to Pathology - Surgery  Once        Comments: Pre-op Diagnosis: Biliary dyskinesia [K82.8]Procedure(s):CHOLECYSTECTOMY, LAPAROSCOPIC Number of specimens: 1Name of specimens: 1. Gallbladder     Question:  Release to patient  Answer:  Immediate    10/09/23 0803                    [id number]

## 2023-10-09 NOTE — DISCHARGE INSTRUCTIONS
For 24 hours:  Do not shower  Do not sign any legal documents  Do not drink alcohol  No driving     Do not immerse yourself in any water for the next 2 weeks.

## 2023-10-09 NOTE — BRIEF OP NOTE
Count includes the [facility]  Brief Operative Note    SUMMARY     Surgery Date: 10/9/2023     Surgeon(s) and Role:     * Mendez Zavala III, MD - Primary    Assisting Surgeon: None    Pre-op Diagnosis:  Biliary dyskinesia [K82.8]    Post-op Diagnosis:  Post-Op Diagnosis Codes:     * Biliary dyskinesia [K82.8]    Procedure(s) (LRB):  CHOLECYSTECTOMY, LAPAROSCOPIC (N/A)    Anesthesia: General    Implants:  * No implants in log *    Operative Findings: The patient was brought to the operating room and transferred to the operating room table in the supine position.  Patient was given general anesthesia and intubated.  The abdomen was prepped and draped.  A transverse infraumbilical incision was made.  Dissection was carried down through the skin and subcutaneous tissue.  The abdomen was insufflated with the Veress needle. The abdomen was entered with the 5 mm visiport. Under direct visualization, three ports were placed.  One 5 mm was placed in the subxiphoid position, one 5 mm in the right anterior axillary line and the other 5 mm in the mid clavicular line.  The gallbladder body was retracted superiorly and the infundibulum was retracted laterally.  The peritoneum overlying the cystic duct and artery was carefully taken down.  The cystic duct and cystic artery were individually isolated and dissected free 360 degrees.  They were individually clipped proximally and distally.  They were transected using endo lico.  Electrocautery was used to take the gallbladder off the liver bed.  The 5 mm Endo-Catch bag was used to remove the gallbladder from the abdomen.  We irrigated out the right upper quadrant with irrigation.  We checked again and there was no evidence of any bile leak or bleeding from our clips or from the liver bed.  The patient tolerated the procedure well.  We removed all of our ports. We repaired the skin with 4-0 Monocryl.  After that was done, the patient was extubated and taken to the recovery room in  stable condition.  All lap and instrument counts were correct.     Estimated Blood Loss: 5 mL  Estimated Blood Loss has been documented.       Complications none   Specimens:   Specimen (24h ago, onward)       Start     Ordered    10/09/23 0804  Specimen to Pathology - Surgery  Once        Comments: Pre-op Diagnosis: Biliary dyskinesia [K82.8]Procedure(s):CHOLECYSTECTOMY, LAPAROSCOPIC Number of specimens: 1Name of specimens: 1. Gallbladder     Question:  Release to patient  Answer:  Immediate    10/09/23 0803                    [id number]

## 2023-10-09 NOTE — ANESTHESIA POSTPROCEDURE EVALUATION
Anesthesia Post Evaluation    Patient: Promise Medrano    Procedure(s) Performed: Procedure(s) (LRB):  CHOLECYSTECTOMY, LAPAROSCOPIC (N/A)    Final Anesthesia Type: general      Patient location during evaluation: PACU  Patient participation: Yes- Able to Participate  Level of consciousness: awake and alert, oriented and awake  Post-procedure vital signs: reviewed and stable  Pain management: adequate  Airway patency: patent    PONV status at discharge: No PONV  Anesthetic complications: no      Cardiovascular status: blood pressure returned to baseline, hemodynamically stable and stable  Respiratory status: unassisted, spontaneous ventilation and room air  Hydration status: euvolemic  Follow-up not needed.          Vitals Value Taken Time   /78 10/09/23 0930   Temp 36.3 °C (97.3 °F) 10/09/23 0841   Pulse 72 10/09/23 0942   Resp 20 10/09/23 0942   SpO2 98 % 10/09/23 0942   Vitals shown include unvalidated device data.      No case tracking events are documented in the log.      Pain/Katelyn Score: Pain Rating Prior to Med Admin: 6 (10/9/2023  9:35 AM)  Katelyn Score: 9 (10/9/2023  9:30 AM)

## 2023-10-09 NOTE — DISCHARGE SUMMARY
Discharge Summary  General Surgery      Admit Date: 10/9/2023    Discharge Date :10/9/2023    Attending Physician: Mendez Zavala III     Discharge Physician: Mendez Zavala III    Discharged Condition: good    Discharge Diagnosis: Biliary dyskinesia [K82.8]    Treatments/Procedures: Procedure(s) (LRB):  CHOLECYSTECTOMY, LAPAROSCOPIC (N/A)    Hospital Course: Uneventful; Discharged home from Recovery    Significant Diagnostic Studies: none    Disposition: Home or Self Care    Diet: Regular    Follow up: Office 10-14 days    Activity: No heavy lifting till seen in office.    Patient Instructions:   Current Discharge Medication List        START taking these medications    Details   HYDROcodone-acetaminophen (NORCO) 5-325 mg per tablet Take 1 tablet by mouth every 6 (six) hours as needed for Pain.  Qty: 20 tablet, Refills: 0    Comments: Quantity prescribed more than 7 day supply? No           CONTINUE these medications which have NOT CHANGED    Details   azelastine (ASTELIN) 137 mcg (0.1 %) nasal spray SPRAY ONCE IN EACH NOSTRIL TWICE DAILY  Qty: 30 mL, Refills: 3    Associated Diagnoses: Chronic sinusitis with recurrent bronchitis      cyclobenzaprine (FLEXERIL) 10 MG tablet Take 10 mg by mouth every evening.      diazePAM (VALIUM) 5 MG tablet TAKE 1 TO 2 TABLETS BY MOUTH EVERY NIGHT AT BEDTIME  Qty: 60 tablet, Refills: 0    Associated Diagnoses: Chronic insomnia      dicyclomine (BENTYL) 20 mg tablet TAKE 1 TABLET(20 MG) BY MOUTH TWICE DAILY  Qty: 60 tablet, Refills: 0    Associated Diagnoses: Irritable bowel syndrome with constipation      doxepin (SINEQUAN) 150 MG Cap Take 1 capsule (150 mg total) by mouth every evening.  Qty: 90 capsule, Refills: 1      ergocalciferol (ERGOCALCIFEROL) 50,000 unit Cap TAKE 1 CAPSULE BY MOUTH EVERY 7 DAYS  Qty: 4 capsule, Refills: 3    Associated Diagnoses: Vitamin D deficiency      esomeprazole (NEXIUM) 40 MG capsule Take 1 capsule (40 mg total) by mouth before  breakfast.  Qty: 30 capsule, Refills: 11    Associated Diagnoses: GERD without esophagitis      ezetimibe (ZETIA) 10 mg tablet Take 1 tablet (10 mg total) by mouth once daily.  Qty: 90 tablet, Refills: 1    Associated Diagnoses: Pure hypercholesterolemia      ferrous sulfate (FEROSUL) 325 mg (65 mg iron) Tab tablet Take 1 tablet (325 mg total) by mouth once daily.  Qty: 90 tablet, Refills: 1    Associated Diagnoses: Iron deficiency      fluticasone propionate (FLONASE) 50 mcg/actuation nasal spray SHAKE LIQUID AND USE 1 SPRAY(50 MCG) IN EACH NOSTRIL EVERY DAY  Qty: 48 g, Refills: 1    Comments: **Patient requests 90 days supply**  Associated Diagnoses: Post-nasal drip      gabapentin (NEURONTIN) 300 MG capsule TAKE 1 CAPSULE BY MOUTH AT BEDTIME  Qty: 90 capsule, Refills: 1    Associated Diagnoses: Peripheral polyneuropathy      levocetirizine (XYZAL) 5 MG tablet Take 1 tablet (5 mg total) by mouth every evening.  Qty: 90 tablet, Refills: 1    Associated Diagnoses: Pulmonary fibrosis      metFORMIN (GLUCOPHAGE) 500 MG tablet Take 1 tablet (500 mg total) by mouth 2 (two) times daily with meals.  Qty: 60 tablet, Refills: 5    Associated Diagnoses: Type 2 diabetes mellitus without complication, without long-term current use of insulin      ondansetron (ZOFRAN) 4 MG tablet Take 1 tablet (4 mg total) by mouth every 6 (six) hours.  Qty: 12 tablet, Refills: 0      ondansetron (ZOFRAN-ODT) 4 MG TbDL DISSOLVE 2 TABLETS UNDER THE TONGUE EVERY 8 HOURS AS NEEDED  Qty: 30 tablet, Refills: 5    Associated Diagnoses: GERD without esophagitis      pravastatin (PRAVACHOL) 80 MG tablet Take 1 tablet (80 mg total) by mouth once daily.  Qty: 90 tablet, Refills: 0      ACCU-CHEK SOFTCLIX LANCETS Misc USE ONCE DAILY AS NEEDED  Qty: 100 each, Refills: 5    Associated Diagnoses: Controlled type 2 diabetes with neuropathy      albuterol (PROVENTIL) 2.5 mg /3 mL (0.083 %) nebulizer solution Take 3 mLs (2.5 mg total) by nebulization every 4  (four) hours.  Qty: 300 mL, Refills: 3      albuterol (PROVENTIL/VENTOLIN HFA) 90 mcg/actuation inhaler INHALE 1 PUFF INTO THE LUNGS ONCE DAILY  Qty: 18 g, Refills: 3      albuterol-ipratropium (DUO-NEB) 2.5 mg-0.5 mg/3 mL nebulizer solution USE 1 VIAL VIA NEBULIZER EVERY 6 HOURS AS NEEDED FOR WHEEZING OR SHORTNESS OF BREATH  Qty: 180 mL, Refills: 0    Associated Diagnoses: Pulmonary fibrosis      blood sugar diagnostic (ONETOUCH ULTRA BLUE TEST STRIP) Strp Use strips to take blood sugar bid  Qty: 200 strip, Refills: 2    Associated Diagnoses: Controlled type 2 diabetes with neuropathy; History of diabetes mellitus      budesonide (PULMICORT) 0.5 mg/2 mL nebulizer solution ADD 1 VIAL TO SINUS RINSE AND USE TWICE DAILY AS DIRECTED  Qty: 120 mL, Refills: 3    Associated Diagnoses: Chronic sinusitis with recurrent bronchitis      cyanocobalamin 1,000 mcg/mL injection Inject 1 mL (1,000 mcg total) into the muscle every 28 days.  Qty: 10 mL, Refills: 0    Associated Diagnoses: Fatigue, unspecified type      diclofenac sodium (VOLTAREN) 1 % Gel Apply 2 g topically 3 (three) times daily.  Qty: 100 g, Refills: 1    Associated Diagnoses: Fall, initial encounter; Acute medial meniscus tear of right knee, initial encounter      ibuprofen (ADVIL,MOTRIN) 600 MG tablet TAKE 1 TABLET(600 MG) BY MOUTH TWICE DAILY AS NEEDED FOR PAIN  Qty: 60 tablet, Refills: 1    Associated Diagnoses: Arthralgia, unspecified joint      immun glob G,IgG,-gly-IgA ov50 (CUVITRU) 10 gram/50 mL (20 %) Soln Inject 12 g into the skin once a week.  Qty: 200 mL, Refills: 12    Associated Diagnoses: Deficiency of anti-pneumococcal polysaccharide antibody      midodrine (PROAMATINE) 2.5 MG Tab       OXYGEN-AIR DELIVERY SYSTEMS MISC by Misc.(Non-Drug; Combo Route) route.      semaglutide (OZEMPIC) 1 mg/dose (4 mg/3 mL) Inject 1 mg into the skin every 7 days.  Qty: 3 mL, Refills: 2    Associated Diagnoses: Type 2 diabetes mellitus without complication, without  long-term current use of insulin      semaglutide (OZEMPIC) 2 mg/dose (8 mg/3 mL) PnIj Inject 2 mg into the skin every 7 days.  Qty: 9 mL, Refills: 1      XIIDRA 5 % Dpet              Discharge Procedure Orders   Diet Adult Regular     Lifting restrictions   Order Comments: No lifting over twenty pounds for six weeks     Remove dressing in 48 hours

## 2023-10-09 NOTE — ANESTHESIA PROCEDURE NOTES
Intubation    Date/Time: 10/9/2023 7:41 AM    Performed by: Melvin Fontaine CRNA  Authorized by: Catracho Hernandez MD    Intubation:     Induction:  Intravenous    Intubated:  Postinduction    Mask Ventilation:  Easy mask    Attempts:  1    Attempted By:  Staff anesthesiologist    Method of Intubation:  Direct    Blade:  Smith 2    Laryngeal View Grade: Grade I - full view of cords      Difficult Airway Encountered?: No      Complications:  None    Airway Device:  Oral endotracheal tube    Airway Device Size:  7.5    Style/Cuff Inflation:  Cuffed (inflated to minimal occlusive pressure)    Tube secured:  22    Secured at:  The lips    Placement Verified By:  Capnometry    Complicating Factors:  None    Findings Post-Intubation:  BS equal bilateral and atraumatic/condition of teeth unchanged

## 2023-10-09 NOTE — ANESTHESIA PREPROCEDURE EVALUATION
10/09/2023  Promise Medrano is a 48 y.o., female.      Pre-op Assessment    I have reviewed the Patient Summary Reports.     I have reviewed the Nursing Notes. I have reviewed the NPO Status.   I have reviewed the Medications.     Review of Systems  Anesthesia Hx:  History of nausea with pain medications, but controlled with Zofran Denies Family Hx of Anesthesia complications.   Denies Personal Hx of Anesthesia complications.   Social:  Smoker    Hematology/Oncology:  Hematology Normal   Oncology Normal     EENT/Dental:   chronic allergic rhinitis Right TMJ occasionally pops with occasional pain and swelling, no treatment. Dry eyes due to Sjogren's syndrome.   Cardiovascular:  Cardiovascular Normal  Lupus affects patient is vagus nerve, resulting in occasional syncope, last time May 2023.  Her cardiologist, Dr. Wilks, prescribes Bentyl for this, which she took this morning.  History of low blood pressure (for which she takes midodrine as needed), but blood pressure has been running high lately.   Pulmonary:   Chronic oxygen therapy (3 L per nasal cannula) due to interstitial lung disease. Cleared by pulmonologist.  Patient uses inhalers/nebulizers only as needed, last time months ago.  Sleep apnea suspected and sleep study ordered but not done yet.   Hepatic/GI:   GERD, well controlled Last Ozempic 11 days ago. History of gastric sleeve.   Musculoskeletal:  Musculoskeletal Normal    Neurological:   History of vertigo, last time months ago  Chronic Pain Syndrome ( fibromyalgia)  Peripheral Neuropathy    Endocrine:   Diabetes (Glucose 85 this morning)    Psych:   anxiety          Physical Exam  General: Well nourished, Cooperative, Alert and Oriented    Airway:  Mallampati: II / I  Mouth Opening: Normal  TM Distance: > 6 cm  Tongue: Normal  Neck ROM: Normal ROM    Dental:  Intact    Chest/Lungs:  Clear to  auscultation, Normal Respiratory Rate    Heart:  Rate: Normal  Rhythm: Regular Rhythm  Sounds: Normal        Anesthesia Plan  Type of Anesthesia, risks & benefits discussed:    Anesthesia Type: Gen ETT  Intra-op Monitoring Plan: Standard ASA Monitors  Post Op Pain Control Plan: multimodal analgesia  Induction:  IV  Airway Plan: Video, Post-Induction  Informed Consent: Informed consent signed with the Patient and all parties understand the risks and agree with anesthesia plan.  All questions answered.   ASA Score: 3  Anesthesia Plan Notes: GETA  Lubricate eyes and lips  Versed okay  Sleep apnea precautions: minimize opioids, extubate awake and sitting up, sugammadex if muscle relaxant used  Multimodal analgesia:  IV acetaminophen, ketamine  Antiemetics:  Zofran, Pepcid, Benadryl 12.5 mg, scopolamine patch        Ready For Surgery From Anesthesia Perspective.     .

## 2023-10-09 NOTE — TRANSFER OF CARE
Anesthesia Transfer of Care Note    Patient: Promise Medrano    Procedure(s) Performed: Procedure(s) (LRB):  CHOLECYSTECTOMY, LAPAROSCOPIC (N/A)    Patient location: PACU    Anesthesia Type: general    Transport from OR: Transported from OR on 2-3 L/min O2 by NC with adequate spontaneous ventilation    Post vital signs: stable    Level of consciousness: awake    Nausea/Vomiting: no nausea/vomiting    Complications: none    Transfer of care protocol was followed      Last vitals:   Visit Vitals  BP (!) 148/87 (BP Location: Left arm, Patient Position: Lying)   Pulse 80   Temp 36.8 °C (98.2 °F) (Oral)   Resp 16   SpO2 98%   Breastfeeding No

## 2023-10-11 ENCOUNTER — PATIENT MESSAGE (OUTPATIENT)
Dept: FAMILY MEDICINE | Facility: CLINIC | Age: 48
End: 2023-10-11

## 2023-10-16 ENCOUNTER — TELEPHONE (OUTPATIENT)
Dept: ALLERGY | Facility: CLINIC | Age: 48
End: 2023-10-16

## 2023-10-16 DIAGNOSIS — R53.83 FATIGUE, UNSPECIFIED TYPE: ICD-10-CM

## 2023-10-16 DIAGNOSIS — G62.9 PERIPHERAL POLYNEUROPATHY: ICD-10-CM

## 2023-10-16 RX ORDER — GABAPENTIN 300 MG/1
CAPSULE ORAL
Qty: 90 CAPSULE | Refills: 1 | Status: SHIPPED | OUTPATIENT
Start: 2023-10-16 | End: 2024-01-14 | Stop reason: SDUPTHER

## 2023-10-16 RX ORDER — CYANOCOBALAMIN 1000 UG/ML
INJECTION, SOLUTION INTRAMUSCULAR; SUBCUTANEOUS
Qty: 10 ML | Refills: 1 | Status: SHIPPED | OUTPATIENT
Start: 2023-10-16 | End: 2023-11-19 | Stop reason: SDUPTHER

## 2023-10-16 NOTE — TELEPHONE ENCOUNTER
[employer] called with notification of PA approval for Cuvitru  Ref# [id number] effective 11/30/2023-11/30/2024

## 2023-10-26 ENCOUNTER — OFFICE VISIT (OUTPATIENT)
Dept: SURGERY | Facility: CLINIC | Age: 48
End: 2023-10-26
Payer: MEDICAID

## 2023-10-26 ENCOUNTER — OFFICE VISIT (OUTPATIENT)
Dept: ORTHOPEDICS | Facility: CLINIC | Age: 48
End: 2023-10-26
Payer: MEDICAID

## 2023-10-26 VITALS — SYSTOLIC BLOOD PRESSURE: 153 MMHG | HEART RATE: 93 BPM | TEMPERATURE: 98 F | DIASTOLIC BLOOD PRESSURE: 78 MMHG

## 2023-10-26 VITALS — HEIGHT: 62 IN | WEIGHT: 184 LBS | BODY MASS INDEX: 33.86 KG/M2

## 2023-10-26 DIAGNOSIS — K81.1 CHRONIC CHOLECYSTITIS: Primary | ICD-10-CM
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
## 2023-10-26 DIAGNOSIS — S82.001A CLOSED NONDISPLACED FRACTURE OF RIGHT PATELLA, UNSPECIFIED FRACTURE MORPHOLOGY, INITIAL ENCOUNTER: ICD-10-CM

## 2023-10-26 DIAGNOSIS — M17.11 PRIMARY OSTEOARTHRITIS OF RIGHT KNEE: ICD-10-CM

## 2023-10-26 DIAGNOSIS — S80.01XA CONTUSION OF RIGHT KNEE, INITIAL ENCOUNTER: ICD-10-CM

## 2023-10-26 PROCEDURE — 3066F NEPHROPATHY DOC TX: CPT | Mod: CPTII,S$GLB,, | Performed by: SURGERY

## 2023-10-26 PROCEDURE — 99213 OFFICE O/P EST LOW 20 MIN: CPT | Mod: 25,S$GLB,, | Performed by: ORTHOPAEDIC SURGERY

## 2023-10-26 PROCEDURE — 3044F HG A1C LEVEL LT 7.0%: CPT | Mod: CPTII,S$GLB,, | Performed by: SURGERY

## 2023-10-26 PROCEDURE — 1160F RVW MEDS BY RX/DR IN RCRD: CPT | Mod: CPTII,S$GLB,, | Performed by: SURGERY

## 2023-10-26 PROCEDURE — 3066F NEPHROPATHY DOC TX: CPT | Mod: CPTII,S$GLB,, | Performed by: ORTHOPAEDIC SURGERY

## 2023-10-26 PROCEDURE — 3008F PR BODY MASS INDEX (BMI) DOCUMENTED: ICD-10-PCS | Mod: CPTII,S$GLB,, | Performed by: ORTHOPAEDIC SURGERY

## 2023-10-26 PROCEDURE — 3008F BODY MASS INDEX DOCD: CPT | Mod: CPTII,S$GLB,, | Performed by: ORTHOPAEDIC SURGERY

## 2023-10-26 PROCEDURE — 3061F NEG MICROALBUMINURIA REV: CPT | Mod: CPTII,S$GLB,, | Performed by: SURGERY

## 2023-10-26 PROCEDURE — 1159F PR MEDICATION LIST DOCUMENTED IN MEDICAL RECORD: ICD-10-PCS | Mod: CPTII,S$GLB,, | Performed by: ORTHOPAEDIC SURGERY

## 2023-10-26 PROCEDURE — 3061F NEG MICROALBUMINURIA REV: CPT | Mod: CPTII,S$GLB,, | Performed by: ORTHOPAEDIC SURGERY

## 2023-10-26 PROCEDURE — 1160F PR REVIEW ALL MEDS BY PRESCRIBER/CLIN PHARMACIST DOCUMENTED: ICD-10-PCS | Mod: CPTII,S$GLB,, | Performed by: ORTHOPAEDIC SURGERY

## 2023-10-26 PROCEDURE — 3078F DIAST BP <80 MM HG: CPT | Mod: CPTII,S$GLB,, | Performed by: SURGERY

## 2023-10-26 PROCEDURE — 1159F PR MEDICATION LIST DOCUMENTED IN MEDICAL RECORD: ICD-10-PCS | Mod: CPTII,S$GLB,, | Performed by: SURGERY

## 2023-10-26 PROCEDURE — 3061F PR NEG MICROALBUMINURIA RESULT DOCUMENTED/REVIEW: ICD-10-PCS | Mod: CPTII,S$GLB,, | Performed by: SURGERY

## 2023-10-26 PROCEDURE — 99024 PR POST-OP FOLLOW-UP VISIT: ICD-10-PCS | Mod: S$GLB,,, | Performed by: SURGERY

## 2023-10-26 PROCEDURE — 3061F PR NEG MICROALBUMINURIA RESULT DOCUMENTED/REVIEW: ICD-10-PCS | Mod: CPTII,S$GLB,, | Performed by: ORTHOPAEDIC SURGERY

## 2023-10-26 PROCEDURE — 20610 LARGE JOINT ASPIRATION/INJECTION: R KNEE: ICD-10-PCS | Mod: RT,S$GLB,, | Performed by: ORTHOPAEDIC SURGERY

## 2023-10-26 PROCEDURE — 1160F RVW MEDS BY RX/DR IN RCRD: CPT | Mod: CPTII,S$GLB,, | Performed by: ORTHOPAEDIC SURGERY

## 2023-10-26 PROCEDURE — 3044F PR MOST RECENT HEMOGLOBIN A1C LEVEL <7.0%: ICD-10-PCS | Mod: CPTII,S$GLB,, | Performed by: SURGERY

## 2023-10-26 PROCEDURE — 1160F PR REVIEW ALL MEDS BY PRESCRIBER/CLIN PHARMACIST DOCUMENTED: ICD-10-PCS | Mod: CPTII,S$GLB,, | Performed by: SURGERY

## 2023-10-26 PROCEDURE — 1159F MED LIST DOCD IN RCRD: CPT | Mod: CPTII,S$GLB,, | Performed by: ORTHOPAEDIC SURGERY

## 2023-10-26 PROCEDURE — 99213 PR OFFICE/OUTPT VISIT, EST, LEVL III, 20-29 MIN: ICD-10-PCS | Mod: 25,S$GLB,, | Performed by: ORTHOPAEDIC SURGERY

## 2023-10-26 PROCEDURE — 3066F PR DOCUMENTATION OF TREATMENT FOR NEPHROPATHY: ICD-10-PCS | Mod: CPTII,S$GLB,, | Performed by: SURGERY

## 2023-10-26 PROCEDURE — 3077F SYST BP >= 140 MM HG: CPT | Mod: CPTII,S$GLB,, | Performed by: SURGERY

## 2023-10-26 PROCEDURE — 3044F PR MOST RECENT HEMOGLOBIN A1C LEVEL <7.0%: ICD-10-PCS | Mod: CPTII,S$GLB,, | Performed by: ORTHOPAEDIC SURGERY

## 2023-10-26 PROCEDURE — 99024 POSTOP FOLLOW-UP VISIT: CPT | Mod: S$GLB,,, | Performed by: SURGERY

## 2023-10-26 PROCEDURE — 1159F MED LIST DOCD IN RCRD: CPT | Mod: CPTII,S$GLB,, | Performed by: SURGERY

## 2023-10-26 PROCEDURE — 3044F HG A1C LEVEL LT 7.0%: CPT | Mod: CPTII,S$GLB,, | Performed by: ORTHOPAEDIC SURGERY

## 2023-10-26 PROCEDURE — 20610 DRAIN/INJ JOINT/BURSA W/O US: CPT | Mod: RT,S$GLB,, | Performed by: ORTHOPAEDIC SURGERY

## 2023-10-26 PROCEDURE — 3078F PR MOST RECENT DIASTOLIC BLOOD PRESSURE < 80 MM HG: ICD-10-PCS | Mod: CPTII,S$GLB,, | Performed by: SURGERY

## 2023-10-26 PROCEDURE — 3066F PR DOCUMENTATION OF TREATMENT FOR NEPHROPATHY: ICD-10-PCS | Mod: CPTII,S$GLB,, | Performed by: ORTHOPAEDIC SURGERY

## 2023-10-26 PROCEDURE — 3077F PR MOST RECENT SYSTOLIC BLOOD PRESSURE >= 140 MM HG: ICD-10-PCS | Mod: CPTII,S$GLB,, | Performed by: SURGERY

## 2023-10-26 RX ORDER — METOPROLOL SUCCINATE 25 MG/1
25 TABLET, EXTENDED RELEASE ORAL
COMMUNITY
Start: 2023-10-08 | End: 2024-02-22

## 2023-10-26 RX ORDER — BUDESONIDE AND FORMOTEROL FUMARATE DIHYDRATE 80; 4.5 UG/1; UG/1
AEROSOL RESPIRATORY (INHALATION)
COMMUNITY
Start: 2023-09-28 | End: 2023-11-07 | Stop reason: SDUPTHER

## 2023-10-26 RX ORDER — TRIAMCINOLONE ACETONIDE 40 MG/ML
40 INJECTION, SUSPENSION INTRA-ARTICULAR; INTRAMUSCULAR
Status: DISCONTINUED | OUTPATIENT
Start: 2023-10-26 | End: 2023-10-26 | Stop reason: HOSPADM

## 2023-10-26 RX ADMIN — TRIAMCINOLONE ACETONIDE 40 MG: 40 INJECTION, SUSPENSION INTRA-ARTICULAR; INTRAMUSCULAR at 11:10

## 2023-10-26 NOTE — PROGRESS NOTES
[facility]    Subjective:     Chief Complaint:   Chief Complaint   Patient presents with    Right Knee - Pain     Right knee pain follow up. Here for MRI results review       Past Medical History:   Diagnosis Date    Allergic rhinitis     Arthritis     Chronic anxiety 10/23/2018    Connective tissue disease     joints b/c lupus    Diabetes mellitus     Fibromyalgia     GERD (gastroesophageal reflux disease)     Grade II hemorrhoids 2019    Hyperlipemia     Interstitial lung disease     Lupus     Lupus     Sjogren's syndrome 2019       Past Surgical History:   Procedure Laterality Date    AUGMENTATION OF BREAST      BREAST SURGERY      BRONCHOSCOPY WITH FLUOROSCOPY N/A 2019    Procedure: BRONCHOSCOPY, WITH FLUOROSCOPY;  Surgeon: Nate Tarango MD;  Location: [facility] ENDO;  Service: Pulmonary;  Laterality: N/A;    CATHETERIZATION OF BOTH LEFT AND RIGHT HEART Left 2021    Procedure: CATHETERIZATION, HEART, BOTH LEFT AND RIGHT;  Surgeon: Luca Wilks MD;  Location: [facility] CATH/EP LAB;  Service: Cardiology;  Laterality: Left;     SECTION  ,    COLONOSCOPY      ESOPHAGOGASTRODUODENOSCOPY      gastric sleeve      HYSTERECTOMY      LAPAROSCOPIC CHOLECYSTECTOMY N/A 10/9/2023    Procedure: CHOLECYSTECTOMY, LAPAROSCOPIC;  Surgeon: Mendez Zavala III, MD;  Location: [facility] OR;  Service: General;  Laterality: N/A;    TONSILLECTOMY      TUMOR REMOVAL      benign fatty       Current Outpatient Medications   Medication Sig    albuterol (PROVENTIL) 2.5 mg /3 mL (0.083 %) nebulizer solution Take 3 mLs (2.5 mg total) by nebulization every 4 (four) hours.    albuterol (PROVENTIL/VENTOLIN HFA) 90 mcg/actuation inhaler INHALE 1 PUFF INTO THE LUNGS ONCE DAILY    albuterol-ipratropium (DUO-NEB) 2.5 mg-0.5 mg/3 mL nebulizer solution USE 1 VIAL VIA NEBULIZER EVERY 6 HOURS AS NEEDED FOR WHEEZING OR SHORTNESS OF BREATH    azelastine (ASTELIN) 137 mcg (0.1 %) nasal spray SPRAY  ONCE IN EACH NOSTRIL TWICE DAILY    budesonide (PULMICORT) 0.5 mg/2 mL nebulizer solution ADD 1 VIAL TO SINUS RINSE AND USE TWICE DAILY AS DIRECTED    cyanocobalamin 1,000 mcg/mL injection INJECT 1 ML IN THE MUSCLE EVERY 28 DAYS    cyclobenzaprine (FLEXERIL) 10 MG tablet Take 10 mg by mouth every evening.    diazePAM (VALIUM) 5 MG tablet TAKE 1 TO 2 TABLETS BY MOUTH EVERY NIGHT AT BEDTIME    diclofenac sodium (VOLTAREN) 1 % Gel Apply 2 g topically 3 (three) times daily.    dicyclomine (BENTYL) 20 mg tablet TAKE 1 TABLET(20 MG) BY MOUTH TWICE DAILY    doxepin (SINEQUAN) 150 MG Cap Take 1 capsule (150 mg total) by mouth every evening.    ergocalciferol (ERGOCALCIFEROL) 50,000 unit Cap TAKE 1 CAPSULE BY MOUTH EVERY 7 DAYS    esomeprazole (NEXIUM) 40 MG capsule Take 1 capsule (40 mg total) by mouth before breakfast.    ezetimibe (ZETIA) 10 mg tablet Take 1 tablet (10 mg total) by mouth once daily.    ferrous sulfate (FEROSUL) 325 mg (65 mg iron) Tab tablet Take 1 tablet (325 mg total) by mouth once daily.    fluticasone propionate (FLONASE) 50 mcg/actuation nasal spray SHAKE LIQUID AND USE 1 SPRAY(50 MCG) IN EACH NOSTRIL EVERY DAY    gabapentin (NEURONTIN) 300 MG capsule TAKE 1 CAPSULE BY MOUTH AT BEDTIME    HYDROcodone-acetaminophen (NORCO) 5-325 mg per tablet Take 1 tablet by mouth every 6 (six) hours as needed for Pain.    ibuprofen (ADVIL,MOTRIN) 600 MG tablet TAKE 1 TABLET(600 MG) BY MOUTH TWICE DAILY AS NEEDED FOR PAIN    immun glob G,IgG,-gly-IgA ov50 (CUVITRU) 10 gram/50 mL (20 %) Soln Inject 12 g into the skin once a week.    levocetirizine (XYZAL) 5 MG tablet Take 1 tablet (5 mg total) by mouth every evening.    metFORMIN (GLUCOPHAGE) 500 MG tablet Take 1 tablet (500 mg total) by mouth 2 (two) times daily with meals.    midodrine (PROAMATINE) 2.5 MG Tab     ondansetron (ZOFRAN) 4 MG tablet Take 1 tablet (4 mg total) by mouth every 6 (six) hours.    ondansetron (ZOFRAN-ODT) 4 MG TbDL DISSOLVE 2 TABLETS UNDER  THE TONGUE EVERY 8 HOURS AS NEEDED    OXYGEN-AIR DELIVERY SYSTEMS MISC by [facility].(Non-Drug; Combo Route) route.    pravastatin (PRAVACHOL) 80 MG tablet Take 1 tablet (80 mg total) by mouth once daily.    semaglutide (OZEMPIC) 1 mg/dose (4 mg/3 mL) Inject 1 mg into the skin every 7 days.    semaglutide (OZEMPIC) 2 mg/dose (8 mg/3 mL) PnIj Inject 2 mg into the skin every 7 days.    XIIDRA 5 % Dpet     ACCU-CHEK SOFTCLIX LANCETS Misc USE ONCE DAILY AS NEEDED (Patient not taking: Reported on 10/26/2023)    blood sugar diagnostic (ONETOUCH ULTRA BLUE TEST STRIP) Strp Use strips to take blood sugar bid (Patient not taking: Reported on 10/26/2023)    metoprolol succinate (TOPROL-XL) 25 MG 24 hr tablet Take 25 mg by mouth.    SYMBICORT 80-4.5 mcg/actuation HFAA Inhale into the lungs.     No current facility-administered medications for this visit.       Review of patient's allergies indicates:   Allergen Reactions    Ativan [lorazepam] Other (See Comments)     depression    Hay fever and allergy relief     Mobic [meloxicam] Other (See Comments)     Spikes blood pressure    Nitroglycerin      Pt says it could stop her heart       Family History   Problem Relation Age of Onset    Early death Father     Heart disease Father     Stroke Father     Kidney disease Father     Diabetes Paternal Grandmother     Cancer Paternal Grandmother     Raynaud syndrome Mother     Uterine cancer Mother     Breast cancer Mother     Raynaud syndrome Sister     Polycystic ovary syndrome Daughter     Diabetes Maternal Grandmother     Heart disease Maternal Grandmother     Kidney disease Maternal Grandmother     Breast cancer Maternal Grandmother     Heart disease Maternal Grandfather     Cancer Paternal Grandfather     No Known Problems Daughter        Social History     Socioeconomic History    Marital status: Single   Tobacco Use    Smoking status: Every Day     Current packs/day: 1.00     Average packs/day: 1 pack/day for 25.7 years (25.7 ttl  pk-yrs)     Types: Cigarettes     Start date: 2/9/1998    Smokeless tobacco: Never   Substance and Sexual Activity    Alcohol use: Not Currently    Drug use: No    Sexual activity: Yes     Partners: Male     Birth control/protection: None     Social Determinants of Health     Financial Resource Strain: Unknown (9/14/2023)    Overall Financial Resource Strain (CARDIA)     Difficulty of Paying Living Expenses: Patient refused   Recent Concern: Financial Resource Strain - High Risk (7/31/2023)    Overall Financial Resource Strain (CARDIA)     Difficulty of Paying Living Expenses: Very hard   Food Insecurity: Unknown (9/14/2023)    Hunger Vital Sign     Worried About Running Out of Food in the Last Year: Patient refused     Ran Out of Food in the Last Year: Patient refused   Recent Concern: Food Insecurity - Food Insecurity Present (7/18/2023)    Hunger Vital Sign     Worried About Running Out of Food in the Last Year: Patient refused     Ran Out of Food in the Last Year: Often true   Transportation Needs: Unknown (9/14/2023)    PRAPARE - Transportation     Lack of Transportation (Medical): Patient refused     Lack of Transportation (Non-Medical): Patient refused   Recent Concern: Transportation Needs - Unmet Transportation Needs (7/31/2023)    PRAPARE - Transportation     Lack of Transportation (Medical): Yes     Lack of Transportation (Non-Medical): Yes   Physical Activity: Inactive (9/14/2023)    Exercise Vital Sign     Days of Exercise per Week: 0 days     Minutes of Exercise per Session: 10 min   Stress: Stress Concern Present (9/14/2023)    [employer] - Occupational Stress Questionnaire     Feeling of Stress : To some extent   Social Connections: Unknown (9/14/2023)    Social Connection and Isolation Panel [NHANES]     Frequency of Communication with Friends and Family: Patient refused     Frequency of Social Gatherings with Friends and Family: Patient refused     Active Member of  Clubs or Organizations: No     Attends Club or Organization Meetings: Patient refused     Marital Status: Patient refused   Housing Stability: Unknown (9/14/2023)    Housing Stability Vital Sign     Unable to Pay for Housing in the Last Year: Patient refused     Number of Places Lived in the Last Year: 1     Unstable Housing in the Last Year: Patient refused   Recent Concern: Housing Stability - High Risk (7/18/2023)    Housing Stability Vital Sign     Unable to Pay for Housing in the Last Year: Yes     Number of Places Lived in the Last Year: 1     Unstable Housing in the Last Year: Patient refused       History of present illness: 47-year-old female, presents to clinic today follow-up of complaints of right knee pain.  Review MRI results right knee.  Last office visit September.  Not much relief in her symptoms after injection.      Follow-up from emergency department visit on May 3rd.  Pain with weight-bearing, ambulating with crutches.  It is getting better, feels unstable like it wants to give out.  Reports some locking and catching.      Review of Systems:    Constitution: Negative for chills, fever, and sweats.  Negative for unexplained weight loss.    HENT:  Negative for headaches and blurry vision.    Cardiovascular:Negative for chest pain or irregular heart beat. Negative for hypertension.    Respiratory:  Negative for cough and shortness of breath.    Gastrointestinal: Negative for abdominal pain, heartburn, melena, nausea, and vomitting.    Genitourinary:  Negative bladder incontinence and dysuria.    Musculoskeletal:  See HPI for details.     Neurological: Negative for numbness.    Psychiatric/Behavioral: Negative for depression.  The patient is not nervous/anxious.      Endocrine: Negative for polyuria    Hematologic/Lymphatic: Negative for bleeding problem.  Does not bruise/bleed easily.    Skin: Negative for poor would healing and rash    Objective:      Physical Examination:    Vital Signs:  There  were no vitals filed for this visit.    Body mass index is 33.65 kg/m².    This a well-developed, well nourished patient in no acute distress.  They are alert and oriented and cooperative to examination.        Examination of the right knee, no effusion, skin is dry and intact, no erythema ecchymosis, no signs symptoms of action.  Patient with range of motion 0-110 degrees.  Stable anterior-posterior varus and valgus stress.  No pain with palpation over the medial or lateral joint lines but she take it pain with Damaris's testing.  Calf soft nontender, straight leg raise negative  Pertinent New Results:  Narrative & Impression  MR KNEE WITHOUT IV CONTRAST RIGHT     CLINICAL HISTORY:  48 years Female Medial meniscus tear, Right knee Injury in 05/2023. Pain to right patella no surgery.     COMPARISON: Knee radiography 5/3/23     FINDINGS:     Mild bone marrow edema involving the inferior aspect of the patella. Best appreciated on sagittal T1-weighted imaging, there is linear low T1 signal intensity along the inferior aspect of the patella, with both vertical and longitudinal components. Aforementioned findings are consistent with subacute/healing nondisplaced fracture considering history of trauma in May 2023. No other fracture of the right knee is evident. Normal alignment.     Femoral tibial cartilage is maintained. Patellofemoral cartilage is maintained.     Small-volume knee joint fluid is physiologic. No intra-articular osteochondral bodies.     Anterior and posterior cruciate ligaments are intact.     Normal morphology and signal intensity of medial and lateral menisci.     Medial collateral ligament is intact. IT band is intact. Lateral collateral ligament is intact.     Neurovascular bundle appears normal. No popliteal fossa cyst. Pes anserine tendons appear normal.     Extensor mechanism is intact. Medial and lateral patellofemoral retinacula are intact.     IMPRESSION:     Subacute, healing, nondisplaced  "minimally comminuted fracture along the inferior aspect of the patella.     No MRI evidence of meniscal tear.     Electronically signed by:  Jasvir Corbin MD  09/26/2023 01:27 PM CDT Workstation: [id number]           Specimen Collected: 09/26/23 12:07 Last Resulted: 09/26/23 13:27           XRAY Report / Interpretation:       Assessment/Plan:      48-year-old female with persistent right knee pain after a fall back in May.  He MRI shows a healing nondisplaced patella fracture.  She could have a little step-off MRIs just not that impressive.  She continues to have some pain.  Medically she is got multiple medical problems.  She is had her gallbladder removed 3 weeks ago.  She is not really interested in another surgery such as arthroscopy for chondroplasty.  Recommended a repeat injection and administered lidocaine and triamcinolone anterior lateral approach sterile technique right knee patient tolerated well.  She will follow-up with us in 3 months.  If she continues to have symptoms that then we will revisit the conversation for arthroplasty.    Alfredito Alan, Physician Assistant, served in the capacity as a "scribe" for this patient encounter.  A "face-to-face" encounter occurred with Dr. Gustabo Zhao on this date.  The treatment plan and medical decision-making is outlined above. Patient was seen and examined with a chaperone.       This note was created using Dragon voice recognition software that occasionally misinterpreted phrases or words.        "

## 2023-10-26 NOTE — PROCEDURES
Large Joint Aspiration/Injection: R knee    Date/Time: 10/26/2023 11:45 AM    Performed by: Gustabo Zhao MD  Authorized by: Gustabo Zhao MD    Consent Done?:  Yes (Verbal)  Indications:  Pain  Site marked: the procedure site was marked    Timeout: prior to procedure the correct patient, procedure, and site was verified    Prep: patient was prepped and draped in usual sterile fashion      Local anesthesia used?: Yes    Local anesthetic:  Lidocaine 1% without epinephrine    Details:  Needle Size:  25 G  Ultrasonic Guidance for needle placement?: No    Location:  Knee  Site:  R knee  Medications:  40 mg triamcinolone acetonide 40 mg/mL  Patient tolerance:  Patient tolerated the procedure well with no immediate complications

## 2023-10-27 NOTE — PROGRESS NOTES
Subjective:       Patient ID: Promise Medrano is a 48 y.o. female.    Chief Complaint: Post-op Evaluation      HPI:  S/p lap ryan. No jaundice. No fever. Tolerating diet. Path benign.     GALLBLADDER, CHOLECYSTECTOMY   - CHRONIC CHOLECYSTITIS   Review of Systems    Objective:      Physical Exam  Constitutional:       General: She is not in acute distress.  Pulmonary:      Effort: Pulmonary effort is normal. No respiratory distress.   Abdominal:      General: There is no distension.      Palpations: Abdomen is soft.      Tenderness: There is no abdominal tenderness. There is no guarding or rebound.   Skin:     Comments: Incisions are clean, dry and intact  There is no evidence of infection, hematoma or seroma    Neurological:      Mental Status: She is alert and oriented to person, place, and time.   Psychiatric:         Behavior: Behavior is cooperative.         Assessment/Plan:   Promise was seen today for post-op evaluation.    Diagnoses and all orders for this visit:    Chronic cholecystitis      S/p lap ryan. Path benign. No evidence of complications. RTC PRN

## 2023-11-07 ENCOUNTER — OFFICE VISIT (OUTPATIENT)
Dept: ALLERGY | Facility: CLINIC | Age: 48
End: 2023-11-07
Payer: MEDICAID

## 2023-11-07 VITALS
BODY MASS INDEX: 31.46 KG/M2 | WEIGHT: 172 LBS | SYSTOLIC BLOOD PRESSURE: 132 MMHG | DIASTOLIC BLOOD PRESSURE: 80 MMHG | OXYGEN SATURATION: 98 % | HEART RATE: 93 BPM

## 2023-11-07 DIAGNOSIS — J32.9 CHRONIC SINUSITIS WITH RECURRENT BRONCHITIS: ICD-10-CM

## 2023-11-07 DIAGNOSIS — R06.89 DYSPNEA AND RESPIRATORY ABNORMALITIES: Primary | ICD-10-CM

## 2023-11-07 DIAGNOSIS — J40 CHRONIC SINUSITIS WITH RECURRENT BRONCHITIS: ICD-10-CM

## 2023-11-07 DIAGNOSIS — R09.82 POST-NASAL DRIP: ICD-10-CM

## 2023-11-07 DIAGNOSIS — R06.00 DYSPNEA AND RESPIRATORY ABNORMALITIES: Primary | ICD-10-CM

## 2023-11-07 DIAGNOSIS — D80.6 DEFICIENCY OF ANTI-PNEUMOCOCCAL POLYSACCHARIDE ANTIBODY: ICD-10-CM

## 2023-11-07 PROCEDURE — 3044F HG A1C LEVEL LT 7.0%: CPT | Mod: CPTII,S$GLB,, | Performed by: ALLERGY & IMMUNOLOGY

## 2023-11-07 PROCEDURE — 3008F PR BODY MASS INDEX (BMI) DOCUMENTED: ICD-10-PCS | Mod: CPTII,S$GLB,, | Performed by: ALLERGY & IMMUNOLOGY

## 2023-11-07 PROCEDURE — 3079F DIAST BP 80-89 MM HG: CPT | Mod: CPTII,S$GLB,, | Performed by: ALLERGY & IMMUNOLOGY

## 2023-11-07 PROCEDURE — 99213 PR OFFICE/OUTPT VISIT, EST, LEVL III, 20-29 MIN: ICD-10-PCS | Mod: S$GLB,,, | Performed by: ALLERGY & IMMUNOLOGY

## 2023-11-07 PROCEDURE — 3066F NEPHROPATHY DOC TX: CPT | Mod: CPTII,S$GLB,, | Performed by: ALLERGY & IMMUNOLOGY

## 2023-11-07 PROCEDURE — 1159F MED LIST DOCD IN RCRD: CPT | Mod: CPTII,S$GLB,, | Performed by: ALLERGY & IMMUNOLOGY

## 2023-11-07 PROCEDURE — 3075F PR MOST RECENT SYSTOLIC BLOOD PRESS GE 130-139MM HG: ICD-10-PCS | Mod: CPTII,S$GLB,, | Performed by: ALLERGY & IMMUNOLOGY

## 2023-11-07 PROCEDURE — 99213 OFFICE O/P EST LOW 20 MIN: CPT | Mod: S$GLB,,, | Performed by: ALLERGY & IMMUNOLOGY

## 2023-11-07 PROCEDURE — 1159F PR MEDICATION LIST DOCUMENTED IN MEDICAL RECORD: ICD-10-PCS | Mod: CPTII,S$GLB,, | Performed by: ALLERGY & IMMUNOLOGY

## 2023-11-07 PROCEDURE — 3061F NEG MICROALBUMINURIA REV: CPT | Mod: CPTII,S$GLB,, | Performed by: ALLERGY & IMMUNOLOGY

## 2023-11-07 PROCEDURE — 3061F PR NEG MICROALBUMINURIA RESULT DOCUMENTED/REVIEW: ICD-10-PCS | Mod: CPTII,S$GLB,, | Performed by: ALLERGY & IMMUNOLOGY

## 2023-11-07 PROCEDURE — 3008F BODY MASS INDEX DOCD: CPT | Mod: CPTII,S$GLB,, | Performed by: ALLERGY & IMMUNOLOGY

## 2023-11-07 PROCEDURE — 1160F RVW MEDS BY RX/DR IN RCRD: CPT | Mod: CPTII,S$GLB,, | Performed by: ALLERGY & IMMUNOLOGY

## 2023-11-07 PROCEDURE — 3079F PR MOST RECENT DIASTOLIC BLOOD PRESSURE 80-89 MM HG: ICD-10-PCS | Mod: CPTII,S$GLB,, | Performed by: ALLERGY & IMMUNOLOGY

## 2023-11-07 PROCEDURE — 1160F PR REVIEW ALL MEDS BY PRESCRIBER/CLIN PHARMACIST DOCUMENTED: ICD-10-PCS | Mod: CPTII,S$GLB,, | Performed by: ALLERGY & IMMUNOLOGY

## 2023-11-07 PROCEDURE — 3075F SYST BP GE 130 - 139MM HG: CPT | Mod: CPTII,S$GLB,, | Performed by: ALLERGY & IMMUNOLOGY

## 2023-11-07 PROCEDURE — 3066F PR DOCUMENTATION OF TREATMENT FOR NEPHROPATHY: ICD-10-PCS | Mod: CPTII,S$GLB,, | Performed by: ALLERGY & IMMUNOLOGY

## 2023-11-07 PROCEDURE — 3044F PR MOST RECENT HEMOGLOBIN A1C LEVEL <7.0%: ICD-10-PCS | Mod: CPTII,S$GLB,, | Performed by: ALLERGY & IMMUNOLOGY

## 2023-11-07 RX ORDER — AZELASTINE 1 MG/ML
1 SPRAY, METERED NASAL 2 TIMES DAILY
Qty: 90 ML | Refills: 3 | Status: SHIPPED | OUTPATIENT
Start: 2023-11-07

## 2023-11-07 RX ORDER — BUDESONIDE 0.5 MG/2ML
INHALANT ORAL
Qty: 120 ML | Refills: 3 | Status: SHIPPED | OUTPATIENT
Start: 2023-11-07

## 2023-11-07 RX ORDER — IMMUNE GLOBULIN SUBCUTANEOUS (HUMAN) 200 MG/ML
12 INJECTION, SOLUTION SUBCUTANEOUS WEEKLY
Qty: 200 ML | Refills: 12 | Status: SHIPPED | OUTPATIENT
Start: 2023-11-07

## 2023-11-07 RX ORDER — BUDESONIDE AND FORMOTEROL FUMARATE DIHYDRATE 80; 4.5 UG/1; UG/1
2 AEROSOL RESPIRATORY (INHALATION) 2 TIMES DAILY
Qty: 10.2 G | Refills: 3 | Status: SHIPPED | OUTPATIENT
Start: 2023-11-07

## 2023-11-07 RX ORDER — FLUTICASONE PROPIONATE 50 MCG
SPRAY, SUSPENSION (ML) NASAL
Qty: 48 G | Refills: 3 | Status: SHIPPED | OUTPATIENT
Start: 2023-11-07

## 2023-11-07 RX ORDER — IMMUNE GLOBULIN SUBCUTANEOUS (HUMAN) 200 MG/ML
12 INJECTION, SOLUTION SUBCUTANEOUS WEEKLY
Qty: 200 ML | Refills: 12 | Status: SHIPPED | OUTPATIENT
Start: 2023-11-07 | End: 2023-11-07 | Stop reason: SDUPTHER

## 2023-11-07 RX ORDER — ALBUTEROL SULFATE 90 UG/1
1 AEROSOL, METERED RESPIRATORY (INHALATION) DAILY
Qty: 18 G | Refills: 3 | Status: SHIPPED | OUTPATIENT
Start: 2023-11-07

## 2023-11-07 NOTE — PROGRESS NOTES
"Subjective:       Patient ID: Promise Medrano is a 48 y.o. female.    Chief Complaint: Follow-up (Follow up needs refills on her infusions )      HPI     Pt presents for allergic rhinitis and recurrent bronchitis.     Her strep pneumo titers were not responsive to her pneumovax 23 she obtained in November 2019 indicating SAD.    Last visit: 8/2023  - 12 grams weekly cuvitru , nasal sprays      Since her last visit,     remained infection free on her infusions.     She [facility]     No bacterial infections on subq igg therapy. Current dose is 12 grams weekly subcutaneous.     Still using tobacco. Room smells of tobacco today.     Allergic Rhinitis- dust mite only   Condition: stable      Onset: childhood  Sx: congestion, dryness, feels fluid in the ears.   Season: perennial   Trigger: uncertain   Tx:   saline, azelastine- 1 sen qday or more prn, fluticasone, montelukast - stopped herself.   ait in the past: yes  Recent testing: serum IgE dust mite only.   ct sinus: 6/2019  Essentially clear paranasal sinuses without significant mucoperiosteal sinus  disease.    SAD  11/23:  Continued 12 grams weekly as kept her infection free.     8/23:  Continue 12 grams weekly     11/22:  Increased to 12 grams weekly.     6/2022:  Started cuvitru 11 grams weekly     doxcycline was last used in 4/2020  She is interested in [employer]. - I tried to order it , but it is not orderable in epic.     Recurrent bronchitis felt to be due to SAD dx 12/2019  Started on doxy proph MWF 12/2019  No other abx needed when on proph.     Condition:   Having increased dyspnea. 1-2 weeks.     Infections:  Type infections:   No bacterial infections since her cuvitru.     None since her last visit. But feeling"funk" 1-2 weeks on and off.     Onset: 2018  Bronchitis 3-4 episodes last winter  Has a pmh of lupus autoimmunity, complement levels normal.   April 2019 was d/c from [facility] for pna and or bronchitis  She has a " history of current smoker   Does have associated tobacco abuse as well.     Chest ct 8/2018 shows bronchioloitis and mediastinal lymph nodes and axillary lymph nodes presumed reactive.   Pft: large airway no obstruction and partial response to bronchodilator, small airway reversed by 31%.     Immune evaluation: 6/20/2019    Complement, Total (CH50) 56 ( >41 U/mL)  Complement C2                 3.3  C4 Complement 14 - 44 mg/dL 16      IgA, Serum                    281                         IgM 26 - 217 mg/dL 52      IgG, Total Serum             1259                     700-1600  mg/dL       IgG Subclass 1                710                      248-810  mg/dL       IgG Subclass 2                189                      130-555  mg/dL       IgG Subclass 3                 70                         mg/dL       IgG Subclass 4                 61                         2-96  mg/dL                                           Strep Pneumoniae Type 1  0.5 L >1.3 ug/mL  Strep Pneumoniae Type 3 0.6 L >1.3 ug/mL  Strep Pneumoniae Type 4 0.1 L >1.3 ug/mL  Strep Pneumoniae Type 8 1.8  >1.3 ug/mL  Strep Pneumoniae Type 9 0.5 L >1.3 ug/mL  Strep Pneumoniae Type 12 <0.1 L >1.3 ug/mL  Strep Pneumoniae Type 14 1.2 L >1.3 ug/mL  Strep Pneumoniae Type 19 0.7 L >1.3 ug/mL  Strep Pneumoniae Type 23 0.3 L >1.3 ug/mL  Strep Pneumoniae Type 26 4.3  >1.3 ug/mL  Strep Pneumoniae Type 51 0.3 L >1.3 ug/mL  Strep Pneumoniae Type 56 0.5 L >1.3 ug/mL  Strep Pneumoniae Type 57 1.5  >1.3 ug/mL  Strep Pneumoniae Type 68 <0.1 L >1.3 ug/mL    3/14 = 21%    Hematocrit                   46.0                    34.0-46.6  %           WBC                           5.7                     3.4-10.8  x10E3/uL    RBC                          4.83                    3.77-5.28  x10E6/uL    Hemoglobin                   14.7                    11.1-15.9  g/dL        Basos                           0                   Not Estab.  %           Neurtrophils                    60                   Not Estab.  %           Neurtrophils (Absolute) 3.4  1.4-7.0 x10E3/uL  Lymphs (Absolute)             1.9                      0.7-3.1  x10E3/uL    MCV                            95                        79-97  fL          MCHC                         32.0                    31.5-35.7  g/dL        MCH                          30.4                    26.6-33.0  pg          Lymphs                         34                   Not Estab.  %           Immature Granulocytes            0                   Not Estab.  %           Eos                             0                   Not Estab.  %           Monocytes                       6                   Not Estab.  %           Platelets                     185                      150-450  x10E3/uL    Eos (Absolute)                0.0                      0.0-0.4  x10E3/uL    BASO (Absolute)               0.0                      0.0-0.2  x10E3/uL    Monocytes (Absolute)          0.4                      0.1-0.9  x10E3/uL    % CD19+ Lymphs                9.3                     3.3-25.4  %           Abs. CD19+ Lymphs             177                         /uL         Absolute CD4 Ola           716                     359-1519  /uL         Absolute CD3                 1634                     622-2402  /uL         % CD 4 Pos. Lymph            37.7                    30.8-58.5  %           CD4/CD8 Ratio                0.78 L                  0.92-3.72              % CD3 Pos. Lymph             86.0                    57.5-86.2  %           Abs. CD8 Suppressor           923 H                    109-897  /uL         % CD8 Pos. Lymph             48.6 H                  12.0-35.5  %           RDW                          14.5                    12.3-15.4  %           % NK (CD56/16)                4.1                     1.4-19.4  %           Ab NK (CD56/16)                78       Lpr:   Ranitidine rx at last visit.   Condition: stable  ", not better.   She didn't start it.   "horrible acid reflux"  "gut issues" not diagnosed.   "why I got off plaquenil" "tore up my gut."       Atopic Hx    Rhinitis see above   Oral allergy: denies  Food allergy: none    Asthma see above for bronchitis   Latex tolerates   Eczema: denies, but may have a psoriasis.    Urticaria denies chronic, has doxepin qhs     Infection History    Pneumonia # in the past 12 months: bronchitis as above   Sinus infection # in the past 12 months: reports feels like sinus infections.   Otitis infection # in the past 12 months:  Denies chronic infection, but notes chronic fluid in the ears.     Dust mite only    IgE Cladosporium herbarum <0.10  Class 0 kU/L  IgE Dog Dander              <0.10                      Class 0  kU/L        IgE Cat Epithelium          <0.10                      Class 0  kU/L        IgE Ragweed, Short          <0.10                      Class 0  kU/L        IgE D. pteronyssinus         0.13 AB                 Class 0/I  kU/L        IgE A. fumigatus            <0.10                      Class 0  kU/L        IgE Alteraria alternata <0.10  Class 0 kU/L  IgE Elm, American           <0.10                      Class 0  kU/L        IgE Bermuda Grass           <0.10                      Class 0  kU/L        IgE Savannah, White              <0.10                      Class 0  kU/L        IgE Pigweed, Common         <0.10                      Class 0  kU/L        IgE Cockroach, Vincentian        <0.10                      Class 0  kU/L        IgE Thistle Russian         <0.10                      Class 0  kU/L        IgE D. farinae              <0.10                      Class 0  kU/L        IgE Cockroach American <0.10  Class 0 kU/L   This test was developed and its performance      characteristics determined by [facility].  It      has not been cleared or approved by the U.[place].      Food and Drug Administration.      The FDA has determined that such clearance      or approval is not " necessary. This test is      used for clinical purposes.  It should not      be regarded as investigational or for                             research.                                           IgE Maple/Box Elder         <0.10                      Class 0  kU/L        IgE Penicillium chrysogen <0.10  Class 0 kU/L  IgE Ringling              <0.10                      Class 0  kU/L        IgE Gabe Grass           <0.10                      Class 0  kU/L        IgE Arik Grass           <0.10                      Class 0  kU/L        IgE Bahia Grass             <0.10                      Class 0  kU/L        IgE Sheep Estelle           <0.10                      Class 0  kU/L        IgE Plantain, English        <0.10                      Class 0  kU/L        IgE Mugwort                 <0.10                      Class 0  kU/L        IgE Pecan [place]           <0.10                      Class 0  kU/L        IgE Candida albicans        <0.10                      Class 0  kU/L        IgE Setomelanomma rostrat <0.10  Class 0 kU/L  IgE White [place]          <0.10                      Class 0  kU/L        IgE Epicoccum purpur        <0.10                      Class 0  kU/L        IgE Fusarium proliferatum <0.10  Class 0 kU/L  IgE Trichophyton rubrum <0.10  Class 0 kU/L  IgE Rough [place]er        <0.10                      Class 0  kU/L        IgE Mouse Urine             <0.10                      Class 0  kU/L        IgE Silver Birch            <0.10                      Class 0  kU/L        IgE Maple [place] <0.10  Class 0 kU/L  IgE Bipolaris               <0.10                      Class 0  kU/L         This test was developed and its performance      characteristics determined by [facility].  It      has not been cleared or approved by the U.S.      Food and Drug Administration.      The FDA has determined that such clearance      or approval is not necessary. This test is      used for clinical purposes.   It should not      be regarded as investigational or for                             research.                                           IgE Nishant, White              <0.10                      Class 0  kU/L        IgE Privet, Common          <0.10                      Class 0  kU/L        IgE Ragweed, Giant          <0.10                      Class 0  kU/L        IgE Nettle                  <0.10                      Class 0  kU/L        IgE Cocklebur               <0.10                      Class 0  kU/L        IgE Dockweed, Yellow        <0.10                      Class 0  kU/L         This test was developed and its performance      characteristics determined by [facility].  It      has not been cleared or approved by the U.[place].      Food and Drug Administration.      The FDA has determined that such clearance      or approval is not necessary. This test is      used for clinical purposes.  It should not      be regarded as investigational or for                             research.                                           IgE Hackberry               <0.10                      Class 0  kU/L         This test was developed and its performance      characteristics determined by [facility].  It      has not been cleared or approved by the U.S.      Food and Drug Administration.      The FDA has determined that such clearance      or approval is not necessary. This test is      used for clinical purposes.  It should not      be regarded as investigational or for                             research.                                           IgE Sweet Gum               <0.10                      Class 0  kU/L         This test was developed and its performance      characteristics determined by [facility].  It      has not been cleared or approved by the U.S.      Food and Drug Administration.      The FDA has determined that such clearance      or approval is not necessary. This test is      used for clinical purposes.  It  should not      be regarded as investigational or for                             research.                                           IgE Wormwood                <0.10                      Class 0  kU/L        IgE Fennel, Dog             <0.10                      Class 0  kU/L         This test was developed and its performance      characteristics determined by [facility].  It      has not been cleared or approved by the U.S.      Food and Drug Administration.      The FDA has determined that such clearance      or approval is not necessary. This test is      used for clinical purposes.  It should not      be regarded as investigational or for                             research.                                                                                                                        Performed at: , [address], [id number]      Jc Mckeon MD, Phone:  [phone]     IgE Mouse Epithelium        <0.10                      Class 0  kU/L        IgE Hardesty Bald            <0.10               General: neg unexpected weight changes, fevers, chills, night sweats, malaise  HEENT: see hpi, Neg eye pain, vision changes, ear drainage, nose bleeds, throat tightness, sores in the mouth  CV: Neg chest pain, palpitations, swelling  Resp: see hpi, neg shortness of breath, hemoptysis  GI: see hpi, neg dysphagia, night abdominal pain, reflux, chronic diarrhea, chronic constipation  Derm: See Hpi, neg new rash, neg flushing  Mu/sk: Neg joint pain, joint swelling   Psych: Neg anxiety  neuro: neg chronic headaches, muscle weakness  Endo: neg heat/cold intolerance, chronic fatigue    Objective:     Vitals:    11/07/23 0820   BP: 132/80   Pulse: 93   SpO2: 98%   Weight: 78 kg (172 lb)          Physical Exam      General: no acute distress, well developed well nourished       Assessment:       1. Dyspnea and respiratory abnormalities    2. Deficiency of anti-pneumococcal  polysaccharide antibody    3. Post-nasal drip    4. Chronic sinusitis with recurrent bronchitis                Plan:       Dyspnea and respiratory abnormalities  -     SYMBICORT 80-4.5 mcg/actuation HFAA; Inhale 2 puffs into the lungs 2 (two) times a day.  Dispense: 10.2 g; Refill: 3  -     albuterol (PROVENTIL/VENTOLIN HFA) 90 mcg/actuation inhaler; Inhale 1 puff into the lungs once daily. Rescue  Dispense: 18 g; Refill: 3    Deficiency of anti-pneumococcal polysaccharide antibody  -     Discontinue: immun glob G,IgG,-gly-IgA ov50 (CUVITRU) 10 gram/50 mL (20 %) Soln; Inject 60 mLs (12 g total) into the skin once a week.  Dispense: 200 mL; Refill: 12  -     immun glob G,IgG,-gly-IgA ov50 (CUVITRU) 10 gram/50 mL (20 %) Soln; Inject 60 mLs (12 g total) into the skin once a week.  Dispense: 200 mL; Refill: 12    Post-nasal drip  -     fluticasone propionate (FLONASE) 50 mcg/actuation nasal spray; 1 spray per nostril twice per day  Dispense: 48 g; Refill: 3    Chronic sinusitis with recurrent bronchitis  -     azelastine (ASTELIN) 137 mcg (0.1 %) nasal spray; 1 spray (137 mcg total) by Nasal route 2 (two) times daily.  Dispense: 90 mL; Refill: 3  -     budesonide (PULMICORT) 0.5 mg/2 mL nebulizer solution; ADD 1 VIAL TO SINUS RINSE AND USE TWICE DAILY AS DIRECTED  Dispense: 120 mL; Refill: 3              Chronic allergic rhinitis: dust mite only  11/23:  Continue budesonide in her rinses   Continue azelastine prn   Continue levocetirizine prn     8/23:  Continue budesonide in her rinses  Continue azelasitne   Continue levocetirizine prn      2/23:  Stable  Continue budesonide flares  Continue azelastine   Levocetirizine prn     11/22:   Somewhat flared.     6/22:  Continue budesonide rinses   Continue azelastine   levocetirizine prn     2/22  Serum IgE- not stable enough for skin prick testing. Dust mite only- mild sensitivity.   saline and azelastine    disContinue fluticasone   Discontinue montelukast 1 pill po qday -  pt self d/c   levocetirizine prn   Start budesonide in saline rinse     Recurrent bronchitis and sinusitis/SAD  11/23:  12 grams weekly , continue cuvitru - refill today     8/23:  12 grams weekly continue cuvitru     2/23:  Continue 12 grams weekly cuvitru     11/22:  Increased to 12 grams weekly to account for recent wt gain 500mg/kg / monthly dose     6/2022:  Start cuvitru weekly 11 grams weekly (550 mg/kg/month - based upon 81 kg)- having thrush and undesirable side effects.   Discontinue doxy  mg MWF only for proph once on infusions x 3 months.      2/22  Doxycycline 100 mg BID MWF     Small airway reversibility by 31%     11/23:  Continue symbciort and pulmonary care     11/22:  Continue pulmonary care  Recommend smoking cessation     6/22  Continue pulmonary care.     Tobacco abuse and oxygen dependent   Smoking cessation recommended.      Flu vaccine yearly recommended.   covid vaccination obtained     F/u 6-12 months, sooner if needed.              Katiana Cline M.D.  Allergy/Immunology  [facility]   [phone] phone  [phone] fax

## 2023-11-07 NOTE — PATIENT INSTRUCTIONS
- buy over the counter at any pharmacy or [facility]     Put distilled water into the kath med sinus rinse bottle and stop at the black line.     Then, pour that amount into a microwavable mug, microwave 10 seconds then put into the distilled water back into the bottle.     Then place xlear packet into and mix. The packets that come with the kath med can be used instead or kept for backup if you run out of xlear.      - buy over the counter, can buy online at [facility].        Then place budesonide vial into distilled water and xlear/salt packet mixture.    This will replace the intranasal steroid spray. (flonase/fluticasone, rhinocort, nasacort) unless you want to use both.       - budesonide vials     Breathe in via mouth. Hold breath, squeeze GENTLY ! And blow out via the nose.  Imaging it is like swimming.     The shower is a popular location. Steam can also help open the sinuses.       Budesonide vials Must be used daily for at least 2-4 weeks, or this will not be effective.     Dose:  Use 2 vials daily or 1 vial twice per day.     If you feel worsening symptoms, try doubling the dose x 2 weeks, then back to the baseline or original dose.     If there is a burning sensation, add baking soda to neutralize the ph. Make sure the ratios are mixed correctly.     Tips to avoid unexpected drainage after rinsing     In rare situations, especially if you have had sinus surgery, the saline solution can pool in the sinus cavities and nasal passages and then drip from your nostrils hours after rinsing. To avoid this harmless, but annoying inconvenience, take one extra step after rinsing: lean forward, tilt your head sideways and gently blow your nose. Then, tilt your head to the other side and blow again. You may need to repeat this several times. This will help rid your nasal passages of any excess mucus and remaining saline solution. If you find yourself experiencing delayed drainage often, do not rinse right before  "leaving your house or going to bed.    additional nasal spray/sprays if prescribed that can work well with budesonide rinses at the same time:    Azelastine- use as needed for congestion and post nasal drip.   Dose: Use 1 spray per nare every 6 hours.      Ipratropium nasal spray: This is used for a runny nose. This may be used as needed. 1-2 sprays per nare or nostril every 3-6 hours.       Nasal Spray Technique:  Head down  Aim nasal spray tip up and out   Spray DON'T sniff   Let the spray drip out the front  Pat dry and lift head.     Oral antihistamines  Use antihistamines as needed for itching or sneezing.   Daily use can thicken the mucus to where it becomes a glue-like consistency.    Albuterol is a rescue medication. Use as needed every 4-6 hours for cough wheeze shortness of breath.     When asthma is flared, use 4-6 puffs every 4 hours or every 6 hours scheduled x 48 hours. After the 48 hours, you may try to extend the time interval to every 6 hours or every 8 hours scheduled until the asthma flare improves. Once the asthma flare improves, then use as needed again.      Proper technique to inhale albuterol    Shake inhaler x 10 seconds  Put inhaler in the mouth   Press the top of the inhaler until the medication comes out  Breathe in SLOWLY over 5 seconds.   Then hold breath x 10 seconds  Slowly exhale.     Repeat until the amount of puffs required to help with asthma symptoms improves.       If 6-10 puffs x 3 times used 10 minutes apart does not break the asthma "attack" go to the nearest emergency room.       "

## 2023-11-13 DIAGNOSIS — S83.241A ACUTE MEDIAL MENISCUS TEAR OF RIGHT KNEE, INITIAL ENCOUNTER: ICD-10-CM
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
## 2023-11-13 RX ORDER — DICLOFENAC SODIUM 10 MG/G
GEL TOPICAL
Qty: 100 G | Refills: 6 | Status: SHIPPED | OUTPATIENT
Start: 2023-11-13

## 2023-11-19 DIAGNOSIS — R53.83 FATIGUE, UNSPECIFIED TYPE: ICD-10-CM

## 2023-11-19 DIAGNOSIS — E78.00 PURE HYPERCHOLESTEROLEMIA: ICD-10-CM

## 2023-11-20 RX ORDER — EZETIMIBE 10 MG/1
10 TABLET ORAL DAILY
Qty: 90 TABLET | Refills: 1 | Status: SHIPPED | OUTPATIENT
Start: 2023-11-20 | End: 2024-01-05 | Stop reason: SDUPTHER

## 2023-11-20 RX ORDER — CYANOCOBALAMIN 1000 UG/ML
1000 INJECTION, SOLUTION INTRAMUSCULAR; SUBCUTANEOUS WEEKLY
Qty: 10 ML | Refills: 1 | Status: SHIPPED | OUTPATIENT
Start: 2023-11-20

## 2023-11-20 RX ORDER — SEMAGLUTIDE 2.68 MG/ML
2 INJECTION, SOLUTION SUBCUTANEOUS
Qty: 9 ML | Refills: 1 | Status: SHIPPED | OUTPATIENT
Start: 2023-11-20 | End: 2024-11-19

## 2023-11-20 RX ORDER — PRAVASTATIN SODIUM 80 MG/1
80 TABLET ORAL DAILY
Qty: 90 TABLET | Refills: 0 | Status: SHIPPED | OUTPATIENT
Start: 2023-11-20 | End: 2024-03-29 | Stop reason: SDUPTHER

## 2024-01-05 DIAGNOSIS — E61.1 IRON DEFICIENCY: ICD-10-CM

## 2024-01-05 DIAGNOSIS — K21.9 GERD WITHOUT ESOPHAGITIS: ICD-10-CM

## 2024-01-05 DIAGNOSIS — E78.00 PURE HYPERCHOLESTEROLEMIA: ICD-10-CM

## 2024-01-05 DIAGNOSIS — F51.04 CHRONIC INSOMNIA: ICD-10-CM

## 2024-01-08 ENCOUNTER — PATIENT MESSAGE (OUTPATIENT)
Dept: FAMILY MEDICINE | Facility: CLINIC | Age: 49
End: 2024-01-08
Payer: MEDICAID

## 2024-01-10 ENCOUNTER — PATIENT MESSAGE (OUTPATIENT)
Dept: FAMILY MEDICINE | Facility: CLINIC | Age: 49
End: 2024-01-10

## 2024-01-10 RX ORDER — ONDANSETRON 4 MG/1
TABLET, ORALLY DISINTEGRATING ORAL
Qty: 30 TABLET | Refills: 5 | Status: SHIPPED | OUTPATIENT
Start: 2024-01-10

## 2024-01-10 RX ORDER — EZETIMIBE 10 MG/1
10 TABLET ORAL DAILY
Qty: 90 TABLET | Refills: 1 | Status: SHIPPED | OUTPATIENT
Start: 2024-01-10

## 2024-01-10 RX ORDER — DIAZEPAM 5 MG/1
TABLET ORAL
Qty: 60 TABLET | Refills: 0 | Status: SHIPPED | OUTPATIENT
Start: 2024-01-10 | End: 2024-02-22 | Stop reason: SDUPTHER

## 2024-01-10 RX ORDER — FERROUS SULFATE 325(65) MG
325 TABLET ORAL DAILY
Qty: 90 TABLET | Refills: 1 | Status: SHIPPED | OUTPATIENT
Start: 2024-01-10

## 2024-01-14 DIAGNOSIS — E11.9 TYPE 2 DIABETES MELLITUS WITHOUT COMPLICATION, WITHOUT LONG-TERM CURRENT USE OF INSULIN: ICD-10-CM

## 2024-01-14 DIAGNOSIS — G62.9 PERIPHERAL POLYNEUROPATHY: ICD-10-CM

## 2024-01-14 DIAGNOSIS — K21.9 GERD WITHOUT ESOPHAGITIS: ICD-10-CM

## 2024-01-14 DIAGNOSIS — E55.9 VITAMIN D DEFICIENCY: ICD-10-CM

## 2024-01-14 DIAGNOSIS — J84.10 PULMONARY FIBROSIS: ICD-10-CM

## 2024-01-16 RX ORDER — METFORMIN HYDROCHLORIDE 500 MG/1
500 TABLET ORAL 2 TIMES DAILY WITH MEALS
Qty: 60 TABLET | Refills: 5 | Status: SHIPPED | OUTPATIENT
Start: 2024-01-16 | End: 2025-01-15

## 2024-01-16 RX ORDER — GABAPENTIN 300 MG/1
CAPSULE ORAL
Qty: 90 CAPSULE | Refills: 1 | Status: SHIPPED | OUTPATIENT
Start: 2024-01-16

## 2024-01-16 RX ORDER — ESOMEPRAZOLE MAGNESIUM 40 MG/1
40 CAPSULE, DELAYED RELEASE ORAL
Qty: 30 CAPSULE | Refills: 11 | Status: SHIPPED | OUTPATIENT
Start: 2024-01-16

## 2024-01-16 RX ORDER — ERGOCALCIFEROL 1.25 MG/1
50000 CAPSULE ORAL
Qty: 4 CAPSULE | Refills: 3 | Status: SHIPPED | OUTPATIENT
Start: 2024-01-16

## 2024-01-16 RX ORDER — IPRATROPIUM BROMIDE AND ALBUTEROL SULFATE 2.5; .5 MG/3ML; MG/3ML
SOLUTION RESPIRATORY (INHALATION)
Qty: 180 ML | Refills: 0 | Status: SHIPPED | OUTPATIENT
Start: 2024-01-16

## 2024-01-16 RX ORDER — DOXEPIN HYDROCHLORIDE 150 MG/1
150 CAPSULE ORAL NIGHTLY
Qty: 90 CAPSULE | Refills: 1 | Status: SHIPPED | OUTPATIENT
Start: 2024-01-16

## 2024-02-19 NOTE — TELEPHONE ENCOUNTER
----- Message from Marcus Fernandez MA sent at 5/3/2023  9:30 AM CDT -----  Regarding: [facility] f/u  Contact: [facility]  Please note referral in system from [facility] for S80.01XA (ICD-10-CM) - Contusion of right knee, initial encounter.    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 Reports similar episode of pain, but over ulnar wrist ~2 weeks ago that resolved on its own. c/o throbbing pain when her hand hangs down. Reports that she develops redness around areas of pain. Noticed tingling in RIGHT small finger today. Denies injury. Unable to tolerate wrist wrap/brace. + Advil, Tylenol without relief. Reports that she has previously seen Rheumatologist => labs were unremarkable.  Hx: ?calcific arthropathy vs pseudogout. Sx: L distal radius fx ORIF, CTR (Dr. Ananth Perez 4/19/19). Ankles sx. ACL reconstruction. Cholecystectomy. [] : no

## 2024-02-22 ENCOUNTER — OFFICE VISIT (OUTPATIENT)
Dept: FAMILY MEDICINE | Facility: CLINIC | Age: 49
End: 2024-02-22
Payer: MEDICAID

## 2024-02-22 VITALS
TEMPERATURE: 98 F | OXYGEN SATURATION: 96 % | BODY MASS INDEX: 29.81 KG/M2 | WEIGHT: 162 LBS | HEIGHT: 62 IN | RESPIRATION RATE: 16 BRPM | HEART RATE: 82 BPM | DIASTOLIC BLOOD PRESSURE: 86 MMHG | SYSTOLIC BLOOD PRESSURE: 128 MMHG

## 2024-02-22 DIAGNOSIS — I10 BENIGN HYPERTENSION: ICD-10-CM

## 2024-02-22 DIAGNOSIS — E78.00 PURE HYPERCHOLESTEROLEMIA: ICD-10-CM

## 2024-02-22 DIAGNOSIS — E11.9 TYPE 2 DIABETES MELLITUS WITHOUT COMPLICATION, WITHOUT LONG-TERM CURRENT USE OF INSULIN: Primary | ICD-10-CM

## 2024-02-22 DIAGNOSIS — G47.30 SLEEP APNEA IN ADULT: ICD-10-CM

## 2024-02-22 DIAGNOSIS — F51.04 CHRONIC INSOMNIA: ICD-10-CM

## 2024-02-22 DIAGNOSIS — R53.83 FATIGUE, UNSPECIFIED TYPE: ICD-10-CM

## 2024-02-22 PROCEDURE — 99214 OFFICE O/P EST MOD 30 MIN: CPT | Mod: PBBFAC,PN | Performed by: INTERNAL MEDICINE

## 2024-02-22 PROCEDURE — 3008F BODY MASS INDEX DOCD: CPT | Mod: CPTII,,, | Performed by: INTERNAL MEDICINE

## 2024-02-22 PROCEDURE — 99999 PR PBB SHADOW E&M-EST. PATIENT-LVL IV: CPT | Mod: PBBFAC,,, | Performed by: INTERNAL MEDICINE

## 2024-02-22 PROCEDURE — 3074F SYST BP LT 130 MM HG: CPT | Mod: CPTII,,, | Performed by: INTERNAL MEDICINE

## 2024-02-22 PROCEDURE — 99214 OFFICE O/P EST MOD 30 MIN: CPT | Mod: S$PBB,,, | Performed by: INTERNAL MEDICINE

## 2024-02-22 PROCEDURE — 3079F DIAST BP 80-89 MM HG: CPT | Mod: CPTII,,, | Performed by: INTERNAL MEDICINE

## 2024-02-22 RX ORDER — VARENICLINE 0.03 MG/.05ML
SPRAY NASAL
COMMUNITY
Start: 2023-12-05

## 2024-02-22 RX ORDER — GABAPENTIN 100 MG/1
100 CAPSULE ORAL NIGHTLY
COMMUNITY

## 2024-02-22 RX ORDER — DIAZEPAM 5 MG/1
TABLET ORAL
Qty: 60 TABLET | Refills: 2 | Status: SHIPPED | OUTPATIENT
Start: 2024-02-22

## 2024-02-22 RX ORDER — ESTRADIOL 0.1 MG/D
1 PATCH, EXTENDED RELEASE TRANSDERMAL
COMMUNITY
Start: 2024-01-11

## 2024-02-22 RX ORDER — METOPROLOL SUCCINATE 50 MG/1
50 TABLET, EXTENDED RELEASE ORAL DAILY
COMMUNITY
Start: 2024-02-04

## 2024-02-22 NOTE — PROGRESS NOTES
"  SUBJECTIVE:    Patient ID: Promise Medrano is a 48 y.o. female.    Chief Complaint: Medication Refill and Follow-up    Medication Refill  Associated symptoms include arthralgias, joint swelling, neck pain and weakness. Pertinent negatives include no abdominal pain, chest pain, chills, congestion, coughing, diaphoresis, fatigue, fever, headaches, myalgias, nausea, numbness, sore throat or vomiting.   Follow-up  Associated symptoms include arthralgias, joint swelling, neck pain and weakness. Pertinent negatives include no abdominal pain, chest pain, chills, congestion, coughing, diaphoresis, fatigue, fever, headaches, myalgias, nausea, numbness, sore throat or vomiting.     Diabetes-   Promise Medrano is an 48 y.o. female who presents for follow up of diabetes. Current symptoms include: none. Patient denies foot ulcerations, increased appetite, nausea, paresthesia of the feet, polydipsia, polyuria, visual disturbances, vomiting, and weight loss. Evaluation to date has included: fasting blood sugar, fasting lipid panel, hemoglobin A1C, and microalbuminuria. Home sugars: patient does not check sugars. Current treatments: metformin,Ozempic. Last dilated eye exam UTD.    Patient has had GB removed since the last visit-also a sleep study has been ordered but has not been done due to location of the facility-she has had no problems but a little diarrhea at times    Chronic anxiety-she hasn't had valium since June-she takes 1-2 hs for same but does not take it every night    Time for labs--      Last 3 sets of Vitals        10/26/2023     1:57 PM 11/7/2023     8:20 AM 2/22/2024     9:42 AM   Vitals - 1 value per visit   SYSTOLIC 153 132 128   DIASTOLIC 78 80 86   Pulse 93 93 82   Temp 98.2 °F (36.8 °C)  98.3 °F (36.8 °C)   Resp   16   SPO2  98 % 96 %   Weight (lb)  172 162   Weight (kg)  78.019 73.483   Height   5' 2" (1.575 m)   BMI (Calculated)   29.6   Pain Score Four Zero Zero          Admission on 10/09/2023, " Discharged on 10/09/2023   Component Date Value Ref Range Status    POC Glucose 10/09/2023 85  70 - 110 Final    POC Glucose 10/09/2023 114 (H)  70 - 110 Final   Admission on 09/08/2023, Discharged on 09/08/2023   Component Date Value Ref Range Status    WBC 09/08/2023 10.15  3.90 - 12.70 K/uL Final    RBC 09/08/2023 4.73  4.00 - 5.40 M/uL Final    Hemoglobin 09/08/2023 14.5  12.0 - 16.0 g/dL Final    Hematocrit 09/08/2023 43.3  37.0 - 48.5 % Final    MCV 09/08/2023 92  82 - 98 fL Final    MCH 09/08/2023 30.7  27.0 - 31.0 pg Final    MCHC 09/08/2023 33.5  32.0 - 36.0 g/dL Final    RDW 09/08/2023 14.1  11.5 - 14.5 % Final    Platelets 09/08/2023 264  150 - 450 K/uL Final    MPV 09/08/2023 10.1  9.2 - 12.9 fL Final    Immature Granulocytes 09/08/2023 0.3  0.0 - 0.5 % Final    Gran # (ANC) 09/08/2023 8.5 (H)  1.8 - 7.7 K/uL Final    Immature Grans (Abs) 09/08/2023 0.03  0.00 - 0.04 K/uL Final    Lymph # 09/08/2023 1.2  1.0 - 4.8 K/uL Final    Mono # 09/08/2023 0.3  0.3 - 1.0 K/uL Final    Eos # 09/08/2023 0.0  0.0 - 0.5 K/uL Final    Baso # 09/08/2023 0.03  0.00 - 0.20 K/uL Final    nRBC 09/08/2023 0  0 /100 WBC Final    Gran % 09/08/2023 84.1 (H)  38.0 - 73.0 % Final    Lymph % 09/08/2023 12.0 (L)  18.0 - 48.0 % Final    Mono % 09/08/2023 3.3 (L)  4.0 - 15.0 % Final    Eosinophil % 09/08/2023 0.0  0.0 - 8.0 % Final    Basophil % 09/08/2023 0.3  0.0 - 1.9 % Final    Differential Method 09/08/2023 Automated   Final    Sodium 09/08/2023 136  136 - 145 mmol/L Final    Potassium 09/08/2023 4.1  3.5 - 5.1 mmol/L Final    Chloride 09/08/2023 103  95 - 110 mmol/L Final    CO2 09/08/2023 25  23 - 29 mmol/L Final    Glucose 09/08/2023 166 (H)  70 - 110 mg/dL Final    BUN 09/08/2023 17  6 - 20 mg/dL Final    Creatinine 09/08/2023 0.7  0.5 - 1.4 mg/dL Final    Calcium 09/08/2023 9.3  8.7 - 10.5 mg/dL Final    Total Protein 09/08/2023 8.5 (H)  6.0 - 8.4 g/dL Final    Albumin 09/08/2023 4.4  3.5 - 5.2 g/dL Final    Total Bilirubin  09/08/2023 0.5  0.1 - 1.0 mg/dL Final    Alkaline Phosphatase 09/08/2023 84  55 - 135 U/L Final    AST 09/08/2023 18  10 - 40 U/L Final    ALT 09/08/2023 13  10 - 44 U/L Final    eGFR 09/08/2023 >60.0  >60 mL/min/1.73 m^2 Final    Anion Gap 09/08/2023 8  8 - 16 mmol/L Final    Specimen UA 09/08/2023 Urine, Clean Catch   Final    Color, UA 09/08/2023 Yellow  Yellow, Straw, Violet Final    Appearance, UA 09/08/2023 Clear  Clear Final    pH, UA 09/08/2023 7.0  5.0 - 8.0 Final    Specific Gravity, UA 09/08/2023 >1.030 (A)  1.005 - 1.030 Final    Protein, UA 09/08/2023 2+ (A)  Negative Final    Glucose, UA 09/08/2023 Trace (A)  Negative Final    Ketones, UA 09/08/2023 1+ (A)  Negative Final    Bilirubin (UA) 09/08/2023 1+ (A)  Negative Final    Occult Blood UA 09/08/2023 3+ (A)  Negative Final    Nitrite, UA 09/08/2023 Negative  Negative Final    Urobilinogen, UA 09/08/2023 4.0-6.0 (A)  Negative EU/dL Final    Leukocytes, UA 09/08/2023 Negative  Negative Final    RBC, UA 09/08/2023 11 (H)  0 - 4 /hpf Final    WBC, UA 09/08/2023 3  0 - 5 /hpf Final    Bacteria 09/08/2023 Negative  None-Occ /hpf Final    Squam Epithel, UA 09/08/2023 5  /hpf Final    Hyaline Casts, UA 09/08/2023 12 (A)  0-1/lpf /lpf Final    Ca Oxalate Caron, UA 09/08/2023 Few  None-Moderate Final    Microscopic Comment 09/08/2023 SEE COMMENT   Final   Lab Visit on 08/11/2023   Component Date Value Ref Range Status    Sodium 08/11/2023 139  136 - 145 mmol/L Final    Potassium 08/11/2023 4.6  3.5 - 5.1 mmol/L Final    Chloride 08/11/2023 105  95 - 110 mmol/L Final    CO2 08/11/2023 26  23 - 29 mmol/L Final    Glucose 08/11/2023 100  70 - 110 mg/dL Final    BUN 08/11/2023 13  6 - 20 mg/dL Final    Creatinine 08/11/2023 0.6  0.5 - 1.4 mg/dL Final    Calcium 08/11/2023 9.3  8.7 - 10.5 mg/dL Final    Anion Gap 08/11/2023 8  8 - 16 mmol/L Final    eGFR 08/11/2023 >60.0  >60 mL/min/1.73 m^2 Final    Microalbumin, Urine 08/11/2023 8.8  <19.9 ug/mL Final     Creatinine, Urine 08/11/2023 150.0  15.0 - 325.0 mg/dL Final    Microalb/Creat Ratio 08/11/2023 5.9  0.0 - 30.0 ug/mg Final    Hemoglobin A1C 08/11/2023 5.6  4.5 - 6.2 % Final    Estimated Avg Glucose 08/11/2023 114  68 - 131 mg/dL Final   Admission on 07/28/2023, Discharged on 07/28/2023   Component Date Value Ref Range Status    Magnesium 07/28/2023 1.8  1.6 - 2.6 mg/dL Final    WBC 07/28/2023 10.24  3.90 - 12.70 K/uL Final    RBC 07/28/2023 4.82  4.00 - 5.40 M/uL Final    Hemoglobin 07/28/2023 14.7  12.0 - 16.0 g/dL Final    Hematocrit 07/28/2023 44.0  37.0 - 48.5 % Final    MCV 07/28/2023 91  82 - 98 fL Final    MCH 07/28/2023 30.5  27.0 - 31.0 pg Final    MCHC 07/28/2023 33.4  32.0 - 36.0 g/dL Final    RDW 07/28/2023 14.6 (H)  11.5 - 14.5 % Final    Platelets 07/28/2023 268  150 - 450 K/uL Final    MPV 07/28/2023 10.2  9.2 - 12.9 fL Final    Immature Granulocytes 07/28/2023 0.5  0.0 - 0.5 % Final    Gran # (ANC) 07/28/2023 6.9  1.8 - 7.7 K/uL Final    Immature Grans (Abs) 07/28/2023 0.05 (H)  0.00 - 0.04 K/uL Final    Lymph # 07/28/2023 2.7  1.0 - 4.8 K/uL Final    Mono # 07/28/2023 0.5  0.3 - 1.0 K/uL Final    Eos # 07/28/2023 0.0  0.0 - 0.5 K/uL Final    Baso # 07/28/2023 0.03  0.00 - 0.20 K/uL Final    nRBC 07/28/2023 0  0 /100 WBC Final    Gran % 07/28/2023 67.7  38.0 - 73.0 % Final    Lymph % 07/28/2023 26.4  18.0 - 48.0 % Final    Mono % 07/28/2023 4.9  4.0 - 15.0 % Final    Eosinophil % 07/28/2023 0.2  0.0 - 8.0 % Final    Basophil % 07/28/2023 0.3  0.0 - 1.9 % Final    Differential Method 07/28/2023 Automated   Final    Sodium 07/28/2023 136  136 - 145 mmol/L Final    Potassium 07/28/2023 3.6  3.5 - 5.1 mmol/L Final    Chloride 07/28/2023 102  95 - 110 mmol/L Final    CO2 07/28/2023 26  23 - 29 mmol/L Final    Glucose 07/28/2023 104  70 - 110 mg/dL Final    BUN 07/28/2023 10  6 - 20 mg/dL Final    Creatinine 07/28/2023 0.7  0.5 - 1.4 mg/dL Final    Calcium 07/28/2023 9.0  8.7 - 10.5 mg/dL Final    Total  Protein 2023 8.1  6.0 - 8.4 g/dL Final    Albumin 2023 4.1  3.5 - 5.2 g/dL Final    Total Bilirubin 2023 0.4  0.1 - 1.0 mg/dL Final    Alkaline Phosphatase 2023 91  55 - 135 U/L Final    AST 2023 21  10 - 40 U/L Final    ALT 2023 19  10 - 44 U/L Final    eGFR 2023 >60.0  >60 mL/min/1.73 m^2 Final    Anion Gap 2023 8  8 - 16 mmol/L Final    Troponin I High Sensitivity 2023 3.3  0.0 - 14.9 pg/mL Final    BNP 2023 5  0 - 99 pg/mL Final    Lipase 2023 33  4 - 60 U/L Final    POC Creatinine 2023 0.6  0.5 - 1.4 mg/dL Final    Sample 2023 VENOUS   Final    Troponin I High Sensitivity 2023 3.2  0.0 - 14.9 pg/mL Final         Past Medical History:   Diagnosis Date    Allergic rhinitis     Arthritis     Chronic anxiety 10/23/2018    Connective tissue disease     joints b/c lupus    Diabetes mellitus     Fibromyalgia     GERD (gastroesophageal reflux disease)     Grade II hemorrhoids 2019    Hyperlipemia     Interstitial lung disease     Lupus     Lupus     Sjogren's syndrome 2019     Past Surgical History:   Procedure Laterality Date    AUGMENTATION OF BREAST      BREAST SURGERY      BRONCHOSCOPY WITH FLUOROSCOPY N/A 2019    Procedure: BRONCHOSCOPY, WITH FLUOROSCOPY;  Surgeon: Nate Tarango MD;  Location: [facility] ENDO;  Service: Pulmonary;  Laterality: N/A;    CATHETERIZATION OF BOTH LEFT AND RIGHT HEART Left 2021    Procedure: CATHETERIZATION, HEART, BOTH LEFT AND RIGHT;  Surgeon: Luca Wilks MD;  Location: [facility] CATH/EP LAB;  Service: Cardiology;  Laterality: Left;     SECTION  ,    COLONOSCOPY      ESOPHAGOGASTRODUODENOSCOPY      gastric sleeve  2010    HYSTERECTOMY      LAPAROSCOPIC CHOLECYSTECTOMY N/A 10/9/2023    Procedure: CHOLECYSTECTOMY, LAPAROSCOPIC;  Surgeon: Mendez Zavala III, MD;  Location: [facility] OR;  Service: General;  Laterality: N/A;    TONSILLECTOMY  1996    TUMOR REMOVAL       benign fatty     Family History   Problem Relation Age of Onset    Early death Father     Heart disease Father     Stroke Father     Kidney disease Father     Diabetes Paternal Grandmother     Cancer Paternal Grandmother     Raynaud syndrome Mother     Uterine cancer Mother     Breast cancer Mother     Raynaud syndrome Sister     Polycystic ovary syndrome Daughter     Diabetes Maternal Grandmother     Heart disease Maternal Grandmother     Kidney disease Maternal Grandmother     Breast cancer Maternal Grandmother     Heart disease Maternal Grandfather     Cancer Paternal Grandfather     No Known Problems Daughter        Marital Status: Single  Alcohol History:  reports that she does not currently use alcohol.  Tobacco History:  reports that she has been smoking cigarettes. She started smoking about 26 years ago. She has a 26.0 pack-year smoking history. She has never used smokeless tobacco.  Drug History:  reports no history of drug use.    Review of patient's allergies indicates:   Allergen Reactions    Ativan [lorazepam] Other (See Comments)     depression    Chlorpheniramine-phenylpropan Other (See Comments)    Hay fever and allergy relief     Mobic [meloxicam] Other (See Comments)     Spikes blood pressure    Nitroglycerin      Pt says it could stop her heart       Current Outpatient Medications:     ACCU-CHEK SOFTCLIX LANCETS Misc, USE ONCE DAILY AS NEEDED, Disp: 100 each, Rfl: 5    albuterol (PROVENTIL/VENTOLIN HFA) 90 mcg/actuation inhaler, Inhale 1 puff into the lungs once daily. Rescue, Disp: 18 g, Rfl: 3    albuterol-ipratropium (DUO-NEB) 2.5 mg-0.5 mg/3 mL nebulizer solution, USE 1 VIAL VIA NEBULIZER EVERY 6 HOURS AS NEEDED FOR WHEEZING OR SHORTNESS OF BREATH, Disp: 180 mL, Rfl: 0    azelastine (ASTELIN) 137 mcg (0.1 %) nasal spray, 1 spray (137 mcg total) by Nasal route 2 (two) times daily., Disp: 90 mL, Rfl: 3    blood sugar diagnostic (ONETOUCH ULTRA BLUE TEST STRIP) Strp, Use strips to take blood  sugar bid, Disp: 200 strip, Rfl: 2    budesonide (PULMICORT) 0.5 mg/2 mL nebulizer solution, ADD 1 VIAL TO SINUS RINSE AND USE TWICE DAILY AS DIRECTED, Disp: 120 mL, Rfl: 3    cyanocobalamin 1,000 mcg/mL injection, Inject 1 mL (1,000 mcg total) into the muscle once a week., Disp: 10 mL, Rfl: 1    cyclobenzaprine (FLEXERIL) 10 MG tablet, Take 10 mg by mouth every evening., Disp: , Rfl:     diclofenac sodium (VOLTAREN) 1 % Gel, APPLY 2G TO THE SKIN THREE TIMES DAILY, Disp: 100 g, Rfl: 6    dicyclomine (BENTYL) 20 mg tablet, TAKE 1 TABLET(20 MG) BY MOUTH TWICE DAILY, Disp: 60 tablet, Rfl: 0    doxepin (SINEQUAN) 150 MG Cap, Take 1 capsule (150 mg total) by mouth every evening., Disp: 90 capsule, Rfl: 1    ergocalciferol (ERGOCALCIFEROL) 50,000 unit Cap, Take 1 capsule (50,000 Units total) by mouth every 7 days., Disp: 4 capsule, Rfl: 3    esomeprazole (NEXIUM) 40 MG capsule, Take 1 capsule (40 mg total) by mouth before breakfast., Disp: 30 capsule, Rfl: 11    ezetimibe (ZETIA) 10 mg tablet, Take 1 tablet (10 mg total) by mouth once daily., Disp: 90 tablet, Rfl: 1    ferrous sulfate (FEROSUL) 325 mg (65 mg iron) Tab tablet, Take 1 tablet (325 mg total) by mouth once daily., Disp: 90 tablet, Rfl: 1    fluticasone propionate (FLONASE) 50 mcg/actuation nasal spray, 1 spray per nostril twice per day, Disp: 48 g, Rfl: 3    gabapentin (NEURONTIN) 100 MG capsule, Take 100 mg by mouth every evening., Disp: , Rfl:     gabapentin (NEURONTIN) 300 MG capsule, TAKE 1 CAPSULE BY MOUTH AT BEDTIME, Disp: 90 capsule, Rfl: 1    ibuprofen (ADVIL,MOTRIN) 600 MG tablet, TAKE 1 TABLET(600 MG) BY MOUTH TWICE DAILY AS NEEDED FOR PAIN, Disp: 60 tablet, Rfl: 1    immun glob G,IgG,-gly-IgA ov50 (CUVITRU) 10 gram/50 mL (20 %) Soln, Inject 60 mLs (12 g total) into the skin once a week., Disp: 200 mL, Rfl: 12    levocetirizine (XYZAL) 5 MG tablet, Take 1 tablet (5 mg total) by mouth every evening., Disp: 90 tablet, Rfl: 1    LYLLANA 0.1 mg/24 hr  PTSW, Place 1 patch onto the skin twice a week., Disp: , Rfl:     metFORMIN (GLUCOPHAGE) 500 MG tablet, Take 1 tablet (500 mg total) by mouth 2 (two) times daily with meals., Disp: 60 tablet, Rfl: 5    metoprolol succinate (TOPROL-XL) 50 MG 24 hr tablet, Take 50 mg by mouth once daily., Disp: , Rfl:     midodrine (PROAMATINE) 2.5 MG Tab, , Disp: , Rfl:     ondansetron (ZOFRAN-ODT) 4 MG TbDL, DISSOLVE 2 TABLETS UNDER THE TONGUE EVERY 8 HOURS AS NEEDED, Disp: 30 tablet, Rfl: 5    OXYGEN-AIR DELIVERY SYSTEMS MISC, by Misc.(Non-Drug; Combo Route) route., Disp: , Rfl:     pravastatin (PRAVACHOL) 80 MG tablet, Take 1 tablet (80 mg total) by mouth once daily., Disp: 90 tablet, Rfl: 0    semaglutide (OZEMPIC) 2 mg/dose (8 mg/3 mL) PnIj, Inject 2 mg into the skin every 7 days., Disp: 9 mL, Rfl: 1    SYMBICORT 80-4.5 mcg/actuation HFAA, Inhale 2 puffs into the lungs 2 (two) times a day., Disp: 10.2 g, Rfl: 3    TYRVAYA 0.03 mg/spray sprm, , Disp: , Rfl:     diazePAM (VALIUM) 5 MG tablet, TAKE 1 TO 2 TABLETS BY MOUTH EVERY NIGHT AT BEDTIME, Disp: 60 tablet, Rfl: 2    Review of Systems   Constitutional:  Negative for activity change, appetite change, chills, diaphoresis, fatigue, fever and unexpected weight change.   HENT:  Negative for congestion, ear pain, hearing loss, nosebleeds, postnasal drip, rhinorrhea, sinus pressure, sinus pain, sneezing, sore throat, trouble swallowing and voice change.    Eyes:  Positive for visual disturbance (dry eyes). Negative for photophobia, pain, discharge and itching.   Respiratory:  Positive for wheezing. Negative for apnea, cough, chest tightness, shortness of breath and stridor.    Cardiovascular:  Positive for palpitations. Negative for chest pain and leg swelling.   Gastrointestinal:  Positive for constipation and diarrhea. Negative for abdominal distention, abdominal pain, blood in stool, nausea and vomiting.   Endocrine: Negative for cold intolerance, heat intolerance, polydipsia and  "polyuria.   Genitourinary:  Negative for difficulty urinating, dyspareunia, dysuria, flank pain, frequency, hematuria, menstrual problem, pelvic pain, urgency, vaginal discharge and vaginal pain.   Musculoskeletal:  Positive for arthralgias, joint swelling and neck pain. Negative for back pain, myalgias and neck stiffness.   Skin:  Negative for pallor.   Allergic/Immunologic: Negative for environmental allergies and food allergies.   Neurological:  Positive for weakness. Negative for dizziness, tremors, speech difficulty, light-headedness, numbness and headaches.   Hematological:  Does not bruise/bleed easily.   Psychiatric/Behavioral:  Positive for sleep disturbance. Negative for agitation, confusion, decreased concentration, dysphoric mood and suicidal ideas. The patient is not nervous/anxious.           Objective:          9/26/2023     9:46 AM 10/9/2023     6:01 AM 10/26/2023    12:09 PM 10/26/2023     1:57 PM 11/7/2023     8:20 AM 2/22/2024     9:42 AM   Vitals - 1 value per visit   SYSTOLIC  148  153 132 128   DIASTOLIC  87  78 80 86   Pulse  80  93 93 82   Temp  98.2 °F (36.8 °C)  98.2 °F (36.8 °C)  98.3 °F (36.8 °C)   Resp  16    16   SPO2  98 %   98 % 96 %   Weight (lb) 184  184  172 162   Weight (kg) 83.462  83.462  78.019 73.483   Height 5' 2" (1.575 m)  5' 2" (1.575 m)   5' 2" (1.575 m)   BMI (Calculated) 33.6  33.6   29.6   Pain Score   Five Four Zero Zero   (    Physical Exam  Vitals and nursing note reviewed.   Constitutional:       Appearance: Normal appearance.      Comments: overweight   HENT:      Head: Normocephalic and atraumatic.      Nose: Nose normal.      Mouth/Throat:      Mouth: Mucous membranes are moist.   Eyes:      Extraocular Movements: Extraocular movements intact.      Pupils: Pupils are equal, round, and reactive to light.   Neck:      Vascular: No carotid bruit.   Cardiovascular:      Rate and Rhythm: Normal rate and regular rhythm.      Pulses: Normal pulses.      Heart sounds: " Normal heart sounds.   Pulmonary:      Effort: Pulmonary effort is normal. No respiratory distress.      Breath sounds: Normal breath sounds. No stridor. No wheezing or rhonchi.   Abdominal:      General: Bowel sounds are normal. There is no distension.      Palpations: Abdomen is soft.      Tenderness: There is no abdominal tenderness.   Musculoskeletal:         General: No swelling, tenderness, deformity or signs of injury.   Lymphadenopathy:      Cervical: No cervical adenopathy.   Skin:     General: Skin is warm and dry.      Coloration: Skin is not jaundiced or pale.      Findings: No bruising or erythema.   Neurological:      General: No focal deficit present.      Mental Status: She is alert and oriented to person, place, and time.   Psychiatric:         Mood and Affect: Mood normal.         Thought Content: Thought content normal.         Judgment: Judgment normal.           Assessment:       1. Type 2 diabetes mellitus without complication, without long-term current use of insulin    2. Pure hypercholesterolemia    3. Sleep apnea in adult    4. Fatigue, unspecified type    5. Benign hypertension    6. Chronic insomnia         Plan:       Type 2 diabetes mellitus without complication, without long-term current use of insulin  -     Comprehensive Metabolic Panel; Future; Expected date: 02/22/2024  -     HEMOGLOBIN A1C; Future; Expected date: 02/22/2024    Pure hypercholesterolemia  -     Lipid Panel; Future; Expected date: 02/22/2024    Sleep apnea in adult  -     Ambulatory referral/consult to Pulmonology; Future; Expected date: 02/29/2024    Fatigue, unspecified type  -     CBC Auto Differential; Future; Expected date: 02/22/2024    Benign hypertension  -     CBC Auto Differential; Future; Expected date: 02/22/2024    Chronic insomnia  -     diazePAM (VALIUM) 5 MG tablet; TAKE 1 TO 2 TABLETS BY MOUTH EVERY NIGHT AT BEDTIME  Dispense: 60 tablet; Refill: 2      Follow up in about 3 months (around 5/22/2024)  for FOLLOW UP LABS, FOLLOW-UP STATUS, FOLLOW UP MEDICATIONS.        2/25/2024 Mine Palmer M.D.

## 2024-02-27 ENCOUNTER — PATIENT MESSAGE (OUTPATIENT)
Dept: FAMILY MEDICINE | Facility: CLINIC | Age: 49
End: 2024-02-27
Payer: MEDICAID

## 2024-02-27 RX ORDER — COLESEVELAM 180 1/1
1875 TABLET ORAL 2 TIMES DAILY WITH MEALS
Qty: 540 TABLET | Refills: 3 | Status: SHIPPED | OUTPATIENT
Start: 2024-02-27 | End: 2025-02-26

## 2024-03-07 ENCOUNTER — PATIENT MESSAGE (OUTPATIENT)
Dept: FAMILY MEDICINE | Facility: CLINIC | Age: 49
End: 2024-03-07
Payer: MEDICAID

## 2024-03-29 RX ORDER — PRAVASTATIN SODIUM 80 MG/1
80 TABLET ORAL DAILY
Qty: 90 TABLET | Refills: 0 | Status: SHIPPED | OUTPATIENT
Start: 2024-03-29

## 2024-03-29 RX ORDER — PRAVASTATIN SODIUM 80 MG/1
80 TABLET ORAL
Qty: 90 TABLET | Refills: 0 | Status: SHIPPED | OUTPATIENT
Start: 2024-03-29

## 2024-04-24 ENCOUNTER — PATIENT MESSAGE (OUTPATIENT)
Dept: ADMINISTRATIVE | Facility: OTHER | Age: 49
End: 2024-04-24
Payer: MEDICAID

## 2024-04-26 LAB
ALBUMIN SERPL-MCNC: 4.3 G/DL (ref 3.6–5.1)
ALBUMIN/GLOB SERPL: 1.4 (CALC) (ref 1–2.5)
ALP SERPL-CCNC: 79 U/L (ref 31–125)
ALT SERPL-CCNC: 14 U/L (ref 6–29)
AST SERPL-CCNC: 17 U/L (ref 10–35)
BASOPHILS # BLD AUTO: 63 CELLS/UL (ref 0–200)
BASOPHILS NFR BLD AUTO: 0.6 %
BILIRUB SERPL-MCNC: 0.3 MG/DL (ref 0.2–1.2)
BUN SERPL-MCNC: 11 MG/DL (ref 7–25)
BUN/CREAT SERPL: NORMAL (CALC) (ref 6–22)
CALCIUM SERPL-MCNC: 9.3 MG/DL (ref 8.6–10.2)
CHLORIDE SERPL-SCNC: 104 MMOL/L (ref 98–110)
CHOLEST SERPL-MCNC: 150 MG/DL
CHOLEST/HDLC SERPL: 2.7 (CALC)
CO2 SERPL-SCNC: 27 MMOL/L (ref 20–32)
CREAT SERPL-MCNC: 0.53 MG/DL (ref 0.5–0.99)
EGFR: 115 ML/MIN/1.73M2
EOSINOPHIL # BLD AUTO: 63 CELLS/UL (ref 15–500)
EOSINOPHIL NFR BLD AUTO: 0.6 %
ERYTHROCYTE [DISTWIDTH] IN BLOOD BY AUTOMATED COUNT: 13 % (ref 11–15)
GLOBULIN SER CALC-MCNC: 3.1 G/DL (CALC) (ref 1.9–3.7)
GLUCOSE SERPL-MCNC: 81 MG/DL (ref 65–99)
HBA1C MFR BLD: 5.3 % OF TOTAL HGB
HCT VFR BLD AUTO: 43.2 % (ref 35–45)
HDLC SERPL-MCNC: 56 MG/DL
HGB BLD-MCNC: 14 G/DL (ref 11.7–15.5)
LDLC SERPL CALC-MCNC: 75 MG/DL (CALC)
LYMPHOCYTES # BLD AUTO: 3906 CELLS/UL (ref 850–3900)
LYMPHOCYTES NFR BLD AUTO: 37.2 %
MCH RBC QN AUTO: 30.8 PG (ref 27–33)
MCHC RBC AUTO-ENTMCNC: 32.4 G/DL (ref 32–36)
MCV RBC AUTO: 94.9 FL (ref 80–100)
MONOCYTES # BLD AUTO: 567 CELLS/UL (ref 200–950)
MONOCYTES NFR BLD AUTO: 5.4 %
NEUTROPHILS # BLD AUTO: 5901 CELLS/UL (ref 1500–7800)
NEUTROPHILS NFR BLD AUTO: 56.2 %
NONHDLC SERPL-MCNC: 94 MG/DL (CALC)
PLATELET # BLD AUTO: 283 THOUSAND/UL (ref 140–400)
PMV BLD REES-ECKER: 11.1 FL (ref 7.5–12.5)
POTASSIUM SERPL-SCNC: 4.5 MMOL/L (ref 3.5–5.3)
PROT SERPL-MCNC: 7.4 G/DL (ref 6.1–8.1)
RBC # BLD AUTO: 4.55 MILLION/UL (ref 3.8–5.1)
SODIUM SERPL-SCNC: 141 MMOL/L (ref 135–146)
TRIGL SERPL-MCNC: 109 MG/DL
WBC # BLD AUTO: 10.5 THOUSAND/UL (ref 3.8–10.8)

## 2024-06-04 DIAGNOSIS — S22.39XD CLOSED FRACTURE OF ONE RIB WITH ROUTINE HEALING, UNSPECIFIED LATERALITY, SUBSEQUENT ENCOUNTER: Primary | ICD-10-CM

## 2024-06-05 ENCOUNTER — HOSPITAL ENCOUNTER (OUTPATIENT)
Dept: RADIOLOGY | Facility: HOSPITAL | Age: 49
Discharge: HOME | End: 2024-06-05
Payer: MEDICAID

## 2024-06-05 DIAGNOSIS — S22.39XD CLOSED FRACTURE OF ONE RIB WITH ROUTINE HEALING, UNSPECIFIED LATERALITY, SUBSEQUENT ENCOUNTER: ICD-10-CM

## 2024-06-05 PROCEDURE — 77080 DXA BONE DENSITY AXIAL: CPT | Mod: 26,,, | Performed by: RADIOLOGY

## 2024-06-05 PROCEDURE — 77080 DXA BONE DENSITY AXIAL: CPT | Mod: TC,PO

## 2024-10-25 DIAGNOSIS — Z12.31 ENCOUNTER FOR SCREENING MAMMOGRAM FOR BREAST CANCER: Primary | ICD-10-CM

## 2024-10-28 ENCOUNTER — HOSPITAL ENCOUNTER (OUTPATIENT)
Dept: RADIOLOGY | Facility: HOSPITAL | Age: 49
Discharge: HOME | End: 2024-10-28
Attending: INTERNAL MEDICINE
Payer: MEDICAID

## 2024-10-28 DIAGNOSIS — Z12.31 ENCOUNTER FOR SCREENING MAMMOGRAM FOR BREAST CANCER: ICD-10-CM

## 2024-10-28 PROCEDURE — 77067 SCR MAMMO BI INCL CAD: CPT | Mod: 26,,, | Performed by: RADIOLOGY

## 2024-10-28 PROCEDURE — 77063 BREAST TOMOSYNTHESIS BI: CPT | Mod: TC,PO

## 2024-10-28 PROCEDURE — 77067 SCR MAMMO BI INCL CAD: CPT | Mod: TC,PO

## 2024-10-28 PROCEDURE — 77063 BREAST TOMOSYNTHESIS BI: CPT | Mod: 26,,, | Performed by: RADIOLOGY

## 2024-11-19 ENCOUNTER — PATIENT MESSAGE (OUTPATIENT)
Dept: GASTROENTEROLOGY | Facility: CLINIC | Age: 49
End: 2024-11-19
Payer: MEDICAID

## 2025-06-27 DIAGNOSIS — G25.2 INTENTION TREMOR: Primary | ICD-10-CM

## 2025-06-27 DIAGNOSIS — R41.89 BRAIN FOG: ICD-10-CM

## 2025-06-27 DIAGNOSIS — R55 VASOVAGAL SYNCOPE: ICD-10-CM

## 2025-07-09 ENCOUNTER — HOSPITAL ENCOUNTER (OUTPATIENT)
Dept: RADIOLOGY | Facility: HOSPITAL | Age: 50
Discharge: HOME | End: 2025-07-09
Attending: NURSE PRACTITIONER
Payer: MEDICAID

## 2025-07-09 DIAGNOSIS — G25.2 INTENTION TREMOR: ICD-10-CM

## 2025-07-09 DIAGNOSIS — R41.89 BRAIN FOG: ICD-10-CM

## 2025-07-09 DIAGNOSIS — R55 VASOVAGAL SYNCOPE: ICD-10-CM

## 2025-07-09 PROCEDURE — 70551 MRI BRAIN STEM W/O DYE: CPT | Mod: TC,PO

## 2025-07-09 PROCEDURE — 70551 MRI BRAIN STEM W/O DYE: CPT | Mod: 26,,, | Performed by: RADIOLOGY

## (undated) DEVICE — SLEEVE TROCAR 5MMX100MM  CTS02

## (undated) DEVICE — GLOVE BIOGEL MICRO SURGEON PINK SZ 7.5

## (undated) DEVICE — SOLUTION NACL 0.9% 3000ML

## (undated) DEVICE — SYSTEM RETRIEVAL 180ML 5MM PORT UNIVERSAL FOR LAPAROSCOPIC

## (undated) DEVICE — CATHETER RADIAL TIG 4.0 OPTITORQUE 5X110

## (undated) DEVICE — TROCAR OPTICAL ZTHREAD 5MMX100MM CTF03

## (undated) DEVICE — NEEDLE INSUFFLATION 120MM 172015

## (undated) DEVICE — WARMER SCOPE SEE SHARP

## (undated) DEVICE — SUTURE VICRYL #0 27 UR-6 VCP603H

## (undated) DEVICE — SHEATH PINNACLE 5FRX10CM W/GUIDEWIRE

## (undated) DEVICE — ADHESIVE DERMABOND SKIN DNX12

## (undated) DEVICE — CATHETER DIAGNOSTIC DXTERITY 5FR JR4.0

## (undated) DEVICE — SUTURE MONOCRYL 4-0 PS-2 27 MCP426H

## (undated) DEVICE — SYRINGE HYPODERMC LL 22G 1.5 ECLIPSE 30

## (undated) DEVICE — CATHETER DIAGNOSTIC DXTERITY 5F PIGST

## (undated) DEVICE — SUCTION/IRRIGATOR W/TIP

## (undated) DEVICE — HEMOSTAT VASC BAND REG 24CM

## (undated) DEVICE — GUIDEWIRE J TIP EXCHANGE FIXED CORE 260

## (undated) DEVICE — CATHETER DIAGNOSTIC DXTERITY 5FR JL4.0

## (undated) DEVICE — SCISSORS 5MM APPLIED MEDICAL   CB030

## (undated) DEVICE — ADHESIVE TISSUE EXOFIN

## (undated) DEVICE — SHEATH INTRODUCER PRECISION ACCESS 6FX10

## (undated) DEVICE — Device

## (undated) DEVICE — CABLE MONOPOLAR 10FT DISPOSABLE

## (undated) DEVICE — SOLUTION IRRI NS BOTTLE 1000ML R5200-01

## (undated) DEVICE — DISSECTOR KITTNER 13300

## (undated) DEVICE — TRAY GENERAL LAPAROSCOPY

## (undated) DEVICE — PAD BOVIE ADULT

## (undated) DEVICE — APPLIER CLIP  5MM

## (undated) DEVICE — GUIDEWIRE J TIP BMW .014X190

## (undated) DEVICE — UNDERGLOVE BIOGEL PI MICRO BLUE SZ 8

## (undated) DEVICE — ELECTRODE OLSEN HOOK L-SHAPED 13INX330MM